# Patient Record
Sex: FEMALE | Race: BLACK OR AFRICAN AMERICAN | NOT HISPANIC OR LATINO | Employment: FULL TIME | URBAN - METROPOLITAN AREA
[De-identification: names, ages, dates, MRNs, and addresses within clinical notes are randomized per-mention and may not be internally consistent; named-entity substitution may affect disease eponyms.]

---

## 2018-07-26 ENCOUNTER — OFFICE VISIT (OUTPATIENT)
Dept: INTERNAL MEDICINE | Facility: CLINIC | Age: 53
End: 2018-07-26
Payer: COMMERCIAL

## 2018-07-26 ENCOUNTER — HOSPITAL ENCOUNTER (OUTPATIENT)
Dept: RADIOLOGY | Facility: HOSPITAL | Age: 53
Discharge: HOME | End: 2018-07-26
Attending: FAMILY MEDICINE
Payer: COMMERCIAL

## 2018-07-26 VITALS
HEART RATE: 74 BPM | WEIGHT: 153.44 LBS | BODY MASS INDEX: 24.66 KG/M2 | SYSTOLIC BLOOD PRESSURE: 122 MMHG | DIASTOLIC BLOOD PRESSURE: 82 MMHG | HEIGHT: 66 IN | TEMPERATURE: 99 F

## 2018-07-26 DIAGNOSIS — M79.642 PAIN IN BOTH HANDS: ICD-10-CM

## 2018-07-26 DIAGNOSIS — R63.4 WEIGHT LOSS: ICD-10-CM

## 2018-07-26 DIAGNOSIS — M79.89 SWELLING OF BOTH HANDS: ICD-10-CM

## 2018-07-26 DIAGNOSIS — D50.0 IRON DEFICIENCY ANEMIA DUE TO CHRONIC BLOOD LOSS: ICD-10-CM

## 2018-07-26 DIAGNOSIS — M79.89 SWELLING OF BOTH HANDS: Primary | ICD-10-CM

## 2018-07-26 DIAGNOSIS — M79.641 PAIN IN BOTH HANDS: ICD-10-CM

## 2018-07-26 PROCEDURE — 99204 OFFICE O/P NEW MOD 45 MIN: CPT | Mod: S$GLB,,, | Performed by: FAMILY MEDICINE

## 2018-07-26 PROCEDURE — 3008F BODY MASS INDEX DOCD: CPT | Mod: CPTII,S$GLB,, | Performed by: FAMILY MEDICINE

## 2018-07-26 PROCEDURE — 73120 X-RAY EXAM OF HAND: CPT | Mod: 50,TC,FY,PO

## 2018-07-26 PROCEDURE — 99999 PR PBB SHADOW E&M-NEW PATIENT-LVL III: CPT | Mod: PBBFAC,,, | Performed by: FAMILY MEDICINE

## 2018-07-26 PROCEDURE — 73120 X-RAY EXAM OF HAND: CPT | Mod: 26,50,, | Performed by: RADIOLOGY

## 2018-07-26 RX ORDER — IRON,CARBONYL/ASCORBIC ACID 100-250 MG
1 TABLET ORAL DAILY
COMMUNITY
Start: 2018-02-28 | End: 2018-12-04 | Stop reason: SDUPTHER

## 2018-07-26 RX ORDER — NAPROXEN AND ESOMEPRAZOLE MAGNESIUM 20; 500 MG/1; MG/1
1 TABLET, DELAYED RELEASE ORAL 2 TIMES DAILY
COMMUNITY
Start: 2018-01-09 | End: 2019-04-01

## 2018-07-26 NOTE — PROGRESS NOTES
"Subjective:      Patient ID: Ayesha Arcos is a 53 y.o. female.    Chief Complaint: Establish Care (physical)    HPI  52 yo female here to establish care.  She saw her PCP in Feb, outside/Dr. Wang and had annual/preop done.    She had R carpal tunnel earlier this year with Dr. Flores.  She is having a lot of swelling/pain in her hands.  Daily she feels the tightness and pain.  Using NSAIDs PRN.    No redness.  Swelling in feet as well.  Had injection in wrist/L hand for tendonitis and that was by hand specialist.  That was a few mos ago.  Has noticed her weight coming down.  Normal BMs, no diarrhea.  No palpitations.  Skin change mainly in hands.  Shiny/darker in color.  Was on HCTZ in past for BP, but that has been over a year since she has taken it.  Gyn done 8/2017  Colon 2/2017    Past Medical History:   Diagnosis Date    Carpal tunnel syndrome, bilateral     Hypertension      Family History   Problem Relation Age of Onset    Diabetes Mother     Heart disease Father     Hypertension Father     Early death Father         Early 50's    Stroke Paternal Uncle     Heart disease Paternal Uncle      Past Surgical History:   Procedure Laterality Date    CARPAL TUNNEL RELEASE      right    skin cancer removal      nose    TUBAL LIGATION       Social History   Substance Use Topics    Smoking status: Never Smoker    Smokeless tobacco: Never Used    Alcohol use Yes      Comment: social        /82 (BP Location: Right arm, Patient Position: Sitting, BP Method: Large (Manual))   Pulse 74   Temp 98.5 °F (36.9 °C) (Tympanic)   Ht 5' 5.5" (1.664 m)   Wt 69.6 kg (153 lb 7 oz)   LMP 06/25/2018   BMI 25.15 kg/m²     Review of Systems   Constitutional: Positive for unexpected weight change. Negative for activity change, appetite change, chills, diaphoresis, fatigue and fever.   HENT: Negative for ear pain, hearing loss, postnasal drip, rhinorrhea and tinnitus.    Eyes: Negative for visual disturbance. "   Respiratory: Negative for cough, shortness of breath and wheezing.    Cardiovascular: Negative for chest pain, palpitations and leg swelling.   Gastrointestinal: Negative for abdominal distention, abdominal pain, constipation and diarrhea.   Genitourinary: Negative for dysuria, frequency, hematuria and urgency.        Cycle had been gone 4 mos, had one in June.  Nothing since.     Musculoskeletal: Positive for arthralgias and joint swelling. Negative for back pain.   Neurological: Negative for weakness and headaches.   Hematological: Negative for adenopathy.   Psychiatric/Behavioral: Negative for confusion and decreased concentration.       Objective:     Physical Exam   Constitutional: She is oriented to person, place, and time. She appears well-developed and well-nourished. No distress.   HENT:   Right Ear: External ear normal.   Left Ear: External ear normal.   Nose: Nose normal.   Mouth/Throat: Oropharynx is clear and moist.   Eyes: Conjunctivae are normal. Pupils are equal, round, and reactive to light.   Neck: Normal range of motion. Neck supple. Carotid bruit is not present. No thyromegaly present.   Cardiovascular: Normal rate, regular rhythm and normal heart sounds.    Pulmonary/Chest: Effort normal and breath sounds normal. No respiratory distress. She has no wheezes. She has no rales.   Abdominal: Soft. Bowel sounds are normal. She exhibits no distension. There is no tenderness. There is no guarding.   Musculoskeletal: She exhibits edema.   BL hands are puffy/swollen  DIP in most fingers with some enlargement    BL feet are puffy  No pitting leg edema   Lymphadenopathy:     She has no cervical adenopathy.   Neurological: She is alert and oriented to person, place, and time. No cranial nerve deficit.   Skin: Skin is warm and dry. No rash noted.   Psychiatric: She has a normal mood and affect. Her behavior is normal. Judgment and thought content normal.   Nursing note and vitals reviewed.      No results  found for: WBC, HGB, HCT, PLT, CHOL, TRIG, HDL, LDLDIRECT, ALT, AST, NA, K, CL, CREATININE, BUN, CO2, TSH, PSA, INR, GLUF, HGBA1C, MICROALBUR    Assessment:     1. Swelling of both hands    2. Pain in both hands    3. Weight loss    4. Iron deficiency anemia due to chronic blood loss         Plan:     Swelling of both hands  -     CBC auto differential; Future; Expected date: 07/26/2018  -     Ferritin; Future; Expected date: 07/26/2018  -     Iron and TIBC; Future; Expected date: 07/26/2018  -     Basic metabolic panel; Future; Expected date: 07/26/2018  -     TSH; Future; Expected date: 07/26/2018  -     MOLLY; Future; Expected date: 07/26/2018  -     Sedimentation rate; Future; Expected date: 07/26/2018  -     X-Ray Hand 2 View Bilat; Future; Expected date: 07/26/2018  -     Uric acid; Future; Expected date: 07/26/2018    Pain in both hands  -     CBC auto differential; Future; Expected date: 07/26/2018  -     Ferritin; Future; Expected date: 07/26/2018  -     Iron and TIBC; Future; Expected date: 07/26/2018  -     Basic metabolic panel; Future; Expected date: 07/26/2018  -     TSH; Future; Expected date: 07/26/2018  -     MOLLY; Future; Expected date: 07/26/2018  -     Sedimentation rate; Future; Expected date: 07/26/2018  -     Uric acid; Future; Expected date: 07/26/2018    Weight loss  -     CBC auto differential; Future; Expected date: 07/26/2018  -     Ferritin; Future; Expected date: 07/26/2018  -     Iron and TIBC; Future; Expected date: 07/26/2018  -     Basic metabolic panel; Future; Expected date: 07/26/2018  -     TSH; Future; Expected date: 07/26/2018  -     MOLLY; Future; Expected date: 07/26/2018  -     Sedimentation rate; Future; Expected date: 07/26/2018  -     Hemoglobin A1c; Future; Expected date: 01/25/2019    Iron deficiency anemia due to chronic blood loss    Reviewed outside records  Will update labs and screen for autoimmune process as well  Check TSH and iron levels  Decrease NSAIDs and try  tylenol  Drink more water  Xrays of BL hands show thinning of the bones and degenerative changes  F/u to be determined once above is reviewed

## 2018-07-30 ENCOUNTER — DOCUMENTATION ONLY (OUTPATIENT)
Dept: RHEUMATOLOGY | Facility: CLINIC | Age: 53
End: 2018-07-30

## 2018-07-30 ENCOUNTER — TELEPHONE (OUTPATIENT)
Dept: INTERNAL MEDICINE | Facility: CLINIC | Age: 53
End: 2018-07-30

## 2018-07-30 DIAGNOSIS — R76.8 POSITIVE ANA (ANTINUCLEAR ANTIBODY): ICD-10-CM

## 2018-07-30 DIAGNOSIS — M25.541 ARTHRALGIA OF BOTH HANDS: Primary | ICD-10-CM

## 2018-07-30 DIAGNOSIS — M25.542 ARTHRALGIA OF BOTH HANDS: Primary | ICD-10-CM

## 2018-07-30 NOTE — TELEPHONE ENCOUNTER
Pls let pt know that her hand xrays show arthritic changes.    Her labs show a +MOLLY, but the remaining lupus panel is neg.  Her inflammatory marker is mildly elevated.  I would recommend seeing Rheumatology, see if you can get her in with new one, Dr. Thomas.  Her iron levels are mildly low, continue daily iron.  Normal sugar/thyroid levels.

## 2018-07-30 NOTE — TELEPHONE ENCOUNTER
I returned call and advised megha that we will call her once results are back and she expressed understanding.ernie

## 2018-08-02 ENCOUNTER — TELEPHONE (OUTPATIENT)
Dept: RHEUMATOLOGY | Facility: CLINIC | Age: 53
End: 2018-08-02

## 2018-08-03 ENCOUNTER — OFFICE VISIT (OUTPATIENT)
Dept: RHEUMATOLOGY | Facility: CLINIC | Age: 53
End: 2018-08-03
Payer: COMMERCIAL

## 2018-08-03 VITALS
DIASTOLIC BLOOD PRESSURE: 77 MMHG | SYSTOLIC BLOOD PRESSURE: 130 MMHG | BODY MASS INDEX: 24.94 KG/M2 | HEIGHT: 66 IN | HEART RATE: 83 BPM | WEIGHT: 155.19 LBS

## 2018-08-03 DIAGNOSIS — M25.50 ARTHRALGIA, UNSPECIFIED JOINT: Primary | ICD-10-CM

## 2018-08-03 DIAGNOSIS — M05.9 SEROPOSITIVE RHEUMATOID ARTHRITIS: ICD-10-CM

## 2018-08-03 DIAGNOSIS — R76.8 ANA POSITIVE: ICD-10-CM

## 2018-08-03 PROCEDURE — 99205 OFFICE O/P NEW HI 60 MIN: CPT | Mod: S$GLB,,, | Performed by: INTERNAL MEDICINE

## 2018-08-03 PROCEDURE — 3008F BODY MASS INDEX DOCD: CPT | Mod: CPTII,S$GLB,, | Performed by: INTERNAL MEDICINE

## 2018-08-03 PROCEDURE — 99999 PR PBB SHADOW E&M-EST. PATIENT-LVL III: CPT | Mod: PBBFAC,,, | Performed by: INTERNAL MEDICINE

## 2018-08-03 RX ORDER — PREDNISONE 5 MG/1
5 TABLET ORAL DAILY
Qty: 60 TABLET | Refills: 2 | Status: SHIPPED | OUTPATIENT
Start: 2018-08-03 | End: 2018-08-10 | Stop reason: SDUPTHER

## 2018-08-03 NOTE — PATIENT INSTRUCTIONS
Prednisone tablets  What is this medicine?  PREDNISONE (PRED ni sone) is a corticosteroid. It is commonly used to treat inflammation of the skin, joints, lungs, and other organs. Common conditions treated include asthma, allergies, and arthritis. It is also used for other conditions, such as blood disorders and diseases of the adrenal glands.  How should I use this medicine?  Take this medicine by mouth with a glass of water. Follow the directions on the prescription label. Take this medicine with food. If you are taking this medicine once a day, take it in the morning. Do not take more medicine than you are told to take. Do not suddenly stop taking your medicine because you may develop a severe reaction. Your doctor will tell you how much medicine to take. If your doctor wants you to stop the medicine, the dose may be slowly lowered over time to avoid any side effects.  Talk to your pediatrician regarding the use of this medicine in children. Special care may be needed.  What side effects may I notice from receiving this medicine?  Side effects that you should report to your doctor or health care professional as soon as possible:  · allergic reactions like skin rash, itching or hives, swelling of the face, lips, or tongue  · changes in emotions or moods  · changes in vision  · depressed mood  · eye pain  · fever or chills, cough, sore throat, pain or difficulty passing urine  · increased thirst  · swelling of ankles, feet  Side effects that usually do not require medical attention (report to your doctor or health care professional if they continue or are bothersome):  · confusion, excitement, restlessness  · headache  · nausea, vomiting  · skin problems, acne, thin and shiny skin  · trouble sleeping  · weight gain  What may interact with this medicine?  Do not take this medicine with any of the following medications:  · metyrapone  · mifepristone  This medicine may also interact with the following  medications:  · aminoglutethimide  · amphotericin B  · aspirin and aspirin-like medicines  · barbiturates  · certain medicines for diabetes, like glipizide or glyburide  · cholestyramine  · cholinesterase inhibitors  · cyclosporine  · digoxin  · diuretics  · ephedrine  · female hormones, like estrogens and birth control pills  · isoniazid  · ketoconazole  · NSAIDS, medicines for pain and inflammation, like ibuprofen or naproxen  · phenytoin  · rifampin  · toxoids  · vaccines  · warfarin  What if I miss a dose?  If you miss a dose, take it as soon as you can. If it is almost time for your next dose, talk to your doctor or health care professional. You may need to miss a dose or take an extra dose. Do not take double or extra doses without advice.  Where should I keep my medicine?  Keep out of the reach of children.  Store at room temperature between 15 and 30 degrees C (59 and 86 degrees F). Protect from light. Keep container tightly closed. Throw away any unused medicine after the expiration date.  What should I tell my health care provider before I take this medicine?  They need to know if you have any of these conditions:  · Cushing's syndrome  · diabetes  · glaucoma  · heart disease  · high blood pressure  · infection (especially a virus infection such as chickenpox, cold sores, or herpes)  · kidney disease  · liver disease  · mental illness  · myasthenia gravis  · osteoporosis  · seizures  · stomach or intestine problems  · thyroid disease  · an unusual or allergic reaction to lactose, prednisone, other medicines, foods, dyes, or preservatives  · pregnant or trying to get pregnant  · breast-feeding  What should I watch for while using this medicine?  Visit your doctor or health care professional for regular checks on your progress. If you are taking this medicine over a prolonged period, carry an identification card with your name and address, the type and dose of your medicine, and your doctor's name and  address.  This medicine may increase your risk of getting an infection. Tell your doctor or health care professional if you are around anyone with measles or chickenpox, or if you develop sores or blisters that do not heal properly.  If you are going to have surgery, tell your doctor or health care professional that you have taken this medicine within the last twelve months.  Ask your doctor or health care professional about your diet. You may need to lower the amount of salt you eat.  This medicine may affect blood sugar levels. If you have diabetes, check with your doctor or health care professional before you change your diet or the dose of your diabetic medicine.  NOTE:This sheet is a summary. It may not cover all possible information. If you have questions about this medicine, talk to your doctor, pharmacist, or health care provider. Copyright© 2017 [employer]

## 2018-08-03 NOTE — TELEPHONE ENCOUNTER
Notified pt of results and recommendations. She verbalized understanding. Pt has rheumatology appt today

## 2018-08-03 NOTE — PROGRESS NOTES
RHEUMATOLOGY OUTPATIENT CLINIC NOTE    8/3/2018    Attending Rheumatologist: Perez Thomas  Primary Care Provider: Primary Doctor No   Physician Requesting Consultation: Aaareferral Self  No address on file  Chief Complaint/Reason For Consultation:  Consult for joint pain and + MOLLY      Subjective:       HPI  Ayesha Arcos is a 53 y.o. Black or  female with joint pain and swelling to assess for rheumatic disease.  Patient's main complaint is hand arthralgias and swelling.  Started approximately on 2/2018, mostly on PIPs, MCPs, and wrists b/l.  Reports association with stiffness of approximately 30 minutes.  Notes inactivity aggravates sx.  Difficulty closing her fists, opening doors/jars due to pain.  No significant response to OTC medication or topical therapy.  Denies any Raynaud's phenomenon, rash, /GI complaints.      Review of Systems   Constitutional: Negative for fever and weight loss.   HENT:        Denies eye or mouth sicca symptoms, denies oral ulcers, denies parotid swelling, denies swollen glands.   Eyes: Negative for blurred vision, pain and redness.   Respiratory: Negative for cough, hemoptysis, sputum production and shortness of breath.    Cardiovascular: Negative for chest pain, orthopnea, leg swelling and PND.   Gastrointestinal: Negative for abdominal pain, blood in stool, constipation, diarrhea and heartburn.        Denies dysphagia.   Genitourinary: Negative for dysuria and hematuria.        Denies genital ulcers or sicca symptoms, denies foaming of the urine, denies nephrolithiasis.   Musculoskeletal: Positive for joint pain. Negative for back pain, myalgias and neck pain.        +Stiffness  +swelling   Skin: Negative for rash.        Denies photosensitivity, denies alopecia, denies sclerodactyly, denies Raynaud's phenomenon,denies digital ulcers, no psoriasis, ulcerations, no purpura, denies nodules.   Neurological: Negative for tingling, sensory change, focal weakness,  seizures, weakness and headaches.        Denies transient ischemic attack, denies strokes, denies seizures   Endo/Heme/Allergies: Does not bruise/bleed easily.        Denies fetal loss,frequent infections, denies deep vein thrombosis or pulmonary embolism.   Psychiatric/Behavioral: Negative for depression and suicidal ideas.   All other systems reviewed and are negative.    Past Medical History:   Diagnosis Date    Carpal tunnel syndrome, bilateral     Hypertension      Past Surgical History:   Procedure Laterality Date    CARPAL TUNNEL RELEASE      right    skin cancer removal      nose    TUBAL LIGATION       Family History   Problem Relation Age of Onset    Diabetes Mother     Heart disease Father     Hypertension Father     Early death Father         Early 50's    Stroke Paternal Uncle     Heart disease Paternal Uncle      History   Alcohol Use    Yes     Comment: social      History   Smoking Status    Never Smoker   Smokeless Tobacco    Never Used     History   Drug Use No       Current Outpatient Prescriptions:     diclofenac sodium (PENNSAID) 2 % SoPk, Apply 40 mg topically 2 (two) times daily., Disp: , Rfl:     iron-vitamin C 100-250 mg, ICAR-C, 100-250 mg Tab, Take 1 tablet by mouth once daily., Disp: , Rfl:     multivit-min/FA/lycopen/lutein (CENTRUM SILVER ULTRA MEN'S ORAL), Take 1 tablet by mouth once daily., Disp: , Rfl:     naproxen-esomeprazole (VIMOVO) 500-20 mg TbID, Take 1 tablet by mouth 2 (two) times daily., Disp: , Rfl:     predniSONE (DELTASONE) 5 MG tablet, Take 1 tablet (5 mg total) by mouth once daily., Disp: 60 tablet, Rfl: 2       Objective:         Vitals:    08/03/18 1630   BP: 130/77   Pulse: 83     Physical Exam   Nursing note and vitals reviewed.  Constitutional: She is oriented to person, place, and time and well-developed, well-nourished, and in no distress.   HENT:   Head: Normocephalic.   no oral ulcers, normal pooling of saliva.  no parotid enlargement.    Eyes: Conjunctivae are normal. Pupils are equal, round, and reactive to light.   Absent Episcleritis/scleritis.   Neck: Normal range of motion.   Cardiovascular: Normal rate and regular rhythm.    No murmur heard.  Pulmonary/Chest: Effort normal and breath sounds normal. No respiratory distress. She has no rales.   Abdominal: Soft. She exhibits no mass. There is no tenderness.   Lymphadenopathy:     She has no cervical adenopathy.   Neurological: She is alert and oriented to person, place, and time. No cranial nerve deficit. Gait normal.   absent sensory deficits  muscle strength 5/5 through.   SLR -, Lhermitte's sign - Spurling's test -   Skin: No rash noted.     No Skin fibrosis, teleangiectasias, rashes, discoid lesions, purpura, skin ulcers, nodules, or livedo reticularis, nail pitting.    Capillaroscopy: normal     Musculoskeletal: Normal range of motion. She exhibits no edema, tenderness or deformity.   Synovitis + / Tenderness +  / Warmth +  Present on PIPs, mostly MCPs and Wrists    : diminished    AROM: intact, painful wrist extension.  PROM: intact    Devices used by patient: none     Reviewed old and all outside pertinent medical records available.    All lab results personally reviewed and interpreted by me.  Lab Results   Component Value Date    WBC 9.79 07/26/2018    HGB 12.0 07/26/2018    HCT 41.1 07/26/2018    MCV 84 07/26/2018    MCH 24.4 (L) 07/26/2018    MCHC 29.2 (L) 07/26/2018    RDW 17.2 (H) 07/26/2018     (H) 07/26/2018    MPV 10.1 07/26/2018       Lab Results   Component Value Date     07/26/2018    K 4.1 07/26/2018     07/26/2018    CO2 23 07/26/2018    GLU 84 07/26/2018    BUN 8 07/26/2018    CALCIUM 9.6 07/26/2018       No results found for: COLORU, APPEARANCEUA, SPECGRAV, PHUR, PROTEINUA, GLUCOSEU, KETONESU, BLOODU, LEUKOCYTESUR, NITRITE, UROBILINOGEN    No results found for: CRP    Lab Results   Component Value Date    SEDRATE 27 (H) 07/26/2018       Lab Results    Component Value Date    SEDRATE 27 (H) 07/26/2018       No components found for: 25OHVITDTOT, 46QYEPTX8, 79WVXVPA5, METHODNOTE    Lab Results   Component Value Date    URICACID 4.5 07/26/2018       No components found for: TSPOTTB    Rheum Labs:  MOLLY HEP-2 Titer Positive >=1:2560 Homogeneous      Anti Sm Antibody 0.00 - 19.99 EU 1.56    Anti-Sm Interpretation Negative Negative    Comment: <20 EU...............Negative   20 - 25 EU...........Borderline   >25 EU...............Positive   Borderline results are repeated before reporting. If   repeat results are still borderline, the sample has no   significant antibody.    Anti-SSA Antibody 0.00 - 19.99 EU 1.71    Anti-SSA Interpretation Negative Negative    Comment: <20 EU...............Negative   20 - 25 EU...........Borderline   >25 EU...............Positive   Borderline results are repeated before reporting. If   repeat results are still borderline, the sample has no   significant antibody.    Anti-SSB Antibody 0.00 - 19.99 EU 0.82    Anti-SSB Interpretation Negative Negative    Comment: <20 EU...............Negative   20 - 25 EU...........Borderline   >25 EU...............Positive   Borderline results are repeated before reporting. If   repeat results are still borderline, the sample has no   significant antibody.    ds DNA Ab Negative 1:10 Negative 1:10    Comment: Performed by fluorescent crithidia assay.   Anti Sm/RNP Antibody 0.00 - 19.99 EU 4.58    Anti-Sm/RNP Interpretation Negative Negative    Comment: <20 EU...............Negative   20 - 25 EU...........Borderline   >25 EU...............Positive   Borderline results are repeated before reporting. If   repeat results are still borderline, the sample has no   significant antibody.        Imaging:  All imaging reviewed and independently  interpreted by me.  XR hands 7/2018  The bones appear demineralized.  Multi articular degenerative changes are seen including at the 1st carpometacarpal joint and at the  distal interphalangeal joints with osteophyte formation.  Findings are most pronounced at the right 5th distal interphalangeal joint with possible mild erosive changes.     ASSESSMENT / PLAN:     Ayesha Arcos is a 53 y.o. Black or  female with:    1. Arthralgia, unspecified joint  -     high likelyhood of inflammatory arthritis, currently on flare.  -     will complete w/u and start therapy with PDN for now  -     C3 complement;   -     C4 complement;  -     C-reactive protein;   -     Cyclic citrul peptide antibody, IgG;  -     Rheumatoid factor;   -     Hepatitis panel,   -     Quantiferon Gold TB;    2. MOLLY +  -     active arthritis but no other features of CTD at this time  -     C3 complement;   -     C4 complement;  -     Anti-DNA antibody, double-stranded;  -     DRVVT;   -     Cardiolipin antibody;   -     Beta-2 glycoprotein antibodies;   -     Protein / creatinine ratio, urine;   -     Urinalysis;     3. PDN use  -     discussed clinical significant side effects of therapy     Return to care in 1 month    Method of contact with patient concerns: MyChart attn Rheumatology    ** Addendum:  Labs reviewed, no doubt regarding diagnosis of seropositive rheumatoid arthritis.  Discussed with patient.  Continue prednisone 10 mg daily.  Will start methotrexate goal of 15 mg per week with folic acid supplementation.      Perez Thomas M.D.  Rheumatology Department   [facility] [place] [address], [place] [address]  Phone: [phone]  Fax: [phone]

## 2018-08-08 ENCOUNTER — LAB VISIT (OUTPATIENT)
Dept: LAB | Facility: HOSPITAL | Age: 53
End: 2018-08-08
Attending: INTERNAL MEDICINE
Payer: COMMERCIAL

## 2018-08-08 DIAGNOSIS — M25.50 ARTHRALGIA, UNSPECIFIED JOINT: ICD-10-CM

## 2018-08-08 LAB
C3 SERPL-MCNC: 118 MG/DL
C4 SERPL-MCNC: 20 MG/DL
CCP AB SER IA-ACNC: 149.4 U/ML
CRP SERPL-MCNC: 1.5 MG/L
ERYTHROCYTE [SEDIMENTATION RATE] IN BLOOD BY WESTERGREN METHOD: 13 MM/HR
RHEUMATOID FACT SERPL-ACNC: 57 IU/ML

## 2018-08-08 PROCEDURE — 36415 COLL VENOUS BLD VENIPUNCTURE: CPT | Mod: PO

## 2018-08-08 PROCEDURE — 86200 CCP ANTIBODY: CPT

## 2018-08-08 PROCEDURE — 86225 DNA ANTIBODY NATIVE: CPT

## 2018-08-08 PROCEDURE — 86431 RHEUMATOID FACTOR QUANT: CPT

## 2018-08-08 PROCEDURE — 86160 COMPLEMENT ANTIGEN: CPT | Mod: 59

## 2018-08-08 PROCEDURE — 86146 BETA-2 GLYCOPROTEIN ANTIBODY: CPT | Mod: 59

## 2018-08-08 PROCEDURE — 86160 COMPLEMENT ANTIGEN: CPT

## 2018-08-08 PROCEDURE — 86147 CARDIOLIPIN ANTIBODY EA IG: CPT | Mod: 59

## 2018-08-08 PROCEDURE — 86140 C-REACTIVE PROTEIN: CPT

## 2018-08-08 PROCEDURE — 85651 RBC SED RATE NONAUTOMATED: CPT | Mod: PO

## 2018-08-08 PROCEDURE — 80074 ACUTE HEPATITIS PANEL: CPT

## 2018-08-08 PROCEDURE — 85613 RUSSELL VIPER VENOM DILUTED: CPT

## 2018-08-09 LAB
DSDNA AB SER-ACNC: NORMAL [IU]/ML
HAV IGM SERPL QL IA: NEGATIVE
HBV CORE IGM SERPL QL IA: NEGATIVE
HBV SURFACE AG SERPL QL IA: NEGATIVE
HCV AB SERPL QL IA: NEGATIVE

## 2018-08-10 ENCOUNTER — TELEPHONE (OUTPATIENT)
Dept: RHEUMATOLOGY | Facility: CLINIC | Age: 53
End: 2018-08-10

## 2018-08-10 LAB
CARDIOLIPIN IGG SER IA-ACNC: <9.4 GPL
CARDIOLIPIN IGM SER IA-ACNC: <9.4 MPL
LA PPP-IMP: NEGATIVE

## 2018-08-10 RX ORDER — METHOTREXATE 2.5 MG/1
15 TABLET ORAL
Qty: 24 TABLET | Refills: 3 | Status: CANCELLED | OUTPATIENT
Start: 2018-08-10

## 2018-08-10 RX ORDER — FOLIC ACID 1 MG/1
1 TABLET ORAL DAILY
Qty: 30 TABLET | Refills: 4 | Status: SHIPPED | OUTPATIENT
Start: 2018-08-10 | End: 2018-09-04 | Stop reason: SDUPTHER

## 2018-08-10 RX ORDER — METHOTREXATE 2.5 MG/1
15 TABLET ORAL
Qty: 24 TABLET | Refills: 3 | OUTPATIENT
Start: 2018-08-10 | End: 2018-08-13 | Stop reason: SDUPTHER

## 2018-08-10 RX ORDER — PREDNISONE 5 MG/1
10 TABLET ORAL DAILY
Qty: 30 TABLET | Refills: 2 | Status: SHIPPED | OUTPATIENT
Start: 2018-08-10 | End: 2018-09-04 | Stop reason: SDUPTHER

## 2018-08-11 LAB
B2 GLYCOPROT1 IGA SER QL: <9 SAU
B2 GLYCOPROT1 IGG SER QL: <9 SGU
B2 GLYCOPROT1 IGM SER QL: <9 SMU

## 2018-08-13 ENCOUNTER — TELEPHONE (OUTPATIENT)
Dept: RHEUMATOLOGY | Facility: CLINIC | Age: 53
End: 2018-08-13

## 2018-08-13 NOTE — TELEPHONE ENCOUNTER
----- Message from Radha Lyon sent at 8/13/2018  1:13 PM CDT -----  Contact: pt  Calling to speak with officer regarding her test results.

## 2018-08-14 RX ORDER — METHOTREXATE 2.5 MG/1
15 TABLET ORAL
Qty: 24 TABLET | Refills: 3 | OUTPATIENT
Start: 2018-08-14 | End: 2018-09-04 | Stop reason: SDUPTHER

## 2018-08-14 NOTE — TELEPHONE ENCOUNTER
----- Message from Jose Martin Guevara LPN sent at 8/13/2018  4:52 PM CDT -----  Jose Martin Guevara LPN at 8/10/2018 12:04 PM     Status: Signed       Spoke with pt and advised her the Folic acid and prednisone was sent over and the MTX was printed. Reviewed over MTX directions with patient. Pt verbalized understanding.       MTX RX??

## 2018-08-15 ENCOUNTER — TELEPHONE (OUTPATIENT)
Dept: RHEUMATOLOGY | Facility: CLINIC | Age: 53
End: 2018-08-15

## 2018-08-15 NOTE — TELEPHONE ENCOUNTER
----- Message from Scarlett Burkett sent at 8/15/2018  2:50 PM CDT -----  Pt needs call back rg medication that she is suppose to be taking..[phone]

## 2018-08-15 NOTE — TELEPHONE ENCOUNTER
----- Message from Marcy Tavares sent at 8/15/2018  4:38 PM CDT -----  Contact: pt  The pt states she is returning a missed call, no additional info given, the pt can be reached at [phone]///[id number]

## 2018-08-27 ENCOUNTER — TELEPHONE (OUTPATIENT)
Dept: RHEUMATOLOGY | Facility: CLINIC | Age: 53
End: 2018-08-27

## 2018-08-27 NOTE — TELEPHONE ENCOUNTER
----- Message from Mary Chamberlain sent at 8/27/2018 11:50 AM CDT -----  Contact: Patient  Patient called to speak with the nurse; she is having some side effects from the medication she just started. She started it about 3 weeks ago. She is having stomach cramps and constipation. She wants to know what she should do. She can be contacted at [phone].    Thanks,  Mary

## 2018-08-29 ENCOUNTER — TELEPHONE (OUTPATIENT)
Dept: RHEUMATOLOGY | Facility: CLINIC | Age: 53
End: 2018-08-29

## 2018-08-29 NOTE — TELEPHONE ENCOUNTER
Spoke with patient,. States that last week after she took Methotrexate , Prednisone and Iron she was constipated . She stopped her Iron 2 days ago , and is due for Methotrexate today. Patient informed that Iron will constipate

## 2018-08-29 NOTE — TELEPHONE ENCOUNTER
----- Message from Heriberto Nagel sent at 8/29/2018 10:39 AM CDT -----  Contact: Pt  Please give pt a call at ..[phone] (home) she is returning the nurse call.

## 2018-08-31 DIAGNOSIS — Z12.39 BREAST CANCER SCREENING: ICD-10-CM

## 2018-09-04 ENCOUNTER — OFFICE VISIT (OUTPATIENT)
Dept: RHEUMATOLOGY | Facility: CLINIC | Age: 53
End: 2018-09-04
Payer: COMMERCIAL

## 2018-09-04 VITALS
SYSTOLIC BLOOD PRESSURE: 125 MMHG | HEIGHT: 65 IN | WEIGHT: 157.44 LBS | DIASTOLIC BLOOD PRESSURE: 84 MMHG | HEART RATE: 82 BPM | BODY MASS INDEX: 26.23 KG/M2

## 2018-09-04 DIAGNOSIS — M06.9 RHEUMATOID ARTHRITIS, INVOLVING UNSPECIFIED SITE, UNSPECIFIED RHEUMATOID FACTOR PRESENCE: Primary | ICD-10-CM

## 2018-09-04 PROCEDURE — 3008F BODY MASS INDEX DOCD: CPT | Mod: CPTII,S$GLB,, | Performed by: INTERNAL MEDICINE

## 2018-09-04 PROCEDURE — 99999 PR PBB SHADOW E&M-EST. PATIENT-LVL III: CPT | Mod: PBBFAC,,, | Performed by: INTERNAL MEDICINE

## 2018-09-04 PROCEDURE — 99214 OFFICE O/P EST MOD 30 MIN: CPT | Mod: S$GLB,,, | Performed by: INTERNAL MEDICINE

## 2018-09-04 RX ORDER — HYDROXYCHLOROQUINE SULFATE 200 MG/1
400 TABLET, FILM COATED ORAL DAILY
Qty: 60 TABLET | Refills: 3 | Status: SHIPPED | OUTPATIENT
Start: 2018-09-04 | End: 2018-12-04 | Stop reason: SDUPTHER

## 2018-09-04 RX ORDER — METHOTREXATE 2.5 MG/1
15 TABLET ORAL
Qty: 24 TABLET | Refills: 3 | Status: SHIPPED | OUTPATIENT
Start: 2018-09-04 | End: 2018-12-04

## 2018-09-04 RX ORDER — PREDNISONE 5 MG/1
10 TABLET ORAL DAILY
Qty: 30 TABLET | Refills: 0 | Status: SHIPPED | OUTPATIENT
Start: 2018-09-04 | End: 2018-10-04

## 2018-09-04 RX ORDER — FOLIC ACID 1 MG/1
1 TABLET ORAL DAILY
Qty: 30 TABLET | Refills: 4 | Status: SHIPPED | OUTPATIENT
Start: 2018-09-04 | End: 2018-12-04 | Stop reason: SDUPTHER

## 2018-09-04 NOTE — PROGRESS NOTES
RHEUMATOLOGY OUTPATIENT CLINIC NOTE    9/4/2018    Attending Rheumatologist: Perez Thomas  Primary Care Provider: Neel Matos MD   Physician Requesting Consultation: No referring provider defined for this encounter.  Chief Complaint/Reason For Consultation:  Consult for joint pain and + MOLLY      Subjective:       JOSE Arcos is a 53 y.o. Black or  female  comes follow-up for positive antibodies and arthritis.      Patient was last seen in early August.  Clinical picture suggestive of inflammatory arthritis.   Presentation and workup consistent with seropositive rheumatoid arthritis, with early erosions.   Was started on prednisone and methotrexate, with very good results per patient.  No acute complaints today.  Refers morning stiffness resolved and swelling going down.  Slight tenderness remains on the left wrist.  Tolerating methotrexate well.  Denies any rash,  Raynaud's,  or GI complaints.     Review of Systems   Constitutional: Negative for fever and weight loss.   HENT:        Denies eye or mouth sicca symptoms, denies oral ulcers, denies parotid swelling, denies swollen glands.   Eyes: Negative for blurred vision.   Respiratory: Negative for cough and shortness of breath.    Cardiovascular: Negative for chest pain.   Gastrointestinal: Negative for abdominal pain, blood in stool, constipation and diarrhea.   Genitourinary: Negative for dysuria and hematuria.        Denies genital ulcers or sicca symptoms, denies foaming of the urine, denies nephrolithiasis.   Musculoskeletal: Negative for joint pain.        +swelling   Skin: Negative for rash.        denies Raynaud's phenomenon   Neurological: Negative for tingling, sensory change, focal weakness, seizures, weakness and headaches.        Denies transient ischemic attack, denies strokes, denies seizures   Endo/Heme/Allergies: Does not bruise/bleed easily.        Denies fetal loss, frequent infections, denies deep vein  thrombosis or pulmonary embolism.   All other systems reviewed and are negative.    Past Medical History:   Diagnosis Date    Carpal tunnel syndrome, bilateral     Hypertension      Past Surgical History:   Procedure Laterality Date    CARPAL TUNNEL RELEASE      right    skin cancer removal      nose    TUBAL LIGATION       Family History   Problem Relation Age of Onset    Diabetes Mother     Heart disease Father     Hypertension Father     Early death Father         Early 50's    Stroke Paternal Uncle     Heart disease Paternal Uncle      Social History     Substance and Sexual Activity   Alcohol Use Yes    Comment: social      Social History     Tobacco Use   Smoking Status Never Smoker   Smokeless Tobacco Never Used     Social History     Substance and Sexual Activity   Drug Use No       Current Outpatient Medications:     diclofenac sodium (PENNSAID) 2 % SoPk, Apply 40 mg topically 2 (two) times daily., Disp: , Rfl:     folic acid (FOLVITE) 1 MG tablet, Take 1 tablet (1 mg total) by mouth once daily., Disp: 30 tablet, Rfl: 4    methotrexate 2.5 MG Tab, Take 6 tablets (15 mg total) by mouth every 7 days., Disp: 24 tablet, Rfl: 3    multivit-min/FA/lycopen/lutein (CENTRUM SILVER ULTRA MEN'S ORAL), Take 1 tablet by mouth once daily., Disp: , Rfl:     naproxen-esomeprazole (VIMOVO) 500-20 mg TbID, Take 1 tablet by mouth 2 (two) times daily., Disp: , Rfl:     predniSONE (DELTASONE) 5 MG tablet, Take 2 tablets (10 mg total) by mouth once daily., Disp: 30 tablet, Rfl: 0    hydroxychloroquine (PLAQUENIL) 200 mg tablet, Take 2 tablets (400 mg total) by mouth once daily., Disp: 60 tablet, Rfl: 3    iron-vitamin C 100-250 mg, ICAR-C, 100-250 mg Tab, Take 1 tablet by mouth once daily., Disp: , Rfl:        Objective:         Vitals:    09/04/18 0814   BP: 125/84   Pulse: 82     Physical Exam   Nursing note and vitals reviewed.  Constitutional: She is oriented to person, place, and time and well-developed,  well-nourished, and in no distress.   HENT:   Head: Normocephalic.   Eyes: Conjunctivae are normal.   Absent Episcleritis/scleritis.   Neck: Normal range of motion.   Cardiovascular: Normal rate.    Pulmonary/Chest: Effort normal. No respiratory distress.   Abdominal: She exhibits no distension.   Neurological: She is alert and oriented to person, place, and time. No cranial nerve deficit. Gait normal.   absent sensory deficits  muscle strength 5/5 through.   Skin: No rash noted.     No Skin fibrosis, teleangiectasias, rashes, discoid lesions, purpura, skin ulcers, nodules, or livedo reticularis, nail pitting.    Capillaroscopy: + dilated loops,  No capillary dropping or micro hemorrhages.     Musculoskeletal: Normal range of motion. She exhibits edema and tenderness. She exhibits no deformity.   Synovitis + / Tenderness +  / Warmth +  Chronic swelling PIP is and wrists.    :   Good    AROM: intact.  PROM: intact    Devices used by patient: none     Reviewed old and all outside pertinent medical records available.    All lab results personally reviewed and interpreted by me.  Lab Results   Component Value Date    WBC 9.79 07/26/2018    HGB 12.0 07/26/2018    HCT 41.1 07/26/2018    MCV 84 07/26/2018    MCH 24.4 (L) 07/26/2018    MCHC 29.2 (L) 07/26/2018    RDW 17.2 (H) 07/26/2018     (H) 07/26/2018    MPV 10.1 07/26/2018       Lab Results   Component Value Date     07/26/2018    K 4.1 07/26/2018     07/26/2018    CO2 23 07/26/2018    GLU 84 07/26/2018    BUN 8 07/26/2018    CALCIUM 9.6 07/26/2018       Lab Results   Component Value Date    COLORU Yellow 08/08/2018    APPEARANCEUA Clear 08/08/2018    SPECGRAV 1.025 08/08/2018    PHUR 6.0 08/08/2018    PROTEINUA Negative 08/08/2018    KETONESU Negative 08/08/2018    LEUKOCYTESUR Negative 08/08/2018    NITRITE Negative 08/08/2018       Lab Results   Component Value Date    CRP 1.5 08/08/2018       Lab Results   Component Value Date    SEDRATE 13  08/08/2018       Lab Results   Component Value Date    RF 57.0 (H) 08/08/2018    SEDRATE 13 08/08/2018       No components found for: 25OHVITDTOT, 77YJSFXQ8, 48EBHWFS7, METHODNOTE    Lab Results   Component Value Date    URICACID 4.5 07/26/2018       No components found for: TSPOTTB    Rheum Labs:  · MOLLY 1 in 2560 homogeneous pattern  · Rheumatoid factor 57  ·   ·  ALMA panel negative  ·  beta 2 anti cardiolipin negative  ·  C3-4 normal    ·   Hepatitis /TB negative 8/2018    Imaging:  All imaging reviewed and independently  interpreted by me.  XR hands 7/2018  The bones appear demineralized.  Multi articular degenerative changes are seen including at the 1st carpometacarpal joint and at the distal interphalangeal joints with osteophyte formation.  Findings are most pronounced at the right 5th distal interphalangeal joint with possible mild erosive changes.     ASSESSMENT / PLAN:     Ayesha Arcos is a 53 y.o. Black or  female with:    1.  Seropositive RA  -      Based on synovitis PIP ease, MCPs, wrists.  Probable erosions on imaging.    Increase a APR and autoantibodies.  -      CDAI:   Moderate disease activity  -      Currently on methotrexate 15 mg and prednisone therapy very good results noted per patient (stared on 8/2018).  -      Will add Plaquenil therapy.  Clinical significance side effects of medication discussed.  -      Taper prednisone to discontinue.  -      ESR, CRP before next visit.          2.  DMARD use  -     Monitor for toxicity  -     CBC, CMP before next visit.   -     Yearly eye clinic evaluation while on Plaquenil.    3. MOLLY +  -     active arthritis but no other features of CTD at this time  -     Dilated loops on capillaroscopy.  No history of vascular or obstetric events.  -     Will continue to monitor.    4. PDN use  -     discussed clinical significant side effects of therapy  -     Calcium vitamin-D supplementation.    5.   Health maintenance  -      Medication  counseling done.  -  Discussed importance of maintaining vaccinations up-to-date.    Recommended for shingles vaccine.       Return to care in 3 months    Method of contact with patient concerns: [facility]      Perez Thomas M.D.  Rheumatology Department   [facility] [place] [address], [place] [address]  Phone: [phone]  Fax: [phone]

## 2018-11-20 ENCOUNTER — TELEPHONE (OUTPATIENT)
Dept: INTERNAL MEDICINE | Facility: CLINIC | Age: 53
End: 2018-11-20

## 2018-11-20 NOTE — TELEPHONE ENCOUNTER
----- Message from Nathalie Corcoran sent at 11/20/2018 11:37 AM CST -----  Contact: pt  States she's returning a call. Please call pt at [phone]. Thank you

## 2018-11-20 NOTE — TELEPHONE ENCOUNTER
----- Message from Gonzales Marks sent at 11/20/2018 10:33 AM CST -----  Contact: Pt.   Pt is calling regarding requesting  to have nurse call pt. Pt states that the new medication is turning pt lips dark and Pt would like to know if their is anything that she can do for this. Pt would like nurse to call Pt. regarding this matter. .[phone] (home)

## 2018-11-27 ENCOUNTER — LAB VISIT (OUTPATIENT)
Dept: LAB | Facility: HOSPITAL | Age: 53
End: 2018-11-27
Attending: INTERNAL MEDICINE
Payer: COMMERCIAL

## 2018-11-27 DIAGNOSIS — M06.9 RHEUMATOID ARTHRITIS, INVOLVING UNSPECIFIED SITE, UNSPECIFIED RHEUMATOID FACTOR PRESENCE: ICD-10-CM

## 2018-11-27 LAB
ALBUMIN SERPL BCP-MCNC: 3.7 G/DL
ALP SERPL-CCNC: 74 U/L
ALT SERPL W/O P-5'-P-CCNC: 18 U/L
ANION GAP SERPL CALC-SCNC: 8 MMOL/L
AST SERPL-CCNC: 21 U/L
BASOPHILS # BLD AUTO: 0.02 K/UL
BASOPHILS NFR BLD: 0.3 %
BILIRUB SERPL-MCNC: 0.6 MG/DL
BUN SERPL-MCNC: 8 MG/DL
CALCIUM SERPL-MCNC: 9.4 MG/DL
CHLORIDE SERPL-SCNC: 107 MMOL/L
CO2 SERPL-SCNC: 22 MMOL/L
CREAT SERPL-MCNC: 0.7 MG/DL
CRP SERPL-MCNC: 5.2 MG/L
DIFFERENTIAL METHOD: ABNORMAL
EOSINOPHIL # BLD AUTO: 0.6 K/UL
EOSINOPHIL NFR BLD: 7.8 %
ERYTHROCYTE [DISTWIDTH] IN BLOOD BY AUTOMATED COUNT: 16.6 %
ERYTHROCYTE [SEDIMENTATION RATE] IN BLOOD BY WESTERGREN METHOD: 30 MM/HR
EST. GFR  (AFRICAN AMERICAN): >60 ML/MIN/1.73 M^2
EST. GFR  (NON AFRICAN AMERICAN): >60 ML/MIN/1.73 M^2
GLUCOSE SERPL-MCNC: 85 MG/DL
HCT VFR BLD AUTO: 39.4 %
HGB BLD-MCNC: 12.7 G/DL
LYMPHOCYTES # BLD AUTO: 1.8 K/UL
LYMPHOCYTES NFR BLD: 25.1 %
MCH RBC QN AUTO: 26.7 PG
MCHC RBC AUTO-ENTMCNC: 32.2 G/DL
MCV RBC AUTO: 83 FL
MONOCYTES # BLD AUTO: 0.6 K/UL
MONOCYTES NFR BLD: 7.8 %
NEUTROPHILS # BLD AUTO: 4.3 K/UL
NEUTROPHILS NFR BLD: 59 %
PLATELET # BLD AUTO: 303 K/UL
PMV BLD AUTO: 10.1 FL
POTASSIUM SERPL-SCNC: 3.9 MMOL/L
PROT SERPL-MCNC: 7.8 G/DL
RBC # BLD AUTO: 4.75 M/UL
SODIUM SERPL-SCNC: 137 MMOL/L
WBC # BLD AUTO: 7.29 K/UL

## 2018-11-27 PROCEDURE — 86140 C-REACTIVE PROTEIN: CPT

## 2018-11-27 PROCEDURE — 85025 COMPLETE CBC W/AUTO DIFF WBC: CPT | Mod: PO

## 2018-11-27 PROCEDURE — 85651 RBC SED RATE NONAUTOMATED: CPT | Mod: PO

## 2018-11-27 PROCEDURE — 36415 COLL VENOUS BLD VENIPUNCTURE: CPT | Mod: PO

## 2018-11-27 PROCEDURE — 80053 COMPREHEN METABOLIC PANEL: CPT | Mod: PO

## 2018-12-04 ENCOUNTER — OFFICE VISIT (OUTPATIENT)
Dept: RHEUMATOLOGY | Facility: CLINIC | Age: 53
End: 2018-12-04
Payer: COMMERCIAL

## 2018-12-04 VITALS
BODY MASS INDEX: 25.56 KG/M2 | WEIGHT: 153.44 LBS | DIASTOLIC BLOOD PRESSURE: 84 MMHG | SYSTOLIC BLOOD PRESSURE: 124 MMHG | HEIGHT: 65 IN | HEART RATE: 88 BPM

## 2018-12-04 DIAGNOSIS — M06.9 RHEUMATOID ARTHRITIS, INVOLVING UNSPECIFIED SITE, UNSPECIFIED RHEUMATOID FACTOR PRESENCE: Primary | ICD-10-CM

## 2018-12-04 PROCEDURE — 99999 PR PBB SHADOW E&M-EST. PATIENT-LVL III: CPT | Mod: PBBFAC,,, | Performed by: INTERNAL MEDICINE

## 2018-12-04 PROCEDURE — 99214 OFFICE O/P EST MOD 30 MIN: CPT | Mod: S$GLB,,, | Performed by: INTERNAL MEDICINE

## 2018-12-04 PROCEDURE — 3008F BODY MASS INDEX DOCD: CPT | Mod: CPTII,S$GLB,, | Performed by: INTERNAL MEDICINE

## 2018-12-04 RX ORDER — METHOTREXATE 2.5 MG/1
20 TABLET ORAL
Qty: 40 TABLET | Refills: 3 | Status: SHIPPED | OUTPATIENT
Start: 2018-12-04 | End: 2019-02-01 | Stop reason: SDUPTHER

## 2018-12-04 RX ORDER — FOLIC ACID 1 MG/1
1 TABLET ORAL DAILY
Qty: 30 TABLET | Refills: 4 | Status: SHIPPED | OUTPATIENT
Start: 2018-12-04 | End: 2019-03-08 | Stop reason: SDUPTHER

## 2018-12-04 RX ORDER — IRON,CARBONYL/ASCORBIC ACID 100-250 MG
1 TABLET ORAL DAILY
COMMUNITY
Start: 2018-12-04 | End: 2019-01-03

## 2018-12-04 RX ORDER — HYDROXYCHLOROQUINE SULFATE 200 MG/1
400 TABLET, FILM COATED ORAL DAILY
Qty: 60 TABLET | Refills: 3 | Status: SHIPPED | OUTPATIENT
Start: 2018-12-04 | End: 2019-05-01 | Stop reason: SDUPTHER

## 2018-12-04 NOTE — PROGRESS NOTES
RHEUMATOLOGY OUTPATIENT CLINIC NOTE    12/4/2018    Attending Rheumatologist: Perez Thomas  Primary Care Provider: Neel Matos MD   Physician Requesting Consultation: No referring provider defined for this encounter.  Chief Complaint/Reason For Consultation:  Consult for joint pain and + MOLLY      Subjective:       HPI  Ayesha Arcos is a 53 y.o. Black or  female  comes follow-up for positive antibodies and arthritis.    Today  Last seen September Plaquenil added and short prednisone taper given for active rheumatoid arthritis.  Voices no acute complaints today.  Reports intermittent joint pain and slight swelling on her wrists.  No present today.  Able to perform activities of daily living without difficulty.  Morning stiffness less than 1 hr.  Taking DMARDs as prescribed without side effects.  Denies any rash,  Raynaud's,  or GI complaints.     Review of Systems   Constitutional: Negative for chills and fever.   HENT:        Denies eye or mouth sicca symptoms, denies oral ulcers, denies parotid swelling, denies swollen glands.   Eyes: Negative for pain and redness.   Respiratory: Negative for cough and shortness of breath.    Cardiovascular: Negative for chest pain and leg swelling.   Gastrointestinal: Negative for abdominal pain and diarrhea.   Genitourinary: Negative for dysuria and hematuria.   Musculoskeletal: Positive for joint pain.        +swelling (improving)   Skin: Negative for rash.   Neurological: Negative for tingling and focal weakness.   Endo/Heme/Allergies: Does not bruise/bleed easily.   Psychiatric/Behavioral: Negative for substance abuse. The patient does not have insomnia.    All other systems reviewed and are negative.    Past Medical History:   Diagnosis Date    Carpal tunnel syndrome, bilateral     Hypertension      Past Surgical History:   Procedure Laterality Date    CARPAL TUNNEL RELEASE      right    skin cancer removal      nose    TUBAL LIGATION        Family History   Problem Relation Age of Onset    Diabetes Mother     Heart disease Father     Hypertension Father     Early death Father         Early 50's    Stroke Paternal Uncle     Heart disease Paternal Uncle      Social History     Substance and Sexual Activity   Alcohol Use Yes    Comment: social      Social History     Tobacco Use   Smoking Status Never Smoker   Smokeless Tobacco Never Used     Social History     Substance and Sexual Activity   Drug Use No       Current Outpatient Medications:     diclofenac sodium (PENNSAID) 2 % SoPk, Apply 40 mg topically 2 (two) times daily., Disp: , Rfl:     folic acid (FOLVITE) 1 MG tablet, Take 1 tablet (1 mg total) by mouth once daily., Disp: 30 tablet, Rfl: 4    hydroxychloroquine (PLAQUENIL) 200 mg tablet, Take 2 tablets (400 mg total) by mouth once daily., Disp: 60 tablet, Rfl: 3    methotrexate 2.5 MG Tab, Take 8 tablets (20 mg total) by mouth every 7 days. Split dose same day: 4 tablets in AM, 4 tablets in PM, Disp: 40 tablet, Rfl: 3    multivit-min/FA/lycopen/lutein (CENTRUM SILVER ULTRA MEN'S ORAL), Take 1 tablet by mouth once daily., Disp: , Rfl:     naproxen-esomeprazole (VIMOVO) 500-20 mg TbID, Take 1 tablet by mouth 2 (two) times daily., Disp: , Rfl:     iron-vitamin C 100-250 mg, ICAR-C, 100-250 mg Tab, Take 1 tablet by mouth once daily., Disp: , Rfl:        Objective:         Vitals:    12/04/18 0903   BP: 124/84   Pulse: 88     Physical Exam   Nursing note and vitals reviewed.  Constitutional: She is oriented to person, place, and time and well-developed, well-nourished, and in no distress.   HENT:   Head: Normocephalic.   Eyes: Conjunctivae are normal.   Absent Episcleritis/scleritis.   Neck: Normal range of motion.   Cardiovascular: Normal rate.    Pulmonary/Chest: Effort normal. No respiratory distress.   Abdominal: She exhibits no distension.   Neurological: She is alert and oriented to person, place, and time. No cranial nerve  deficit. Gait normal.   absent sensory deficits  muscle strength 5/5 through.   Skin: No rash noted.     Musculoskeletal: Normal range of motion. She exhibits edema and tenderness. She exhibits no deformity.   Synovitis + wrists / Tenderness +  / Warmth +    : strong    AROM: intact.  PROM: intact    Devices used by patient: none     Reviewed old and all outside pertinent medical records available.    All lab results personally reviewed and interpreted by me.  Lab Results   Component Value Date    WBC 7.29 11/27/2018    HGB 12.7 11/27/2018    HCT 39.4 11/27/2018    MCV 83 11/27/2018    MCH 26.7 (L) 11/27/2018    MCHC 32.2 11/27/2018    RDW 16.6 (H) 11/27/2018     11/27/2018    MPV 10.1 11/27/2018       Lab Results   Component Value Date     11/27/2018    K 3.9 11/27/2018     11/27/2018    CO2 22 (L) 11/27/2018    GLU 85 11/27/2018    BUN 8 11/27/2018    CALCIUM 9.4 11/27/2018    PROT 7.8 11/27/2018    ALBUMIN 3.7 11/27/2018    BILITOT 0.6 11/27/2018    AST 21 11/27/2018    ALKPHOS 74 11/27/2018    ALT 18 11/27/2018       Lab Results   Component Value Date    COLORU Yellow 08/08/2018    APPEARANCEUA Clear 08/08/2018    SPECGRAV 1.025 08/08/2018    PHUR 6.0 08/08/2018    PROTEINUA Negative 08/08/2018    KETONESU Negative 08/08/2018    LEUKOCYTESUR Negative 08/08/2018    NITRITE Negative 08/08/2018       Lab Results   Component Value Date    CRP 5.2 11/27/2018       Lab Results   Component Value Date    SEDRATE 30 (H) 11/27/2018       Lab Results   Component Value Date    RF 57.0 (H) 08/08/2018    SEDRATE 30 (H) 11/27/2018       No components found for: 25OHVITDTOT, 48LYNYDR5, 87ARYBMJ8, METHODNOTE    Lab Results   Component Value Date    URICACID 4.5 07/26/2018       No components found for: TSPOTTB    Rheum Labs:  · MOLLY 1 in 2560 homogeneous pattern  · Rheumatoid factor 57  ·   ·  ALMA panel negative  ·  beta 2 anti cardiolipin negative  ·  C3-4 normal    ·   Hepatitis /TB negative  8/2018    Imaging:  All imaging reviewed and independently  interpreted by me.  XR hands 7/2018  The bones appear demineralized.  Multi articular degenerative changes are seen including at the 1st carpometacarpal joint and at the distal interphalangeal joints with osteophyte formation.  Findings are most pronounced at the right 5th distal interphalangeal joint with possible mild erosive changes.     ASSESSMENT / PLAN:     Ayesha Arcos is a 53 y.o. Black or  female with:    1.  Seropositive, erosive RA  -      CDAI:   Moderate disease activity.  Superimposed OA as well.  -      Currently on methotrexate 15 mg po once per week and HCQ 400mg, FA daily.  -      Will increase MTX to 20mg po split dose same day.    -      Will consider for biologic therapy if active RA.  -      ESR, CRP before next visit.          2.  DMARD use  -     Monitor for toxicity  -     CBC, CMP before next visit.   -     Yearly eye clinic evaluation while on Plaquenil.    3. MOLLY +  -     no other features of CTD at this time  -     Will continue to monitor.    4.   Health maintenance  -      Medication counseling done.  -  Discussed importance of maintaining vaccinations up-to-date.    Recommended for shingles vaccine.       Return to care in 3 months    Method of contact with patient concerns: [facility]      Disclaimer:  This note is prepared using voice recognition software and as such is likely to have errors and has not been proof read. Please contact me for questions.       Perez Thomas M.D.  Rheumatology Department   [facility] [place] [address], [place] [address]  Phone: [phone]  Fax: [phone]

## 2018-12-04 NOTE — PATIENT INSTRUCTIONS
Tofacitinib tablets  What is this medicine?  TOFACITINIB (TOE fa SYE ti nib) is a medicine that works on the immune system. This medicine is used to treat rheumatoid arthritis.  How should I use this medicine?  Take this medicine by mouth with a glass of water. Follow the directions on the prescription label. You can take it with or without food. If it upsets your stomach, take it with food.  A special MedGuide will be given to you by the pharmacist with each prescription and refill. Be sure to read this information carefully each time.  Talk to your pediatrician regarding the use of this medicine in children. Special care may be needed.  What side effects may I notice from receiving this medicine?  Side effects that you should report to your doctor or health care professional as soon as possible:  · allergic reactions like skin rash, itching or hives, swelling of the face, lips, or tongue  · breathing problems  · dizziness  · signs of infection - fever or chills, cough, sore throat, pain or trouble passing urine  · signs and symptoms of liver injury like dark yellow or brown urine; general ill feeling or flu-like symptoms; light-colored stools; loss of appetite; nausea; right upper belly pain; unusually weak or tired; yellowing of the eyes or skin  · stomach pain or a sudden change in bowel habits  · unusually weak or tired  Side effects that usually do not require medical attention (Report these to your doctor or health care professional if they continue or are bothersome.):  · diarrhea  · headache  · muscle aches  · runny nose  · sinus trouble  What may interact with this medicine?  Do not take this medicine with any of the following medications:  · abatacept  · adalimumab  · anakinra  · azathioprine  · certolizumab  · cyclosporine  · etanercept  · golimumab  · infliximab  · live vaccines  · rituximab  · tacrolimus  · tocilizumabThis medicine may also interact with the following  medications:  · fluconazole  · ketoconazole  · rifampin  · vaccines  What if I miss a dose?  If you miss a dose, take it as soon as you can. If it is almost time for your next dose, take only that dose. Do not take double or extra doses.  Where should I keep my medicine?  Keep out of the reach of children.  Store between 20 and 25 degrees C (68 and 77 degrees F). Throw away any unused medicine after the expiration date.  What should I tell my health care provider before I take this medicine?  They need to know if you have any of these conditions:  · cancer  · diabetes  · high cholesterol  · immune system problems  · infection (especially a virus infection such as chickenpox, cold sores, or herpes)  · kidney disease  · liver disease  · low blood counts, like low white cell, platelet, or red cell counts  · stomach or intestine problems  · an unusual or allergic reaction to tofacitinib, other medicines, foods, dyes, or preservatives  · pregnant or trying to get pregnant  · breast-feeding  What should I watch for while using this medicine?  Tell your doctor or healthcare professional if your symptoms do not start to get better or if they get worse.  Avoid taking products that contain aspirin, acetaminophen, ibuprofen, naproxen, or ketoprofen unless instructed by your doctor. These medicines may hide a fever.  Call your doctor or health care professional for advice if you get a fever, chills or sore throat, or other symptoms of a cold or flu. Do not treat yourself. This drug decreases your body's ability to fight infections. Try to avoid being around people who are sick.  NOTE:This sheet is a summary. It may not cover all possible information. If you have questions about this medicine, talk to your doctor, pharmacist, or health care provider. Copyright© 2017 [employer]        Infliximab injection  What is this medicine?  INFLIXIMAB (in FLIX i mab) is used to treat Crohn's disease and ulcerative colitis. It is also  used to treat ankylosing spondylitis, psoriasis, and some forms of arthritis.  How should I use this medicine?  This medicine is for injection into a vein. It is usually given by a health care professional in a hospital or clinic setting.  A special MedGuide will be given to you by the pharmacist with each prescription and refill. Be sure to read this information carefully each time.  Talk to your pediatrician regarding the use of this medicine in children. Special care may be needed.  What side effects may I notice from receiving this medicine?  Side effects that you should report to your doctor or health care professional as soon as possible:  · allergic reactions like skin rash, itching or hives, swelling of the face, lips, or tongue  · chest pain  · fever or chills, usually related to the infusion  · muscle or joint pain  · red, scaly patches or raised bumps on the skin  · signs of infection - fever or chills, cough, sore throat, pain or difficulty passing urine  · swollen lymph nodes in the neck, underarm, or groin areas  · unexplained weight loss  · unusual bleeding or bruising  · unusually weak or tired  · yellowing of the eyes or skin  Side effects that usually do not require medical attention (report to your doctor or health care professional if they continue or are bothersome):  · headache  · heartburn or stomach pain  · nausea, vomiting  What may interact with this medicine?  Do not take this medicine with any of the following medications:  · anakinra  · rilonacept  This medicine may also interact with the following medications:  · vaccines  What if I miss a dose?  It is important not to miss your dose. Call your doctor or health care professional if you are unable to keep an appointment.  Where should I keep my medicine?  This drug is given in a hospital or clinic and will not be stored at home.  What should I tell my health care provider before I take this medicine?  They need to know if you have any of  these conditions:  · diabetes  · exposure to tuberculosis  · heart failure  · hepatitis or liver disease  · immune system problems  · infection  · lung or breathing disease, like COPD  · multiple sclerosis  · current or past resident of [place]  · seizure disorder  · an unusual or allergic reaction to infliximab, mouse proteins, other medicines, foods, dyes, or preservatives  · pregnant or trying to get pregnant  · breast-feeding  What should I watch for while using this medicine?  Visit your doctor or health care professional for regular checks on your progress.  If you get a cold or other infection while receiving this medicine, call your doctor or health care professional. Do not treat yourself. This medicine may decrease your body's ability to fight infections. Before beginning therapy, your doctor may do a test to see if you have been exposed to tuberculosis.  This medicine may make the symptoms of heart failure worse in some patients. If you notice symptoms such as increased shortness of breath or swelling of the ankles or legs, contact your health care provider right away.  If you are going to have surgery or dental work, tell your health care professional or dentist that you have received this medicine.  If you take this medicine for plaque psoriasis, stay out of the sun. If you cannot avoid being in the sun, wear protective clothing and use sunscreen. Do not use sun lamps or tanning beds/booths.  NOTE:This sheet is a summary. It may not cover all possible information. If you have questions about this medicine, talk to your doctor, pharmacist, or health care provider. Copyright© 2017 [employer]

## 2019-01-02 ENCOUNTER — TELEPHONE (OUTPATIENT)
Dept: RHEUMATOLOGY | Facility: CLINIC | Age: 54
End: 2019-01-02

## 2019-01-02 NOTE — TELEPHONE ENCOUNTER
----- Message from Mary Chamberlain sent at 1/2/2019 10:22 AM CST -----  Contact: Patient  Patient called to speak with the nurse; she stated the pharmacy didn't have her new prescription orders on file.     They didn't have the prescriptions for:   1. Methotrexate.  2. Iron    [facility] [address] [place] [address] [facility]  [address] [place] [id number]  Phone: [phone] Fax: [phone]    She can be contacted at [phone].    Thanks,  Mary

## 2019-01-02 NOTE — TELEPHONE ENCOUNTER
Spoke with Hiral the Pharmacist at [facility] she stated that she needed to clarify order/instruction for Methotrexate. Order/instruction clarified. Mrs. Arcos is aware.

## 2019-02-01 RX ORDER — METHOTREXATE 2.5 MG/1
20 TABLET ORAL
Qty: 40 TABLET | Refills: 3 | Status: SHIPPED | OUTPATIENT
Start: 2019-02-01 | End: 2019-07-12 | Stop reason: SDUPTHER

## 2019-02-01 NOTE — TELEPHONE ENCOUNTER
----- Message from Marcy Tavares sent at 2/1/2019 10:44 AM CST -----  Contact: pt  The pt state she needs a new prescription on her arthritis medication, the pt gave no additional info can be reached at [phone]///[email or web address]

## 2019-03-01 ENCOUNTER — LAB VISIT (OUTPATIENT)
Dept: LAB | Facility: HOSPITAL | Age: 54
End: 2019-03-01
Attending: INTERNAL MEDICINE
Payer: COMMERCIAL

## 2019-03-01 DIAGNOSIS — M06.9 RHEUMATOID ARTHRITIS, INVOLVING UNSPECIFIED SITE, UNSPECIFIED RHEUMATOID FACTOR PRESENCE: ICD-10-CM

## 2019-03-01 LAB
ALBUMIN SERPL BCP-MCNC: 3.6 G/DL
ALP SERPL-CCNC: 81 U/L
ALT SERPL W/O P-5'-P-CCNC: 19 U/L
ANION GAP SERPL CALC-SCNC: 9 MMOL/L
AST SERPL-CCNC: 19 U/L
BASOPHILS # BLD AUTO: 0.06 K/UL
BASOPHILS NFR BLD: 0.7 %
BILIRUB SERPL-MCNC: 0.4 MG/DL
BUN SERPL-MCNC: 10 MG/DL
CALCIUM SERPL-MCNC: 9.4 MG/DL
CHLORIDE SERPL-SCNC: 106 MMOL/L
CO2 SERPL-SCNC: 25 MMOL/L
CREAT SERPL-MCNC: 0.8 MG/DL
CRP SERPL-MCNC: 2.2 MG/L
DIFFERENTIAL METHOD: ABNORMAL
EOSINOPHIL # BLD AUTO: 0.7 K/UL
EOSINOPHIL NFR BLD: 8.4 %
ERYTHROCYTE [DISTWIDTH] IN BLOOD BY AUTOMATED COUNT: 15.6 %
ERYTHROCYTE [SEDIMENTATION RATE] IN BLOOD BY WESTERGREN METHOD: 49 MM/HR
EST. GFR  (AFRICAN AMERICAN): >60 ML/MIN/1.73 M^2
EST. GFR  (NON AFRICAN AMERICAN): >60 ML/MIN/1.73 M^2
GLUCOSE SERPL-MCNC: 92 MG/DL
HCT VFR BLD AUTO: 40.7 %
HGB BLD-MCNC: 12.5 G/DL
IMM GRANULOCYTES # BLD AUTO: 0.02 K/UL
IMM GRANULOCYTES NFR BLD AUTO: 0.2 %
LYMPHOCYTES # BLD AUTO: 2.3 K/UL
LYMPHOCYTES NFR BLD: 28.6 %
MCH RBC QN AUTO: 26.3 PG
MCHC RBC AUTO-ENTMCNC: 30.7 G/DL
MCV RBC AUTO: 86 FL
MONOCYTES # BLD AUTO: 0.6 K/UL
MONOCYTES NFR BLD: 7 %
NEUTROPHILS # BLD AUTO: 4.5 K/UL
NEUTROPHILS NFR BLD: 55.3 %
NRBC BLD-RTO: 0 /100 WBC
PLATELET # BLD AUTO: 326 K/UL
PMV BLD AUTO: 10.4 FL
POTASSIUM SERPL-SCNC: 3.8 MMOL/L
PROT SERPL-MCNC: 7.9 G/DL
RBC # BLD AUTO: 4.76 M/UL
SODIUM SERPL-SCNC: 140 MMOL/L
WBC # BLD AUTO: 8.09 K/UL

## 2019-03-01 PROCEDURE — 85025 COMPLETE CBC W/AUTO DIFF WBC: CPT

## 2019-03-01 PROCEDURE — 86140 C-REACTIVE PROTEIN: CPT

## 2019-03-01 PROCEDURE — 80053 COMPREHEN METABOLIC PANEL: CPT

## 2019-03-01 PROCEDURE — 36415 COLL VENOUS BLD VENIPUNCTURE: CPT

## 2019-03-01 PROCEDURE — 85652 RBC SED RATE AUTOMATED: CPT

## 2019-03-08 ENCOUNTER — OFFICE VISIT (OUTPATIENT)
Dept: RHEUMATOLOGY | Facility: CLINIC | Age: 54
End: 2019-03-08
Payer: COMMERCIAL

## 2019-03-08 VITALS
HEART RATE: 84 BPM | DIASTOLIC BLOOD PRESSURE: 94 MMHG | WEIGHT: 153.25 LBS | HEIGHT: 65 IN | SYSTOLIC BLOOD PRESSURE: 137 MMHG | BODY MASS INDEX: 25.53 KG/M2

## 2019-03-08 DIAGNOSIS — M05.9 SEROPOSITIVE RHEUMATOID ARTHRITIS: Primary | ICD-10-CM

## 2019-03-08 DIAGNOSIS — M19.90 OSTEOARTHRITIS, UNSPECIFIED OSTEOARTHRITIS TYPE, UNSPECIFIED SITE: ICD-10-CM

## 2019-03-08 DIAGNOSIS — Z79.60 LONG-TERM USE OF IMMUNOSUPPRESSANT MEDICATION: ICD-10-CM

## 2019-03-08 DIAGNOSIS — Z71.89 COUNSELING ON HEALTH PROMOTION AND DISEASE PREVENTION: ICD-10-CM

## 2019-03-08 DIAGNOSIS — R76.8 POSITIVE ANA (ANTINUCLEAR ANTIBODY): ICD-10-CM

## 2019-03-08 PROCEDURE — 99999 PR PBB SHADOW E&M-EST. PATIENT-LVL III: ICD-10-PCS | Mod: PBBFAC,,, | Performed by: INTERNAL MEDICINE

## 2019-03-08 PROCEDURE — 99214 OFFICE O/P EST MOD 30 MIN: CPT | Mod: S$GLB,,, | Performed by: INTERNAL MEDICINE

## 2019-03-08 PROCEDURE — 99999 PR PBB SHADOW E&M-EST. PATIENT-LVL III: CPT | Mod: PBBFAC,,, | Performed by: INTERNAL MEDICINE

## 2019-03-08 PROCEDURE — 3008F PR BODY MASS INDEX (BMI) DOCUMENTED: ICD-10-PCS | Mod: CPTII,S$GLB,, | Performed by: INTERNAL MEDICINE

## 2019-03-08 PROCEDURE — 99214 PR OFFICE/OUTPT VISIT, EST, LEVL IV, 30-39 MIN: ICD-10-PCS | Mod: S$GLB,,, | Performed by: INTERNAL MEDICINE

## 2019-03-08 PROCEDURE — 3008F BODY MASS INDEX DOCD: CPT | Mod: CPTII,S$GLB,, | Performed by: INTERNAL MEDICINE

## 2019-03-08 RX ORDER — FOLIC ACID 1 MG/1
1 TABLET ORAL DAILY
Qty: 30 TABLET | Refills: 4 | Status: SHIPPED | OUTPATIENT
Start: 2019-03-08 | End: 2020-02-12 | Stop reason: SDUPTHER

## 2019-03-08 NOTE — PATIENT INSTRUCTIONS
Osteoarthritis: Coping with Pain    There are many ways to control your pain. Youre making a good start by learning about osteoarthritis and its treatments. Knowing more about this condition helps you work with your healthcare provider to find answers to problems. Keeping a positive outlook can help you manage pain from day to day. And making time each day to relax and enjoy yourself may help you control osteoarthritis pain, instead of letting it control you. Try these methods to help you cope with, and even reduce, your pain.  Take control  Relaxing may help relieve muscle aches that result from joint pain. To relax, try these techniques:  · Breathe slowly and calmly and think of a peaceful scene.  · Meditate by focusing your mind on one word, object, or idea.  Getting plenty of sleep can help reduce pain and let you function better. If pain is making it hard for you to sleep, ask your doctor about ways to control pain and ensure a good nights sleep. Cutting back on caffeine and alcohol can help you sleep better. So can going to bed and getting up at about the same time every day.  Use distraction  Getting your mind off the pain may seem hard to do. But it can actually help reduce pain. When you are in pain, try one of these ways of distracting yourself:  · Watch a funny movie with a friend.  · Listen to music you enjoy.  · Read a novel.  · Talk with friends or family.  · Go to a museum, park, or other favorite attraction.  · Arrange to do a regular activity, such as volunteer work.  Heat and cold  Using heat and cold treatments are simple ways to lessen arthritis symptoms:  · Heat soothes stiff joints and tired muscles. Heat works well before exercise, for example. Heat treatments include:  ¨ A warm shower or baths, or soak (for example, fill the sink with warm water and move your fingers, hands, and wrists around in the water)  ¨ A moist heating pad  ¨ A warm, moist wash cloth  ¨ An electric blanket or  throw  · Cold treatments help to numb painful areas and decrease swelling. Cold treatments include the following wrapped in a thin towel:  ¨ An ice pack or bag of ice  ¨ A gel-filled cold pack  ¨ A bag of frozen vegetables, like peas or corn  Be careful when using heat or cold. You can injure your skin. Each treatment should only last for 10 to 20 minutes. Your healthcare provider or therapist can give you specific instructions.  Acupuncture  Acupuncture is a 2000-year-old practice. Practitioners insert thin needles in specific parts of the body. Research shows that it can help to relieve the pain of arthritis.  For more information or to find a practitioner in your area, contact the American Academy of Medical Acupuncture. Its website is: http://www.medicalacupuncture.org/.  Massage  Therapeutic massage has many benefits. It may:  · Help you and your muscles relax  · Improve blood flow to muscles and joints  · Help joints stay more flexible  Look for a certified massage therapist. Many are trained to treat sore muscles and joint pain and stiffness.  Vitamins, supplements, and herbs  People with arthritis, or other long-term conditions that cause pain, often look for alternative ways to lessen pain. Vitamins, supplements, and herbs may or may not help you to feel better. Before you try any vitamin, supplement, or herb, make sure you ask your healthcare provider or pharmacist.  Physical therapy/occupational therapy  Evaluation by a physical therapist and or occupational therapist for assessment for limitations in activities of daily living  Assistance with developing an appropriate exercise routine for both muscle strengthening and cardiovascular health  Weight management  Studies have demonstrated that weight loss in overweight individuals can improve osteoarthritis symptoms.  Talk with your healthcare provider regarding your optimal weight and techniques for weight management if necessary.  Psychological  treatments  Research shows that many psychological therapies or those that deal with thinking and emotions, help people cope with arthritis pain. Therapies include cognitive-behavioral therapy (CBT), pain coping skills training, biofeedback, stress management, and hypnosis. Ask your healthcare provider for more information about these therapies.  For more information about many of these methods, contact the National Center for Complementary and Alternative Medicine (NCCAM) at http://www.[facility].gov.   Date Last Reviewed: 2/14/2016 © 2000-2017 [employer]. [address] [place] [address]. All rights reserved. This information is not intended as a substitute for professional medical care. Always follow your healthcare professional's instructions.        Living with Osteoarthritis    Osteoarthritis is a chronic disease and the most common type of arthritis. But it doesnt have to keep you from leading an active life. You can help control symptoms by exercising and losing weight if you are overweight. Using special tools also helps make life easier. Be sure to see your healthcare provider for scheduled checkups and lab work. If you have questions or concerns between office visits, call your healthcare provider's office.  Make exercise part of your life  Gentle exercise can help lessen your pain. Keep the following in mind:  · Choose exercises that improve joint motion and make your muscles stronger. Your healthcare provider or a physical therapist may suggest a few.  · Stretching and flexibility activities such as yoga and yesi chi may improve pain and joint motion.  · Try low-impact sports, such as walking, biking, or doing exercises in a warm pool.  · Most people should exercise for at least 30 minutes a day on most days of the week. This can be broken up into shorter periods throughout the day.  · Dont push yourself too hard at first. Slowly build up over time.  · Make sure you warm up for  5 to 10 minutes before you exercise.  · If pain and stiffness increase, don't exercise as hard or as long.  Watch your weight  If you weigh more than you should, your weight-bearing joints are under extra pressure. This makes your symptoms worse. To reduce pain and stiffness, try shedding a few of those extra pounds. The tips below may help:  · Start a weight-loss program with the help of your healthcare provider.  · Ask your friends and family for support.  · Join a weight-loss group.  Use special tools  Even simple tasks can be hard to do when your joints hurt. Special tools called assistive devices can make things easier by reducing strain and protecting your joints. Ask your healthcare provider where to find these and other helpful tools:  · Long-handled reachers or grabbers  · Jar openers and button threaders  · Large  for pencils, garden tools, and other hand-held objects  Use mobility and other aids  People with arthritis and other joint problems often use mobility aids to help with walking. For example, they may use canes or walkers. They may also use splints or braces to support joints. Talk with your healthcare provider or physical therapist about these aids:  · A cane to reduce knee or hip pain and help prevent falls  · Splints for your wrists or other joints  · A brace to support a weak knee joint  · Orthotics for toe and foot involvement  Medical and surgical treatments  Discuss medical treatments with your healthcare provider to help reduce your pain and improve joint mobility.  · Topical medicines such as lidocaine, capsaicin, and diclofenac gel  · Oral medicines such as acetaminophen, NSAIDs (nonsteroidal anti-inflammatory medicines) such as ibuprofen and naproxen, or opioid narcotics  · Injections in affected joints such as corticosteroids in various joints, or hyaluronic acid in the knee joints  · Surgical repair or surgical joint replacement with artificial joints  · Complementary therapies  such as heat and cold treatment, massage, acupuncture, supplements, cognitive training, meditation, and others. Discuss these options with your healthcare provider.  Date Last Reviewed: 2/14/2016 © 2000-2017 [employer]. [address] [place] [address], [place] [address]. All rights reserved. This information is not intended as a substitute for professional medical care. Always follow your healthcare professional's instructions.        Duloxetine delayed-release capsules  What is this medicine?  DULOXETINE (doo LOX e teen) is used to treat depression, anxiety, and different types of chronic pain.  How should I use this medicine?  Take this medicine by mouth with a glass of water. Follow the directions on the prescription label. Do not cut, crush or chew this medicine. You can take this medicine with or without food. Take your medicine at regular intervals. Do not take your medicine more often than directed. Do not stop taking this medicine suddenly except upon the advice of your doctor. Stopping this medicine too quickly may cause serious side effects or your condition may worsen.  A special MedGuide will be given to you by the pharmacist with each prescription and refill. Be sure to read this information carefully each time.  Talk to your pediatrician regarding the use of this medicine in children. While this drug may be prescribed for children as young as 7 years of age for selected conditions, precautions do apply.  What side effects may I notice from receiving this medicine?  Side effects that you should report to your doctor or health care professional as soon as possible:  · allergic reactions like skin rash, itching or hives, swelling of the face, lips, or tongue  · changes in blood pressure  · confusion  · dark urine  · dizziness  · fast talking and excited feelings or actions that are out of control  · fast, irregular heartbeat  · fever  · general ill feeling or flu-like symptoms  · hallucination,  loss of contact with reality  · light-colored stools  · loss of balance or coordination  · redness, blistering, peeling or loosening of the skin, including inside the mouth  · right upper belly pain  · seizures  · suicidal thoughts or other mood changes  · trouble concentrating  · trouble passing urine or change in the amount of urine  · unusual bleeding or bruising  · unusually weak or tired  · yellowing of the eyes or skin  Side effects that usually do not require medical attention (report to your doctor or health care professional if they continue or are bothersome):  · blurred vision  · change in appetite  · change in sex drive or performance  · headache  · increased sweating  · nausea  What may interact with this medicine?  Do not take this medicine with any of the following medications:  · certain diet drugs like dexfenfluramine, fenfluramine  · desvenlafaxine  · linezolid  · MAOIs like Azilect, Carbex, Eldepryl, Marplan, Nardil, and Parnate  · methylene blue (intravenous)  · milnacipran  · thioridazine  · venlafaxine  This medicine may also interact with the following medications:  · alcohol  · aspirin and aspirin-like medicines  · certain antibiotics like ciprofloxacin and enoxacin  · certain medicines for blood pressure, heart disease, irregular heart beat  · certain medicines for depression, anxiety, or psychotic disturbances  · certain medicines for migraine headache like almotriptan, eletriptan, frovatriptan, naratriptan, rizatriptan, sumatriptan, zolmitriptan  · certain medicines that treat or prevent blood clots like warfarin, enoxaparin, and dalteparin  · cimetidine  · fentanyl  · lithium  · NSAIDS, medicines for pain and inflammation, like ibuprofen or naproxen  · phentermine  · procarbazine  · sibutramine  · [place]'s wort  · theophylline  · tramadol  · tryptophan  What if I miss a dose?  If you miss a dose, take it as soon as you can. If it is almost time for your next dose, take only that dose.  Do not take double or extra doses.  Where should I keep my medicine?  Keep out of the reach of children.  Store at room temperature between 20 and 25 degrees C (68 to 77 degrees F). Throw away any unused medicine after the expiration date.  What should I tell my health care provider before I take this medicine?  They need to know if you have any of these conditions:  · bipolar disorder or a family history of bipolar disorder  · glaucoma  · kidney disease  · liver disease  · suicidal thoughts or a previous suicide attempt  · taken medicines called MAOIs like Carbex, Eldepryl, Marplan, Nardil, and Parnate within 14 days  · an unusual reaction to duloxetine, other medicines, foods, dyes, or preservatives  · pregnant or trying to get pregnant  · breast-feeding  What should I watch for while using this medicine?  Tell your doctor if your symptoms do not get better or if they get worse. Visit your doctor or health care professional for regular checks on your progress. Because it may take several weeks to see the full effects of this medicine, it is important to continue your treatment as prescribed by your doctor.  Patients and their families should watch out for new or worsening thoughts of suicide or depression. Also watch out for sudden changes in feelings such as feeling anxious, agitated, panicky, irritable, hostile, aggressive, impulsive, severely restless, overly excited and hyperactive, or not being able to sleep. If this happens, especially at the beginning of treatment or after a change in dose, call your health care professional.  You may get drowsy or dizzy. Do not drive, use machinery, or do anything that needs mental alertness until you know how this medicine affects you. Do not stand or sit up quickly, especially if you are an older patient. This reduces the risk of dizzy or fainting spells. Alcohol may interfere with the effect of this medicine. Avoid alcoholic drinks.  This medicine can cause an increase  in blood pressure. This medicine can also cause a sudden drop in your blood pressure, which may make you feel faint and increase the chance of a fall. These effects are most common when you first start the medicine or when the dose is increased, or during use of other medicines that can cause a sudden drop in blood pressure. Check with your doctor for instructions on monitoring your blood pressure while taking this medicine.  Your mouth may get dry. Chewing sugarless gum or sucking hard candy, and drinking plenty of water may help. Contact your doctor if the problem does not go away or is severe.  NOTE:This sheet is a summary. It may not cover all possible information. If you have questions about this medicine, talk to your doctor, pharmacist, or health care provider. Copyright© 2017 [employer]

## 2019-03-08 NOTE — PROGRESS NOTES
RHEUMATOLOGY OUTPATIENT CLINIC NOTE    3/8/2019    Attending Rheumatologist: Perez Thomas  Primary Care Provider: Neel Matos MD   Physician Requesting Consultation: No referring provider defined for this encounter.  Chief Complaint/Reason For Consultation:  Consult for joint pain and + MOLLY      Subjective:       JOSE Arcos is a 53 y.o. Black or  female  comes follow-up for positive antibodies and arthritis.    Today  Last seen on December.  Moderate activity of RA, methotrexate increased.  Voices no acute complaints today.  Refers intermittent episodes of arthralgias mostly on DIPs.  Do not interfere with activities of daily living.  Denies any joint pain or swelling.  Does not have prolonged morning stiffness.  Denies any rash,  Raynaud's,  or GI complaints.     Review of Systems   Constitutional: Negative for chills and fever.   HENT:        Denies eye or mouth sicca symptoms, denies oral ulcers, denies parotid swelling, denies swollen glands.   Eyes: Negative for pain and redness.   Respiratory: Negative for cough and shortness of breath.    Cardiovascular: Negative for chest pain and leg swelling.   Gastrointestinal: Negative for abdominal pain and diarrhea.   Genitourinary: Negative for dysuria and hematuria.   Musculoskeletal: Negative for back pain and joint pain.   Skin: Negative for rash.   Neurological: Negative for tingling and focal weakness.   Endo/Heme/Allergies: Does not bruise/bleed easily.   Psychiatric/Behavioral: Negative for substance abuse. The patient does not have insomnia.    All other systems reviewed and are negative.    Past Medical History:   Diagnosis Date    Carpal tunnel syndrome, bilateral     Hypertension      Past Surgical History:   Procedure Laterality Date    CARPAL TUNNEL RELEASE      right    skin cancer removal      nose    TUBAL LIGATION       Family History   Problem Relation Age of Onset    Diabetes Mother     Heart disease  Father     Hypertension Father     Early death Father         Early 50's    Stroke Paternal Uncle     Heart disease Paternal Uncle      Social History     Substance and Sexual Activity   Alcohol Use Yes    Comment: social      Social History     Tobacco Use   Smoking Status Never Smoker   Smokeless Tobacco Never Used     Social History     Substance and Sexual Activity   Drug Use No       Current Outpatient Medications:     diclofenac sodium (PENNSAID) 2 % SoPk, Apply 40 mg topically 2 (two) times daily., Disp: 1 g, Rfl: 2    folic acid (FOLVITE) 1 MG tablet, Take 1 tablet (1 mg total) by mouth once daily., Disp: 30 tablet, Rfl: 4    hydroxychloroquine (PLAQUENIL) 200 mg tablet, Take 2 tablets (400 mg total) by mouth once daily., Disp: 60 tablet, Rfl: 3    methotrexate 2.5 MG Tab, Take 8 tablets (20 mg total) by mouth every 7 days. Split dose same day: 4 tablets in AM, 4 tablets in PM, Disp: 40 tablet, Rfl: 3    multivit-min/FA/lycopen/lutein (CENTRUM SILVER ULTRA MEN'S ORAL), Take 1 tablet by mouth once daily., Disp: , Rfl:     naproxen-esomeprazole (VIMOVO) 500-20 mg TbID, Take 1 tablet by mouth 2 (two) times daily., Disp: , Rfl:        Objective:         Vitals:    03/08/19 0814   BP: (!) 137/94   Pulse: 84     Physical Exam   Nursing note and vitals reviewed.  Constitutional: She is oriented to person, place, and time and well-developed, well-nourished, and in no distress.   HENT:   Head: Normocephalic.   Eyes: Conjunctivae are normal.   Absent Episcleritis/scleritis.   Neck: Normal range of motion.   Cardiovascular: Normal rate.    Pulmonary/Chest: Effort normal. No respiratory distress.   Abdominal: She exhibits no distension.   Neurological: She is alert and oriented to person, place, and time. No cranial nerve deficit. Gait normal.   absent sensory deficits  muscle strength 5/5 through.   Skin: No rash noted.     Musculoskeletal: Normal range of motion. She exhibits deformity (early Heberden's).  She exhibits no edema or tenderness.   No synovitis  : strong    AROM: intact.  PROM: intact    Devices used by patient: none     Reviewed old and all outside pertinent medical records available.    All lab results personally reviewed and interpreted by me.  Lab Results   Component Value Date    WBC 8.09 03/01/2019    HGB 12.5 03/01/2019    HCT 40.7 03/01/2019    MCV 86 03/01/2019    MCH 26.3 (L) 03/01/2019    MCHC 30.7 (L) 03/01/2019    RDW 15.6 (H) 03/01/2019     03/01/2019    MPV 10.4 03/01/2019       Lab Results   Component Value Date     03/01/2019    K 3.8 03/01/2019     03/01/2019    CO2 25 03/01/2019    GLU 92 03/01/2019    BUN 10 03/01/2019    CALCIUM 9.4 03/01/2019    PROT 7.9 03/01/2019    ALBUMIN 3.6 03/01/2019    BILITOT 0.4 03/01/2019    AST 19 03/01/2019    ALKPHOS 81 03/01/2019    ALT 19 03/01/2019       Lab Results   Component Value Date    COLORU Yellow 08/08/2018    APPEARANCEUA Clear 08/08/2018    SPECGRAV 1.025 08/08/2018    PHUR 6.0 08/08/2018    PROTEINUA Negative 08/08/2018    KETONESU Negative 08/08/2018    LEUKOCYTESUR Negative 08/08/2018    NITRITE Negative 08/08/2018       Lab Results   Component Value Date    CRP 2.2 03/01/2019       Lab Results   Component Value Date    SEDRATE 49 (H) 03/01/2019       Lab Results   Component Value Date    RF 57.0 (H) 08/08/2018    SEDRATE 49 (H) 03/01/2019       No components found for: 25OHVITDTOT, 54YNIXIK3, 08BFAYIU6, METHODNOTE    Lab Results   Component Value Date    URICACID 4.5 07/26/2018       No components found for: TSPOTTB    Rheum Labs:  · MOLLY 1 in 2560 homogeneous pattern  · Rheumatoid factor 57  ·   ·  ALMA panel negative  ·  beta 2 anti cardiolipin negative  ·  C3-4 normal    ·   Hepatitis /TB negative 8/2018    Imaging:  All imaging reviewed and independently  interpreted by me.  XR hands 7/2018  The bones appear demineralized.  Multi articular degenerative changes are seen including at the 1st  carpometacarpal joint and at the distal interphalangeal joints with osteophyte formation.  Findings are most pronounced at the right 5th distal interphalangeal joint with possible mild erosive changes.     ASSESSMENT / PLAN:     Ayesha Arcos is a 53 y.o. Black or  female with:    1.Seropositive, erosive RA  - CDAI: 4-> low disease activity.  Chronic complaints most consistent with DJD.  - Currently on HCQ 400mg daily, methotrexate 20 mg po split dose once per week and daily FA.  - will continue DMARDs unchanged.  - ESR, CRP before next visit.    2. DMARD use  - Monitor for toxicity  - CBC, CMP before next visit.   - Yearly eye clinic evaluation while on Plaquenil.    3. MOLLY +  - remains with no other features of active CTD  - Will continue to monitor.    4. Osteoarthritis  - active complaints consistent with DJD.  - Discussed and recommended exercise (lisa non wt-bearing/swimming)  - resting affected joint for brief periods (<12 h)  - joint braces/splints PRN  - stretching, massage, heat, paraffin wax  - Acetaminophen prn -> standing -> NSAIDs short course (if persistent pain)  - Topicals therapy: Capsaicin / NSAIDs     5. Other specified counseling  - Immunization counseling done.  - Nutrition and exercise counseling.  - Limitation of alcohol consumption.  - Regular exercise:  Aerobic and resistance.  - Medication counseling provided.    Return to care in 3 months    Method of contact with patient concerns: [facility]    Disclaimer:  This note is prepared using voice recognition software and as such is likely to have errors and has not been proof read. Please contact me for questions.     Time spent: 25 minutes in face to face discussion concerning diagnosis, prognosis, review of lab and test results, benefits of treatment as well as management of disease, counseling of patient and coordination of care between various health care providers.  Greater than half the time spent was used  for coordination of care and counseling of patient.    Perez Thomas M.D.  Rheumatology Department   [facility]

## 2019-04-01 ENCOUNTER — HOSPITAL ENCOUNTER (OUTPATIENT)
Dept: RADIOLOGY | Facility: HOSPITAL | Age: 54
Discharge: HOME | End: 2019-04-01
Attending: FAMILY MEDICINE
Payer: COMMERCIAL

## 2019-04-01 ENCOUNTER — OFFICE VISIT (OUTPATIENT)
Dept: INTERNAL MEDICINE | Facility: CLINIC | Age: 54
End: 2019-04-01
Payer: COMMERCIAL

## 2019-04-01 VITALS
HEIGHT: 65 IN | BODY MASS INDEX: 25.53 KG/M2 | SYSTOLIC BLOOD PRESSURE: 128 MMHG | HEART RATE: 74 BPM | DIASTOLIC BLOOD PRESSURE: 86 MMHG | TEMPERATURE: 98 F | WEIGHT: 153.25 LBS

## 2019-04-01 DIAGNOSIS — M25.512 ACUTE PAIN OF LEFT SHOULDER: Primary | ICD-10-CM

## 2019-04-01 DIAGNOSIS — R76.8 POSITIVE ANA (ANTINUCLEAR ANTIBODY): ICD-10-CM

## 2019-04-01 DIAGNOSIS — M25.512 ACUTE PAIN OF LEFT SHOULDER: ICD-10-CM
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
## 2019-04-01 PROCEDURE — 99999 PR PBB SHADOW E&M-EST. PATIENT-LVL III: ICD-10-PCS | Mod: PBBFAC,,, | Performed by: FAMILY MEDICINE

## 2019-04-01 PROCEDURE — 73030 XR SHOULDER COMPLETE 2 OR MORE VIEWS LEFT: ICD-10-PCS | Mod: 26,LT,, | Performed by: RADIOLOGY

## 2019-04-01 PROCEDURE — 3008F PR BODY MASS INDEX (BMI) DOCUMENTED: ICD-10-PCS | Mod: CPTII,S$GLB,, | Performed by: FAMILY MEDICINE

## 2019-04-01 PROCEDURE — 99214 OFFICE O/P EST MOD 30 MIN: CPT | Mod: S$GLB,,, | Performed by: FAMILY MEDICINE

## 2019-04-01 PROCEDURE — 73030 X-RAY EXAM OF SHOULDER: CPT | Mod: 26,LT,, | Performed by: RADIOLOGY

## 2019-04-01 PROCEDURE — 99214 PR OFFICE/OUTPT VISIT, EST, LEVL IV, 30-39 MIN: ICD-10-PCS | Mod: S$GLB,,, | Performed by: FAMILY MEDICINE

## 2019-04-01 PROCEDURE — 99999 PR PBB SHADOW E&M-EST. PATIENT-LVL III: CPT | Mod: PBBFAC,,, | Performed by: FAMILY MEDICINE

## 2019-04-01 PROCEDURE — 73030 X-RAY EXAM OF SHOULDER: CPT | Mod: TC,FY,PO,LT

## 2019-04-01 PROCEDURE — 3008F BODY MASS INDEX DOCD: CPT | Mod: CPTII,S$GLB,, | Performed by: FAMILY MEDICINE

## 2019-04-01 RX ORDER — IBUPROFEN 600 MG/1
600 TABLET ORAL EVERY 8 HOURS PRN
Qty: 30 TABLET | Refills: 0 | Status: SHIPPED | OUTPATIENT
Start: 2019-04-01 | End: 2019-06-14

## 2019-04-01 NOTE — PROGRESS NOTES
"Subjective:      Patient ID: Ayesha Arcos is a 53 y.o. female.    Chief Complaint: Fall (left shoulder pain )    HPI  54 yo with RA on MTX and plaquenil here for f/u after a fall about 2.5-3 wks ago.  [place], hitting her L shoulder.  Can move it in all directions, just stiff/painful.  Using topical cream/pennsaid and needs refill on NSAID.  No swelling, no numbness/tingling in the hand/fingers.    Past Medical History:   Diagnosis Date    Carpal tunnel syndrome, bilateral     Hypertension     Rheumatoid arthritis      Family History   Problem Relation Age of Onset    Diabetes Mother     Heart disease Father     Hypertension Father     Early death Father         Early 50's    Stroke Paternal Uncle     Heart disease Paternal Uncle      Past Surgical History:   Procedure Laterality Date    CARPAL TUNNEL RELEASE      right    skin cancer removal      nose    TUBAL LIGATION       Social History     Tobacco Use    Smoking status: Never Smoker    Smokeless tobacco: Never Used   Substance Use Topics    Alcohol use: Yes     Comment: social     Drug use: No       /86 (BP Location: Left arm, Patient Position: Sitting, BP Method: Large (Manual))   Pulse 74   Temp 98.4 °F (36.9 °C) (Oral)   Ht 5' 5" (1.651 m)   Wt 69.5 kg (153 lb 3.5 oz)   BMI 25.50 kg/m²     Review of Systems   Constitutional: Negative for activity change, appetite change, chills, diaphoresis, fatigue, fever and unexpected weight change.   HENT: Negative for ear pain, hearing loss, postnasal drip, rhinorrhea and tinnitus.    Eyes: Negative for visual disturbance.   Respiratory: Negative for cough, shortness of breath and wheezing.    Cardiovascular: Negative for chest pain, palpitations and leg swelling.   Gastrointestinal: Negative for abdominal distention.   Genitourinary: Negative for dysuria, frequency, hematuria and urgency.   Musculoskeletal: Positive for arthralgias. "   Neurological: Negative for weakness and headaches.   Hematological: Negative for adenopathy.       Objective:     Physical Exam   Constitutional: She appears well-developed and well-nourished.   Cardiovascular: Normal rate, regular rhythm and normal heart sounds.   Pulmonary/Chest: Effort normal and breath sounds normal. No respiratory distress.   Musculoskeletal:        Left shoulder: She exhibits decreased range of motion and tenderness. She exhibits no bony tenderness.   ROM intact, painful with raising above the head and reaching across chest and to the back.   Nursing note and vitals reviewed.      Lab Results   Component Value Date    WBC 8.09 03/01/2019    HGB 12.5 03/01/2019    HCT 40.7 03/01/2019     03/01/2019    ALT 19 03/01/2019    AST 19 03/01/2019     03/01/2019    K 3.8 03/01/2019     03/01/2019    CREATININE 0.8 03/01/2019    BUN 10 03/01/2019    CO2 25 03/01/2019    TSH 1.363 07/26/2018    HGBA1C 5.1 07/26/2018       Assessment:     1. Acute pain of left shoulder    2. Fall, initial encounter    3. Positive MOLLY (antinuclear antibody)         Plan:     Acute pain of left shoulder  -     Cancel: X-Ray Shoulder Trauma 3 view Left; Future; Expected date: 04/01/2019    Fall, initial encounter  -     Cancel: X-Ray Shoulder Trauma 3 view Left; Future; Expected date: 04/01/2019    Positive MOLLY (antinuclear antibody)    Other orders  -     ibuprofen (ADVIL,MOTRIN) 600 MG tablet; Take 1 tablet (600 mg total) by mouth every 8 (eight) hours as needed for Pain. Take with food  Dispense: 30 tablet; Refill: 0    Xray with advanced arthritis  Start ibuprofen 600mg tid with food/PRN  Can use topical/heat/gentle stretching  If no improvement over next 10-14 days, start PT

## 2019-04-18 ENCOUNTER — TELEPHONE (OUTPATIENT)
Dept: INTERNAL MEDICINE | Facility: CLINIC | Age: 54
End: 2019-04-18

## 2019-04-18 DIAGNOSIS — M25.512 ACUTE PAIN OF LEFT SHOULDER: Primary | ICD-10-CM

## 2019-04-18 NOTE — TELEPHONE ENCOUNTER
Called pt and let her know that Dr. Matos placed referral for PT at [facility], they will call to set up appt after they get approved through the insurance company

## 2019-04-18 NOTE — TELEPHONE ENCOUNTER
----- Message from Meenakshi Diaz sent at 4/18/2019  8:42 AM CDT -----  Contact: self  Type:  Patient Returning Call    Who Called:abhay  Who Left Message for Patient:  Does the patient know what this is regarding?:  Would the patient rather a call back or a response via [facility]? call  Best Call Back Number:[phone]  Additional Information:

## 2019-04-18 NOTE — TELEPHONE ENCOUNTER
Pt called back, she said she saw you for left arm/shoulder pain and you mentioned doing PT if she wasn't any better.  She would like to do PT.  She lives in [facility].    Let her know that Dr. Matos out the rest of week and Monday, so will contact her once Dr. Matos responds.

## 2019-04-18 NOTE — TELEPHONE ENCOUNTER
----- Message from Gonzales Marks sent at 4/17/2019  4:36 PM CDT -----  Contact: Pt   Pt is .Type:  Patient Returning Call    Who Called:Pt   Who Left Message for Patient:Nurse   Does the patient know what this is regarding?: concerning setting up therapy   Would the patient rather a call back or a response via [facility]? Call back   Best Call Back Number:[phone]           .Thank You  Heaven Marks

## 2019-05-01 RX ORDER — HYDROXYCHLOROQUINE SULFATE 200 MG/1
400 TABLET, FILM COATED ORAL DAILY
Qty: 60 TABLET | Refills: 0 | Status: SHIPPED | OUTPATIENT
Start: 2019-05-01 | End: 2019-05-30 | Stop reason: SDUPTHER

## 2019-05-31 RX ORDER — HYDROXYCHLOROQUINE SULFATE 200 MG/1
400 TABLET, FILM COATED ORAL DAILY
Qty: 60 TABLET | Refills: 0 | Status: SHIPPED | OUTPATIENT
Start: 2019-05-31 | End: 2019-09-20 | Stop reason: SDUPTHER

## 2019-06-14 ENCOUNTER — LAB VISIT (OUTPATIENT)
Dept: LAB | Facility: HOSPITAL | Age: 54
End: 2019-06-14
Attending: INTERNAL MEDICINE
Payer: COMMERCIAL

## 2019-06-14 ENCOUNTER — OFFICE VISIT (OUTPATIENT)
Dept: RHEUMATOLOGY | Facility: CLINIC | Age: 54
End: 2019-06-14
Payer: COMMERCIAL

## 2019-06-14 VITALS — BODY MASS INDEX: 25.9 KG/M2 | WEIGHT: 155.44 LBS | HEIGHT: 65 IN

## 2019-06-14 DIAGNOSIS — Z79.60 LONG-TERM USE OF IMMUNOSUPPRESSANT MEDICATION: ICD-10-CM

## 2019-06-14 DIAGNOSIS — M05.9 SEROPOSITIVE RHEUMATOID ARTHRITIS: Primary | ICD-10-CM

## 2019-06-14 DIAGNOSIS — Z71.89 COUNSELING ON HEALTH PROMOTION AND DISEASE PREVENTION: ICD-10-CM

## 2019-06-14 DIAGNOSIS — M15.9 PRIMARY OSTEOARTHRITIS INVOLVING MULTIPLE JOINTS: ICD-10-CM

## 2019-06-14 DIAGNOSIS — M05.9 SEROPOSITIVE RHEUMATOID ARTHRITIS: ICD-10-CM

## 2019-06-14 DIAGNOSIS — R76.8 POSITIVE ANA (ANTINUCLEAR ANTIBODY): ICD-10-CM

## 2019-06-14 LAB
ALBUMIN SERPL BCP-MCNC: 3.8 G/DL (ref 3.5–5.2)
ALP SERPL-CCNC: 73 U/L (ref 55–135)
ALT SERPL W/O P-5'-P-CCNC: 18 U/L (ref 10–44)
ANION GAP SERPL CALC-SCNC: 9 MMOL/L (ref 8–16)
AST SERPL-CCNC: 16 U/L (ref 10–40)
BASOPHILS # BLD AUTO: 0.05 K/UL (ref 0–0.2)
BASOPHILS NFR BLD: 0.6 % (ref 0–1.9)
BILIRUB SERPL-MCNC: 0.4 MG/DL (ref 0.1–1)
BUN SERPL-MCNC: 9 MG/DL (ref 6–20)
CALCIUM SERPL-MCNC: 9.6 MG/DL (ref 8.7–10.5)
CHLORIDE SERPL-SCNC: 105 MMOL/L (ref 95–110)
CO2 SERPL-SCNC: 24 MMOL/L (ref 23–29)
CREAT SERPL-MCNC: 0.8 MG/DL (ref 0.5–1.4)
CRP SERPL-MCNC: 0.9 MG/L (ref 0–8.2)
DIFFERENTIAL METHOD: ABNORMAL
EOSINOPHIL # BLD AUTO: 0.6 K/UL (ref 0–0.5)
EOSINOPHIL NFR BLD: 7.7 % (ref 0–8)
ERYTHROCYTE [DISTWIDTH] IN BLOOD BY AUTOMATED COUNT: 16.1 % (ref 11.5–14.5)
ERYTHROCYTE [SEDIMENTATION RATE] IN BLOOD BY WESTERGREN METHOD: 30 MM/HR (ref 0–36)
EST. GFR  (AFRICAN AMERICAN): >60 ML/MIN/1.73 M^2
EST. GFR  (NON AFRICAN AMERICAN): >60 ML/MIN/1.73 M^2
GLUCOSE SERPL-MCNC: 98 MG/DL (ref 70–110)
HCT VFR BLD AUTO: 41.6 % (ref 37–48.5)
HGB BLD-MCNC: 13.2 G/DL (ref 12–16)
IMM GRANULOCYTES # BLD AUTO: 0.01 K/UL (ref 0–0.04)
IMM GRANULOCYTES NFR BLD AUTO: 0.1 % (ref 0–0.5)
LYMPHOCYTES # BLD AUTO: 2 K/UL (ref 1–4.8)
LYMPHOCYTES NFR BLD: 24.8 % (ref 18–48)
MCH RBC QN AUTO: 27.1 PG (ref 27–31)
MCHC RBC AUTO-ENTMCNC: 31.7 G/DL (ref 32–36)
MCV RBC AUTO: 85 FL (ref 82–98)
MONOCYTES # BLD AUTO: 0.7 K/UL (ref 0.3–1)
MONOCYTES NFR BLD: 8.2 % (ref 4–15)
NEUTROPHILS # BLD AUTO: 4.8 K/UL (ref 1.8–7.7)
NEUTROPHILS NFR BLD: 58.7 % (ref 38–73)
NRBC BLD-RTO: 0 /100 WBC
PLATELET # BLD AUTO: 321 K/UL (ref 150–350)
PMV BLD AUTO: 9.8 FL (ref 9.2–12.9)
POTASSIUM SERPL-SCNC: 4.1 MMOL/L (ref 3.5–5.1)
PROT SERPL-MCNC: 7.9 G/DL (ref 6–8.4)
RBC # BLD AUTO: 4.87 M/UL (ref 4–5.4)
SODIUM SERPL-SCNC: 138 MMOL/L (ref 136–145)
WBC # BLD AUTO: 8.14 K/UL (ref 3.9–12.7)

## 2019-06-14 PROCEDURE — 99214 PR OFFICE/OUTPT VISIT, EST, LEVL IV, 30-39 MIN: ICD-10-PCS | Mod: 25,S$GLB,, | Performed by: INTERNAL MEDICINE

## 2019-06-14 PROCEDURE — 36415 COLL VENOUS BLD VENIPUNCTURE: CPT

## 2019-06-14 PROCEDURE — 86140 C-REACTIVE PROTEIN: CPT

## 2019-06-14 PROCEDURE — 20610 LARGE JOINT ASPIRATION/INJECTION: L GLENOHUMERAL: ICD-10-PCS | Mod: LT,S$GLB,, | Performed by: INTERNAL MEDICINE

## 2019-06-14 PROCEDURE — 99214 OFFICE O/P EST MOD 30 MIN: CPT | Mod: 25,S$GLB,, | Performed by: INTERNAL MEDICINE

## 2019-06-14 PROCEDURE — 3008F BODY MASS INDEX DOCD: CPT | Mod: CPTII,S$GLB,, | Performed by: INTERNAL MEDICINE

## 2019-06-14 PROCEDURE — 20610 DRAIN/INJ JOINT/BURSA W/O US: CPT | Mod: LT,S$GLB,, | Performed by: INTERNAL MEDICINE

## 2019-06-14 PROCEDURE — 80053 COMPREHEN METABOLIC PANEL: CPT

## 2019-06-14 PROCEDURE — 85025 COMPLETE CBC W/AUTO DIFF WBC: CPT

## 2019-06-14 PROCEDURE — 99999 PR PBB SHADOW E&M-EST. PATIENT-LVL III: CPT | Mod: PBBFAC,,, | Performed by: INTERNAL MEDICINE

## 2019-06-14 PROCEDURE — 85652 RBC SED RATE AUTOMATED: CPT

## 2019-06-14 PROCEDURE — 3008F PR BODY MASS INDEX (BMI) DOCUMENTED: ICD-10-PCS | Mod: CPTII,S$GLB,, | Performed by: INTERNAL MEDICINE

## 2019-06-14 PROCEDURE — 99999 PR PBB SHADOW E&M-EST. PATIENT-LVL III: ICD-10-PCS | Mod: PBBFAC,,, | Performed by: INTERNAL MEDICINE

## 2019-06-14 RX ORDER — TRIAMCINOLONE ACETONIDE 40 MG/ML
40 INJECTION, SUSPENSION INTRA-ARTICULAR; INTRAMUSCULAR
Status: DISCONTINUED | OUTPATIENT
Start: 2019-06-14 | End: 2019-06-14 | Stop reason: HOSPADM

## 2019-06-14 RX ORDER — NAPROXEN 500 MG/1
500 TABLET ORAL 2 TIMES DAILY
Qty: 60 TABLET | Refills: 2 | Status: SHIPPED | OUTPATIENT
Start: 2019-06-14 | End: 2019-07-14

## 2019-06-14 RX ADMIN — TRIAMCINOLONE ACETONIDE 40 MG: 40 INJECTION, SUSPENSION INTRA-ARTICULAR; INTRAMUSCULAR at 10:06

## 2019-06-14 NOTE — PATIENT INSTRUCTIONS
Infliximab injection  What is this medicine?  INFLIXIMAB (in FLIX i mab) is used to treat Crohn's disease and ulcerative colitis. It is also used to treat ankylosing spondylitis, psoriasis, and some forms of arthritis.  How should I use this medicine?  This medicine is for injection into a vein. It is usually given by a health care professional in a hospital or clinic setting.  A special MedGuide will be given to you by the pharmacist with each prescription and refill. Be sure to read this information carefully each time.  Talk to your pediatrician regarding the use of this medicine in children. Special care may be needed.  What side effects may I notice from receiving this medicine?  Side effects that you should report to your doctor or health care professional as soon as possible:  · allergic reactions like skin rash, itching or hives, swelling of the face, lips, or tongue  · chest pain  · fever or chills, usually related to the infusion  · muscle or joint pain  · red, scaly patches or raised bumps on the skin  · signs of infection - fever or chills, cough, sore throat, pain or difficulty passing urine  · swollen lymph nodes in the neck, underarm, or groin areas  · unexplained weight loss  · unusual bleeding or bruising  · unusually weak or tired  · yellowing of the eyes or skin  Side effects that usually do not require medical attention (report to your doctor or health care professional if they continue or are bothersome):  · headache  · heartburn or stomach pain  · nausea, vomiting  What may interact with this medicine?  Do not take this medicine with any of the following medications:  · anakinra  · rilonacept  This medicine may also interact with the following medications:  · vaccines  What if I miss a dose?  It is important not to miss your dose. Call your doctor or health care professional if you are unable to keep an appointment.  Where should I keep my medicine?  This drug is given in a hospital or clinic  and will not be stored at home.  What should I tell my health care provider before I take this medicine?  They need to know if you have any of these conditions:  · diabetes  · exposure to tuberculosis  · heart failure  · hepatitis or liver disease  · immune system problems  · infection  · lung or breathing disease, like COPD  · multiple sclerosis  · current or past resident of [place]  · seizure disorder  · an unusual or allergic reaction to infliximab, mouse proteins, other medicines, foods, dyes, or preservatives  · pregnant or trying to get pregnant  · breast-feeding  What should I watch for while using this medicine?  Visit your doctor or health care professional for regular checks on your progress.  If you get a cold or other infection while receiving this medicine, call your doctor or health care professional. Do not treat yourself. This medicine may decrease your body's ability to fight infections. Before beginning therapy, your doctor may do a test to see if you have been exposed to tuberculosis.  This medicine may make the symptoms of heart failure worse in some patients. If you notice symptoms such as increased shortness of breath or swelling of the ankles or legs, contact your health care provider right away.  If you are going to have surgery or dental work, tell your health care professional or dentist that you have received this medicine.  If you take this medicine for plaque psoriasis, stay out of the sun. If you cannot avoid being in the sun, wear protective clothing and use sunscreen. Do not use sun lamps or tanning beds/booths.  NOTE:This sheet is a summary. It may not cover all possible information. If you have questions about this medicine, talk to your doctor, pharmacist, or health care provider. Copyright© 2017 [employer]        Adalimumab Injection  What is this medicine?  ADALIMUMAB (a shayna greenberg mab) is used to treat rheumatoid and psoriatic arthritis. It is also  used to treat ankylosing spondylitis, Crohn's disease, ulcerative colitis, plaque psoriasis, hidradenitis suppurativa, and uveitis.  How should I use this medicine?  This medicine is for injection under the skin. You will be taught how to prepare and give this medicine. Use exactly as directed. Take your medicine at regular intervals. Do not take your medicine more often than directed.  A special MedGuide will be given to you by the pharmacist with each prescription and refill. Be sure to read this information carefully each time.  It is important that you put your used needles and syringes in a special sharps container. Do not put them in a trash can. If you do not have a sharps container, call your pharmacist or healthcare provider to get one.  Talk to your pediatrician regarding the use of this medicine in children. While this drug may be prescribed for children as young as 2 years for selected conditions, precautions do apply.  The  of the medicine offers free information to patients and their health care partners. Call 1-962.965.1116 for more information.  What side effects may I notice from receiving this medicine?  Side effects that you should report to your doctor or health care professional as soon as possible:  · allergic reactions like skin rash, itching or hives, swelling of the face, lips, or tongue  · breathing problems  · changes in vision  · chest pain  · fever, chills, or any other sign of infection  · numbness or tingling  · red, scaly patches or raised bumps on the skin  · swelling of the ankles  · swollen lymph nodes in the neck, underarm, or groin areas  · unexplained weight loss  · unusual bleeding or bruising  · unusually weak or tired  Side effects that usually do not require medical attention (report to your doctor or health care professional if they continue or are bothersome):  · headache  · nausea  · redness, itching, swelling, or bruising at site where injected  What may  interact with this medicine?  Do not take this medicine with any of the following medications:  · abatacept  · anakinra  · etanercept  · infliximab  · live virus vaccines  · rilonacept  This medicine may also interact with the following medications:  · vaccines  What if I miss a dose?  If you miss a dose, take it as soon as you can. If it is almost time for your next dose, take only that dose. Do not take double or extra doses. Give the next dose when your next scheduled dose is due. Call your doctor or health care professional if you are not sure how to handle a missed dose.  Where should I keep my medicine?  Keep out of the reach of children.  Store in the original container and in the refrigerator between 2 and 8 degrees C (36 and 46 degrees F). Do not freeze. The product may be stored in a cool carrier with an ice pack, if needed. Protect from light. Throw away any unused medicine after the expiration date.  What should I tell my health care provider before I take this medicine?  They need to know if you have any of these conditions:  · diabetes  · heart disease  · hepatitis B or history of hepatitis B infection  · immune system problems  · infection or history of infections  · multiple sclerosis  · recently received or scheduled to receive a vaccine  · scheduled to have surgery  · tuberculosis, a positive skin test for tuberculosis or have recently been in close contact with someone who has tuberculosis  · an unusual reaction to adalimumab, other medicines, mannitol, latex, rubber, foods, dyes, or preservatives  · pregnant or trying to get pregnant  · breast-feeding  What should I watch for while using this medicine?  Visit your doctor or health care professional for regular checks on your progress. Tell your doctor or healthcare professional if your symptoms do not start to get better or if they get worse.  You will be tested for tuberculosis (TB) before you start this medicine. If your doctor prescribes any  medicine for TB, you should start taking the TB medicine before starting this medicine. Make sure to finish the full course of TB medicine.  Call your doctor or health care professional if you get a cold or other infection while receiving this medicine. Do not treat yourself. This medicine may decrease your body's ability to fight infection.  Talk to your doctor about your risk of cancer. You may be more at risk for certain types of cancers if you take this medicine.  NOTE:This sheet is a summary. It may not cover all possible information. If you have questions about this medicine, talk to your doctor, pharmacist, or health care provider. Copyright© 2017 [employer]

## 2019-06-14 NOTE — PROCEDURES
Large Joint Aspiration/Injection: L glenohumeral  Date/Time: 6/14/2019 10:09 AM  Performed by: Perez Thomas MD  Authorized by: Perez Thomas MD     Consent Done?:  Yes (Verbal)  Indications:  Pain  Procedure site marked: Yes    Timeout: Prior to procedure the correct patient, procedure, and site was verified      Location:  Shoulder  Site:  L glenohumeral  Prep: Patient was prepped and draped in usual sterile fashion    Ultrasonic Guidance for needle placement: No  Needle size:  25 G  Medications:  40 mg triamcinolone acetonide 40 mg/mL  Patient tolerance:  Patient tolerated the procedure well with no immediate complications

## 2019-06-14 NOTE — PROGRESS NOTES
RHEUMATOLOGY OUTPATIENT CLINIC NOTE    6/14/2019    Attending Rheumatologist: Perez Thomas  Primary Care Provider: Neel Matos MD   Physician Requesting Consultation: No referring provider defined for this encounter.  Chief Complaint/Reason For Consultation:  Consult for joint pain and + MOLLY    Subjective:       HPI  Ayesha Arcos is a 53 y.o. Black or  female positive RA comes for follow-up.    Today  Last seen on March.  Mild disease activity, recommended split methotrexate dose.  Patient had episode of mechanical fall, has been having worsening left shoulder pain.  Currently doing PT per primary care provider with suboptimal results.  Denies any other significant joint pain.  Does not have prolonged morning stiffness or joint swelling.  Currently not splinting methotrexate dose.  Denies any fever, rash,  or GI complaints.    Review of Systems   Constitutional: Negative for chills and fever.   HENT:        Denies eye or mouth sicca symptoms, denies oral ulcers, denies parotid swelling, denies swollen glands.   Eyes: Negative for pain and redness.   Respiratory: Negative for cough and shortness of breath.    Cardiovascular: Negative for chest pain and leg swelling.   Gastrointestinal: Negative for abdominal pain and diarrhea.   Genitourinary: Negative for dysuria and hematuria.   Musculoskeletal: Positive for joint pain (Left shoulder, mechanical pattern.). Negative for back pain.   Skin: Negative for rash.   Neurological: Negative for tingling and focal weakness.   Endo/Heme/Allergies: Does not bruise/bleed easily.   Psychiatric/Behavioral: Negative for substance abuse. The patient does not have insomnia.    All other systems reviewed and are negative.    Past Medical History:   Diagnosis Date    Carpal tunnel syndrome, bilateral     Hypertension     Rheumatoid arthritis      Past Surgical History:   Procedure Laterality Date    CARPAL TUNNEL RELEASE      right    skin  cancer removal      nose    TUBAL LIGATION       Family History   Problem Relation Age of Onset    Diabetes Mother     Heart disease Father     Hypertension Father     Early death Father         Early 50's    Stroke Paternal Uncle     Heart disease Paternal Uncle      Social History     Substance and Sexual Activity   Alcohol Use Yes    Comment: social      Social History     Tobacco Use   Smoking Status Never Smoker   Smokeless Tobacco Never Used     Social History     Substance and Sexual Activity   Drug Use No       Current Outpatient Medications:     diclofenac sodium (PENNSAID) 2 % SoPk, Apply 40 mg topically 2 (two) times daily., Disp: 1 g, Rfl: 2    folic acid (FOLVITE) 1 MG tablet, Take 1 tablet (1 mg total) by mouth once daily., Disp: 30 tablet, Rfl: 4    hydroxychloroquine (PLAQUENIL) 200 mg tablet, Take 2 tablets (400 mg total) by mouth once daily., Disp: 60 tablet, Rfl: 0    methotrexate 2.5 MG Tab, Take 8 tablets (20 mg total) by mouth every 7 days. Split dose same day: 4 tablets in AM, 4 tablets in PM, Disp: 40 tablet, Rfl: 3    multivit-min/FA/lycopen/lutein (CENTRUM SILVER ULTRA MEN'S ORAL), Take 1 tablet by mouth once daily., Disp: , Rfl:     naproxen (NAPROSYN) 500 MG tablet, Take 1 tablet (500 mg total) by mouth 2 (two) times daily., Disp: 60 tablet, Rfl: 2    varicella-zoster gE-AS01B, PF, (SHINGRIX, PF,) 50 mcg/0.5 mL injection, Inject 0.5 mLs into the muscle every 3 (three) months. for 2 doses, Disp: 0.5 mL, Rfl: 1       Objective:         There were no vitals filed for this visit.  Physical Exam   Nursing note and vitals reviewed.  Constitutional: She is oriented to person, place, and time and well-developed, well-nourished, and in no distress.   HENT:   Head: Normocephalic.   Eyes: Conjunctivae are normal.   Absent Episcleritis/scleritis.   Neck: Normal range of motion.   Cardiovascular: Normal rate and intact distal pulses.    Pulmonary/Chest: Effort normal. No respiratory  distress.   Abdominal: Soft. She exhibits no distension.   Neurological: She is alert and oriented to person, place, and time. Gait normal.   absent sensory deficits  muscle strength 5/5 through.   Skin: No rash noted.     Musculoskeletal: Normal range of motion. She exhibits deformity (early Heberden's). She exhibits no edema or tenderness.   : strong  No synovitis.  No warmth or erythema.    AROM:  Pain reported approach left shoulder abduction past 50°, otherwise intact.  PROM:  As above    Rotator cuff maneuvers positive left side.  Arm drop test negative.    Devices used by patient: none     Reviewed old and all outside pertinent medical records available.    All lab results personally reviewed and interpreted by me.  Lab Results   Component Value Date    WBC 8.14 06/14/2019    HGB 13.2 06/14/2019    HCT 41.6 06/14/2019    MCV 85 06/14/2019    MCH 27.1 06/14/2019    MCHC 31.7 (L) 06/14/2019    RDW 16.1 (H) 06/14/2019     06/14/2019    MPV 9.8 06/14/2019       Lab Results   Component Value Date     06/14/2019    K 4.1 06/14/2019     06/14/2019    CO2 24 06/14/2019    GLU 98 06/14/2019    BUN 9 06/14/2019    CALCIUM 9.6 06/14/2019    PROT 7.9 06/14/2019    ALBUMIN 3.8 06/14/2019    BILITOT 0.4 06/14/2019    AST 16 06/14/2019    ALKPHOS 73 06/14/2019    ALT 18 06/14/2019       Lab Results   Component Value Date    COLORU Yellow 08/08/2018    APPEARANCEUA Clear 08/08/2018    SPECGRAV 1.025 08/08/2018    PHUR 6.0 08/08/2018    PROTEINUA Negative 08/08/2018    KETONESU Negative 08/08/2018    LEUKOCYTESUR Negative 08/08/2018    NITRITE Negative 08/08/2018       Lab Results   Component Value Date    CRP 0.9 06/14/2019       Lab Results   Component Value Date    SEDRATE 49 (H) 03/01/2019       Lab Results   Component Value Date    RF 57.0 (H) 08/08/2018    SEDRATE 49 (H) 03/01/2019       No components found for: 25OHVITDTOT, 20MKYITY8, 97MWYZPZ5, METHODNOTE    Lab Results   Component Value Date     URICACID 4.5 07/26/2018       No components found for: TSPOTTB    Rheum Labs:  · MOLLY 1 in 2560 homogeneous pattern  · Rheumatoid factor 57  ·   ·  ALMA panel negative  ·  beta 2 anti cardiolipin negative  ·  C3-4 normal    ·   Hepatitis /TB negative 8/2018    Imaging:  All imaging reviewed and independently  interpreted by me.    XR hands 7/2018  The bones appear demineralized.  Multi articular degenerative changes are seen including at the 1st carpometacarpal joint and at the distal interphalangeal joints with osteophyte formation.  Findings are most pronounced at the right 5th distal interphalangeal joint with possible mild erosive changes.    X-ray shoulder April 2019  no radiographic evidence of acute osseous, articular, or soft tissue abnormality.  There are moderate to severe degenerative changes present at the glenohumeral joint.     ASSESSMENT / PLAN:     Ayesha Arcos is a 53 y.o. Black or  female with:    1.Seropositive, erosive RA  - CDAI: -> low disease activity.  Chronic complaints most consistent with DJD.  - Currently on HCQ 400mg daily, methotrexate 20 mg po once per week  - recommend to split methotrexate dose the same day for better absorption.  - daily FA supplementation.  - ESR, CRP before next visit.    2. DMARD use  - Monitor for toxicity  - CBC, CMP before next visit.   - Yearly eye clinic evaluation while on Plaquenil.    3. MOLLY +  - remains with no other features of active CTD  - Will continue to monitor.    4. Osteoarthritis  - main complaint consistent with shoulder arthropathy  - resting affected joint for brief periods (<12 h)  - range of motion, flexibility, and strengthening exercises.  Currently undergoing PT.  - Acetaminophen prn -> standing -> NSAIDs short course (if persistent pain)  - Naprosyn provided to use p.r.n.  - Topicals therapy: Capsaicin / NSAIDs   - corticosteroid injection provided on left shoulder today    5. Other specified counseling  -  Immunization counseling done.  Shingrix vaccine recommended today.  - Nutrition and exercise counseling.  - Limitation of alcohol consumption.  - Regular exercise:  Aerobic and resistance.  - Medication counseling provided.    Return to care in 3 months    Method of contact with patient concerns: [facility]    Disclaimer:  This note is prepared using voice recognition software and as such is likely to have errors and has not been proof read. Please contact me for questions.     Time spent: 25 minutes in face to face discussion concerning diagnosis, prognosis, review of lab and test results, benefits of treatment as well as management of disease, counseling of patient and coordination of care between various health care providers.  Greater than half the time spent was used for coordination of care and counseling of patient.    Large Joint Aspiration/Injection: L glenohumeral  Date/Time: 6/14/2019 10:09 AM  Performed by: Perez Thomas MD  Authorized by: Perez Thomas MD     Consent Done?:  Yes (Verbal)  Indications:  Pain  Procedure site marked: Yes    Timeout: Prior to procedure the correct patient, procedure, and site was verified      Location:  Shoulder  Site:  L glenohumeral  Prep: Patient was prepped and draped in usual sterile fashion    Ultrasonic Guidance for needle placement: No  Needle size:  25 G  Medications:  40 mg triamcinolone acetonide 40 mg/mL  Patient tolerance:  Patient tolerated the procedure well with no immediate complications      Perez Thomas M.D.  Rheumatology Department   [facility]

## 2019-07-12 RX ORDER — METHOTREXATE 2.5 MG/1
20 TABLET ORAL
Qty: 40 TABLET | Refills: 3 | Status: SHIPPED | OUTPATIENT
Start: 2019-07-12 | End: 2019-08-07 | Stop reason: SDUPTHER

## 2019-08-08 RX ORDER — METHOTREXATE 2.5 MG/1
20 TABLET ORAL
Qty: 40 TABLET | Refills: 3 | Status: SHIPPED | OUTPATIENT
Start: 2019-08-08 | End: 2019-08-16 | Stop reason: SDUPTHER

## 2019-08-16 RX ORDER — METHOTREXATE 2.5 MG/1
20 TABLET ORAL
Qty: 40 TABLET | Refills: 3 | Status: SHIPPED | OUTPATIENT
Start: 2019-08-16 | End: 2019-08-22 | Stop reason: SDUPTHER

## 2019-08-16 NOTE — TELEPHONE ENCOUNTER
----- Message from Bernadette Watson sent at 8/16/2019  4:28 PM CDT -----  Contact: Pt  Type:  RX Refill Request    Who Called: Ayesha Arcos  Refill or New Rx:Refill  RX Name and Strength:methotrexate 2.5 MG Tab   How is the patient currently taking it? (ex. 1XDay):  Take 8 tablets (20 mg total) by mouth every 7 days. Split dose same day: 4 tablets in AM, 4 tablets in PM   Is this a 30 day or 90 day RX: 30 Day  Preferred Pharmacy with phone number:  [id number] [place], [place] - [address] [facility] AT [facility]  [address] [facility] [address]  Phone: [phone] Fax: [phone]  Local or Mail Order:Local  Ordering Provider:Dr. MELE Thomas   Would the patient rather a call back or a response via [facility]? Call Back  Best Call Back Number:[phone] (home)   Additional Information:

## 2019-08-19 ENCOUNTER — TELEPHONE (OUTPATIENT)
Dept: RHEUMATOLOGY | Facility: CLINIC | Age: 54
End: 2019-08-19

## 2019-08-19 RX ORDER — METHOTREXATE 2.5 MG/1
20 TABLET ORAL
Qty: 40 TABLET | Refills: 3 | Status: CANCELLED | OUTPATIENT
Start: 2019-08-19 | End: 2020-08-18

## 2019-08-19 NOTE — TELEPHONE ENCOUNTER
Spoke with patient who states that the [facility] did not receive her Methotrexate RX that Dr. Thomas sent on 08/16/2019.     This nurse has attempted to call [facility] several times but has failed to reach a pharmacist or a Pharm Tech to check on RX or to call in RX. Ms. Arcos dusty be informed of success of refill of her RX. Mx. Arcos states understanding.  Will call again upon my return to clinic.

## 2019-08-23 RX ORDER — METHOTREXATE 2.5 MG/1
20 TABLET ORAL
Qty: 40 TABLET | Refills: 3 | Status: SHIPPED | OUTPATIENT
Start: 2019-08-23 | End: 2019-08-26 | Stop reason: SDUPTHER

## 2019-08-26 NOTE — TELEPHONE ENCOUNTER
----- Message from Kyara Martinez sent at 8/26/2019 12:08 PM CDT -----  Type:  RX Refill Request     Who Called:  Pt  Ayesha  Refill or New Rx:           Refill      RX Name and Strength:    Arthritis Med (does not know the name of med)  How is the patient currently taking it? (ex. 1XDay):  Takes 8 tablets every Monday  Is this a 30 day or 90 day RX:   Preferred Pharmacy with phone number:    [facility]  Local or Mail Order: Local  Ordering Provider:  Dr Thomas  Would the patient rather a call back or a response via [facility]?   Call back  Best Call Back Number:    [phone]  Additional Information:   States she has not gotten her med yet//the pharmacy is waiting on a prior authorization from your office//has been out of the med for 3  Weeks now//the pharmacy gave her a 1 week supply//pt has [employer] Insurance//please call asasp//ayaka/lh

## 2019-08-27 RX ORDER — METHOTREXATE 2.5 MG/1
20 TABLET ORAL
Qty: 40 TABLET | Refills: 3 | Status: SHIPPED | OUTPATIENT
Start: 2019-08-27 | End: 2019-09-20 | Stop reason: SDUPTHER

## 2019-08-28 ENCOUNTER — TELEPHONE (OUTPATIENT)
Dept: RHEUMATOLOGY | Facility: CLINIC | Age: 54
End: 2019-08-28

## 2019-08-28 NOTE — TELEPHONE ENCOUNTER
"Called [facility] to inquire about the patient's prescription for methotrexate.  The pharm tech stated that they have been having trouble with their messaging system and that they had not received a prescription refill request from Dr. Thomas but allowed me to do a "verbal authorization" over the phone for the methotrexate to be filled.    Spoke to the patient and informed her that [facility] had been having technical difficulties but that a verbal auth was given to fill her prescription.  "

## 2019-08-28 NOTE — TELEPHONE ENCOUNTER
----- Message from Miriam Domingo sent at 8/28/2019  9:28 AM CDT -----  Contact: Pt  Pt is requesting call back in regards to prescription not being at pharmacy.      Pls call back at [phone]

## 2019-09-11 ENCOUNTER — TELEPHONE (OUTPATIENT)
Dept: INTERNAL MEDICINE | Facility: CLINIC | Age: 54
End: 2019-09-11

## 2019-09-11 NOTE — TELEPHONE ENCOUNTER
----- Message from Kelsey Aviles sent at 9/11/2019  8:41 AM CDT -----  Contact: self [phone]  .Type:  Sooner Apoointment Request    Caller is requesting a sooner appointment.  Caller declined first available appointment listed below.  Caller will not accept being placed on the waitlist and is requesting a message be sent to doctor.  Name of Caller:Ayesha Arcos  When is the first available appointment?10/01/19  Symptoms:therapy follow-up/back pain  Would the patient rather a call back or a response via [facility]? Call back  Best Call Back Number:[phone]  Additional Information:

## 2019-09-11 NOTE — TELEPHONE ENCOUNTER
----- Message from Bernadette Watson sent at 9/11/2019  9:41 AM CDT -----  Contact: Pt  Type:  Patient Returning Call    Who Called:Ayesha Arcos  Who Left Message for Patient: Harley  Does the patient know what this is regarding?: Appointment  Would the patient rather a call back or a response via [facility]? Call Back  Best Call Back Number: [phone] (home)   Additional Information:

## 2019-09-11 NOTE — TELEPHONE ENCOUNTER
Called and left message that Dr. Matos doesn't have anything available today and will be leaving shortly.  Can schedule with someone else or she can see .  Please call back to get scheduled.

## 2019-09-11 NOTE — TELEPHONE ENCOUNTER
Notified pt that our system just came back up. Dr. Matos is gone for the day. Advised pt to come to urgent care. She said, she has an appt scheduled for 10/3. She will keep that appt. She prefers to see Dr. Matos. Explained to pt that I will add her to the wait list. If a sooner appt becomes available, she will be notified. She verbalized understanding.

## 2019-09-12 RX ORDER — HYDROXYCHLOROQUINE SULFATE 200 MG/1
400 TABLET, FILM COATED ORAL DAILY
Qty: 60 TABLET | Refills: 0 | Status: CANCELLED | OUTPATIENT
Start: 2019-09-12

## 2019-09-13 ENCOUNTER — LAB VISIT (OUTPATIENT)
Dept: LAB | Facility: HOSPITAL | Age: 54
End: 2019-09-13
Attending: INTERNAL MEDICINE
Payer: COMMERCIAL

## 2019-09-13 DIAGNOSIS — M05.9 SEROPOSITIVE RHEUMATOID ARTHRITIS: ICD-10-CM

## 2019-09-13 LAB
ALBUMIN SERPL BCP-MCNC: 3.8 G/DL (ref 3.5–5.2)
ALP SERPL-CCNC: 71 U/L (ref 55–135)
ALT SERPL W/O P-5'-P-CCNC: 13 U/L (ref 10–44)
ANION GAP SERPL CALC-SCNC: 10 MMOL/L (ref 8–16)
AST SERPL-CCNC: 14 U/L (ref 10–40)
BASOPHILS # BLD AUTO: 0.04 K/UL (ref 0–0.2)
BASOPHILS NFR BLD: 0.5 % (ref 0–1.9)
BILIRUB SERPL-MCNC: 0.4 MG/DL (ref 0.1–1)
BUN SERPL-MCNC: 10 MG/DL (ref 6–20)
CALCIUM SERPL-MCNC: 9.2 MG/DL (ref 8.7–10.5)
CHLORIDE SERPL-SCNC: 106 MMOL/L (ref 95–110)
CO2 SERPL-SCNC: 23 MMOL/L (ref 23–29)
CREAT SERPL-MCNC: 0.8 MG/DL (ref 0.5–1.4)
CRP SERPL-MCNC: 0.6 MG/L (ref 0–8.2)
DIFFERENTIAL METHOD: ABNORMAL
EOSINOPHIL # BLD AUTO: 0.7 K/UL (ref 0–0.5)
EOSINOPHIL NFR BLD: 7.9 % (ref 0–8)
ERYTHROCYTE [DISTWIDTH] IN BLOOD BY AUTOMATED COUNT: 15.6 % (ref 11.5–14.5)
ERYTHROCYTE [SEDIMENTATION RATE] IN BLOOD BY WESTERGREN METHOD: 12 MM/HR (ref 0–20)
EST. GFR  (AFRICAN AMERICAN): >60 ML/MIN/1.73 M^2
EST. GFR  (NON AFRICAN AMERICAN): >60 ML/MIN/1.73 M^2
GLUCOSE SERPL-MCNC: 98 MG/DL (ref 70–110)
HCT VFR BLD AUTO: 41.7 % (ref 37–48.5)
HGB BLD-MCNC: 13.3 G/DL (ref 12–16)
IMM GRANULOCYTES # BLD AUTO: 0.02 K/UL (ref 0–0.04)
IMM GRANULOCYTES NFR BLD AUTO: 0.2 % (ref 0–0.5)
LYMPHOCYTES # BLD AUTO: 2 K/UL (ref 1–4.8)
LYMPHOCYTES NFR BLD: 23.3 % (ref 18–48)
MCH RBC QN AUTO: 27.2 PG (ref 27–31)
MCHC RBC AUTO-ENTMCNC: 31.9 G/DL (ref 32–36)
MCV RBC AUTO: 85 FL (ref 82–98)
MONOCYTES # BLD AUTO: 0.6 K/UL (ref 0.3–1)
MONOCYTES NFR BLD: 6.7 % (ref 4–15)
NEUTROPHILS # BLD AUTO: 5.2 K/UL (ref 1.8–7.7)
NEUTROPHILS NFR BLD: 61.6 % (ref 38–73)
NRBC BLD-RTO: 0 /100 WBC
PLATELET # BLD AUTO: 303 K/UL (ref 150–350)
PMV BLD AUTO: 10.3 FL (ref 9.2–12.9)
POTASSIUM SERPL-SCNC: 3.9 MMOL/L (ref 3.5–5.1)
PROT SERPL-MCNC: 7.7 G/DL (ref 6–8.4)
RBC # BLD AUTO: 4.89 M/UL (ref 4–5.4)
SODIUM SERPL-SCNC: 139 MMOL/L (ref 136–145)
WBC # BLD AUTO: 8.46 K/UL (ref 3.9–12.7)

## 2019-09-13 PROCEDURE — 36415 COLL VENOUS BLD VENIPUNCTURE: CPT

## 2019-09-13 PROCEDURE — 85025 COMPLETE CBC W/AUTO DIFF WBC: CPT

## 2019-09-13 PROCEDURE — 86140 C-REACTIVE PROTEIN: CPT

## 2019-09-13 PROCEDURE — 85651 RBC SED RATE NONAUTOMATED: CPT

## 2019-09-13 PROCEDURE — 80053 COMPREHEN METABOLIC PANEL: CPT

## 2019-09-17 RX ORDER — HYDROXYCHLOROQUINE SULFATE 200 MG/1
400 TABLET, FILM COATED ORAL DAILY
Qty: 60 TABLET | Refills: 0 | Status: CANCELLED | OUTPATIENT
Start: 2019-09-17

## 2019-09-17 NOTE — TELEPHONE ENCOUNTER
----- Message from Marcy Chaitanya sent at 9/17/2019  8:02 AM CDT -----  Contact: pt  1. What is the name of the medication you are requesting? Arthritis medication (the pt diesn't know the name of this med)  2. What is the dose? n/a  3. How do you take the medication? Orally, topically, etc? n/a  4. How often do you take this medication? n/a  5. Do you need a 30 day or 90 day supply? n/a  6. How many refills are you requesting? n/a  7. What is your preferred pharmacy and location of the pharmacy? [facility] [address]  8. Who can we contact with further questions? The pt at [phone]

## 2019-09-18 ENCOUNTER — PATIENT MESSAGE (OUTPATIENT)
Dept: RHEUMATOLOGY | Facility: CLINIC | Age: 54
End: 2019-09-18

## 2019-09-18 NOTE — TELEPHONE ENCOUNTER
Called and left message for patient requesting information on the prescription she would like refilled.

## 2019-09-20 ENCOUNTER — OFFICE VISIT (OUTPATIENT)
Dept: RHEUMATOLOGY | Facility: CLINIC | Age: 54
End: 2019-09-20
Payer: COMMERCIAL

## 2019-09-20 ENCOUNTER — TELEPHONE (OUTPATIENT)
Dept: PHARMACY | Facility: CLINIC | Age: 54
End: 2019-09-20

## 2019-09-20 ENCOUNTER — TELEPHONE (OUTPATIENT)
Dept: INTERNAL MEDICINE | Facility: CLINIC | Age: 54
End: 2019-09-20

## 2019-09-20 VITALS
WEIGHT: 157.19 LBS | BODY MASS INDEX: 26.19 KG/M2 | DIASTOLIC BLOOD PRESSURE: 90 MMHG | HEART RATE: 81 BPM | HEIGHT: 65 IN | SYSTOLIC BLOOD PRESSURE: 130 MMHG

## 2019-09-20 DIAGNOSIS — M19.90 OSTEOARTHRITIS, UNSPECIFIED OSTEOARTHRITIS TYPE, UNSPECIFIED SITE: ICD-10-CM

## 2019-09-20 DIAGNOSIS — M05.9 SEROPOSITIVE RHEUMATOID ARTHRITIS: Primary | ICD-10-CM

## 2019-09-20 DIAGNOSIS — D84.9 IMMUNOSUPPRESSED STATUS: ICD-10-CM

## 2019-09-20 DIAGNOSIS — M15.9 PRIMARY OSTEOARTHRITIS INVOLVING MULTIPLE JOINTS: ICD-10-CM

## 2019-09-20 PROCEDURE — 99999 PR PBB SHADOW E&M-EST. PATIENT-LVL III: CPT | Mod: PBBFAC,,, | Performed by: INTERNAL MEDICINE

## 2019-09-20 PROCEDURE — 3008F BODY MASS INDEX DOCD: CPT | Mod: CPTII,S$GLB,, | Performed by: INTERNAL MEDICINE

## 2019-09-20 PROCEDURE — 99215 PR OFFICE/OUTPT VISIT, EST, LEVL V, 40-54 MIN: ICD-10-PCS | Mod: S$GLB,,, | Performed by: INTERNAL MEDICINE

## 2019-09-20 PROCEDURE — 3008F PR BODY MASS INDEX (BMI) DOCUMENTED: ICD-10-PCS | Mod: CPTII,S$GLB,, | Performed by: INTERNAL MEDICINE

## 2019-09-20 PROCEDURE — 99215 OFFICE O/P EST HI 40 MIN: CPT | Mod: S$GLB,,, | Performed by: INTERNAL MEDICINE

## 2019-09-20 PROCEDURE — 99999 PR PBB SHADOW E&M-EST. PATIENT-LVL III: ICD-10-PCS | Mod: PBBFAC,,, | Performed by: INTERNAL MEDICINE

## 2019-09-20 RX ORDER — HYDROXYCHLOROQUINE SULFATE 200 MG/1
400 TABLET, FILM COATED ORAL DAILY
Qty: 60 TABLET | Refills: 2 | Status: SHIPPED | OUTPATIENT
Start: 2019-09-20 | End: 2019-09-20 | Stop reason: SDUPTHER

## 2019-09-20 RX ORDER — HYDROXYCHLOROQUINE SULFATE 200 MG/1
400 TABLET, FILM COATED ORAL DAILY
Qty: 60 TABLET | Refills: 2 | Status: SHIPPED | OUTPATIENT
Start: 2019-09-20 | End: 2019-12-13 | Stop reason: SDUPTHER

## 2019-09-20 RX ORDER — METHOTREXATE 2.5 MG/1
20 TABLET ORAL
Qty: 40 TABLET | Refills: 3 | Status: SHIPPED | OUTPATIENT
Start: 2019-09-20 | End: 2019-09-20 | Stop reason: SDUPTHER

## 2019-09-20 RX ORDER — METHOTREXATE 2.5 MG/1
20 TABLET ORAL
Qty: 40 TABLET | Refills: 3 | Status: SHIPPED | OUTPATIENT
Start: 2019-09-20 | End: 2020-01-13

## 2019-09-20 ASSESSMENT — ROUTINE ASSESSMENT OF PATIENT INDEX DATA (RAPID3): MDHAQ FUNCTION SCORE: 2

## 2019-09-20 NOTE — TELEPHONE ENCOUNTER
Called and left message that we are out of shingles vaccines as of now.  She can call back if she needs to speak with me.

## 2019-09-20 NOTE — PROGRESS NOTES
RHEUMATOLOGY OUTPATIENT CLINIC NOTE    9/20/2019    Attending Rheumatologist: Perez Thomas  Primary Care Provider: Neel Matos MD   Physician Requesting Consultation: No referring provider defined for this encounter.  Chief Complaint/Reason For Consultation:  Consult for joint pain and + MOLLY    Subjective:       HPI  Ayesha Arcos is a 54 y.o. Black or  female positive RA comes for follow-up.    Today  Last seen on June.  Deemed stable with methotrexate 20 mg p.o. dose and Plaquenil.  Provided with corticosteroid injection of left shoulder and recommended for physical therapy.  Excellent results referred, ran out of medication and has not had Plaquenil for approximately a week.  No acute complaints today.  Reports intermittent episodes of joint pain and swelling on hands.  Episodic prolonged stiffness.  Denies any fever, swollen glands,  or GI complaints.    Review of Systems   Constitutional: Negative for chills and fever.   HENT:        Denies eye or mouth sicca symptoms, denies oral ulcers, denies parotid swelling, denies swollen glands.   Eyes: Negative for pain and redness.   Respiratory: Negative for cough and shortness of breath.    Cardiovascular: Negative for chest pain and leg swelling.   Gastrointestinal: Negative for abdominal pain and diarrhea.   Genitourinary: Negative for dysuria and hematuria.   Musculoskeletal: Negative for back pain and joint pain (Left shoulder, mechanical pattern.  Intermittently hands with inflammatory pattern.).   Skin: Negative for rash.   Neurological: Negative for tingling and focal weakness.   Endo/Heme/Allergies: Does not bruise/bleed easily.   Psychiatric/Behavioral: Negative for substance abuse. The patient does not have insomnia.    All other systems reviewed and are negative.    Past Medical History:   Diagnosis Date    Carpal tunnel syndrome, bilateral     Hypertension     Rheumatoid arthritis      Past Surgical History:    Procedure Laterality Date    CARPAL TUNNEL RELEASE      right    skin cancer removal      nose    TUBAL LIGATION       Family History   Problem Relation Age of Onset    Diabetes Mother     Heart disease Father     Hypertension Father     Early death Father         Early 50's    Stroke Paternal Uncle     Heart disease Paternal Uncle      Social History     Substance and Sexual Activity   Alcohol Use Yes    Comment: social      Social History     Tobacco Use   Smoking Status Never Smoker   Smokeless Tobacco Never Used     Social History     Substance and Sexual Activity   Drug Use No       Current Outpatient Medications:     folic acid (FOLVITE) 1 MG tablet, Take 1 tablet (1 mg total) by mouth once daily., Disp: 30 tablet, Rfl: 4    hydroxychloroquine (PLAQUENIL) 200 mg tablet, Take 2 tablets (400 mg total) by mouth once daily., Disp: 60 tablet, Rfl: 2    methotrexate 2.5 MG Tab, Take 8 tablets (20 mg total) by mouth every 7 days. Split dose same day: 4 tablets in AM, 4 tablets in PM, Disp: 40 tablet, Rfl: 3    multivitamin capsule, Take 1 capsule by mouth once daily., Disp: , Rfl:     tofacitinib (XELJANZ XR) 11 mg Tb24, Take 11 mg by mouth once daily., Disp: 30 tablet, Rfl: 2    varicella-zoster gE-AS01B, PF, (SHINGRIX, PF,) 50 mcg/0.5 mL injection, Inject 0.5 mLs into the muscle every 3 (three) months. for 2 doses, Disp: 0.5 mL, Rfl: 1       Objective:         Vitals:    09/20/19 0818   BP: (!) 130/90   Pulse: 81     Physical Exam   Nursing note and vitals reviewed.  Constitutional: She is oriented to person, place, and time and well-developed, well-nourished, and in no distress.   HENT:   Head: Normocephalic.   Eyes: Conjunctivae are normal.   Absent Episcleritis/scleritis.   Neck: Normal range of motion.   Cardiovascular: Normal rate and intact distal pulses.    Pulmonary/Chest: Effort normal. No respiratory distress.   Abdominal: Soft. She exhibits no distension.   Neurological: She is alert and  oriented to person, place, and time. Gait normal.   absent sensory deficits  muscle strength 5/5 through.   Skin: No rash noted. No erythema.     Musculoskeletal: Normal range of motion. She exhibits tenderness (Slight tenderness to palpation some PIPs.) and deformity (early Heberden's).   : strong  Puffiness some PIP ease and MCPs.  synovitis.  No warmth or erythema.    AROM: intact.  PROM: Intact  Devices used by patient: none     Reviewed old and all outside pertinent medical records available.    All lab results personally reviewed and interpreted by me.  Lab Results   Component Value Date    WBC 8.46 09/13/2019    HGB 13.3 09/13/2019    HCT 41.7 09/13/2019    MCV 85 09/13/2019    MCH 27.2 09/13/2019    MCHC 31.9 (L) 09/13/2019    RDW 15.6 (H) 09/13/2019     09/13/2019    MPV 10.3 09/13/2019       Lab Results   Component Value Date     09/13/2019    K 3.9 09/13/2019     09/13/2019    CO2 23 09/13/2019    GLU 98 09/13/2019    BUN 10 09/13/2019    CALCIUM 9.2 09/13/2019    PROT 7.7 09/13/2019    ALBUMIN 3.8 09/13/2019    BILITOT 0.4 09/13/2019    AST 14 09/13/2019    ALKPHOS 71 09/13/2019    ALT 13 09/13/2019       Lab Results   Component Value Date    COLORU Yellow 08/08/2018    APPEARANCEUA Clear 08/08/2018    SPECGRAV 1.025 08/08/2018    PHUR 6.0 08/08/2018    PROTEINUA Negative 08/08/2018    KETONESU Negative 08/08/2018    LEUKOCYTESUR Negative 08/08/2018    NITRITE Negative 08/08/2018       Lab Results   Component Value Date    CRP 0.6 09/13/2019       Lab Results   Component Value Date    SEDRATE 12 09/13/2019       Lab Results   Component Value Date    RF 57.0 (H) 08/08/2018    SEDRATE 12 09/13/2019       No components found for: 25OHVITDTOT, 48NSWBGE1, 17MSRTXV4, METHODNOTE    Lab Results   Component Value Date    URICACID 4.5 07/26/2018       No components found for: TSPOTTB    Rheum Labs:  · MOLLY 1 in 2560 homogeneous pattern  · Rheumatoid factor 57  ·   ·  ALMA panel  negative  ·  beta 2 anti cardiolipin negative  ·  C3-4 normal    ·   Hepatitis /TB negative 8/2018    Imaging:  All imaging reviewed and independently  interpreted by me.    XR hands 7/2018  The bones appear demineralized.  Multi articular degenerative changes are seen including at the 1st carpometacarpal joint and at the distal interphalangeal joints with osteophyte formation.  Findings are most pronounced at the right 5th distal interphalangeal joint with possible mild erosive changes.    X-ray shoulder April 2019  no radiographic evidence of acute osseous, articular, or soft tissue abnormality.  There are moderate to severe degenerative changes present at the glenohumeral joint.     ASSESSMENT / PLAN:     Ayesha Arcos is a 54 y.o. Black or  female with:    1.Seropositive, erosive RA  - CDAI: 12-> moderate disease activity.  - Currently on HCQ 400mg daily, methotrexate 20 mg po split dose once per week  - will step up therapy, recommend for Xeljanz (fear of needles reported by patient).  - clinical significance side effects of therapy discussed in detail.  Script sent through [facility].  - continue daily FA supplementation.  - ESR, CRP before next visit.    2. DMARD use  - Monitor for toxicity  - CBC, CMP before next visit.   - Yearly eye clinic evaluation while on Plaquenil.    3. MOLLY +  - remains with no other features of active CTD  - Will continue to monitor.    4. Osteoarthritis  - good results with physical therapy.   - Recommend to continue with range of motion, flexibility, and strengthening exercises through PT.  - resting affected joint for brief periods (<12 h)  - Acetaminophen prn -> standing -> NSAIDs short course (if persistent pain)  - Naprosyn provided to use p.r.n.  - Topicals therapy: Capsaicin / NSAIDs   - corticosteroid injection provided on 6/2019 with good results    5. Other specified counseling  - Immunization counseling done.  Shingrix vaccine recommended today.  -  Nutrition and exercise counseling.  - Limitation of alcohol consumption.  - Regular exercise:  Aerobic and resistance.  - Medication counseling provided.    Return to care in 2 months    Method of contact with patient concerns: [facility]    Disclaimer:  This note is prepared using voice recognition software and as such is likely to have errors and has not been proof read. Please contact me for questions.     Time spent: 25 minutes in face to face discussion concerning diagnosis, prognosis, review of lab and test results, benefits of treatment as well as management of disease, counseling of patient and coordination of care between various health care providers.  Greater than half the time spent was used for coordination of care and counseling of patient.    Perez Thomas M.D.  Rheumatology Department   [facility]

## 2019-09-20 NOTE — TELEPHONE ENCOUNTER
----- Message from Laura Rivera sent at 9/20/2019  3:48 PM CDT -----  Contact: Patient  Type: Needs Medical Advice    Who Called:  Patient  Symptoms (please be specific):  na  How long has patient had these symptoms:  na2  Pharmacy name and phone #:  [facility]  Best Call Back Number: [phone] (home)    Additional Information: would like to discuss adding the shingles vaccine to her appointment next week, please call to advise, thank you!

## 2019-09-30 NOTE — TELEPHONE ENCOUNTER
DOCUMENTATION ONLY:  Prior authorization for Xeljanz XR 11mg approved from 9/26/2019 to 12/31/2039.  Case ID# [id number]    Co-pay: $0 (e-voucher)    Patient Assistance IS NOT required.     Forward to clinical pharmacist for consult & shipment.

## 2019-10-02 ENCOUNTER — TELEPHONE (OUTPATIENT)
Dept: RHEUMATOLOGY | Facility: CLINIC | Age: 54
End: 2019-10-02

## 2019-10-02 NOTE — TELEPHONE ENCOUNTER
Returned pt's call. Pt was checking to see if her Xeljanz was approved. Advised pt as of 09/30/2019 medication was approved. Pt verbalized understanding.

## 2019-10-02 NOTE — TELEPHONE ENCOUNTER
----- Message from Leena Malone LPN sent at 10/2/2019  2:31 PM CDT -----  Please assist. This patient does not see Dr. Babatunde Thomas. I think they sent it to the wrong provider. Thank you.   ----- Message -----  From: Remington Goodwin  Sent: 10/2/2019   2:13 PM CDT  To: William Fine Staff    .Type:  Patient Returning Call    Who Called:pt   Who Left Message for Patient:  Does the patient know what this is regarding?:treatment // medications  Would the patient rather a call back or a response via [facility]? Call back   Best Call Back Number: [phone]  Additional Information: Pt is requesting a call from nurse to discuss medications and treatment.

## 2019-10-03 ENCOUNTER — TELEPHONE (OUTPATIENT)
Dept: PHARMACY | Facility: CLINIC | Age: 54
End: 2019-10-03

## 2019-10-03 NOTE — TELEPHONE ENCOUNTER
Called patient on 10/3 for Xeljanz XR initial consult/fill. Call was silent initially. Called back twice and calls dropped both times after patient answered. Initially thought it was [facility] headset but switched to handheld with same result. Appears patient's line was dropping. Will attempt again to reach out to patient. $0 copay in 004.

## 2019-10-03 NOTE — TELEPHONE ENCOUNTER
Xeljanz XR initial consult / shipment complete on 10/3.  Pt complains of 3/10 pain mostly at night, involving the arms and shoulders.  Morning stiffness occurs in the hands for about 15 minutes. Running hands under warm water helps.  Since starting treatment with Plaquenil and MTX, the patient's ability to button shirts has improved.  She has no problems turning faucets on and off.  Dosing reviewed with the pt, advising her to set an alarm or use a pill box as a reminder.  Pt agreed to this adherence advise.  Goals of therapy were reviewed.  Warnings and possible side effects explained to the pt.  Pt voiced understanding.  [facility] services, refill procedures, hours of operation, on call pharmacist, and welcome packet reviewed.  Pt advised that I will touch base one week after she starts.  Pt confirmed shipping of Xeljanz on 10/3 for delivery on 10/4.  Pt plans to start on 10/5.  Pt  and address verified.  $0.00 copay in 004.  Pt had no further questions or concerns.

## 2019-10-04 ENCOUNTER — PATIENT OUTREACH (OUTPATIENT)
Dept: ADMINISTRATIVE | Facility: HOSPITAL | Age: 54
End: 2019-10-04

## 2019-10-17 ENCOUNTER — TELEPHONE (OUTPATIENT)
Dept: INTERNAL MEDICINE | Facility: CLINIC | Age: 54
End: 2019-10-17

## 2019-10-17 NOTE — TELEPHONE ENCOUNTER
Called pt and let her know that we don't have the shingles vaccine.  It has been on back order and in very limited supply.  I told her that we have been sending to the pharmacy to get .  She said she tried that and they say she is not eligible, that she would have to get through her [facility].  She called [employer] and they told her she was eligible.  She has been to [facility] and [facility] and they wont' give it to her.  Pt said she will call her insurance and check on this.

## 2019-10-17 NOTE — TELEPHONE ENCOUNTER
----- Message from Camron Leija sent at 10/17/2019 11:16 AM CDT -----  Contact: pt   Pt checking on shingles vaccine avail.       [phone]

## 2019-10-25 DIAGNOSIS — Z12.11 COLON CANCER SCREENING: ICD-10-CM

## 2019-10-28 ENCOUNTER — TELEPHONE (OUTPATIENT)
Dept: PHARMACY | Facility: CLINIC | Age: 54
End: 2019-10-28

## 2019-11-14 ENCOUNTER — TELEPHONE (OUTPATIENT)
Dept: INTERNAL MEDICINE | Facility: CLINIC | Age: 54
End: 2019-11-14

## 2019-11-14 NOTE — TELEPHONE ENCOUNTER
----- Message from Alberta Suarez sent at 11/14/2019 10:46 AM CST -----     Patient FOBT specimen too old to process

## 2019-11-15 ENCOUNTER — TELEPHONE (OUTPATIENT)
Dept: RHEUMATOLOGY | Facility: CLINIC | Age: 54
End: 2019-11-15

## 2019-11-15 ENCOUNTER — LAB VISIT (OUTPATIENT)
Dept: LAB | Facility: HOSPITAL | Age: 54
End: 2019-11-15
Attending: INTERNAL MEDICINE
Payer: COMMERCIAL

## 2019-11-15 ENCOUNTER — OFFICE VISIT (OUTPATIENT)
Dept: INTERNAL MEDICINE | Facility: CLINIC | Age: 54
End: 2019-11-15
Payer: COMMERCIAL

## 2019-11-15 ENCOUNTER — OFFICE VISIT (OUTPATIENT)
Dept: RHEUMATOLOGY | Facility: CLINIC | Age: 54
End: 2019-11-15
Payer: COMMERCIAL

## 2019-11-15 VITALS
WEIGHT: 164.69 LBS | SYSTOLIC BLOOD PRESSURE: 120 MMHG | DIASTOLIC BLOOD PRESSURE: 80 MMHG | HEART RATE: 76 BPM | BODY MASS INDEX: 27.44 KG/M2 | HEIGHT: 65 IN | TEMPERATURE: 98 F

## 2019-11-15 VITALS
HEART RATE: 85 BPM | DIASTOLIC BLOOD PRESSURE: 94 MMHG | HEIGHT: 65 IN | BODY MASS INDEX: 27.1 KG/M2 | WEIGHT: 162.69 LBS | SYSTOLIC BLOOD PRESSURE: 139 MMHG

## 2019-11-15 DIAGNOSIS — M05.9 SEROPOSITIVE RHEUMATOID ARTHRITIS: ICD-10-CM

## 2019-11-15 DIAGNOSIS — M05.9 SEROPOSITIVE RHEUMATOID ARTHRITIS: Primary | ICD-10-CM

## 2019-11-15 DIAGNOSIS — Z00.00 ANNUAL PHYSICAL EXAM: Primary | ICD-10-CM

## 2019-11-15 DIAGNOSIS — D84.9 IMMUNOSUPPRESSED STATUS: ICD-10-CM

## 2019-11-15 DIAGNOSIS — Z71.89 COUNSELING ON HEALTH PROMOTION AND DISEASE PREVENTION: ICD-10-CM

## 2019-11-15 DIAGNOSIS — Z79.60 LONG-TERM USE OF IMMUNOSUPPRESSANT MEDICATION: ICD-10-CM

## 2019-11-15 DIAGNOSIS — M19.90 OSTEOARTHRITIS, UNSPECIFIED OSTEOARTHRITIS TYPE, UNSPECIFIED SITE: ICD-10-CM

## 2019-11-15 LAB
ALBUMIN SERPL BCP-MCNC: 4.4 G/DL (ref 3.5–5.2)
ALP SERPL-CCNC: 68 U/L (ref 55–135)
ALT SERPL W/O P-5'-P-CCNC: 29 U/L (ref 10–44)
ANION GAP SERPL CALC-SCNC: 10 MMOL/L (ref 8–16)
AST SERPL-CCNC: 22 U/L (ref 10–40)
BASOPHILS # BLD AUTO: 0.04 K/UL (ref 0–0.2)
BASOPHILS NFR BLD: 0.6 % (ref 0–1.9)
BILIRUB SERPL-MCNC: 0.4 MG/DL (ref 0.1–1)
BUN SERPL-MCNC: 9 MG/DL (ref 6–20)
CALCIUM SERPL-MCNC: 9.5 MG/DL (ref 8.7–10.5)
CHLORIDE SERPL-SCNC: 104 MMOL/L (ref 95–110)
CO2 SERPL-SCNC: 27 MMOL/L (ref 23–29)
CREAT SERPL-MCNC: 0.8 MG/DL (ref 0.5–1.4)
CRP SERPL-MCNC: 0.3 MG/L (ref 0–8.2)
DIFFERENTIAL METHOD: ABNORMAL
EOSINOPHIL # BLD AUTO: 0.2 K/UL (ref 0–0.5)
EOSINOPHIL NFR BLD: 3.5 % (ref 0–8)
ERYTHROCYTE [DISTWIDTH] IN BLOOD BY AUTOMATED COUNT: 16.2 % (ref 11.5–14.5)
ERYTHROCYTE [SEDIMENTATION RATE] IN BLOOD BY WESTERGREN METHOD: 8 MM/HR (ref 0–36)
EST. GFR  (AFRICAN AMERICAN): >60 ML/MIN/1.73 M^2
EST. GFR  (NON AFRICAN AMERICAN): >60 ML/MIN/1.73 M^2
GLUCOSE SERPL-MCNC: 95 MG/DL (ref 70–110)
HCT VFR BLD AUTO: 42.9 % (ref 37–48.5)
HGB BLD-MCNC: 13.4 G/DL (ref 12–16)
IMM GRANULOCYTES # BLD AUTO: 0.01 K/UL (ref 0–0.04)
IMM GRANULOCYTES NFR BLD AUTO: 0.1 % (ref 0–0.5)
LYMPHOCYTES # BLD AUTO: 2 K/UL (ref 1–4.8)
LYMPHOCYTES NFR BLD: 29.3 % (ref 18–48)
MCH RBC QN AUTO: 27.9 PG (ref 27–31)
MCHC RBC AUTO-ENTMCNC: 31.2 G/DL (ref 32–36)
MCV RBC AUTO: 89 FL (ref 82–98)
MONOCYTES # BLD AUTO: 0.6 K/UL (ref 0.3–1)
MONOCYTES NFR BLD: 8.6 % (ref 4–15)
NEUTROPHILS # BLD AUTO: 4 K/UL (ref 1.8–7.7)
NEUTROPHILS NFR BLD: 58 % (ref 38–73)
NRBC BLD-RTO: 0 /100 WBC
PLATELET # BLD AUTO: 353 K/UL (ref 150–350)
PMV BLD AUTO: 10.7 FL (ref 9.2–12.9)
POTASSIUM SERPL-SCNC: 3.6 MMOL/L (ref 3.5–5.1)
PROT SERPL-MCNC: 7.8 G/DL (ref 6–8.4)
RBC # BLD AUTO: 4.8 M/UL (ref 4–5.4)
SODIUM SERPL-SCNC: 141 MMOL/L (ref 136–145)
WBC # BLD AUTO: 6.9 K/UL (ref 3.9–12.7)

## 2019-11-15 PROCEDURE — 36415 COLL VENOUS BLD VENIPUNCTURE: CPT

## 2019-11-15 PROCEDURE — 85652 RBC SED RATE AUTOMATED: CPT

## 2019-11-15 PROCEDURE — 99214 OFFICE O/P EST MOD 30 MIN: CPT | Mod: S$GLB,,, | Performed by: INTERNAL MEDICINE

## 2019-11-15 PROCEDURE — 90750 ZOSTER RECOMBINANT VACCINE: ICD-10-PCS | Mod: S$GLB,,, | Performed by: FAMILY MEDICINE

## 2019-11-15 PROCEDURE — 90662 FLU VACCINE - HIGH DOSE (65+) PRESERVATIVE FREE IM: ICD-10-PCS | Mod: S$GLB,,, | Performed by: FAMILY MEDICINE

## 2019-11-15 PROCEDURE — 90471 IMMUNIZATION ADMIN: CPT | Mod: S$GLB,,, | Performed by: FAMILY MEDICINE

## 2019-11-15 PROCEDURE — 80053 COMPREHEN METABOLIC PANEL: CPT

## 2019-11-15 PROCEDURE — 90472 ZOSTER RECOMBINANT VACCINE: ICD-10-PCS | Mod: S$GLB,,, | Performed by: FAMILY MEDICINE

## 2019-11-15 PROCEDURE — 99999 PR PBB SHADOW E&M-EST. PATIENT-LVL III: ICD-10-PCS | Mod: PBBFAC,,, | Performed by: INTERNAL MEDICINE

## 2019-11-15 PROCEDURE — 99396 PR PREVENTIVE VISIT,EST,40-64: ICD-10-PCS | Mod: 25,S$GLB,, | Performed by: FAMILY MEDICINE

## 2019-11-15 PROCEDURE — 90472 IMMUNIZATION ADMIN EACH ADD: CPT | Mod: S$GLB,,, | Performed by: FAMILY MEDICINE

## 2019-11-15 PROCEDURE — 90662 IIV NO PRSV INCREASED AG IM: CPT | Mod: S$GLB,,, | Performed by: FAMILY MEDICINE

## 2019-11-15 PROCEDURE — 86140 C-REACTIVE PROTEIN: CPT

## 2019-11-15 PROCEDURE — 90471 FLU VACCINE - HIGH DOSE (65+) PRESERVATIVE FREE IM: ICD-10-PCS | Mod: S$GLB,,, | Performed by: FAMILY MEDICINE

## 2019-11-15 PROCEDURE — 99999 PR PBB SHADOW E&M-EST. PATIENT-LVL III: CPT | Mod: PBBFAC,,, | Performed by: FAMILY MEDICINE

## 2019-11-15 PROCEDURE — 99999 PR PBB SHADOW E&M-EST. PATIENT-LVL III: ICD-10-PCS | Mod: PBBFAC,,, | Performed by: FAMILY MEDICINE

## 2019-11-15 PROCEDURE — 99999 PR PBB SHADOW E&M-EST. PATIENT-LVL III: CPT | Mod: PBBFAC,,, | Performed by: INTERNAL MEDICINE

## 2019-11-15 PROCEDURE — 3008F PR BODY MASS INDEX (BMI) DOCUMENTED: ICD-10-PCS | Mod: CPTII,S$GLB,, | Performed by: INTERNAL MEDICINE

## 2019-11-15 PROCEDURE — 90750 HZV VACC RECOMBINANT IM: CPT | Mod: S$GLB,,, | Performed by: FAMILY MEDICINE

## 2019-11-15 PROCEDURE — 99396 PREV VISIT EST AGE 40-64: CPT | Mod: 25,S$GLB,, | Performed by: FAMILY MEDICINE

## 2019-11-15 PROCEDURE — 99214 PR OFFICE/OUTPT VISIT, EST, LEVL IV, 30-39 MIN: ICD-10-PCS | Mod: S$GLB,,, | Performed by: INTERNAL MEDICINE

## 2019-11-15 PROCEDURE — 3008F BODY MASS INDEX DOCD: CPT | Mod: CPTII,S$GLB,, | Performed by: INTERNAL MEDICINE

## 2019-11-15 PROCEDURE — 85025 COMPLETE CBC W/AUTO DIFF WBC: CPT

## 2019-11-15 NOTE — TELEPHONE ENCOUNTER
----- Message from Perez Thomas MD sent at 11/15/2019 11:33 AM CST -----  Please contact the patient and inform I personally reviewed the lab results.  I am happy to report that the patient's results are within acceptable range.  We can continue with the plan discussed on our previous encounter.  For any questions or concerns, do not hesitate to contact me; and have the patient call the office or send a message through the patient portal for any concerns.    Thank you very much!    Sincerely,    Perez Thomas  Rheumatology Department   [facility]

## 2019-11-15 NOTE — PATIENT INSTRUCTIONS
Pendulum (Flexibility)    1. Lean over next to a table, with your left arm supporting your weight on the table.  2. Relax your right arm and let it hang straight down.  3. Slowly begin to swing your right arm in a small [place]. Gradually make the [place] bigger if you can. Change direction after 1 minute of motion.  4. Next, swing your right arm backward and forward. Then move it side to side. Change direction after 1 minute of motion.  5. Repeat these movements for about 5 minutes.  6. Do this exercise 3 times a day, or as often as instructed.  Date Last Reviewed: 3/10/2016  © 2589-2732 [employer]. [address] [place] [address]. All rights reserved. This information is not intended as a substitute for professional medical care. Always follow your healthcare professional's instructions.        Exercises for Shoulder Flexibility: Wall Walk    Improving your flexibility can reduce pain. Stretching exercises also can help increase your range of pain-free motion. Breathe normally when you exercise. Use smooth, fluid movements.  Note: Follow any special instructions you are given. If you feel pain, stop the exercise. If the pain continues after stopping, call your healthcare provider:  · Stand with your shoulder about 2 feet from the wall.  · Raise your arm to shoulder level and gently walk your fingers up the wall as high as you can.  · Hold for a few seconds. Then walk your fingers back down.  · Repeat 3 times. Move closer to the wall as you repeat.  · Build up to holding each stretch for 30 seconds.  Caution: Do this stretch only if your healthcare provider recommends it. Dont do it when you are first injured.       Date Last Reviewed: 8/16/2015  © 3543-9105 [employer]. [address] [place] [address]. All rights reserved. This information is not intended as a substitute for professional medical care. Always follow your healthcare professional's  instructions.

## 2019-11-15 NOTE — PROGRESS NOTES
RHEUMATOLOGY OUTPATIENT CLINIC NOTE    11/15/2019    Attending Rheumatologist: Perez Thomas  Primary Care Provider: Neel Matos MD   Physician Requesting Consultation: No referring provider defined for this encounter.  Chief Complaint/Reason For Consultation:  Seropositive rheumatoid arthritis    Subjective:       HPI  Ayesha Arcos is a 54 y.o. Black or  female seropositive RA comes for follow-up.    Today  Last seen on September.  Recommended to step up therapy with Xeljanz.  Started taking same month without any particular side effects.  Notable improvement referred since taking medication.  Denies new joint pain or swelling.  Able to perform activities of daily living without particular difficulty.  Denies fever, rash,  or GI complaints.    Review of Systems   Constitutional: Negative for chills and fever.   HENT:        Denies eye or mouth sicca symptoms, denies oral ulcers, denies parotid swelling, denies swollen glands.   Eyes: Negative for pain and redness.   Respiratory: Negative for cough and shortness of breath.    Cardiovascular: Negative for chest pain and leg swelling.   Gastrointestinal: Negative for abdominal pain and diarrhea.   Genitourinary: Negative for dysuria and hematuria.   Musculoskeletal: Negative for back pain and joint pain (Left shoulder, mechanical pattern.).   Skin: Negative for rash.   Neurological: Negative for tingling and focal weakness.   Endo/Heme/Allergies: Does not bruise/bleed easily.   Psychiatric/Behavioral: Negative for substance abuse. The patient does not have insomnia.    All other systems reviewed and are negative.    Past Medical History:   Diagnosis Date    Carpal tunnel syndrome, bilateral     Hypertension     Rheumatoid arthritis      Past Surgical History:   Procedure Laterality Date    CARPAL TUNNEL RELEASE      right    skin cancer removal      nose    TUBAL LIGATION       Family History   Problem Relation Age of  Onset    Diabetes Mother     Heart disease Father     Hypertension Father     Early death Father         Early 50's    Stroke Paternal Uncle     Heart disease Paternal Uncle      Social History     Substance and Sexual Activity   Alcohol Use Yes    Comment: social      Social History     Tobacco Use   Smoking Status Never Smoker   Smokeless Tobacco Never Used     Social History     Substance and Sexual Activity   Drug Use No       Current Outpatient Medications:     folic acid (FOLVITE) 1 MG tablet, Take 1 tablet (1 mg total) by mouth once daily., Disp: 30 tablet, Rfl: 4    hydroxychloroquine (PLAQUENIL) 200 mg tablet, Take 2 tablets (400 mg total) by mouth once daily., Disp: 60 tablet, Rfl: 2    methotrexate 2.5 MG Tab, Take 8 tablets (20 mg total) by mouth every 7 days. Split dose same day: 4 tablets in AM, 4 tablets in PM, Disp: 40 tablet, Rfl: 3    multivitamin capsule, Take 1 capsule by mouth once daily., Disp: , Rfl:     tofacitinib (XELJANZ XR) 11 mg Tb24, Take 11 mg by mouth once daily., Disp: 30 tablet, Rfl: 2    varicella-zoster [id number], PF, (SHINGRIX, PF,) 50 mcg/0.5 mL injection, admin by [facility] (Patient not taking: Reported on 11/15/2019), Disp: 0.5 mL, Rfl: 1       Objective:         Vitals:    11/15/19 0818   BP: (!) 139/94   Pulse: 85     Physical Exam   Nursing note and vitals reviewed.  Constitutional: She is oriented to person, place, and time and well-developed, well-nourished, and in no distress.   HENT:   Head: Normocephalic.   Eyes: Conjunctivae are normal.   Absent Episcleritis/scleritis.   Neck: Normal range of motion.   Cardiovascular: Normal rate and intact distal pulses.    Pulmonary/Chest: Effort normal. No respiratory distress.   Abdominal: Soft. She exhibits no distension.   Neurological: She is alert and oriented to person, place, and time. Gait normal.   absent sensory deficits  muscle strength 5/5 through.   Skin: No rash noted. No erythema.     Musculoskeletal: Normal  range of motion. She exhibits deformity (early Heberden's). She exhibits no tenderness.   : strong  Puffiness some PIP ease and MCPs (improved compared to last visit)    AROM: intact  PROM: Intact    Devices used by patient: none     Reviewed old and all outside pertinent medical records available.    All lab results personally reviewed and interpreted by me.  Lab Results   Component Value Date    WBC 8.46 09/13/2019    HGB 13.3 09/13/2019    HCT 41.7 09/13/2019    MCV 85 09/13/2019    MCH 27.2 09/13/2019    MCHC 31.9 (L) 09/13/2019    RDW 15.6 (H) 09/13/2019     09/13/2019    MPV 10.3 09/13/2019       Lab Results   Component Value Date     11/15/2019    K 3.6 11/15/2019     11/15/2019    CO2 27 11/15/2019    GLU 95 11/15/2019    BUN 9 11/15/2019    CALCIUM 9.5 11/15/2019    PROT 7.8 11/15/2019    ALBUMIN 4.4 11/15/2019    BILITOT 0.4 11/15/2019    AST 22 11/15/2019    ALKPHOS 68 11/15/2019    ALT 29 11/15/2019       Lab Results   Component Value Date    COLORU Yellow 08/08/2018    APPEARANCEUA Clear 08/08/2018    SPECGRAV 1.025 08/08/2018    PHUR 6.0 08/08/2018    PROTEINUA Negative 08/08/2018    KETONESU Negative 08/08/2018    LEUKOCYTESUR Negative 08/08/2018    NITRITE Negative 08/08/2018       Lab Results   Component Value Date    CRP 0.3 11/15/2019       Lab Results   Component Value Date    SEDRATE 12 09/13/2019       Lab Results   Component Value Date    RF 57.0 (H) 08/08/2018    SEDRATE 12 09/13/2019       No components found for: 25OHVITDTOT, 09JRKLTV1, 22RKRVWB3, METHODNOTE    Lab Results   Component Value Date    URICACID 4.5 07/26/2018       No components found for: TSPOTTB    Rheum Labs:  · MOLLY 1 in 2560 homogeneous pattern  · Rheumatoid factor 57  ·   ·  ALMA panel negative  ·  beta 2 anti cardiolipin negative  ·  C3-4 normal    ·   Hepatitis /TB negative 8/2018    Imaging:  All imaging reviewed and independently  interpreted by me.    XR hands 7/2018  The bones appear  demineralized.  Multi articular degenerative changes are seen including at the 1st carpometacarpal joint and at the distal interphalangeal joints with osteophyte formation.  Findings are most pronounced at the right 5th distal interphalangeal joint with possible mild erosive changes.    X-ray shoulder April 2019  no radiographic evidence of acute osseous, articular, or soft tissue abnormality.  There are moderate to severe degenerative changes present at the glenohumeral joint.     ASSESSMENT / PLAN:     Ayesha Arcos is a 54 y.o. Black or  female with:    1.Seropositive, erosive RA  - CDAI: 4-> low disease activity.  Episodic arthritis on DJD.  - Currently on HCQ 400mg daily, methotrexate 20 mg po split dose, and Xeljanz initiated on 9/2019   - will continue therapy unchanged this time.  Excellent response and still early to assess for response to biologic.  - ESR, CRP    2. Immunosuppressed status   - Compromised immune system secondary to autoimmune disease and use of immunosuppressive drugs.   - Monitor carefully for infections and toxicities.   - Advised to get immediate medical care if any infection.   - Also advised strict adherence to age appropriate vaccinations and cancer screenings with PCP.  - CBC, CMP today and before next visit    3. MOLLY +  - remains with no other features of active CTD  - Will continue to monitor.    4. Osteoarthritis  - Recommend to continue with range of motion, flexibility, and strengthening exercises  - Acetaminophen prn -> standing -> NSAIDs short course (if persistent pain)  - Naprosyn p.r.n.    5. Other specified counseling  - Immunization counseling done.  Shingrix vaccine recommended today.  - Nutrition and exercise counseling.  - Limitation of alcohol consumption.  - Regular exercise:  Aerobic and resistance.  - Medication counseling provided.    Return to care in 4 months    Method of contact with patient concerns: Li roland  Rheumatology    Disclaimer:  This note is prepared using voice recognition software and as such is likely to have errors and has not been proof read. Please contact me for questions.     Time spent: 25 minutes in face to face discussion concerning diagnosis, prognosis, review of lab and test results, benefits of treatment as well as management of disease, counseling of patient and coordination of care between various health care providers.  Greater than half the time spent was used for coordination of care and counseling of patient.    Perez Thomas M.D.  Rheumatology Department   [facility]

## 2019-11-15 NOTE — TELEPHONE ENCOUNTER
Pt said, she was sent the Fit Kit in the mail. She's not sure who sent it. She didn't receive a phone call or anything. She mailed the specimen back. She didn't bring it to the clinic. She said, she was not anything and they had the envelope to mail it back.

## 2019-11-16 NOTE — PROGRESS NOTES
"Subjective:      Patient ID: Ayesha Arcos is a 54 y.o. female.    Chief Complaint:  Annual    HPI  53 yo with HTN, RA followed by Rheum on meds here for annual.  Doing well on meds.  Pain in joints has improved, still with some swelling.  Had colonoscopy done 2017, due again in 10 yrs.  Cycle becoming more irregular/having hot flashes as well.  Normal BMs  BP has been stable//no meds  Trying to eat well/stay active and drink a good bit of water    Past Medical History:   Diagnosis Date    Carpal tunnel syndrome, bilateral     Hypertension     Rheumatoid arthritis      Family History   Problem Relation Age of Onset    Diabetes Mother     Heart disease Father     Hypertension Father     Early death Father         Early 50's    Stroke Paternal Uncle     Heart disease Paternal Uncle      Past Surgical History:   Procedure Laterality Date    CARPAL TUNNEL RELEASE      right    skin cancer removal      nose    TUBAL LIGATION       Social History     Tobacco Use    Smoking status: Never Smoker    Smokeless tobacco: Never Used   Substance Use Topics    Alcohol use: Yes     Comment: social     Drug use: No       /80 (BP Location: Left arm, Patient Position: Sitting, BP Method: Large (Manual))   Pulse 76   Temp 97.9 °F (36.6 °C) (Oral)   Ht 5' 5" (1.651 m)   Wt 74.7 kg (164 lb 10.9 oz)   BMI 27.40 kg/m²     Review of Systems   Constitutional: Negative for activity change, appetite change, chills, diaphoresis, fatigue, fever and unexpected weight change.   HENT: Negative for ear pain, hearing loss, postnasal drip, rhinorrhea and tinnitus.    Eyes: Negative for visual disturbance.   Respiratory: Negative for cough, shortness of breath and wheezing.    Cardiovascular: Negative for chest pain, palpitations and leg swelling.   Gastrointestinal: Negative for abdominal distention, abdominal pain, constipation and diarrhea.   Genitourinary: Negative for dysuria, frequency, hematuria and urgency. "   Musculoskeletal: Positive for joint swelling. Negative for back pain.   Neurological: Negative for weakness and headaches.   Hematological: Negative for adenopathy.   Psychiatric/Behavioral: Negative for confusion and decreased concentration.       Objective:     Physical Exam   Constitutional: She is oriented to person, place, and time. She appears well-developed and well-nourished. No distress.   HENT:   Right Ear: External ear normal.   Left Ear: External ear normal.   Nose: Nose normal.   Mouth/Throat: Oropharynx is clear and moist.   Eyes: Pupils are equal, round, and reactive to light. Conjunctivae are normal.   Neck: Normal range of motion. Neck supple. Carotid bruit is not present. No thyromegaly present.   Cardiovascular: Normal rate, regular rhythm and normal heart sounds.   Pulmonary/Chest: Effort normal and breath sounds normal. No respiratory distress. She has no wheezes. She has no rales.   Abdominal: Soft. Bowel sounds are normal. She exhibits no distension. There is no tenderness. There is no guarding.   Lymphadenopathy:     She has no cervical adenopathy.   Neurological: She is alert and oriented to person, place, and time. No cranial nerve deficit.   Skin: Skin is warm and dry. No rash noted.   Psychiatric: She has a normal mood and affect. Her behavior is normal. Judgment and thought content normal.   Nursing note and vitals reviewed.      Lab Results   Component Value Date    WBC 6.90 11/15/2019    HGB 13.4 11/15/2019    HCT 42.9 11/15/2019     (H) 11/15/2019    ALT 29 11/15/2019    AST 22 11/15/2019     11/15/2019    K 3.6 11/15/2019     11/15/2019    CREATININE 0.8 11/15/2019    BUN 9 11/15/2019    CO2 27 11/15/2019    TSH 1.363 07/26/2018    HGBA1C 5.1 07/26/2018       Assessment:     1. Annual physical exam         Plan:     Annual physical exam  -     TSH; Future; Expected date: 11/15/2019  -     Hemoglobin A1c; Future; Expected date: 11/15/2019  -     Lipid panel;  Future; Expected date: 11/15/2019    Other orders  -     (In Office Administered) Zoster Recombinant Vaccine  -     (In Office Administered) Zoster Recombinant Vaccine; Future; Expected date: 01/15/2020  -     (In Office Administered) Pneumococcal Conjugate Vaccine (13 Valent) (IM); Future; Expected date: 01/15/2020  -     Influenza - High Dose (65+) (PF) (IM)    Will add screening labs to next lab draw for Rheum, had some drawn this AM  Healthy diet/exercise  Cont with Rheum visits  Shingrix #1 today, #2 in 2 mos  Flu shot today  Will need Prevnar 13 as well as Adacel in future.  Will schedule as nurse visit  F/u annually and PRN  Get copy of mammo

## 2019-11-22 ENCOUNTER — TELEPHONE (OUTPATIENT)
Dept: PHARMACY | Facility: CLINIC | Age: 54
End: 2019-11-22

## 2019-12-13 DIAGNOSIS — M05.9 SEROPOSITIVE RHEUMATOID ARTHRITIS: ICD-10-CM

## 2019-12-17 DIAGNOSIS — M05.9 SEROPOSITIVE RHEUMATOID ARTHRITIS: ICD-10-CM

## 2019-12-17 RX ORDER — HYDROXYCHLOROQUINE SULFATE 200 MG/1
400 TABLET, FILM COATED ORAL DAILY
Qty: 60 TABLET | Refills: 2 | Status: SHIPPED | OUTPATIENT
Start: 2019-12-17 | End: 2020-02-14

## 2019-12-17 RX ORDER — HYDROXYCHLOROQUINE SULFATE 200 MG/1
TABLET, FILM COATED ORAL
Qty: 60 TABLET | Refills: 0 | Status: SHIPPED | OUTPATIENT
Start: 2019-12-17 | End: 2020-02-14

## 2019-12-24 DIAGNOSIS — M05.9 SEROPOSITIVE RHEUMATOID ARTHRITIS: ICD-10-CM

## 2019-12-27 ENCOUNTER — TELEPHONE (OUTPATIENT)
Dept: PHARMACY | Facility: CLINIC | Age: 54
End: 2019-12-27

## 2020-01-13 DIAGNOSIS — M05.9 SEROPOSITIVE RHEUMATOID ARTHRITIS: ICD-10-CM

## 2020-01-13 RX ORDER — METHOTREXATE 2.5 MG/1
TABLET ORAL
Qty: 40 TABLET | Refills: 3 | Status: SHIPPED | OUTPATIENT
Start: 2020-01-13 | End: 2020-05-07 | Stop reason: SDUPTHER

## 2020-01-16 ENCOUNTER — TELEPHONE (OUTPATIENT)
Dept: INTERNAL MEDICINE | Facility: CLINIC | Age: 55
End: 2020-01-16

## 2020-01-16 NOTE — TELEPHONE ENCOUNTER
----- Message from Nathalie Corcoran sent at 1/16/2020  8:11 AM CST -----  Contact: pt  She would like to see if she can come in today for her nurse visit, instead of tomorrow. Please call pt [phone]. Thank you

## 2020-01-17 ENCOUNTER — CLINICAL SUPPORT (OUTPATIENT)
Dept: INTERNAL MEDICINE | Facility: CLINIC | Age: 55
End: 2020-01-17
Payer: COMMERCIAL

## 2020-01-17 DIAGNOSIS — Z23 NEED FOR SHINGLES VACCINE: Primary | ICD-10-CM

## 2020-01-17 PROCEDURE — 99999 PR PBB SHADOW E&M-EST. PATIENT-LVL I: ICD-10-PCS | Mod: PBBFAC,,,

## 2020-01-17 PROCEDURE — 90750 HZV VACC RECOMBINANT IM: CPT | Mod: S$GLB,,, | Performed by: FAMILY MEDICINE

## 2020-01-17 PROCEDURE — 90471 IMMUNIZATION ADMIN: CPT | Mod: S$GLB,,, | Performed by: FAMILY MEDICINE

## 2020-01-17 PROCEDURE — 90750 ZOSTER RECOMBINANT VACCINE: ICD-10-PCS | Mod: S$GLB,,, | Performed by: FAMILY MEDICINE

## 2020-01-17 PROCEDURE — 90471 ZOSTER RECOMBINANT VACCINE: ICD-10-PCS | Mod: S$GLB,,, | Performed by: FAMILY MEDICINE

## 2020-01-17 PROCEDURE — 99999 PR PBB SHADOW E&M-EST. PATIENT-LVL I: CPT | Mod: PBBFAC,,,

## 2020-01-17 NOTE — PROGRESS NOTES
Shingrix administered. Pt was due for pneumonia vaccine also, but she didn't want to do both today. She will get pneumonia vaccine another day. Pt tolerated well. Advised pt to remain in lobby 15 minutes after injection. If no adverse/allergic reaction, may leave

## 2020-01-20 ENCOUNTER — TELEPHONE (OUTPATIENT)
Dept: PHARMACY | Facility: CLINIC | Age: 55
End: 2020-01-20

## 2020-01-30 ENCOUNTER — PATIENT MESSAGE (OUTPATIENT)
Dept: PHARMACY | Facility: CLINIC | Age: 55
End: 2020-01-30

## 2020-01-30 NOTE — TELEPHONE ENCOUNTER
Pt confirmed shipping of Xelkiraz on  to arrive on . Address and  verified. $0 copay in 004. Pt reported 3-4 doses on hand. Pt did not start any new medications. Pt had no further questions or concerns.

## 2020-02-12 DIAGNOSIS — M19.90 OSTEOARTHRITIS, UNSPECIFIED OSTEOARTHRITIS TYPE, UNSPECIFIED SITE: ICD-10-CM

## 2020-02-12 RX ORDER — FOLIC ACID 1 MG/1
1 TABLET ORAL DAILY
Qty: 30 TABLET | Refills: 4 | Status: SHIPPED | OUTPATIENT
Start: 2020-02-12 | End: 2020-07-07

## 2020-02-13 ENCOUNTER — LAB VISIT (OUTPATIENT)
Dept: LAB | Facility: HOSPITAL | Age: 55
End: 2020-02-13
Attending: INTERNAL MEDICINE
Payer: COMMERCIAL

## 2020-02-13 DIAGNOSIS — Z79.60 LONG-TERM USE OF IMMUNOSUPPRESSANT MEDICATION: ICD-10-CM

## 2020-02-13 DIAGNOSIS — M05.9 SEROPOSITIVE RHEUMATOID ARTHRITIS: ICD-10-CM

## 2020-02-13 DIAGNOSIS — Z00.00 ANNUAL PHYSICAL EXAM: ICD-10-CM

## 2020-02-13 LAB
ALBUMIN SERPL BCP-MCNC: 3.8 G/DL (ref 3.5–5.2)
ALP SERPL-CCNC: 68 U/L (ref 55–135)
ALT SERPL W/O P-5'-P-CCNC: 20 U/L (ref 10–44)
ANION GAP SERPL CALC-SCNC: 9 MMOL/L (ref 8–16)
AST SERPL-CCNC: 17 U/L (ref 10–40)
BASOPHILS # BLD AUTO: 0.02 K/UL (ref 0–0.2)
BASOPHILS NFR BLD: 0.3 % (ref 0–1.9)
BILIRUB SERPL-MCNC: 0.4 MG/DL (ref 0.1–1)
BUN SERPL-MCNC: 11 MG/DL (ref 6–20)
CALCIUM SERPL-MCNC: 9.1 MG/DL (ref 8.7–10.5)
CHLORIDE SERPL-SCNC: 106 MMOL/L (ref 95–110)
CHOLEST SERPL-MCNC: 251 MG/DL (ref 120–199)
CHOLEST/HDLC SERPL: 4.3 {RATIO} (ref 2–5)
CO2 SERPL-SCNC: 24 MMOL/L (ref 23–29)
CREAT SERPL-MCNC: 0.8 MG/DL (ref 0.5–1.4)
CRP SERPL-MCNC: 0.6 MG/L (ref 0–8.2)
DIFFERENTIAL METHOD: ABNORMAL
EOSINOPHIL # BLD AUTO: 0.2 K/UL (ref 0–0.5)
EOSINOPHIL NFR BLD: 2.6 % (ref 0–8)
ERYTHROCYTE [DISTWIDTH] IN BLOOD BY AUTOMATED COUNT: 15.5 % (ref 11.5–14.5)
ERYTHROCYTE [SEDIMENTATION RATE] IN BLOOD BY WESTERGREN METHOD: 11 MM/HR (ref 0–36)
EST. GFR  (AFRICAN AMERICAN): >60 ML/MIN/1.73 M^2
EST. GFR  (NON AFRICAN AMERICAN): >60 ML/MIN/1.73 M^2
GLUCOSE SERPL-MCNC: 100 MG/DL (ref 70–110)
HCT VFR BLD AUTO: 38.1 % (ref 37–48.5)
HDLC SERPL-MCNC: 58 MG/DL (ref 40–75)
HDLC SERPL: 23.1 % (ref 20–50)
HGB BLD-MCNC: 12.5 G/DL (ref 12–16)
IMM GRANULOCYTES # BLD AUTO: 0.02 K/UL (ref 0–0.04)
IMM GRANULOCYTES NFR BLD AUTO: 0.3 % (ref 0–0.5)
LDLC SERPL CALC-MCNC: 174.4 MG/DL (ref 63–159)
LYMPHOCYTES # BLD AUTO: 1.4 K/UL (ref 1–4.8)
LYMPHOCYTES NFR BLD: 18.8 % (ref 18–48)
MCH RBC QN AUTO: 28.7 PG (ref 27–31)
MCHC RBC AUTO-ENTMCNC: 32.8 G/DL (ref 32–36)
MCV RBC AUTO: 88 FL (ref 82–98)
MONOCYTES # BLD AUTO: 0.7 K/UL (ref 0.3–1)
MONOCYTES NFR BLD: 9.7 % (ref 4–15)
NEUTROPHILS # BLD AUTO: 5.1 K/UL (ref 1.8–7.7)
NEUTROPHILS NFR BLD: 68.6 % (ref 38–73)
NONHDLC SERPL-MCNC: 193 MG/DL
NRBC BLD-RTO: 0 /100 WBC
PLATELET # BLD AUTO: 273 K/UL (ref 150–350)
PMV BLD AUTO: 10.2 FL (ref 9.2–12.9)
POTASSIUM SERPL-SCNC: 3.9 MMOL/L (ref 3.5–5.1)
PROT SERPL-MCNC: 7.2 G/DL (ref 6–8.4)
RBC # BLD AUTO: 4.35 M/UL (ref 4–5.4)
SODIUM SERPL-SCNC: 139 MMOL/L (ref 136–145)
TRIGL SERPL-MCNC: 93 MG/DL (ref 30–150)
TSH SERPL DL<=0.005 MIU/L-ACNC: 1.35 UIU/ML (ref 0.4–4)
WBC # BLD AUTO: 7.44 K/UL (ref 3.9–12.7)

## 2020-02-13 PROCEDURE — 85652 RBC SED RATE AUTOMATED: CPT

## 2020-02-13 PROCEDURE — 83036 HEMOGLOBIN GLYCOSYLATED A1C: CPT

## 2020-02-13 PROCEDURE — 80053 COMPREHEN METABOLIC PANEL: CPT

## 2020-02-13 PROCEDURE — 36415 COLL VENOUS BLD VENIPUNCTURE: CPT

## 2020-02-13 PROCEDURE — 84443 ASSAY THYROID STIM HORMONE: CPT

## 2020-02-13 PROCEDURE — 86140 C-REACTIVE PROTEIN: CPT

## 2020-02-13 PROCEDURE — 80061 LIPID PANEL: CPT

## 2020-02-13 PROCEDURE — 85025 COMPLETE CBC W/AUTO DIFF WBC: CPT

## 2020-02-14 ENCOUNTER — OFFICE VISIT (OUTPATIENT)
Dept: RHEUMATOLOGY | Facility: CLINIC | Age: 55
End: 2020-02-14
Payer: COMMERCIAL

## 2020-02-14 ENCOUNTER — PATIENT MESSAGE (OUTPATIENT)
Dept: INTERNAL MEDICINE | Facility: CLINIC | Age: 55
End: 2020-02-14

## 2020-02-14 VITALS
SYSTOLIC BLOOD PRESSURE: 137 MMHG | HEART RATE: 78 BPM | DIASTOLIC BLOOD PRESSURE: 89 MMHG | BODY MASS INDEX: 27.99 KG/M2 | WEIGHT: 168 LBS | HEIGHT: 65 IN

## 2020-02-14 DIAGNOSIS — R76.8 POSITIVE ANA (ANTINUCLEAR ANTIBODY): ICD-10-CM

## 2020-02-14 DIAGNOSIS — E78.5 HYPERLIPIDEMIA, UNSPECIFIED HYPERLIPIDEMIA TYPE: Primary | ICD-10-CM

## 2020-02-14 DIAGNOSIS — M05.9 SEROPOSITIVE RHEUMATOID ARTHRITIS: Primary | ICD-10-CM

## 2020-02-14 DIAGNOSIS — Z71.89 COUNSELING ON HEALTH PROMOTION AND DISEASE PREVENTION: ICD-10-CM

## 2020-02-14 DIAGNOSIS — D84.9 IMMUNOSUPPRESSED STATUS: ICD-10-CM

## 2020-02-14 LAB
ESTIMATED AVG GLUCOSE: 111 MG/DL (ref 68–131)
HBA1C MFR BLD HPLC: 5.5 % (ref 4–5.6)

## 2020-02-14 PROCEDURE — 99214 OFFICE O/P EST MOD 30 MIN: CPT | Mod: S$GLB,,, | Performed by: INTERNAL MEDICINE

## 2020-02-14 PROCEDURE — 99999 PR PBB SHADOW E&M-EST. PATIENT-LVL III: ICD-10-PCS | Mod: PBBFAC,,, | Performed by: INTERNAL MEDICINE

## 2020-02-14 PROCEDURE — 3008F BODY MASS INDEX DOCD: CPT | Mod: CPTII,S$GLB,, | Performed by: INTERNAL MEDICINE

## 2020-02-14 PROCEDURE — 99999 PR PBB SHADOW E&M-EST. PATIENT-LVL III: CPT | Mod: PBBFAC,,, | Performed by: INTERNAL MEDICINE

## 2020-02-14 PROCEDURE — 99214 PR OFFICE/OUTPT VISIT, EST, LEVL IV, 30-39 MIN: ICD-10-PCS | Mod: S$GLB,,, | Performed by: INTERNAL MEDICINE

## 2020-02-14 PROCEDURE — 3008F PR BODY MASS INDEX (BMI) DOCUMENTED: ICD-10-PCS | Mod: CPTII,S$GLB,, | Performed by: INTERNAL MEDICINE

## 2020-02-14 NOTE — PROGRESS NOTES
RHEUMATOLOGY OUTPATIENT CLINIC NOTE    2/14/2020    Attending Rheumatologist: Perez Thomas  Primary Care Provider: Neel Matos MD   Physician Requesting Consultation: No referring provider defined for this encounter.  Chief Complaint/Reason For Consultation:  Seropositive rheumatoid arthritis    Subjective:       HPI  Ayesha Arcos is a 54 y.o. Black or  female seropositive RA comes for follow-up.    Today  Last seen on November.  Deemed stable with management of Xeljanz, MTX 20mg, and HCQ.  No acute complaints.  Denies new joint pain or swelling.  Refers morning stiffness less than 0.5 hr.  Able to perform activities of daily living without difficulties due to pain.  Does not have rash,  or GI complaints.    Review of Systems   Constitutional: Negative for chills, fever and malaise/fatigue.   Eyes: Negative for pain and redness.   Respiratory: Negative for cough, hemoptysis and shortness of breath.    Cardiovascular: Negative for chest pain and leg swelling.   Gastrointestinal: Negative for abdominal pain, blood in stool and melena.   Genitourinary: Negative for dysuria and hematuria.   Musculoskeletal: Negative for falls and joint pain.   Skin: Negative for rash.   Neurological: Negative for tingling and focal weakness.   Psychiatric/Behavioral: Negative for memory loss. The patient does not have insomnia.      Past Medical History:   Diagnosis Date    Carpal tunnel syndrome, bilateral     Hypertension     Rheumatoid arthritis      Past Surgical History:   Procedure Laterality Date    CARPAL TUNNEL RELEASE      right    skin cancer removal      nose    TUBAL LIGATION       Family History   Problem Relation Age of Onset    Diabetes Mother     Heart disease Father     Hypertension Father     Early death Father         Early 50's    Stroke Paternal Uncle     Heart disease Paternal Uncle      Social History     Substance and Sexual Activity   Alcohol Use Yes     "Comment: social      Social History     Tobacco Use   Smoking Status Never Smoker   Smokeless Tobacco Never Used     Social History     Substance and Sexual Activity   Drug Use No       Current Outpatient Medications:     folic acid (FOLVITE) 1 MG tablet, Take 1 tablet (1 mg total) by mouth once daily., Disp: 30 tablet, Rfl: 4    methotrexate 2.5 MG Tab, TAKE 8 TABLETS BY MOUTH EVERY 7 DAYS. SPLIT DOSE SAME DAY: 4 TSIN AM, 4 TABLETS IN PM, Disp: 40 tablet, Rfl: 3    multivitamin capsule, Take 1 capsule by mouth once daily., Disp: , Rfl:     multivitamin with minerals (HAIR,SKIN AND NAILS ORAL), Take by mouth once daily., Disp: , Rfl:     tofacitinib (XELJANZ XR) 11 mg Tb24, Take 11 mg by mouth once daily., Disp: 30 tablet, Rfl: 2    UNABLE TO FIND, 2 (two) times daily. Concentrated Serum for thinning hair, Disp: , Rfl:        Objective:         Vitals:    02/14/20 1255   BP: 137/89   Pulse: 78     Physical Exam   Constitutional: No distress.   Estimated body mass index is 27.96 kg/m² as calculated from the following:    Height as of this encounter: 5' 5" (1.651 m).    Weight as of this encounter: 76.2 kg (167 lb 15.9 oz).    Wt Readings from Last 1 Encounters:  02/14/20 1255 : 76.2 kg (167 lb 15.9 oz)     HENT:   Head: Normocephalic and atraumatic.   Eyes: Conjunctivae are normal. Pupils are equal, round, and reactive to light.   Neck: Normal range of motion.   Cardiovascular: Normal rate and intact distal pulses.    Pulmonary/Chest: Effort normal. No respiratory distress.   Abdominal: Soft. She exhibits no distension.   Neurological: She is alert. Gait normal.   Skin: No rash noted. No erythema.     Musculoskeletal: Normal range of motion.   : strong  No synovitis    AROM: intact  PROM: intact    Devices used by patient: none     Reviewed old and all outside pertinent medical records available.    All lab results personally reviewed and interpreted by me.  Lab Results   Component Value Date    WBC 7.44 " 02/13/2020    HGB 12.5 02/13/2020    HCT 38.1 02/13/2020    MCV 88 02/13/2020    MCH 28.7 02/13/2020    MCHC 32.8 02/13/2020    RDW 15.5 (H) 02/13/2020     02/13/2020    MPV 10.2 02/13/2020       Lab Results   Component Value Date     02/13/2020    K 3.9 02/13/2020     02/13/2020    CO2 24 02/13/2020     02/13/2020    BUN 11 02/13/2020    CALCIUM 9.1 02/13/2020    PROT 7.2 02/13/2020    ALBUMIN 3.8 02/13/2020    BILITOT 0.4 02/13/2020    AST 17 02/13/2020    ALKPHOS 68 02/13/2020    ALT 20 02/13/2020       Lab Results   Component Value Date    COLORU Yellow 08/08/2018    APPEARANCEUA Clear 08/08/2018    SPECGRAV 1.025 08/08/2018    PHUR 6.0 08/08/2018    PROTEINUA Negative 08/08/2018    KETONESU Negative 08/08/2018    LEUKOCYTESUR Negative 08/08/2018    NITRITE Negative 08/08/2018       Lab Results   Component Value Date    CRP 0.6 02/13/2020       Lab Results   Component Value Date    SEDRATE 11 02/13/2020       Lab Results   Component Value Date    RF 57.0 (H) 08/08/2018    SEDRATE 11 02/13/2020       No components found for: 25OHVITDTOT, 95LSEIHN3, 80BQOXCP7, METHODNOTE    Lab Results   Component Value Date    URICACID 4.5 07/26/2018       No components found for: TSPOTTB    Rheum Labs:  · MOLLY 1 in 2560 homogeneous pattern  · Rheumatoid factor 57  ·   ·  ALMA panel negative  ·  beta 2 anti cardiolipin negative  ·  C3-4 normal    ·   Hepatitis /TB negative 8/2018    Imaging:  All imaging reviewed and independently  interpreted by me.    XR hands 7/2018  The bones appear demineralized.  Multi articular degenerative changes are seen including at the 1st carpometacarpal joint and at the distal interphalangeal joints with osteophyte formation.  Findings are most pronounced at the right 5th distal interphalangeal joint with possible mild erosive changes.    X-ray shoulder April 2019  no radiographic evidence of acute osseous, articular, or soft tissue abnormality.  There are moderate to  severe degenerative changes present at the glenohumeral joint.     ASSESSMENT / PLAN:     Ayesha Arcos is a 54 y.o. Black or  female with:    1.Seropositive, erosive RA  - CDAI:-> remission.    - Currently on HCQ 400mg daily, methotrexate 20 mg po split dose, and Xeljanz initiated  - will recommend to discontinue Plaquenil and continue with methotrexate 20 mg split dose and Xeljanz.  - daily folic acid.  - ESR, CRP prior next visit.    2. Immunosuppressed status   - Compromised immune system secondary to autoimmune disease and use of immunosuppressive drugs.   - Monitor carefully for infections and toxicities.   - Advised to get immediate medical care if any infection.   - Also advised strict adherence to age appropriate vaccinations and cancer screenings with PCP.  - CBC, CMP before next visit    3. MOLLY +  - remains with no other features of active CTD    4. Other specified counseling  - Immunization counseling done.  Shingrix vaccine recommended today.  - Nutrition and exercise counseling.  - Limitation of alcohol consumption.  - Regular exercise:  Aerobic and resistance.  - Medication counseling provided.    Return to care in 4 months    Method of contact with patient concerns: Li ro[facility] Rheumatology    Disclaimer:  This note is prepared using voice recognition software and as such is likely to have errors and has not been proof read. Please contact me for questions.     Time spent: 25 minutes in face to face discussion concerning diagnosis, prognosis, review of lab and test results, benefits of treatment as well as management of disease, counseling of patient and coordination of care between various health care providers.  Greater than half the time spent was used for coordination of care and counseling of patient.    Perez Thomas M.D.  Rheumatology Department   [facility] [place] [facility]

## 2020-02-24 ENCOUNTER — TELEPHONE (OUTPATIENT)
Dept: PHARMACY | Facility: CLINIC | Age: 55
End: 2020-02-24

## 2020-03-19 ENCOUNTER — TELEPHONE (OUTPATIENT)
Dept: RHEUMATOLOGY | Facility: CLINIC | Age: 55
End: 2020-03-19

## 2020-03-19 DIAGNOSIS — M05.9 SEROPOSITIVE RHEUMATOID ARTHRITIS: ICD-10-CM

## 2020-03-19 NOTE — TELEPHONE ENCOUNTER
----- Message from Emily Lobato sent at 3/19/2020  4:10 PM CDT -----  Contact: self  Requesting call back regarding pt states pharmacy needs prior auth for pt medications. Please call back at [phone].    Thanks,  Emily Lobato

## 2020-03-20 ENCOUNTER — TELEPHONE (OUTPATIENT)
Dept: PHARMACY | Facility: CLINIC | Age: 55
End: 2020-03-20

## 2020-04-17 ENCOUNTER — TELEPHONE (OUTPATIENT)
Dept: PHARMACY | Facility: CLINIC | Age: 55
End: 2020-04-17

## 2020-05-07 DIAGNOSIS — M05.9 SEROPOSITIVE RHEUMATOID ARTHRITIS: ICD-10-CM

## 2020-05-08 RX ORDER — METHOTREXATE 2.5 MG/1
TABLET ORAL
Qty: 40 TABLET | Refills: 3 | Status: SHIPPED | OUTPATIENT
Start: 2020-05-08 | End: 2020-09-15 | Stop reason: SDUPTHER

## 2020-05-15 ENCOUNTER — TELEPHONE (OUTPATIENT)
Dept: PHARMACY | Facility: CLINIC | Age: 55
End: 2020-05-15

## 2020-06-12 ENCOUNTER — TELEPHONE (OUTPATIENT)
Dept: PHARMACY | Facility: CLINIC | Age: 55
End: 2020-06-12

## 2020-06-15 ENCOUNTER — PATIENT OUTREACH (OUTPATIENT)
Dept: ADMINISTRATIVE | Facility: OTHER | Age: 55
End: 2020-06-15

## 2020-06-15 DIAGNOSIS — M05.9 SEROPOSITIVE RHEUMATOID ARTHRITIS: ICD-10-CM

## 2020-06-15 DIAGNOSIS — Z12.31 ENCOUNTER FOR SCREENING MAMMOGRAM FOR MALIGNANT NEOPLASM OF BREAST: Primary | ICD-10-CM

## 2020-06-16 ENCOUNTER — TELEPHONE (OUTPATIENT)
Dept: PHARMACY | Facility: CLINIC | Age: 55
End: 2020-06-16

## 2020-06-16 ENCOUNTER — LAB VISIT (OUTPATIENT)
Dept: LAB | Facility: HOSPITAL | Age: 55
End: 2020-06-16
Attending: INTERNAL MEDICINE
Payer: COMMERCIAL

## 2020-06-16 ENCOUNTER — OFFICE VISIT (OUTPATIENT)
Dept: RHEUMATOLOGY | Facility: CLINIC | Age: 55
End: 2020-06-16
Payer: COMMERCIAL

## 2020-06-16 VITALS
SYSTOLIC BLOOD PRESSURE: 141 MMHG | HEIGHT: 65 IN | BODY MASS INDEX: 27.99 KG/M2 | HEART RATE: 78 BPM | DIASTOLIC BLOOD PRESSURE: 90 MMHG | WEIGHT: 168 LBS

## 2020-06-16 DIAGNOSIS — M05.9 SEROPOSITIVE RHEUMATOID ARTHRITIS: Primary | ICD-10-CM

## 2020-06-16 DIAGNOSIS — D84.9 IMMUNOSUPPRESSED STATUS: ICD-10-CM

## 2020-06-16 DIAGNOSIS — M05.9 SEROPOSITIVE RHEUMATOID ARTHRITIS: ICD-10-CM

## 2020-06-16 LAB
ALBUMIN SERPL BCP-MCNC: 4.1 G/DL (ref 3.5–5.2)
ALP SERPL-CCNC: 89 U/L (ref 55–135)
ALT SERPL W/O P-5'-P-CCNC: 18 U/L (ref 10–44)
ANION GAP SERPL CALC-SCNC: 8 MMOL/L (ref 8–16)
AST SERPL-CCNC: 18 U/L (ref 10–40)
BASOPHILS # BLD AUTO: 0.03 K/UL (ref 0–0.2)
BASOPHILS NFR BLD: 0.5 % (ref 0–1.9)
BILIRUB SERPL-MCNC: 0.4 MG/DL (ref 0.1–1)
BUN SERPL-MCNC: 7 MG/DL (ref 6–20)
CALCIUM SERPL-MCNC: 9.3 MG/DL (ref 8.7–10.5)
CHLORIDE SERPL-SCNC: 107 MMOL/L (ref 95–110)
CO2 SERPL-SCNC: 26 MMOL/L (ref 23–29)
CREAT SERPL-MCNC: 0.8 MG/DL (ref 0.5–1.4)
CRP SERPL-MCNC: 0.7 MG/L (ref 0–8.2)
DIFFERENTIAL METHOD: ABNORMAL
EOSINOPHIL # BLD AUTO: 0.1 K/UL (ref 0–0.5)
EOSINOPHIL NFR BLD: 2.1 % (ref 0–8)
ERYTHROCYTE [DISTWIDTH] IN BLOOD BY AUTOMATED COUNT: 16 % (ref 11.5–14.5)
ERYTHROCYTE [SEDIMENTATION RATE] IN BLOOD BY WESTERGREN METHOD: 36 MM/HR (ref 0–36)
EST. GFR  (AFRICAN AMERICAN): >60 ML/MIN/1.73 M^2
EST. GFR  (NON AFRICAN AMERICAN): >60 ML/MIN/1.73 M^2
GLUCOSE SERPL-MCNC: 103 MG/DL (ref 70–110)
HCT VFR BLD AUTO: 39 % (ref 37–48.5)
HGB BLD-MCNC: 12.4 G/DL (ref 12–16)
IMM GRANULOCYTES # BLD AUTO: 0.01 K/UL (ref 0–0.04)
IMM GRANULOCYTES NFR BLD AUTO: 0.2 % (ref 0–0.5)
LYMPHOCYTES # BLD AUTO: 1.3 K/UL (ref 1–4.8)
LYMPHOCYTES NFR BLD: 22.8 % (ref 18–48)
MCH RBC QN AUTO: 28.3 PG (ref 27–31)
MCHC RBC AUTO-ENTMCNC: 31.8 G/DL (ref 32–36)
MCV RBC AUTO: 89 FL (ref 82–98)
MONOCYTES # BLD AUTO: 0.4 K/UL (ref 0.3–1)
MONOCYTES NFR BLD: 7.2 % (ref 4–15)
NEUTROPHILS # BLD AUTO: 3.9 K/UL (ref 1.8–7.7)
NEUTROPHILS NFR BLD: 67.4 % (ref 38–73)
NRBC BLD-RTO: 0 /100 WBC
PLATELET # BLD AUTO: 383 K/UL (ref 150–350)
PMV BLD AUTO: 10.1 FL (ref 9.2–12.9)
POTASSIUM SERPL-SCNC: 3.7 MMOL/L (ref 3.5–5.1)
PROT SERPL-MCNC: 7.7 G/DL (ref 6–8.4)
RBC # BLD AUTO: 4.38 M/UL (ref 4–5.4)
SODIUM SERPL-SCNC: 141 MMOL/L (ref 136–145)
WBC # BLD AUTO: 5.8 K/UL (ref 3.9–12.7)

## 2020-06-16 PROCEDURE — 99214 PR OFFICE/OUTPT VISIT, EST, LEVL IV, 30-39 MIN: ICD-10-PCS | Mod: S$GLB,,, | Performed by: INTERNAL MEDICINE

## 2020-06-16 PROCEDURE — 86140 C-REACTIVE PROTEIN: CPT

## 2020-06-16 PROCEDURE — 99999 PR PBB SHADOW E&M-EST. PATIENT-LVL III: CPT | Mod: PBBFAC,,, | Performed by: INTERNAL MEDICINE

## 2020-06-16 PROCEDURE — 3008F BODY MASS INDEX DOCD: CPT | Mod: CPTII,S$GLB,, | Performed by: INTERNAL MEDICINE

## 2020-06-16 PROCEDURE — 86480 TB TEST CELL IMMUN MEASURE: CPT

## 2020-06-16 PROCEDURE — 85652 RBC SED RATE AUTOMATED: CPT

## 2020-06-16 PROCEDURE — 99214 OFFICE O/P EST MOD 30 MIN: CPT | Mod: S$GLB,,, | Performed by: INTERNAL MEDICINE

## 2020-06-16 PROCEDURE — 36415 COLL VENOUS BLD VENIPUNCTURE: CPT

## 2020-06-16 PROCEDURE — 85025 COMPLETE CBC W/AUTO DIFF WBC: CPT

## 2020-06-16 PROCEDURE — 99999 PR PBB SHADOW E&M-EST. PATIENT-LVL III: ICD-10-PCS | Mod: PBBFAC,,, | Performed by: INTERNAL MEDICINE

## 2020-06-16 PROCEDURE — 3008F PR BODY MASS INDEX (BMI) DOCUMENTED: ICD-10-PCS | Mod: CPTII,S$GLB,, | Performed by: INTERNAL MEDICINE

## 2020-06-16 PROCEDURE — 80053 COMPREHEN METABOLIC PANEL: CPT

## 2020-06-16 RX ORDER — DIPHENHYDRAMINE HYDROCHLORIDE 50 MG/ML
50 INJECTION INTRAMUSCULAR; INTRAVENOUS ONCE AS NEEDED
Status: CANCELLED | OUTPATIENT
Start: 2020-10-12

## 2020-06-16 RX ORDER — SODIUM CHLORIDE 0.9 % (FLUSH) 0.9 %
10 SYRINGE (ML) INJECTION
Status: CANCELLED | OUTPATIENT
Start: 2020-10-12

## 2020-06-16 RX ORDER — DIPHENHYDRAMINE HYDROCHLORIDE 50 MG/ML
50 INJECTION INTRAMUSCULAR; INTRAVENOUS
Status: CANCELLED | OUTPATIENT
Start: 2020-10-12

## 2020-06-16 RX ORDER — EPINEPHRINE 0.3 MG/.3ML
0.3 INJECTION SUBCUTANEOUS ONCE AS NEEDED
Status: CANCELLED | OUTPATIENT
Start: 2020-10-12

## 2020-06-16 RX ORDER — TOFACITINIB 11 MG/1
11 TABLET, FILM COATED, EXTENDED RELEASE ORAL DAILY
Qty: 30 TABLET | Refills: 2 | Status: SHIPPED | OUTPATIENT
Start: 2020-06-16 | End: 2020-09-16 | Stop reason: SDUPTHER

## 2020-06-16 RX ORDER — ACETAMINOPHEN 325 MG/1
650 TABLET ORAL
Status: CANCELLED | OUTPATIENT
Start: 2020-10-12

## 2020-06-16 RX ORDER — HEPARIN 100 UNIT/ML
500 SYRINGE INTRAVENOUS
Status: CANCELLED | OUTPATIENT
Start: 2020-10-12

## 2020-06-16 NOTE — PATIENT INSTRUCTIONS
Tocilizumab injection  What is this medicine?  TOCILIZUMAB (TOE si ELVIA ue mab) is used to treat rheumatoid arthritis (RA) and certain types of arthritis in children. This medicine helps reduce joint pain and swelling. This medicine is often used with other medicines.  How should I use this medicine?  This medicine is for infusion into a vein or for injection under the skin. It is usually given by a health care professional in a hospital or clinic setting. If you get this medicine at home, you will be taught how to prepare and give this medicine. Use exactly as directed. Take your medicine at regular intervals. Do not take your medicine more often than directed.  It is important that you put your used needles and syringes in a special sharps container. Do not put them in a trash can. If you do not have a sharps container, call your pharmacist or healthcare provider to get one.  A special MedGuide will be given to you by the pharmacist with each prescription and refill. Be sure to read this information carefully each time.  Talk to your pediatrician regarding the use of this medicine in children. While the drug may be prescribed for children as young as 2 years for selected conditions, precautions do apply.  What side effects may I notice from receiving this medicine?  Side effects that you should report to your doctor or health care professional as soon as possible:  · allergic reactions like skin rash, itching or hives, swelling of the face, lips, or tongue  · breathing problems  · feeling faint or lightheaded, falls  · fever or chills, sore throat  · stomach pain  · unusual bleeding or bruising  · yellowing of the eyes or skin  Side effects that usually do not require medical attention (Report these to your doctor or health care professional if they continue or are bothersome.):  · dizziness  · headache  · pain, redness, or irritation at site where injected  What may interact with this medicine?  Do not take this  medicine with any of the following medications:  · live virus vaccines  This medicine may also interact with the following medications:  · abatacept  · adalimumab  · anakinra  · atorvastatin  · certolizumab  · cyclosporine  · dextromethorphan  · etanercept  · golimumab  · infliximab  · lovastatin  · medicines that lower the immune system  · ofatumumab  · omeprazole  · oral contraceptives  · rituximab  · simvastatin  · steroid medicines like prednisone or cortisone  · theophylline  · tositumomab  · vaccines  · warfarin  What if I miss a dose?  It is important not to miss your dose. Call your doctor or health care professional if you are unable to keep an appointment. If you give yourself the medicine and you miss a dose, take it as soon as you can. If it is almost time for your next dose, take only that dose. Do not take double or extra doses.  Where should I keep my medicine?  Keep out of the reach of children. If you are using this medicine at home, you will be instructed on how to store this medicine. Throw away any unused medicine after the expiration date on the label.  What should I tell my health care provider before I take this medicine?  They need to know if you have any of these conditions:  · cancer  · diabetes  · diverticulitis  · heart disease  · hepatitis B or history of hepatitis B infection  · high blood pressure  · high cholesterol  · history of pancreatitis  · immune system problems  · infection (especially a virus infection such as chickenpox, cold sores, or herpes)  · liver disease  · low blood counts, like low white cell, platelet, or red cell counts  · multiple sclerosis  · recently received or scheduled to receive a vaccine  · scheduled to have surgery  · stomach ulcer  · stroke  · tuberculosis, a positive skin test for tuberculosis or have recently been in close contact with someone who has tuberculosis  · an unusual or allergic reaction to tocilizumab, other medicines, foods, dyes, or  preservatives  · pregnant or trying to get pregnant  · breast-feeding  What should I watch for while using this medicine?  Tell your doctor or healthcare professional if your symptoms do not start to get better or if they get worse.  Your condition will be monitored carefully while you are receiving this medicine.  You will be tested for tuberculosis (TB) before you start this medicine. If your doctor prescribes any medicine for TB, you should start taking the TB medicine before starting this medicine. Make sure to finish the full course of TB medicine.  Talk to your doctor about your risk of cancer. You may be more at risk for certain types of cancers if you take this medicine.  Call your doctor or health care professional for advice if you get a fever, chills or sore throat, or other symptoms of a cold or flu. Do not treat yourself. This drug decreases your body's ability to fight infections. Try to avoid being around people who are sick.  You may need blood work done while you are taking this medicine.  NOTE:This sheet is a summary. It may not cover all possible information. If you have questions about this medicine, talk to your doctor, pharmacist, or health care provider. Copyright© 2017 [employer]

## 2020-06-16 NOTE — Clinical Note
Considering replacing Xeljanz to Actemra infusion per protocol on next visit in 3-4 months, after MD follow-up.  For prior auth/approval

## 2020-06-16 NOTE — PROGRESS NOTES
RHEUMATOLOGY OUTPATIENT CLINIC NOTE    6/16/2020    Attending Rheumatologist: Perez Thomas  Primary Care Provider: Neel Matos MD   Physician Requesting Consultation: No referring provider defined for this encounter.  Chief Complaint/Reason For Consultation:  Seropositive rheumatoid arthritis    Subjective:       HPI  Ayesha Arcos is a 54 y.o. Black or  female seropositive RA comes for follow-up.    Today  Last seen on February.  Deemed stable with management of Xeljanz, MTX 20mg.  HCQ was discontinued.  Patient notices worsening swelling, most notably on right 5th PIP.  Denies association with pain or prolonged morning stiffness.  Able to perform activities of daily living without difficulty.  Denies fever, rash,  or GI complaints.    Addendum 10/7:  High co-pay with infusions, patient reported prior fear of needles.  Will recommend Rinvoq to replace Xeljanz for seropositive rheumatoid arthritis with suboptimal response to current combined regimen of methotrexate and Xeljanz.  Medication sent to [facility]    Review of Systems   Constitutional: Negative for chills, fever and malaise/fatigue.   Eyes: Negative for pain and redness.   Respiratory: Negative for cough, hemoptysis and shortness of breath.    Cardiovascular: Negative for chest pain and leg swelling.   Gastrointestinal: Negative for abdominal pain, blood in stool and melena.   Genitourinary: Negative for dysuria and hematuria.   Musculoskeletal: Negative for falls and joint pain (Worsening joint swelling).   Skin: Negative for rash.   Neurological: Negative for tingling and focal weakness.   Psychiatric/Behavioral: Negative for memory loss. The patient does not have insomnia.      Past Medical History:   Diagnosis Date    Carpal tunnel syndrome, bilateral     Hypertension     Rheumatoid arthritis      Past Surgical History:   Procedure Laterality Date    CARPAL TUNNEL RELEASE      right    skin cancer removal       "nose    TUBAL LIGATION       Family History   Problem Relation Age of Onset    Diabetes Mother     Heart disease Father     Hypertension Father     Early death Father         Early 50's    Stroke Paternal Uncle     Heart disease Paternal Uncle      Social History     Substance and Sexual Activity   Alcohol Use Yes    Comment: social      Social History     Tobacco Use   Smoking Status Never Smoker   Smokeless Tobacco Never Used     Social History     Substance and Sexual Activity   Drug Use No       Current Outpatient Medications:     folic acid (FOLVITE) 1 MG tablet, Take 1 tablet (1 mg total) by mouth once daily., Disp: 30 tablet, Rfl: 4    methotrexate 2.5 MG Tab, TAKE 8 TABLETS BY MOUTH EVERY 7 DAYS. SPLIT DOSE SAME DAY: 4 TSIN AM, 4 TABLETS IN PM, Disp: 40 tablet, Rfl: 3    multivitamin capsule, Take 1 capsule by mouth once daily., Disp: , Rfl:     multivitamin with minerals (HAIR,SKIN AND NAILS ORAL), Take by mouth once daily., Disp: , Rfl:     tofacitinib (XELJANZ XR) 11 mg Tb24, Take 1 tablet (11 mg) by mouth once daily., Disp: 30 tablet, Rfl: 2    UNABLE TO FIND, 2 (two) times daily. Concentrated Serum for thinning hair, Disp: , Rfl:        Objective:         Vitals:    06/16/20 1018   BP: (!) 141/90   Pulse: 78     Physical Exam   Constitutional: No distress.   Estimated body mass index is 27.96 kg/m² as calculated from the following:    Height as of this encounter: 5' 5" (1.651 m).    Weight as of this encounter: 76.2 kg (167 lb 15.9 oz).    Wt Readings from Last 1 Encounters:  06/16/20 1018 : 76.2 kg (167 lb 15.9 oz)     HENT:   Head: Normocephalic and atraumatic.   Eyes: Conjunctivae are normal. Pupils are equal, round, and reactive to light.   Neck: Normal range of motion.   Cardiovascular: Normal rate and intact distal pulses.    Pulmonary/Chest: Effort normal. No respiratory distress.   Abdominal: Soft. She exhibits no distension.   Neurological: She is alert. Gait normal.   Skin: No " rash noted. No erythema.     Musculoskeletal: Normal range of motion. Tenderness present.      Comments: : strong  Puffy hand appearance.  Acute on chronic synovitis Rt. 5th PIP, slight tenderness.  Small joints feel warmth.  No erythema.    AROM: intact  PROM: intact    Devices used by patient: none     Reviewed old and all outside pertinent medical records available.    All lab results personally reviewed and interpreted by me.  Lab Results   Component Value Date    WBC 5.80 06/16/2020    HGB 12.4 06/16/2020    HCT 39.0 06/16/2020    MCV 89 06/16/2020    MCH 28.3 06/16/2020    MCHC 31.8 (L) 06/16/2020    RDW 16.0 (H) 06/16/2020     (H) 06/16/2020    MPV 10.1 06/16/2020       Lab Results   Component Value Date     06/16/2020    K 3.7 06/16/2020     06/16/2020    CO2 26 06/16/2020     06/16/2020    BUN 7 06/16/2020    CALCIUM 9.3 06/16/2020    PROT 7.7 06/16/2020    ALBUMIN 4.1 06/16/2020    BILITOT 0.4 06/16/2020    AST 18 06/16/2020    ALKPHOS 89 06/16/2020    ALT 18 06/16/2020       Lab Results   Component Value Date    COLORU Yellow 08/08/2018    APPEARANCEUA Clear 08/08/2018    SPECGRAV 1.025 08/08/2018    PHUR 6.0 08/08/2018    PROTEINUA Negative 08/08/2018    KETONESU Negative 08/08/2018    LEUKOCYTESUR Negative 08/08/2018    NITRITE Negative 08/08/2018       Lab Results   Component Value Date    CRP 0.7 06/16/2020       Lab Results   Component Value Date    SEDRATE 11 02/13/2020       Lab Results   Component Value Date    RF 57.0 (H) 08/08/2018    SEDRATE 11 02/13/2020       No components found for: 25OHVITDTOT, 45QMPWZT2, 69YFQFPO6, METHODNOTE    Lab Results   Component Value Date    URICACID 4.5 07/26/2018       No components found for: TSPOTTB    Rheum Labs:  · MOLLY 1 in 2560 homogeneous pattern  · Rheumatoid factor 57  ·   · ALMA panel negative  · beta 2 anti cardiolipin negative  · C3-4 normal    ·   Hepatitis /TB negative 8/2018    Imaging:  All imaging reviewed and  independently  interpreted by me.    XR hands 7/2018  The bones appear demineralized.  Multi articular degenerative changes are seen including at the 1st carpometacarpal joint and at the distal interphalangeal joints with osteophyte formation.  Findings are most pronounced at the right 5th distal interphalangeal joint with possible mild erosive changes.    X-ray shoulder April 2019  no radiographic evidence of acute osseous, articular, or soft tissue abnormality.  There are moderate to severe degenerative changes present at the glenohumeral joint.     ASSESSMENT / PLAN:     Ayesha Arcos is a 54 y.o. Black or  female with:    1.Seropositive, erosive RA  - CDAI:-> mild disease activity.  Patient denies pain or stiffness, worsening swelling since discontinuation of Plaquenil.  - resume Plaquenil and continue with methotrexate 20 mg and Xeljanz 11 mg daily.  Continue daily folic acid  - repeat x-ray to assess radiographic progression.  - consider switch replacing Xeljanz by Tocilizumab (Hx of MOLLY, would avoid TNFi if possible)    2. Immunosuppressed status   - Compromised immune system secondary to autoimmune disease and use of immunosuppressive drugs.   - Monitor carefully for infections and toxicities.   - Advised to get immediate medical care if any infection.   - Also advised strict adherence to age appropriate vaccinations and cancer screenings with PCP.  - CBC, CMP before next visit    3. MOLLY +  - remains with no other features of active CTD    4. Other specified counseling  - Immunization counseling done.    - Limitation of alcohol consumption.  - Regular exercise:  Aerobic and resistance.  - Medication counseling provided.    Return to care in 4 months    Method of contact with patient concerns: [facility]    Disclaimer:  This note is prepared using voice recognition software and as such is likely to have errors and has not been proof read. Please contact me for questions.      Perez Thomas M.D.  Rheumatology Department   [facility] [place] [facility]

## 2020-06-18 LAB
GAMMA INTERFERON BACKGROUND BLD IA-ACNC: 0.02 IU/ML
M TB IFN-G CD4+ BCKGRND COR BLD-ACNC: 0 IU/ML
MITOGEN IGNF BCKGRD COR BLD-ACNC: 3.47 IU/ML
TB GOLD PLUS: NEGATIVE
TB2 - NIL: 0 IU/ML

## 2020-07-01 ENCOUNTER — TELEPHONE (OUTPATIENT)
Dept: RHEUMATOLOGY | Facility: CLINIC | Age: 55
End: 2020-07-01

## 2020-07-01 NOTE — TELEPHONE ENCOUNTER
----- Message from Maliha Mcgill sent at 7/1/2020 11:42 AM CDT -----  Type:  Needs Medical Advice    Who Called: Ayesha Arcos      Would the patient rather a call back or a response via [facility]? Call back   Best Call Back Number: [phone] (cell)   Additional Information: Patient is calling in regards to a Prior Authorization on NEW RX. Patient wanted to know if the Prior Authorization went through Injections.???  Patient stated that it will be a Nurse visit.

## 2020-07-14 ENCOUNTER — TELEPHONE (OUTPATIENT)
Dept: PHARMACY | Facility: CLINIC | Age: 55
End: 2020-07-14

## 2020-07-22 ENCOUNTER — PATIENT OUTREACH (OUTPATIENT)
Dept: ADMINISTRATIVE | Facility: HOSPITAL | Age: 55
End: 2020-07-22

## 2020-07-22 NOTE — PROGRESS NOTES
Patient returned call. Patient will schedule with Dr. Ramsey and will call back after screenings.

## 2020-08-11 ENCOUNTER — TELEPHONE (OUTPATIENT)
Dept: PHARMACY | Facility: CLINIC | Age: 55
End: 2020-08-11

## 2020-08-25 ENCOUNTER — PATIENT OUTREACH (OUTPATIENT)
Dept: ADMINISTRATIVE | Facility: HOSPITAL | Age: 55
End: 2020-08-25

## 2020-08-25 NOTE — LETTER
AUTHORIZATION FOR RELEASE OF   CONFIDENTIAL INFORMATION    Dear Dr. Ramsey,    We are seeing Ayesha Arcos, date of birth 1965, in the clinic at [facility]. Neel Matos MD is the patient's PCP. Ayesha Arcos has an outstanding lab/procedure at the time we reviewed her chart. In order to help keep her health information updated, she has authorized us to request the following medical record(s):                                              ( x )  MAMMOGRAM-Most recent                                        (  )  PAP SMEAR                                          (  )  OUTSIDE LAB RESULTS     (  )  DEXA SCAN                                          (  )  EYE EXAM            (  )  FOOT EXAM                                          (  )  ENTIRE RECORD     (  )  OUTSIDE IMMUNIZATIONS                 (  )  _______________         Please fax records to [facility], Kodi Crisp Coleman, MD, Attn: Delphine High LPN                                                   Fax: [phone]                                           If you have any questions, please contact Delphine High LPN                                                                              Phone: [phone]          Patient Name: Ayesha Arcos  : 1965  Patient Phone #: [phone]

## 2020-08-25 NOTE — PROGRESS NOTES
Working Mammo report. Pt due. Called to offer scheduling, no answer left message and sent mychart message.

## 2020-09-15 DIAGNOSIS — M05.9 SEROPOSITIVE RHEUMATOID ARTHRITIS: ICD-10-CM

## 2020-09-16 DIAGNOSIS — M05.9 SEROPOSITIVE RHEUMATOID ARTHRITIS: ICD-10-CM

## 2020-09-18 ENCOUNTER — TELEPHONE (OUTPATIENT)
Dept: PHARMACY | Facility: CLINIC | Age: 55
End: 2020-09-18

## 2020-09-18 RX ORDER — METHOTREXATE 2.5 MG/1
TABLET ORAL
Qty: 40 TABLET | Refills: 3 | Status: SHIPPED | OUTPATIENT
Start: 2020-09-18 | End: 2020-09-22 | Stop reason: SDUPTHER

## 2020-09-18 RX ORDER — TOFACITINIB 11 MG/1
11 TABLET, FILM COATED, EXTENDED RELEASE ORAL DAILY
Qty: 30 TABLET | Refills: 2 | Status: SHIPPED | OUTPATIENT
Start: 2020-09-18 | End: 2020-11-16

## 2020-09-22 DIAGNOSIS — M05.9 SEROPOSITIVE RHEUMATOID ARTHRITIS: ICD-10-CM

## 2020-09-23 RX ORDER — METHOTREXATE 2.5 MG/1
TABLET ORAL
Qty: 40 TABLET | Refills: 3 | Status: SHIPPED | OUTPATIENT
Start: 2020-09-23 | End: 2020-10-16 | Stop reason: SDUPTHER

## 2020-10-05 ENCOUNTER — TELEPHONE (OUTPATIENT)
Dept: RHEUMATOLOGY | Facility: CLINIC | Age: 55
End: 2020-10-05

## 2020-10-05 NOTE — TELEPHONE ENCOUNTER
Rae Jaquez RN  P William Todd Staff             MATT PARKER  Received call from Natalia @ [phone] to inform that she needs documentation on why this patient is swithing from Xeljanz,  To  Actemra. She see's no recent x-ray results . If approve woulsd only approved one dose and other will have to be administer in MD's office/ or home infusion center, to keep cost down for member.

## 2020-10-07 ENCOUNTER — TELEPHONE (OUTPATIENT)
Dept: RHEUMATOLOGY | Facility: CLINIC | Age: 55
End: 2020-10-07

## 2020-10-07 DIAGNOSIS — M05.9 SEROPOSITIVE RHEUMATOID ARTHRITIS: Primary | ICD-10-CM

## 2020-10-07 RX ORDER — UPADACITINIB 15 MG/1
15 TABLET, EXTENDED RELEASE ORAL DAILY
Qty: 30 TABLET | Refills: 2 | Status: SHIPPED | OUTPATIENT
Start: 2020-10-07 | End: 2021-02-22 | Stop reason: SDUPTHER

## 2020-10-07 NOTE — TELEPHONE ENCOUNTER
Rae Jaquez RN  P William Todd Staff             MATT PARKER  Received call from Natalia @ [phone] to inform that she needs documentation on why this patient is swithing from Xeljanz,  To  Actemra. She see's no recent x-ray results . If approve woulsd only approved one dose and other will have to be administer in MD's office/ or home infusion center, to keep cost down for member.

## 2020-10-13 ENCOUNTER — TELEPHONE (OUTPATIENT)
Dept: PHARMACY | Facility: CLINIC | Age: 55
End: 2020-10-13

## 2020-10-16 ENCOUNTER — TELEPHONE (OUTPATIENT)
Dept: RHEUMATOLOGY | Facility: CLINIC | Age: 55
End: 2020-10-16

## 2020-10-16 ENCOUNTER — OFFICE VISIT (OUTPATIENT)
Dept: RHEUMATOLOGY | Facility: CLINIC | Age: 55
End: 2020-10-16
Payer: COMMERCIAL

## 2020-10-16 ENCOUNTER — LAB VISIT (OUTPATIENT)
Dept: LAB | Facility: HOSPITAL | Age: 55
End: 2020-10-16
Attending: INTERNAL MEDICINE
Payer: COMMERCIAL

## 2020-10-16 VITALS
SYSTOLIC BLOOD PRESSURE: 131 MMHG | HEIGHT: 65 IN | BODY MASS INDEX: 27.07 KG/M2 | DIASTOLIC BLOOD PRESSURE: 87 MMHG | WEIGHT: 162.5 LBS | HEART RATE: 79 BPM

## 2020-10-16 DIAGNOSIS — Z79.899 HIGH RISK MEDICATION USE: ICD-10-CM

## 2020-10-16 DIAGNOSIS — B00.89 HERPETIC DERMATITIS: ICD-10-CM

## 2020-10-16 DIAGNOSIS — D84.9 IMMUNOSUPPRESSED STATUS: ICD-10-CM

## 2020-10-16 DIAGNOSIS — M05.9 SEROPOSITIVE RHEUMATOID ARTHRITIS: Primary | ICD-10-CM

## 2020-10-16 DIAGNOSIS — M05.9 SEROPOSITIVE RHEUMATOID ARTHRITIS: ICD-10-CM

## 2020-10-16 DIAGNOSIS — Z71.89 COUNSELING ON HEALTH PROMOTION AND DISEASE PREVENTION: ICD-10-CM

## 2020-10-16 DIAGNOSIS — Z79.60 LONG-TERM USE OF IMMUNOSUPPRESSANT MEDICATION: ICD-10-CM

## 2020-10-16 LAB
ALBUMIN SERPL BCP-MCNC: 4.3 G/DL (ref 3.5–5.2)
ALP SERPL-CCNC: 87 U/L (ref 55–135)
ALT SERPL W/O P-5'-P-CCNC: 27 U/L (ref 10–44)
ANION GAP SERPL CALC-SCNC: 10 MMOL/L (ref 8–16)
AST SERPL-CCNC: 19 U/L (ref 10–40)
BASOPHILS # BLD AUTO: 0.03 K/UL (ref 0–0.2)
BASOPHILS NFR BLD: 0.5 % (ref 0–1.9)
BILIRUB SERPL-MCNC: 0.4 MG/DL (ref 0.1–1)
BUN SERPL-MCNC: 8 MG/DL (ref 6–20)
CALCIUM SERPL-MCNC: 9.7 MG/DL (ref 8.7–10.5)
CHLORIDE SERPL-SCNC: 106 MMOL/L (ref 95–110)
CO2 SERPL-SCNC: 26 MMOL/L (ref 23–29)
CREAT SERPL-MCNC: 0.8 MG/DL (ref 0.5–1.4)
CRP SERPL-MCNC: 2.8 MG/L (ref 0–8.2)
DIFFERENTIAL METHOD: ABNORMAL
EOSINOPHIL # BLD AUTO: 0.1 K/UL (ref 0–0.5)
EOSINOPHIL NFR BLD: 0.9 % (ref 0–8)
ERYTHROCYTE [DISTWIDTH] IN BLOOD BY AUTOMATED COUNT: 16.5 % (ref 11.5–14.5)
ERYTHROCYTE [SEDIMENTATION RATE] IN BLOOD BY WESTERGREN METHOD: 7 MM/HR (ref 0–36)
EST. GFR  (AFRICAN AMERICAN): >60 ML/MIN/1.73 M^2
EST. GFR  (NON AFRICAN AMERICAN): >60 ML/MIN/1.73 M^2
GLUCOSE SERPL-MCNC: 111 MG/DL (ref 70–110)
HCT VFR BLD AUTO: 39.2 % (ref 37–48.5)
HGB BLD-MCNC: 12.5 G/DL (ref 12–16)
IMM GRANULOCYTES # BLD AUTO: 0.01 K/UL (ref 0–0.04)
IMM GRANULOCYTES NFR BLD AUTO: 0.2 % (ref 0–0.5)
LYMPHOCYTES # BLD AUTO: 1.4 K/UL (ref 1–4.8)
LYMPHOCYTES NFR BLD: 21.7 % (ref 18–48)
MCH RBC QN AUTO: 28.5 PG (ref 27–31)
MCHC RBC AUTO-ENTMCNC: 31.9 G/DL (ref 32–36)
MCV RBC AUTO: 90 FL (ref 82–98)
MONOCYTES # BLD AUTO: 0.6 K/UL (ref 0.3–1)
MONOCYTES NFR BLD: 9 % (ref 4–15)
NEUTROPHILS # BLD AUTO: 4.5 K/UL (ref 1.8–7.7)
NEUTROPHILS NFR BLD: 67.7 % (ref 38–73)
NRBC BLD-RTO: 0 /100 WBC
PLATELET # BLD AUTO: 334 K/UL (ref 150–350)
PMV BLD AUTO: 10 FL (ref 9.2–12.9)
POTASSIUM SERPL-SCNC: 3.9 MMOL/L (ref 3.5–5.1)
PROT SERPL-MCNC: 7.9 G/DL (ref 6–8.4)
RBC # BLD AUTO: 4.38 M/UL (ref 4–5.4)
SODIUM SERPL-SCNC: 142 MMOL/L (ref 136–145)
WBC # BLD AUTO: 6.59 K/UL (ref 3.9–12.7)

## 2020-10-16 PROCEDURE — 36415 COLL VENOUS BLD VENIPUNCTURE: CPT

## 2020-10-16 PROCEDURE — 99214 PR OFFICE/OUTPT VISIT, EST, LEVL IV, 30-39 MIN: ICD-10-PCS | Mod: 25,S$GLB,, | Performed by: INTERNAL MEDICINE

## 2020-10-16 PROCEDURE — 99999 PR PBB SHADOW E&M-EST. PATIENT-LVL III: ICD-10-PCS | Mod: PBBFAC,,, | Performed by: INTERNAL MEDICINE

## 2020-10-16 PROCEDURE — 3008F BODY MASS INDEX DOCD: CPT | Mod: CPTII,S$GLB,, | Performed by: INTERNAL MEDICINE

## 2020-10-16 PROCEDURE — 90471 FLU VACCINE - QUADRIVALENT - ADJUVANTED: ICD-10-PCS | Mod: S$GLB,,, | Performed by: INTERNAL MEDICINE

## 2020-10-16 PROCEDURE — 86140 C-REACTIVE PROTEIN: CPT

## 2020-10-16 PROCEDURE — 90694 FLU VACCINE - QUADRIVALENT - ADJUVANTED: ICD-10-PCS | Mod: S$GLB,,, | Performed by: INTERNAL MEDICINE

## 2020-10-16 PROCEDURE — 90471 IMMUNIZATION ADMIN: CPT | Mod: S$GLB,,, | Performed by: INTERNAL MEDICINE

## 2020-10-16 PROCEDURE — 3008F PR BODY MASS INDEX (BMI) DOCUMENTED: ICD-10-PCS | Mod: CPTII,S$GLB,, | Performed by: INTERNAL MEDICINE

## 2020-10-16 PROCEDURE — 80053 COMPREHEN METABOLIC PANEL: CPT

## 2020-10-16 PROCEDURE — 99999 PR PBB SHADOW E&M-EST. PATIENT-LVL III: CPT | Mod: PBBFAC,,, | Performed by: INTERNAL MEDICINE

## 2020-10-16 PROCEDURE — 90694 VACC AIIV4 NO PRSRV 0.5ML IM: CPT | Mod: S$GLB,,, | Performed by: INTERNAL MEDICINE

## 2020-10-16 PROCEDURE — 99214 OFFICE O/P EST MOD 30 MIN: CPT | Mod: 25,S$GLB,, | Performed by: INTERNAL MEDICINE

## 2020-10-16 PROCEDURE — 85025 COMPLETE CBC W/AUTO DIFF WBC: CPT

## 2020-10-16 PROCEDURE — 85652 RBC SED RATE AUTOMATED: CPT

## 2020-10-16 RX ORDER — METHOTREXATE 2.5 MG/1
25 TABLET ORAL
Qty: 40 TABLET | Refills: 3 | Status: SHIPPED | OUTPATIENT
Start: 2020-10-16 | End: 2021-02-24 | Stop reason: SDUPTHER

## 2020-10-16 NOTE — TELEPHONE ENCOUNTER
----- Message from Odessa Matthew sent at 10/16/2020 12:22 PM CDT -----  Regarding: appt on today at clinic waiting o lab  Type:  Needs Medical Advice    Who Called: pt  Symptoms (please be specific): n/a   How long has patient had these symptoms:  n/a  Pharmacy name and phone #:  n/a  Would the patient rather a call back or a response via [facility]? Call back  Best Call Back Number: [phone]  Additional Information: Pt is still waiting to have labs done and wants to speak with staff about her appt with . Please call back.Thanks

## 2020-10-16 NOTE — PROGRESS NOTES
Administered 0.5mL of Influenza (Fluad) to right deltoid. Patient instructed on s/s to report. Tolerated well. Advised to wait 15 minutes post administration in clinic. Patient Verbalized understanding.    Lot- [id number]  Exp- June 30, 2021

## 2020-10-16 NOTE — TELEPHONE ENCOUNTER
----- Message from Rose Norton sent at 10/16/2020  7:08 AM CDT -----  Contact: self/[phone]  Patient would like a call back regarding her cancel appt for today, please call back at [phone]. Thanks/ar

## 2020-10-16 NOTE — PROGRESS NOTES
RHEUMATOLOGY OUTPATIENT CLINIC NOTE    10/16/2020    Attending Rheumatologist: Perez Thomas  Primary Care Provider: Neel Matos MD   Physician Requesting Consultation: No referring provider defined for this encounter.  Chief Complaint/Reason For Consultation:  Seropositive rheumatoid arthritis    Subjective:       HPI  Ayesha Arcos is a 55 y.o. Black or  female seropositive RA comes for follow-up.    Today  Last seen on mid June.  Mild disease activity despite current therapy with Xeljanz and methotrexate 20 mg, recommended switch to Actemra.  Due to high co-pay for infusion, recommended to Rinvoq instead which is in the process of being approved.  Patient refers doing better since on physical therapy.  Denies significant joint pain or stiffness that interfere with activities of daily living.  Denies fever, rash,  or GI complaints.  Progressing finger deformities noted however, most notable on right 5th PIP.    Addendum 11/27:  Vaginal blisters referred 7 days after Rinvoq therapy.  Patient instructed to stop medication (along with other DMARDs) and provided with course of valacyclovir with gabapentin.     Addendum 12/4:  Communicated with patient.  Still has 3 days left of antiviral therapy.  Refers blisters are gone and has no symptoms.  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  Recommend to complete course and will rechallenge with invoke again.  If refractory symptoms, will do another course and switch biologic therapy.    Review of Systems   Constitutional: Negative for chills, fever and malaise/fatigue.   Eyes: Negative for pain and redness.   Respiratory: Negative for cough, hemoptysis and shortness of breath.    Cardiovascular: Negative for chest pain and leg swelling.   Gastrointestinal: Negative for abdominal pain, blood in stool and melena.   Genitourinary: Negative for dysuria and hematuria.   Musculoskeletal: Negative for falls and joint pain (Worsening  5th PIP swelling and deformity).   Skin: Negative for rash.   Neurological: Negative for tingling and focal weakness.   Psychiatric/Behavioral: Negative for memory loss. The patient does not have insomnia.      Past Medical History:   Diagnosis Date    Carpal tunnel syndrome, bilateral     Hypertension     Rheumatoid arthritis      Past Surgical History:   Procedure Laterality Date    CARPAL TUNNEL RELEASE      right    skin cancer removal      nose    TUBAL LIGATION       Family History   Problem Relation Age of Onset    Diabetes Mother     Heart disease Father     Hypertension Father     Early death Father         Early 50's    Stroke Paternal Uncle     Heart disease Paternal Uncle      Social History     Substance and Sexual Activity   Alcohol Use Yes    Comment: social      Social History     Tobacco Use   Smoking Status Never Smoker   Smokeless Tobacco Never Used     Social History     Substance and Sexual Activity   Drug Use No       Current Outpatient Medications:     folic acid (FOLVITE) 1 MG tablet, TAKE 1 TABLET(1 MG) BY MOUTH EVERY DAY, Disp: 30 tablet, Rfl: 4    methotrexate 2.5 MG Tab, Take 10 tablets (25 mg total) by mouth every 7 days. TAKE 10 TABLETS BY MOUTH EVERY 7 DAYS. SPLIT DOSE SAME DAY: 5 Tablets IN AM, 5 TABLETS IN PM, Disp: 40 tablet, Rfl: 3    multivitamin capsule, Take 1 capsule by mouth once daily., Disp: , Rfl:     multivitamin with minerals (HAIR,SKIN AND NAILS ORAL), Take by mouth once daily., Disp: , Rfl:     tofacitinib (XELJANZ XR) 11 mg Tb24, Take 1 tablet (11 mg) by mouth once daily., Disp: 30 tablet, Rfl: 2    UNABLE TO FIND, 2 (two) times daily. Concentrated Serum for thinning hair, Disp: , Rfl:     upadacitinib (RINVOQ) 15 mg 24 hr tablet, Take 1 tablet (15 mg total) by mouth once daily. (Patient not taking: Reported on 10/16/2020), Disp: 30 tablet, Rfl: 2       Objective:         Vitals:    10/16/20 1245   BP: 131/87   Pulse: 79     Physical Exam  "  Constitutional: No distress.   Estimated body mass index is 27.96 kg/m² as calculated from the following:    Height as of this encounter: 5' 5" (1.651 m).    Weight as of this encounter: 76.2 kg (167 lb 15.9 oz).    Wt Readings from Last 1 Encounters:  06/16/20 1018 : 76.2 kg (167 lb 15.9 oz)     HENT:   Head: Normocephalic and atraumatic.   Eyes: Conjunctivae are normal. Pupils are equal, round, and reactive to light.   Neck: Normal range of motion.   Cardiovascular: Normal rate and intact distal pulses.    Pulmonary/Chest: Effort normal. No respiratory distress.   Abdominal: Soft. She exhibits no distension.   Neurological: She is alert. Gait normal.   Skin: No rash noted. No erythema.     Musculoskeletal: Normal range of motion. Tenderness (Mild tenderness 5th right PIP.) present.      Comments: : strong  Synovitis+++ 5th PIP right hand with ulnar deviation.  Puffy hand appearance mildly improved since last visit  No warmth or erythema.    AROM: intact  PROM: intact    Devices used by patient: none     Reviewed old and all outside pertinent medical records available.    All lab results personally reviewed and interpreted by me.  Lab Results   Component Value Date    WBC 6.59 10/16/2020    HGB 12.5 10/16/2020    HCT 39.2 10/16/2020    MCV 90 10/16/2020    MCH 28.5 10/16/2020    MCHC 31.9 (L) 10/16/2020    RDW 16.5 (H) 10/16/2020     10/16/2020    MPV 10.0 10/16/2020       Lab Results   Component Value Date     10/16/2020    K 3.9 10/16/2020     10/16/2020    CO2 26 10/16/2020     (H) 10/16/2020    BUN 8 10/16/2020    CALCIUM 9.7 10/16/2020    PROT 7.9 10/16/2020    ALBUMIN 4.3 10/16/2020    BILITOT 0.4 10/16/2020    AST 19 10/16/2020    ALKPHOS 87 10/16/2020    ALT 27 10/16/2020       Lab Results   Component Value Date    COLORU Yellow 08/08/2018    APPEARANCEUA Clear 08/08/2018    SPECGRAV 1.025 08/08/2018    PHUR 6.0 08/08/2018    PROTEINUA Negative 08/08/2018    KETONESU Negative " 08/08/2018    LEUKOCYTESUR Negative 08/08/2018    NITRITE Negative 08/08/2018       Lab Results   Component Value Date    CRP 2.8 10/16/2020       Lab Results   Component Value Date    SEDRATE 36 06/16/2020       Lab Results   Component Value Date    RF 57.0 (H) 08/08/2018    SEDRATE 36 06/16/2020       No components found for: 25OHVITDTOT, 43UNLMQN5, 01HCEQPB4, METHODNOTE    Lab Results   Component Value Date    URICACID 4.5 07/26/2018       No components found for: TSPOTTB    Rheum Labs:  · MOLLY 1 in 2560 homogeneous pattern  · Rheumatoid factor 57  ·   · ALMA panel negative  · beta 2 anti cardiolipin negative  · C3-4 normal    ·   Hepatitis /TB negative 8/2018    Imaging:  All imaging reviewed and independently  interpreted by me.    XR hands 7/2018  The bones appear demineralized.  Multi articular degenerative changes are seen including at the 1st carpometacarpal joint and at the distal interphalangeal joints with osteophyte formation.  Findings are most pronounced at the right 5th distal interphalangeal joint with possible mild erosive changes.    X-ray shoulder April 2019  no radiographic evidence of acute osseous, articular, or soft tissue abnormality.  There are moderate to severe degenerative changes present at the glenohumeral joint.     ASSESSMENT / PLAN:     Ayesha Arcos is a 55 y.o. Black or  female with:    1.Seropositive, erosive RA  - CDAI:-> moderate disease activity.   - Patient denies significant pain or stiffness, however persistent swelling and now worsening ulnar deviation on exam  - recommend updating x-rays to assess for progressive erosive changes  - consider switch to Rinvoq by next visit.  - maximize methotrexate to 25 mg split dose once per week.  Will continue Xeljanz 11 mg daily for now    2. Immunosuppressed status   - Compromised immune system secondary to autoimmune disease and use of immunosuppressive drugs.   - Monitor carefully for infections and  toxicities.   - Advised to get immediate medical care if any infection.   - Also advised strict adherence to age appropriate vaccinations and cancer screenings with PCP.  - CBC, CMP before next visit    3. MOLLY +  - remains with no other features of active CTD    4. Other specified counseling  - Immunization counseling done.    - Limitation of alcohol consumption.  - Regular exercise:  Aerobic and resistance.  - Medication counseling provided.    Return to care in 3 months    Method of contact with patient concerns: [facility] attn Rheumatology    Disclaimer:  This note is prepared using voice recognition software and as such is likely to have errors and has not been proof read. Please contact me for questions.     Perez Thomas M.D.  Rheumatology Department   [facility]

## 2020-10-26 ENCOUNTER — SPECIALTY PHARMACY (OUTPATIENT)
Dept: PHARMACY | Facility: CLINIC | Age: 55
End: 2020-10-26

## 2020-10-26 NOTE — TELEPHONE ENCOUNTER
Specialty Pharmacy - Refill Coordination    Specialty Medication Orders Linked to Encounter      Most Recent Value   Medication #1  tofacitinib (XELJANZ XR) 11 mg Tb24 (Order#[id number], Rx#[id number])          Refill Questions - Documented Responses      Most Recent Value   Relationship to patient of person spoken to?  Self   HIPAA/medical authority confirmed?  Yes   Any changes in contact preferences or allowed representatives?  No   Has the patient had any insurance changes?  No   Has the patient had any changes to specialty medication, dose, or instructions?  No   Has the patient started taking any new medications, herbals, or supplements?  No   Has the patient been diagnosed with any new medical conditions?  No   Does the patient have any new allergies to medications or foods?  No   Does the patient have any concerns about side effects?  No   Can the patient store medication/sharps container properly (at the correct temperature, away from children/pets, etc.)?  Yes   Can the patient call emergency services (911) in the event of an emergency?  Yes   Does the patient have any concerns or questions about taking or administering this medication as prescribed?  No   How many doses did the patient miss in the past 4 weeks or since the last fill?  0   How many doses does the patient have on hand?  4   How many days does the patient report on hand quantity will last?  4   Does the number of doses/days supply remaining match pharmacy expected amounts?  Yes   How will the patient receive the medication?  Mail   When does the patient need to receive the medication?  10/27/20   Shipping Address  Home   Address in [facility] confirmed and updated if neccessary?  Yes   Expected Copay ($)  0   Is the patient able to afford the medication copay?  Yes   Payment Method  zero copay   Days supply of Refill  30   Would patient like to speak to a pharmacist?  No   Do you want to trigger an intervention?  No   Do you want to  trigger an additional referral task?  No   Refill activity completed?  Yes   Refill activity plan  Refill scheduled   Shipment/Pickup Date:  10/26/20          Current Outpatient Medications   Medication Sig    folic acid (FOLVITE) 1 MG tablet TAKE 1 TABLET(1 MG) BY MOUTH EVERY DAY    methotrexate 2.5 MG Tab Take 10 tablets (25 mg total) by mouth every 7 days. TAKE 10 TABLETS BY MOUTH EVERY 7 DAYS. SPLIT DOSE SAME DAY: 5 Tablets IN AM, 5 TABLETS IN PM    multivitamin capsule Take 1 capsule by mouth once daily.    multivitamin with minerals (HAIR,SKIN AND NAILS ORAL) Take by mouth once daily.    tofacitinib (XELJANZ XR) 11 mg Tb24 Take 1 tablet (11 mg) by mouth once daily.    UNABLE TO FIND 2 (two) times daily. Concentrated Serum for thinning hair    upadacitinib (RINVOQ) 15 mg 24 hr tablet Take 1 tablet (15 mg total) by mouth once daily. (Patient not taking: Reported on 10/16/2020)   Last reviewed on 10/16/2020  1:15 PM by Perez Thomas MD    Review of patient's allergies indicates:  No Known Allergies Last reviewed on  10/26/2020 9:49 AM by Nat Gant      Tasks added this encounter   No tasks added.   Tasks due within next 3 months   No tasks due.     Nat Gant, PharmD  [facility]  [address] [place] [address]  Phone: [phone]  Fax: [phone]

## 2020-10-27 ENCOUNTER — SPECIALTY PHARMACY (OUTPATIENT)
Dept: PHARMACY | Facility: CLINIC | Age: 55
End: 2020-10-27

## 2020-11-09 ENCOUNTER — TELEPHONE (OUTPATIENT)
Dept: RHEUMATOLOGY | Facility: CLINIC | Age: 55
End: 2020-11-09

## 2020-11-09 NOTE — TELEPHONE ENCOUNTER
Patient is checking the status of the prior authorization for Rinvoq.  This is the last documentation that I see when someone updated patient    Please call patient with an update.     Nat Gant, PharmD  to Ayesha Arcos          10/27/20 8:44 AM  Pt aware that [facility] is working on Rinvoq. Due to storm potential delay in shipment Xeljanz shipped out yesterday so that she does not have gap in therapy.         Please call patient with an update.

## 2020-11-11 ENCOUNTER — SPECIALTY PHARMACY (OUTPATIENT)
Dept: PHARMACY | Facility: CLINIC | Age: 55
End: 2020-11-11

## 2020-11-11 ENCOUNTER — PATIENT MESSAGE (OUTPATIENT)
Dept: PHARMACY | Facility: CLINIC | Age: 55
End: 2020-11-11

## 2020-11-11 DIAGNOSIS — M05.9 SEROPOSITIVE RHEUMATOID ARTHRITIS: Primary | ICD-10-CM

## 2020-11-16 NOTE — TELEPHONE ENCOUNTER
Specialty Pharmacy - Initial Clinical Assessment    Specialty Medication Orders Linked to Encounter      Most Recent Value   Medication #1  upadacitinib (RINVOQ) 15 mg 24 hr tablet (Order#[id number], Rx#[id number])      Pt confirmed shipping of Rinvoq on  to arrive on 20. Address and  verified. $5 copay in 004. Pt plans to start Rinvoq on 20. Pt declined full initial consult, medication list was reviewed. Pt had no further questions or concerns.    Subjective    Ayesha Arcos is a 55 y.o. female, who is followed by the specialty pharmacy service for management and education.    Recent Encounters     Date Type Provider Description    2020 Specialty Pharmacy Nat Gant PharmD Initial Clinical Assessment    10/27/2020 Specialty Pharmacy Nat Gant PharmD Referral Authorization    10/26/2020 Specialty Pharmacy Rachel Paul Refill Coordination        Clinical call attempts since last clinical assessment   No call attempts found.     Today she received education before her first fill with [facility].    Current Outpatient Medications   Medication Sig    folic acid (FOLVITE) 1 MG tablet TAKE 1 TABLET(1 MG) BY MOUTH EVERY DAY    methotrexate 2.5 MG Tab Take 10 tablets (25 mg total) by mouth every 7 days. TAKE 10 TABLETS BY MOUTH EVERY 7 DAYS. SPLIT DOSE SAME DAY: 5 Tablets IN AM, 5 TABLETS IN PM    multivitamin capsule Take 1 capsule by mouth once daily.    multivitamin with minerals (HAIR,SKIN AND NAILS ORAL) Take by mouth once daily.    UNABLE TO FIND 2 (two) times daily. Concentrated Serum for thinning hair    upadacitinib (RINVOQ) 15 mg 24 hr tablet Take 1 tablet (15 mg total) by mouth once daily.   Last reviewed on 10/16/2020  1:15 PM by Perez Thomas MD    Review of patient's allergies indicates:  No Known AllergiesLast reviewed on  10/26/2020 9:49 AM by Nat Gant    Drug Interactions    Drug interactions evaluated: yes  Clinically relevant drug interactions  identified: no  Provided the patient with educational material regarding drug interactions: not applicable         Adverse Effects    *All other systems reviewed and are negative       Assessment Questions - Documented Responses      Most Recent Value   Assessment   Medication Reconciliation completed for patient  Yes   During the past 4 weeks, has patient missed any activities due to condition or medication?  No   During the past 4 weeks, did patient have any of the following urgent care visits?  None   Goals of Therapy Status  Discussed (new start)   Welcome packet contents reviewed and discussed with patient?  No   Assesment completed?  Yes   Plan  Therapy being initiated   Do you need to open a clinical intervention (i-vent)?  No   Do you want to schedule first shipment?  Yes        Refill Questions - Documented Responses      Most Recent Value   Relationship to patient of person spoken to?  Self   HIPAA/medical authority confirmed?  Yes   Can the patient store medication/sharps container properly (at the correct temperature, away from children/pets, etc.)?  Yes   Can the patient call emergency services (911) in the event of an emergency?  Yes   Does the patient have any concerns or questions about taking or administering this medication as prescribed?  No   How many doses does the patient have on hand?  4   During the past 4 weeks, has patient missed any activities due to condition or medication?  No   During the past 4 weeks, did patient have any of the following urgent care visits?  None   How will the patient receive the medication?  Mail   When does the patient need to receive the medication?  11/16/20   Shipping Address  Home   Is the patient able to afford the medication copay?  Yes   Payment Method  CC on file   Days supply of Refill  30   Refill activity completed?  Yes   Refill activity plan  Refill scheduled   Shipment/Pickup Date:  11/16/20          Objective    She has a past medical history of  "Carpal tunnel syndrome, bilateral, Hypertension, and Rheumatoid arthritis.    Tried/failed medications: Xeljanz    BP Readings from Last 4 Encounters:   10/16/20 131/87   06/16/20 (!) 141/90   02/14/20 137/89   11/15/19 120/80     Ht Readings from Last 4 Encounters:   10/16/20 5' 5" (1.651 m)   06/16/20 5' 5" (1.651 m)   02/14/20 5' 5" (1.651 m)   11/15/19 5' 5" (1.651 m)     Wt Readings from Last 4 Encounters:   10/16/20 73.7 kg (162 lb 7.7 oz)   06/16/20 76.2 kg (167 lb 15.9 oz)   02/14/20 76.2 kg (167 lb 15.9 oz)   11/15/19 74.7 kg (164 lb 10.9 oz)     Recent Labs   Lab Result Units 10/16/20  1228   Creatinine mg/dL 0.8   ALT U/L 27   AST U/L 19     The goals of rheumatoid arthritis treatment include:  · Relieving pain and suppressing inflammation  · Achieving remission and preventing joint and organ damage  · Increasing joint mobility and strength  · Preventing infection and other complications of treatment  · Reducing long term complications of rheumatoid arthritis  · Improving or maintaining physical function and optimal well-being  · Improving or maintaining quality of life  · Maintaining optimal therapy adherence  · Minimizing and managing side effects    Goals of Therapy Status: Discussed (new start)    Assessment/Plan  Patient plans to stop xeljanz and start Rinvoq 11/17/20  Rinvoq indication, dosage, appropriateness, effectiveness, safety and convenience of her specialty medication(s) were reviewed today.     Patient Counseling    Counseled the patient on the following: doses and administration discussed, safe handling, storage, and disposal discussed, possible adverse effects and management discussed, possible drug and prescription drug interactions discussed, possible drug and OTC drug and food interactions discussed, lab monitoring and follow-up discussed, therapeutic rationale discussed, cost of medications and cost implications discussed, adherence and missed doses discussed, pharmacy contact " information discussed        Pt switching from xeljanz to rinvoq. Dose appropriate for sero + RA (15 mg QD). TB test negative (6/16/20). Hep B negative (8/18/18). Labs reviewed (6/16/20) - CBC essentially WNL, LFTs WNL. NKDA. Comorbidities (fear of needles, HTN, mark +) reviewed. Pt currently taking MTX + FA. Therapy appropriate to initiate    Tasks added this encounter   12/9/2020 - Refill Call (Auto Added)  2/7/2021 - Clinical - Follow Up Assesement (90 day)   Tasks due within next 3 months   No tasks due.     Nat Gant, PharmD  [facility]  [address] [facility] [address]  Phone: [phone]  Fax: [phone]

## 2020-11-20 ENCOUNTER — TELEPHONE (OUTPATIENT)
Dept: RHEUMATOLOGY | Facility: CLINIC | Age: 55
End: 2020-11-20

## 2020-11-20 NOTE — TELEPHONE ENCOUNTER
Pt states she got Rinvoq in mail today, Started today, Stopped Xeljanz, Would like to know if she should continue to take MTX. Will send msg over to Dr. Thomas to see further recommendations.     Last dose of Xeljanz- Yesterday       Please advise.

## 2020-11-20 NOTE — TELEPHONE ENCOUNTER
----- Message from Rose Norton sent at 11/20/2020  2:51 PM CST -----  Contact: self/[phone]  Type:  Patient Returning Call    Who Called:Ayesha Arcos  Who Left Message for Patient:nurse  Does the patient know what this is regarding?:yes  Would the patient rather a call back or a response via [facility]? Call back   Best Call Back Number:[phone]  Additional Information:

## 2020-11-24 NOTE — TELEPHONE ENCOUNTER
Per Dr Thomas:  Recommend to continue with methotrexate with daily folic acid until next evaluation in clinic.  Thank you!       Left message on patients VM. Dr Thomas instructions

## 2020-11-26 ENCOUNTER — PATIENT MESSAGE (OUTPATIENT)
Dept: RHEUMATOLOGY | Facility: CLINIC | Age: 55
End: 2020-11-26

## 2020-11-27 RX ORDER — GABAPENTIN 100 MG/1
100 CAPSULE ORAL 3 TIMES DAILY
Qty: 30 CAPSULE | Refills: 0 | Status: SHIPPED | OUTPATIENT
Start: 2020-11-27 | End: 2021-03-11 | Stop reason: ALTCHOICE

## 2020-11-27 RX ORDER — VALACYCLOVIR HYDROCHLORIDE 1 G/1
1000 TABLET, FILM COATED ORAL 2 TIMES DAILY
Qty: 20 TABLET | Refills: 0 | Status: SHIPPED | OUTPATIENT
Start: 2020-11-27 | End: 2020-12-04 | Stop reason: SDUPTHER

## 2020-11-27 NOTE — TELEPHONE ENCOUNTER
Patient states that started Rinvoq on 11-20 . She now has blisters in vagina area. Wants to know if she should stop Rinvoq and go back to Xeljanz. Patient informed to stop Rinvoq and do not restart Xeljanz  .  Informed patient that Dr. Thomas is out of clinic but is answering messages and will send message. Patient verbalized understanding

## 2020-12-04 ENCOUNTER — TELEPHONE (OUTPATIENT)
Dept: RHEUMATOLOGY | Facility: CLINIC | Age: 55
End: 2020-12-04

## 2020-12-04 RX ORDER — VALACYCLOVIR HYDROCHLORIDE 1 G/1
1000 TABLET, FILM COATED ORAL 2 TIMES DAILY
Qty: 20 TABLET | Refills: 1 | Status: SHIPPED | OUTPATIENT
Start: 2020-12-04 | End: 2024-02-20

## 2020-12-04 NOTE — TELEPHONE ENCOUNTER
----- Message from Inés Schmitt sent at 12/4/2020  4:20 PM CST -----  Contact: Pt  .Type:  Needs Medical Advice    Who Called:  Ayesha  Symptoms (please be specific): not on arthritis RX due to being on antibiotic  How long has patient had these symptoms:    Pharmacy name and phone #:       [id number]  [place] [address] [facility]  [address]  [facility] [address]  Phone: [phone] Fax: [phone]      Would the patient rather a call back or a response via [facility]? callback  Best Call Back Number:  [phone] (home)     Additional Information: pt wants to know if she should restart Rx's or does she need an appt first

## 2020-12-04 NOTE — TELEPHONE ENCOUNTER
Pt stated she is finishing up antibiotics and no longer has hives. Pt wants to know if she is clear to start her Arthritis meds back or should she make an appointment to come in and see Dr William armstrong?

## 2020-12-08 ENCOUNTER — TELEPHONE (OUTPATIENT)
Dept: RHEUMATOLOGY | Facility: CLINIC | Age: 55
End: 2020-12-08

## 2020-12-08 NOTE — TELEPHONE ENCOUNTER
Received response from [facility], Valacyclovir is on the members plan list of covered drugs. PA not required

## 2020-12-15 DIAGNOSIS — M19.90 OSTEOARTHRITIS, UNSPECIFIED OSTEOARTHRITIS TYPE, UNSPECIFIED SITE: ICD-10-CM

## 2020-12-15 RX ORDER — FOLIC ACID 1 MG/1
TABLET ORAL
Qty: 30 TABLET | Refills: 4 | Status: SHIPPED | OUTPATIENT
Start: 2020-12-15 | End: 2021-03-23

## 2020-12-21 ENCOUNTER — SPECIALTY PHARMACY (OUTPATIENT)
Dept: PHARMACY | Facility: CLINIC | Age: 55
End: 2020-12-21

## 2020-12-21 NOTE — TELEPHONE ENCOUNTER
Specialty Pharmacy - Refill Coordination    Specialty Medication Orders Linked to Encounter      Most Recent Value   Medication #1  upadacitinib (RINVOQ) 15 mg 24 hr tablet (Order#[id number], Rx#[id number])          Refill Questions - Documented Responses      Most Recent Value   Relationship to patient of person spoken to?  Self   HIPAA/medical authority confirmed?  Yes   Any changes in contact preferences or allowed representatives?  No   Has the patient had any insurance changes?  No   Has the patient had any changes to specialty medication, dose, or instructions?  No   Has the patient started taking any new medications, herbals, or supplements?  No   Has the patient been diagnosed with any new medical conditions?  No   Does the patient have any new allergies to medications or foods?  No   Does the patient have any concerns about side effects?  No   Can the patient store medication/sharps container properly (at the correct temperature, away from children/pets, etc.)?  Yes   Can the patient call emergency services (911) in the event of an emergency?  Yes   Does the patient have any concerns or questions about taking or administering this medication as prescribed?  No   How many doses did the patient miss in the past 4 weeks or since the last fill?  0   How many doses does the patient have on hand?  5   How many days does the patient report on hand quantity will last?  5   Does the number of doses/days supply remaining match pharmacy expected amounts?  Yes   Does the patient feel that this medication is effective?  Yes   During the past 4 weeks, has patient missed any activities due to condition or medication?  No   During the past 4 weeks, did patient have any of the following urgent care visits?  None   How will the patient receive the medication?  Mail   When does the patient need to receive the medication?  12/25/20   Shipping Address  Home   Address in [facility] confirmed and updated if neccessary?   Yes   Expected Copay ($)  5   Is the patient able to afford the medication copay?  Yes   Payment Method  CC on file   Days supply of Refill  30   Would patient like to speak to a pharmacist?  No   Do you want to trigger an intervention?  No   Do you want to trigger an additional referral task?  No   Refill activity completed?  Yes   Refill activity plan  Refill scheduled   Shipment/Pickup Date:  12/22/20          Current Outpatient Medications   Medication Sig    folic acid (FOLVITE) 1 MG tablet TAKE 1 TABLET(1 MG) BY MOUTH EVERY DAY    gabapentin (NEURONTIN) 100 MG capsule Take 1 capsule (100 mg total) by mouth 3 (three) times daily. for 10 days    methotrexate 2.5 MG Tab Take 10 tablets (25 mg total) by mouth every 7 days. TAKE 10 TABLETS BY MOUTH EVERY 7 DAYS. SPLIT DOSE SAME DAY: 5 Tablets IN AM, 5 TABLETS IN PM    multivitamin capsule Take 1 capsule by mouth once daily.    multivitamin with minerals (HAIR,SKIN AND NAILS ORAL) Take by mouth once daily.    UNABLE TO FIND 2 (two) times daily. Concentrated Serum for thinning hair    upadacitinib (RINVOQ) 15 mg 24 hr tablet Take 1 tablet (15 mg total) by mouth once daily.    valACYclovir (VALTREX) 1000 MG tablet Take 1 tablet (1,000 mg total) by mouth 2 (two) times daily. for 10 days   Last reviewed on 12/21/2020  5:16 PM by Azucena Wallace    Review of patient's allergies indicates:  No Known Allergies Last reviewed on  12/21/2020 5:16 PM by Azucena Wallace      Tasks added this encounter   No tasks added.   Tasks due within next 3 months   12/9/2020 - Refill Call (Auto Added)  2/7/2021 - Clinical - Follow Up Assesement (90 day)       Patient requested a print out from 2020, verified address and will mail out    AZUCENA WALLACE  [facility]  [address] [place] [id number]  Phone: [phone]  Fax: [phone]

## 2020-12-22 LAB — HEMOCCULT STL QL IA: NEGATIVE

## 2021-01-12 ENCOUNTER — PATIENT OUTREACH (OUTPATIENT)
Dept: ADMINISTRATIVE | Facility: HOSPITAL | Age: 56
End: 2021-01-12

## 2021-01-16 ENCOUNTER — SPECIALTY PHARMACY (OUTPATIENT)
Dept: PHARMACY | Facility: CLINIC | Age: 56
End: 2021-01-16

## 2021-01-21 ENCOUNTER — TELEPHONE (OUTPATIENT)
Dept: RHEUMATOLOGY | Facility: CLINIC | Age: 56
End: 2021-01-21

## 2021-01-22 ENCOUNTER — HOSPITAL ENCOUNTER (OUTPATIENT)
Dept: RADIOLOGY | Facility: HOSPITAL | Age: 56
Discharge: HOME | End: 2021-01-22
Attending: INTERNAL MEDICINE
Payer: COMMERCIAL

## 2021-01-22 ENCOUNTER — OFFICE VISIT (OUTPATIENT)
Dept: RHEUMATOLOGY | Facility: CLINIC | Age: 56
End: 2021-01-22
Payer: COMMERCIAL

## 2021-01-22 VITALS
DIASTOLIC BLOOD PRESSURE: 93 MMHG | BODY MASS INDEX: 26.93 KG/M2 | SYSTOLIC BLOOD PRESSURE: 133 MMHG | WEIGHT: 161.63 LBS | HEART RATE: 81 BPM | HEIGHT: 65 IN

## 2021-01-22 DIAGNOSIS — Z71.89 COUNSELING ON HEALTH PROMOTION AND DISEASE PREVENTION: ICD-10-CM

## 2021-01-22 DIAGNOSIS — M05.9 SEROPOSITIVE RHEUMATOID ARTHRITIS: ICD-10-CM

## 2021-01-22 DIAGNOSIS — M05.9 SEROPOSITIVE RHEUMATOID ARTHRITIS: Primary | ICD-10-CM

## 2021-01-22 DIAGNOSIS — Z79.899 HIGH RISK MEDICATION USE: ICD-10-CM

## 2021-01-22 DIAGNOSIS — R76.8 POSITIVE ANA (ANTINUCLEAR ANTIBODY): ICD-10-CM

## 2021-01-22 PROCEDURE — 73130 X-RAY EXAM OF HAND: CPT | Mod: 26,,, | Performed by: RADIOLOGY

## 2021-01-22 PROCEDURE — 99999 PR PBB SHADOW E&M-EST. PATIENT-LVL III: CPT | Mod: PBBFAC,,, | Performed by: INTERNAL MEDICINE

## 2021-01-22 PROCEDURE — 3008F BODY MASS INDEX DOCD: CPT | Mod: CPTII,S$GLB,, | Performed by: INTERNAL MEDICINE

## 2021-01-22 PROCEDURE — 73130 X-RAY EXAM OF HAND: CPT | Mod: TC,50

## 2021-01-22 PROCEDURE — 3008F PR BODY MASS INDEX (BMI) DOCUMENTED: ICD-10-PCS | Mod: CPTII,S$GLB,, | Performed by: INTERNAL MEDICINE

## 2021-01-22 PROCEDURE — 1126F AMNT PAIN NOTED NONE PRSNT: CPT | Mod: S$GLB,,, | Performed by: INTERNAL MEDICINE

## 2021-01-22 PROCEDURE — 73130 XR HAND COMPLETE 3 VIEWS BILATERAL: ICD-10-PCS | Mod: 26,,, | Performed by: RADIOLOGY

## 2021-01-22 PROCEDURE — 99214 OFFICE O/P EST MOD 30 MIN: CPT | Mod: S$GLB,,, | Performed by: INTERNAL MEDICINE

## 2021-01-22 PROCEDURE — 99214 PR OFFICE/OUTPT VISIT, EST, LEVL IV, 30-39 MIN: ICD-10-PCS | Mod: S$GLB,,, | Performed by: INTERNAL MEDICINE

## 2021-01-22 PROCEDURE — 99999 PR PBB SHADOW E&M-EST. PATIENT-LVL III: ICD-10-PCS | Mod: PBBFAC,,, | Performed by: INTERNAL MEDICINE

## 2021-01-22 PROCEDURE — 1126F PR PAIN SEVERITY QUANTIFIED, NO PAIN PRESENT: ICD-10-PCS | Mod: S$GLB,,, | Performed by: INTERNAL MEDICINE

## 2021-01-27 ENCOUNTER — PATIENT OUTREACH (OUTPATIENT)
Dept: ADMINISTRATIVE | Facility: HOSPITAL | Age: 56
End: 2021-01-27

## 2021-02-20 ENCOUNTER — IMMUNIZATION (OUTPATIENT)
Dept: INTERNAL MEDICINE | Facility: CLINIC | Age: 56
End: 2021-02-20
Payer: COMMERCIAL

## 2021-02-20 DIAGNOSIS — Z23 NEED FOR VACCINATION: Primary | ICD-10-CM

## 2021-02-20 PROCEDURE — 91300 COVID-19, MRNA, LNP-S, PF, 30 MCG/0.3 ML DOSE VACCINE: CPT | Mod: PBBFAC | Performed by: FAMILY MEDICINE

## 2021-02-22 DIAGNOSIS — M05.9 SEROPOSITIVE RHEUMATOID ARTHRITIS: ICD-10-CM

## 2021-02-23 RX ORDER — UPADACITINIB 15 MG/1
15 TABLET, EXTENDED RELEASE ORAL DAILY
Qty: 30 TABLET | Refills: 2 | Status: SHIPPED | OUTPATIENT
Start: 2021-02-23 | End: 2021-05-20 | Stop reason: SDUPTHER

## 2021-02-24 ENCOUNTER — SPECIALTY PHARMACY (OUTPATIENT)
Dept: PHARMACY | Facility: CLINIC | Age: 56
End: 2021-02-24

## 2021-02-24 DIAGNOSIS — M05.9 SEROPOSITIVE RHEUMATOID ARTHRITIS: ICD-10-CM

## 2021-02-24 RX ORDER — METHOTREXATE 2.5 MG/1
25 TABLET ORAL
Qty: 40 TABLET | Refills: 3 | Status: SHIPPED | OUTPATIENT
Start: 2021-02-24 | End: 2021-02-25 | Stop reason: SDUPTHER

## 2021-02-25 DIAGNOSIS — M05.9 SEROPOSITIVE RHEUMATOID ARTHRITIS: ICD-10-CM

## 2021-02-25 RX ORDER — METHOTREXATE 2.5 MG/1
25 TABLET ORAL
Qty: 40 TABLET | Refills: 3 | Status: SHIPPED | OUTPATIENT
Start: 2021-02-25 | End: 2021-09-28

## 2021-03-11 ENCOUNTER — LAB VISIT (OUTPATIENT)
Dept: LAB | Facility: HOSPITAL | Age: 56
End: 2021-03-11
Attending: FAMILY MEDICINE
Payer: COMMERCIAL

## 2021-03-11 ENCOUNTER — OFFICE VISIT (OUTPATIENT)
Dept: INTERNAL MEDICINE | Facility: CLINIC | Age: 56
End: 2021-03-11
Payer: COMMERCIAL

## 2021-03-11 VITALS
SYSTOLIC BLOOD PRESSURE: 132 MMHG | DIASTOLIC BLOOD PRESSURE: 84 MMHG | TEMPERATURE: 98 F | HEART RATE: 72 BPM | WEIGHT: 159.19 LBS | BODY MASS INDEX: 26.52 KG/M2 | HEIGHT: 65 IN

## 2021-03-11 DIAGNOSIS — Z00.00 ANNUAL PHYSICAL EXAM: Primary | ICD-10-CM

## 2021-03-11 DIAGNOSIS — Z00.00 ANNUAL PHYSICAL EXAM: ICD-10-CM

## 2021-03-11 PROCEDURE — 86703 HIV-1/HIV-2 1 RESULT ANTBDY: CPT | Performed by: FAMILY MEDICINE

## 2021-03-11 PROCEDURE — 1126F PR PAIN SEVERITY QUANTIFIED, NO PAIN PRESENT: ICD-10-PCS | Mod: S$GLB,,, | Performed by: FAMILY MEDICINE

## 2021-03-11 PROCEDURE — 99999 PR PBB SHADOW E&M-EST. PATIENT-LVL III: CPT | Mod: PBBFAC,,, | Performed by: FAMILY MEDICINE

## 2021-03-11 PROCEDURE — 99999 PR PBB SHADOW E&M-EST. PATIENT-LVL III: ICD-10-PCS | Mod: PBBFAC,,, | Performed by: FAMILY MEDICINE

## 2021-03-11 PROCEDURE — 3008F PR BODY MASS INDEX (BMI) DOCUMENTED: ICD-10-PCS | Mod: CPTII,S$GLB,, | Performed by: FAMILY MEDICINE

## 2021-03-11 PROCEDURE — 1126F AMNT PAIN NOTED NONE PRSNT: CPT | Mod: S$GLB,,, | Performed by: FAMILY MEDICINE

## 2021-03-11 PROCEDURE — 99396 PR PREVENTIVE VISIT,EST,40-64: ICD-10-PCS | Mod: S$GLB,,, | Performed by: FAMILY MEDICINE

## 2021-03-11 PROCEDURE — 82306 VITAMIN D 25 HYDROXY: CPT | Performed by: FAMILY MEDICINE

## 2021-03-11 PROCEDURE — 99396 PREV VISIT EST AGE 40-64: CPT | Mod: S$GLB,,, | Performed by: FAMILY MEDICINE

## 2021-03-11 PROCEDURE — 80061 LIPID PANEL: CPT | Performed by: FAMILY MEDICINE

## 2021-03-11 PROCEDURE — 3008F BODY MASS INDEX DOCD: CPT | Mod: CPTII,S$GLB,, | Performed by: FAMILY MEDICINE

## 2021-03-11 PROCEDURE — 36415 COLL VENOUS BLD VENIPUNCTURE: CPT | Mod: PO | Performed by: FAMILY MEDICINE

## 2021-03-11 PROCEDURE — 84443 ASSAY THYROID STIM HORMONE: CPT | Performed by: FAMILY MEDICINE

## 2021-03-11 PROCEDURE — 83036 HEMOGLOBIN GLYCOSYLATED A1C: CPT | Performed by: FAMILY MEDICINE

## 2021-03-12 LAB
25(OH)D3+25(OH)D2 SERPL-MCNC: 52 NG/ML (ref 30–96)
CHOLEST SERPL-MCNC: 280 MG/DL (ref 120–199)
CHOLEST/HDLC SERPL: 5.2 {RATIO} (ref 2–5)
ESTIMATED AVG GLUCOSE: 108 MG/DL (ref 68–131)
HBA1C MFR BLD: 5.4 % (ref 4–5.6)
HDLC SERPL-MCNC: 54 MG/DL (ref 40–75)
HDLC SERPL: 19.3 % (ref 20–50)
HIV 1+2 AB+HIV1 P24 AG SERPL QL IA: NEGATIVE
LDLC SERPL CALC-MCNC: 199 MG/DL (ref 63–159)
NONHDLC SERPL-MCNC: 226 MG/DL
TRIGL SERPL-MCNC: 135 MG/DL (ref 30–150)
TSH SERPL DL<=0.005 MIU/L-ACNC: 1.04 UIU/ML (ref 0.4–4)

## 2021-03-13 ENCOUNTER — IMMUNIZATION (OUTPATIENT)
Dept: INTERNAL MEDICINE | Facility: CLINIC | Age: 56
End: 2021-03-13
Payer: COMMERCIAL

## 2021-03-13 DIAGNOSIS — Z23 NEED FOR VACCINATION: Primary | ICD-10-CM

## 2021-03-13 PROCEDURE — 91300 COVID-19, MRNA, LNP-S, PF, 30 MCG/0.3 ML DOSE VACCINE: CPT | Mod: PBBFAC | Performed by: FAMILY MEDICINE

## 2021-03-13 PROCEDURE — 0002A COVID-19, MRNA, LNP-S, PF, 30 MCG/0.3 ML DOSE VACCINE: CPT | Mod: PBBFAC | Performed by: FAMILY MEDICINE

## 2021-03-15 ENCOUNTER — PATIENT MESSAGE (OUTPATIENT)
Dept: INTERNAL MEDICINE | Facility: CLINIC | Age: 56
End: 2021-03-15

## 2021-03-15 DIAGNOSIS — E78.5 HYPERLIPIDEMIA, UNSPECIFIED HYPERLIPIDEMIA TYPE: Primary | ICD-10-CM

## 2021-03-15 RX ORDER — ROSUVASTATIN CALCIUM 10 MG/1
10 TABLET, COATED ORAL DAILY
Qty: 90 TABLET | Refills: 3 | Status: SHIPPED | OUTPATIENT
Start: 2021-03-15 | End: 2021-12-21 | Stop reason: SDUPTHER

## 2021-03-17 ENCOUNTER — PATIENT OUTREACH (OUTPATIENT)
Dept: ADMINISTRATIVE | Facility: HOSPITAL | Age: 56
End: 2021-03-17

## 2021-03-24 ENCOUNTER — SPECIALTY PHARMACY (OUTPATIENT)
Dept: PHARMACY | Facility: CLINIC | Age: 56
End: 2021-03-24

## 2021-03-29 ENCOUNTER — SPECIALTY PHARMACY (OUTPATIENT)
Dept: PHARMACY | Facility: CLINIC | Age: 56
End: 2021-03-29

## 2021-04-13 DIAGNOSIS — M19.90 OSTEOARTHRITIS, UNSPECIFIED OSTEOARTHRITIS TYPE, UNSPECIFIED SITE: ICD-10-CM

## 2021-04-15 RX ORDER — FOLIC ACID 1 MG/1
TABLET ORAL
Qty: 30 TABLET | Refills: 4 | OUTPATIENT
Start: 2021-04-15

## 2021-04-21 ENCOUNTER — SPECIALTY PHARMACY (OUTPATIENT)
Dept: PHARMACY | Facility: CLINIC | Age: 56
End: 2021-04-21

## 2021-04-26 ENCOUNTER — PATIENT OUTREACH (OUTPATIENT)
Dept: ADMINISTRATIVE | Facility: HOSPITAL | Age: 56
End: 2021-04-26

## 2021-04-29 ENCOUNTER — PATIENT OUTREACH (OUTPATIENT)
Dept: ADMINISTRATIVE | Facility: OTHER | Age: 56
End: 2021-04-29

## 2021-04-30 ENCOUNTER — LAB VISIT (OUTPATIENT)
Dept: LAB | Facility: HOSPITAL | Age: 56
End: 2021-04-30
Attending: FAMILY MEDICINE
Payer: COMMERCIAL

## 2021-04-30 ENCOUNTER — OFFICE VISIT (OUTPATIENT)
Dept: RHEUMATOLOGY | Facility: CLINIC | Age: 56
End: 2021-04-30
Payer: COMMERCIAL

## 2021-04-30 ENCOUNTER — TELEPHONE (OUTPATIENT)
Dept: ORTHOPEDICS | Facility: CLINIC | Age: 56
End: 2021-04-30

## 2021-04-30 VITALS
HEART RATE: 72 BPM | SYSTOLIC BLOOD PRESSURE: 128 MMHG | WEIGHT: 158.5 LBS | BODY MASS INDEX: 26.38 KG/M2 | DIASTOLIC BLOOD PRESSURE: 84 MMHG

## 2021-04-30 DIAGNOSIS — M05.9 SEROPOSITIVE RHEUMATOID ARTHRITIS: Primary | ICD-10-CM

## 2021-04-30 DIAGNOSIS — Z79.899 HIGH RISK MEDICATION USE: ICD-10-CM

## 2021-04-30 DIAGNOSIS — M25.50 ARTHRALGIA, UNSPECIFIED JOINT: ICD-10-CM

## 2021-04-30 DIAGNOSIS — D84.9 IMMUNOCOMPROMISED PATIENT: Primary | ICD-10-CM

## 2021-04-30 DIAGNOSIS — Z71.89 COUNSELING ON HEALTH PROMOTION AND DISEASE PREVENTION: ICD-10-CM

## 2021-04-30 LAB
ALBUMIN SERPL BCP-MCNC: 4.3 G/DL (ref 3.5–5.2)
ALP SERPL-CCNC: 89 U/L (ref 55–135)
ALT SERPL W/O P-5'-P-CCNC: 33 U/L (ref 10–44)
ANION GAP SERPL CALC-SCNC: 10 MMOL/L (ref 8–16)
AST SERPL-CCNC: 26 U/L (ref 10–40)
BASOPHILS # BLD AUTO: 0.02 K/UL (ref 0–0.2)
BASOPHILS NFR BLD: 0.3 % (ref 0–1.9)
BILIRUB SERPL-MCNC: 0.5 MG/DL (ref 0.1–1)
BUN SERPL-MCNC: 12 MG/DL (ref 6–20)
CALCIUM SERPL-MCNC: 9.3 MG/DL (ref 8.7–10.5)
CHLORIDE SERPL-SCNC: 104 MMOL/L (ref 95–110)
CO2 SERPL-SCNC: 26 MMOL/L (ref 23–29)
CREAT SERPL-MCNC: 0.9 MG/DL (ref 0.5–1.4)
CRP SERPL-MCNC: 0.3 MG/L (ref 0–8.2)
DIFFERENTIAL METHOD: ABNORMAL
EOSINOPHIL # BLD AUTO: 0.1 K/UL (ref 0–0.5)
EOSINOPHIL NFR BLD: 0.9 % (ref 0–8)
ERYTHROCYTE [DISTWIDTH] IN BLOOD BY AUTOMATED COUNT: 15.9 % (ref 11.5–14.5)
ERYTHROCYTE [SEDIMENTATION RATE] IN BLOOD BY WESTERGREN METHOD: 9 MM/HR (ref 0–36)
EST. GFR  (AFRICAN AMERICAN): >60 ML/MIN/1.73 M^2
EST. GFR  (NON AFRICAN AMERICAN): >60 ML/MIN/1.73 M^2
GLUCOSE SERPL-MCNC: 96 MG/DL (ref 70–110)
HCT VFR BLD AUTO: 38.8 % (ref 37–48.5)
HGB BLD-MCNC: 12.6 G/DL (ref 12–16)
IMM GRANULOCYTES # BLD AUTO: 0.01 K/UL (ref 0–0.04)
IMM GRANULOCYTES NFR BLD AUTO: 0.1 % (ref 0–0.5)
LYMPHOCYTES # BLD AUTO: 1.6 K/UL (ref 1–4.8)
LYMPHOCYTES NFR BLD: 23.3 % (ref 18–48)
MCH RBC QN AUTO: 29.7 PG (ref 27–31)
MCHC RBC AUTO-ENTMCNC: 32.5 G/DL (ref 32–36)
MCV RBC AUTO: 92 FL (ref 82–98)
MONOCYTES # BLD AUTO: 0.6 K/UL (ref 0.3–1)
MONOCYTES NFR BLD: 8.4 % (ref 4–15)
NEUTROPHILS # BLD AUTO: 4.6 K/UL (ref 1.8–7.7)
NEUTROPHILS NFR BLD: 67 % (ref 38–73)
NRBC BLD-RTO: 0 /100 WBC
PLATELET # BLD AUTO: 351 K/UL (ref 150–450)
PMV BLD AUTO: 10 FL (ref 9.2–12.9)
POTASSIUM SERPL-SCNC: 3.9 MMOL/L (ref 3.5–5.1)
PROT SERPL-MCNC: 7.6 G/DL (ref 6–8.4)
RBC # BLD AUTO: 4.24 M/UL (ref 4–5.4)
SODIUM SERPL-SCNC: 140 MMOL/L (ref 136–145)
WBC # BLD AUTO: 6.87 K/UL (ref 3.9–12.7)

## 2021-04-30 PROCEDURE — 80053 COMPREHEN METABOLIC PANEL: CPT | Performed by: INTERNAL MEDICINE

## 2021-04-30 PROCEDURE — 3008F PR BODY MASS INDEX (BMI) DOCUMENTED: ICD-10-PCS | Mod: CPTII,S$GLB,, | Performed by: INTERNAL MEDICINE

## 2021-04-30 PROCEDURE — 3008F BODY MASS INDEX DOCD: CPT | Mod: CPTII,S$GLB,, | Performed by: INTERNAL MEDICINE

## 2021-04-30 PROCEDURE — 99999 PR PBB SHADOW E&M-EST. PATIENT-LVL III: CPT | Mod: PBBFAC,,, | Performed by: INTERNAL MEDICINE

## 2021-04-30 PROCEDURE — 85025 COMPLETE CBC W/AUTO DIFF WBC: CPT | Performed by: INTERNAL MEDICINE

## 2021-04-30 PROCEDURE — 86140 C-REACTIVE PROTEIN: CPT | Performed by: INTERNAL MEDICINE

## 2021-04-30 PROCEDURE — 36415 COLL VENOUS BLD VENIPUNCTURE: CPT | Performed by: INTERNAL MEDICINE

## 2021-04-30 PROCEDURE — 1126F AMNT PAIN NOTED NONE PRSNT: CPT | Mod: S$GLB,,, | Performed by: INTERNAL MEDICINE

## 2021-04-30 PROCEDURE — 85652 RBC SED RATE AUTOMATED: CPT | Performed by: INTERNAL MEDICINE

## 2021-04-30 PROCEDURE — 1126F PR PAIN SEVERITY QUANTIFIED, NO PAIN PRESENT: ICD-10-PCS | Mod: S$GLB,,, | Performed by: INTERNAL MEDICINE

## 2021-04-30 PROCEDURE — 99215 OFFICE O/P EST HI 40 MIN: CPT | Mod: S$GLB,,, | Performed by: INTERNAL MEDICINE

## 2021-04-30 PROCEDURE — 99215 PR OFFICE/OUTPT VISIT, EST, LEVL V, 40-54 MIN: ICD-10-PCS | Mod: S$GLB,,, | Performed by: INTERNAL MEDICINE

## 2021-04-30 PROCEDURE — 99999 PR PBB SHADOW E&M-EST. PATIENT-LVL III: ICD-10-PCS | Mod: PBBFAC,,, | Performed by: INTERNAL MEDICINE

## 2021-04-30 RX ORDER — CETIRIZINE HYDROCHLORIDE 10 MG/1
10 TABLET ORAL
Status: CANCELLED | OUTPATIENT
Start: 2021-04-30

## 2021-04-30 RX ORDER — ACETAMINOPHEN 325 MG/1
650 TABLET ORAL
Status: CANCELLED | OUTPATIENT
Start: 2021-04-30

## 2021-04-30 RX ORDER — EPINEPHRINE 1 MG/ML
0.3 INJECTION, SOLUTION, CONCENTRATE INTRAVENOUS
Status: CANCELLED | OUTPATIENT
Start: 2021-04-30

## 2021-04-30 RX ORDER — DIPHENHYDRAMINE HYDROCHLORIDE 50 MG/ML
25 INJECTION INTRAMUSCULAR; INTRAVENOUS
Status: CANCELLED | OUTPATIENT
Start: 2021-04-30

## 2021-04-30 RX ORDER — SODIUM CHLORIDE 0.9 % (FLUSH) 0.9 %
10 SYRINGE (ML) INJECTION
Status: CANCELLED | OUTPATIENT
Start: 2021-04-30

## 2021-04-30 RX ORDER — METHYLPREDNISOLONE SOD SUCC 125 MG
40 VIAL (EA) INJECTION
Status: CANCELLED | OUTPATIENT
Start: 2021-04-30

## 2021-04-30 RX ORDER — HEPARIN 100 UNIT/ML
500 SYRINGE INTRAVENOUS
Status: CANCELLED | OUTPATIENT
Start: 2021-04-30

## 2021-04-30 RX ORDER — IPRATROPIUM BROMIDE AND ALBUTEROL SULFATE 2.5; .5 MG/3ML; MG/3ML
3 SOLUTION RESPIRATORY (INHALATION)
Status: CANCELLED | OUTPATIENT
Start: 2021-04-30

## 2021-05-10 ENCOUNTER — PATIENT MESSAGE (OUTPATIENT)
Dept: RHEUMATOLOGY | Facility: CLINIC | Age: 56
End: 2021-05-10

## 2021-05-11 ENCOUNTER — OFFICE VISIT (OUTPATIENT)
Dept: ORTHOPEDICS | Facility: CLINIC | Age: 56
End: 2021-05-11
Payer: COMMERCIAL

## 2021-05-11 VITALS
DIASTOLIC BLOOD PRESSURE: 90 MMHG | BODY MASS INDEX: 26.33 KG/M2 | WEIGHT: 158 LBS | HEIGHT: 65 IN | SYSTOLIC BLOOD PRESSURE: 133 MMHG | HEART RATE: 72 BPM

## 2021-05-11 DIAGNOSIS — M05.9 SEROPOSITIVE RHEUMATOID ARTHRITIS: ICD-10-CM

## 2021-05-11 DIAGNOSIS — S63.259A FINGER DISLOCATION, INITIAL ENCOUNTER: Primary | ICD-10-CM

## 2021-05-11 PROCEDURE — 3008F PR BODY MASS INDEX (BMI) DOCUMENTED: ICD-10-PCS | Mod: CPTII,S$GLB,, | Performed by: ORTHOPAEDIC SURGERY

## 2021-05-11 PROCEDURE — 99999 PR PBB SHADOW E&M-EST. PATIENT-LVL III: ICD-10-PCS | Mod: PBBFAC,,, | Performed by: ORTHOPAEDIC SURGERY

## 2021-05-11 PROCEDURE — 99203 OFFICE O/P NEW LOW 30 MIN: CPT | Mod: S$GLB,,, | Performed by: ORTHOPAEDIC SURGERY

## 2021-05-11 PROCEDURE — 1125F AMNT PAIN NOTED PAIN PRSNT: CPT | Mod: S$GLB,,, | Performed by: ORTHOPAEDIC SURGERY

## 2021-05-11 PROCEDURE — 99203 PR OFFICE/OUTPT VISIT, NEW, LEVL III, 30-44 MIN: ICD-10-PCS | Mod: S$GLB,,, | Performed by: ORTHOPAEDIC SURGERY

## 2021-05-11 PROCEDURE — 99999 PR PBB SHADOW E&M-EST. PATIENT-LVL III: CPT | Mod: PBBFAC,,, | Performed by: ORTHOPAEDIC SURGERY

## 2021-05-11 PROCEDURE — 3008F BODY MASS INDEX DOCD: CPT | Mod: CPTII,S$GLB,, | Performed by: ORTHOPAEDIC SURGERY

## 2021-05-11 PROCEDURE — 1125F PR PAIN SEVERITY QUANTIFIED, PAIN PRESENT: ICD-10-PCS | Mod: S$GLB,,, | Performed by: ORTHOPAEDIC SURGERY

## 2021-05-14 ENCOUNTER — TELEPHONE (OUTPATIENT)
Dept: RHEUMATOLOGY | Facility: CLINIC | Age: 56
End: 2021-05-14

## 2021-05-14 ENCOUNTER — PATIENT MESSAGE (OUTPATIENT)
Dept: RHEUMATOLOGY | Facility: CLINIC | Age: 56
End: 2021-05-14

## 2021-05-15 ENCOUNTER — PATIENT MESSAGE (OUTPATIENT)
Dept: RHEUMATOLOGY | Facility: CLINIC | Age: 56
End: 2021-05-15

## 2021-05-19 ENCOUNTER — PATIENT MESSAGE (OUTPATIENT)
Dept: RHEUMATOLOGY | Facility: CLINIC | Age: 56
End: 2021-05-19

## 2021-05-19 ENCOUNTER — TELEPHONE (OUTPATIENT)
Dept: RHEUMATOLOGY | Facility: CLINIC | Age: 56
End: 2021-05-19

## 2021-05-19 ENCOUNTER — SPECIALTY PHARMACY (OUTPATIENT)
Dept: PHARMACY | Facility: CLINIC | Age: 56
End: 2021-05-19

## 2021-05-19 DIAGNOSIS — M05.9 SEROPOSITIVE RHEUMATOID ARTHRITIS: ICD-10-CM

## 2021-05-19 RX ORDER — UPADACITINIB 15 MG/1
15 TABLET, EXTENDED RELEASE ORAL DAILY
Qty: 30 TABLET | Refills: 2 | Status: CANCELLED | OUTPATIENT
Start: 2021-05-19 | End: 2021-06-18

## 2021-05-20 ENCOUNTER — TELEPHONE (OUTPATIENT)
Dept: RHEUMATOLOGY | Facility: CLINIC | Age: 56
End: 2021-05-20

## 2021-05-20 RX ORDER — UPADACITINIB 15 MG/1
15 TABLET, EXTENDED RELEASE ORAL DAILY
Qty: 30 TABLET | Refills: 1 | Status: SHIPPED | OUTPATIENT
Start: 2021-05-20 | End: 2021-07-29 | Stop reason: SDUPTHER

## 2021-05-22 NOTE — TELEPHONE ENCOUNTER
Spoke with pt and advised her the Folic acid and prednisone was sent over and the MTX was faxed to [facility]. Reviewed over MTX directions with patient. Pt verbalized understanding.   
No

## 2021-05-25 ENCOUNTER — SPECIALTY PHARMACY (OUTPATIENT)
Dept: PHARMACY | Facility: CLINIC | Age: 56
End: 2021-05-25

## 2021-05-27 ENCOUNTER — TELEPHONE (OUTPATIENT)
Dept: INFUSION THERAPY | Facility: HOSPITAL | Age: 56
End: 2021-05-27

## 2021-05-28 ENCOUNTER — OFFICE VISIT (OUTPATIENT)
Dept: RHEUMATOLOGY | Facility: CLINIC | Age: 56
End: 2021-05-28
Payer: COMMERCIAL

## 2021-05-28 VITALS
SYSTOLIC BLOOD PRESSURE: 125 MMHG | WEIGHT: 154.56 LBS | BODY MASS INDEX: 25.72 KG/M2 | HEART RATE: 81 BPM | DIASTOLIC BLOOD PRESSURE: 84 MMHG

## 2021-05-28 DIAGNOSIS — M05.9 SEROPOSITIVE RHEUMATOID ARTHRITIS: Primary | ICD-10-CM

## 2021-05-28 PROCEDURE — 99214 PR OFFICE/OUTPT VISIT, EST, LEVL IV, 30-39 MIN: ICD-10-PCS | Mod: S$GLB,,, | Performed by: INTERNAL MEDICINE

## 2021-05-28 PROCEDURE — 3008F BODY MASS INDEX DOCD: CPT | Mod: CPTII,S$GLB,, | Performed by: INTERNAL MEDICINE

## 2021-05-28 PROCEDURE — 99999 PR PBB SHADOW E&M-EST. PATIENT-LVL III: ICD-10-PCS | Mod: PBBFAC,,, | Performed by: INTERNAL MEDICINE

## 2021-05-28 PROCEDURE — 1126F PR PAIN SEVERITY QUANTIFIED, NO PAIN PRESENT: ICD-10-PCS | Mod: S$GLB,,, | Performed by: INTERNAL MEDICINE

## 2021-05-28 PROCEDURE — 1126F AMNT PAIN NOTED NONE PRSNT: CPT | Mod: S$GLB,,, | Performed by: INTERNAL MEDICINE

## 2021-05-28 PROCEDURE — 3008F PR BODY MASS INDEX (BMI) DOCUMENTED: ICD-10-PCS | Mod: CPTII,S$GLB,, | Performed by: INTERNAL MEDICINE

## 2021-05-28 PROCEDURE — 99999 PR PBB SHADOW E&M-EST. PATIENT-LVL III: CPT | Mod: PBBFAC,,, | Performed by: INTERNAL MEDICINE

## 2021-05-28 PROCEDURE — 99214 OFFICE O/P EST MOD 30 MIN: CPT | Mod: S$GLB,,, | Performed by: INTERNAL MEDICINE

## 2021-06-21 ENCOUNTER — TELEPHONE (OUTPATIENT)
Dept: RHEUMATOLOGY | Facility: CLINIC | Age: 56
End: 2021-06-21

## 2021-06-24 ENCOUNTER — LAB VISIT (OUTPATIENT)
Dept: LAB | Facility: HOSPITAL | Age: 56
End: 2021-06-24
Attending: FAMILY MEDICINE
Payer: COMMERCIAL

## 2021-06-24 DIAGNOSIS — E78.5 HYPERLIPIDEMIA, UNSPECIFIED HYPERLIPIDEMIA TYPE: ICD-10-CM

## 2021-06-24 LAB
ALBUMIN SERPL BCP-MCNC: 4.3 G/DL (ref 3.5–5.2)
ALP SERPL-CCNC: 98 U/L (ref 55–135)
ALT SERPL W/O P-5'-P-CCNC: 39 U/L (ref 10–44)
ANION GAP SERPL CALC-SCNC: 11 MMOL/L (ref 8–16)
AST SERPL-CCNC: 26 U/L (ref 10–40)
BILIRUB SERPL-MCNC: 0.4 MG/DL (ref 0.1–1)
BUN SERPL-MCNC: 11 MG/DL (ref 6–20)
CALCIUM SERPL-MCNC: 9.5 MG/DL (ref 8.7–10.5)
CHLORIDE SERPL-SCNC: 105 MMOL/L (ref 95–110)
CHOLEST SERPL-MCNC: 200 MG/DL (ref 120–199)
CHOLEST/HDLC SERPL: 3.2 {RATIO} (ref 2–5)
CO2 SERPL-SCNC: 23 MMOL/L (ref 23–29)
CREAT SERPL-MCNC: 0.9 MG/DL (ref 0.5–1.4)
EST. GFR  (AFRICAN AMERICAN): >60 ML/MIN/1.73 M^2
EST. GFR  (NON AFRICAN AMERICAN): >60 ML/MIN/1.73 M^2
GLUCOSE SERPL-MCNC: 98 MG/DL (ref 70–110)
HDLC SERPL-MCNC: 62 MG/DL (ref 40–75)
HDLC SERPL: 31 % (ref 20–50)
LDLC SERPL CALC-MCNC: 115.4 MG/DL (ref 63–159)
NONHDLC SERPL-MCNC: 138 MG/DL
POTASSIUM SERPL-SCNC: 3.7 MMOL/L (ref 3.5–5.1)
PROT SERPL-MCNC: 7.8 G/DL (ref 6–8.4)
SODIUM SERPL-SCNC: 139 MMOL/L (ref 136–145)
TRIGL SERPL-MCNC: 113 MG/DL (ref 30–150)

## 2021-06-24 PROCEDURE — 80053 COMPREHEN METABOLIC PANEL: CPT | Performed by: FAMILY MEDICINE

## 2021-06-24 PROCEDURE — 80061 LIPID PANEL: CPT | Performed by: FAMILY MEDICINE

## 2021-06-24 PROCEDURE — 36415 COLL VENOUS BLD VENIPUNCTURE: CPT | Performed by: FAMILY MEDICINE

## 2021-06-29 ENCOUNTER — SPECIALTY PHARMACY (OUTPATIENT)
Dept: PHARMACY | Facility: CLINIC | Age: 56
End: 2021-06-29

## 2021-07-02 ENCOUNTER — OFFICE VISIT (OUTPATIENT)
Dept: INTERNAL MEDICINE | Facility: CLINIC | Age: 56
End: 2021-07-02
Payer: COMMERCIAL

## 2021-07-02 VITALS
HEART RATE: 74 BPM | HEIGHT: 65 IN | TEMPERATURE: 98 F | WEIGHT: 156.75 LBS | BODY MASS INDEX: 26.12 KG/M2 | DIASTOLIC BLOOD PRESSURE: 82 MMHG | SYSTOLIC BLOOD PRESSURE: 112 MMHG

## 2021-07-02 DIAGNOSIS — D84.9 IMMUNOSUPPRESSED STATUS: ICD-10-CM

## 2021-07-02 DIAGNOSIS — E78.5 HYPERLIPIDEMIA, UNSPECIFIED HYPERLIPIDEMIA TYPE: Primary | ICD-10-CM

## 2021-07-02 PROCEDURE — 1126F PR PAIN SEVERITY QUANTIFIED, NO PAIN PRESENT: ICD-10-PCS | Mod: S$GLB,,, | Performed by: FAMILY MEDICINE

## 2021-07-02 PROCEDURE — 1126F AMNT PAIN NOTED NONE PRSNT: CPT | Mod: S$GLB,,, | Performed by: FAMILY MEDICINE

## 2021-07-02 PROCEDURE — 90471 IMMUNIZATION ADMIN: CPT | Mod: S$GLB,,, | Performed by: FAMILY MEDICINE

## 2021-07-02 PROCEDURE — 90471 PNEUMOCOCCAL POLYSACCHARIDE VACCINE 23-VALENT =>2YO SQ IM: ICD-10-PCS | Mod: S$GLB,,, | Performed by: FAMILY MEDICINE

## 2021-07-02 PROCEDURE — 99999 PR PBB SHADOW E&M-EST. PATIENT-LVL III: CPT | Mod: PBBFAC,,, | Performed by: FAMILY MEDICINE

## 2021-07-02 PROCEDURE — 90732 PPSV23 VACC 2 YRS+ SUBQ/IM: CPT | Mod: S$GLB,,, | Performed by: FAMILY MEDICINE

## 2021-07-02 PROCEDURE — 3008F PR BODY MASS INDEX (BMI) DOCUMENTED: ICD-10-PCS | Mod: CPTII,S$GLB,, | Performed by: FAMILY MEDICINE

## 2021-07-02 PROCEDURE — 3008F BODY MASS INDEX DOCD: CPT | Mod: CPTII,S$GLB,, | Performed by: FAMILY MEDICINE

## 2021-07-02 PROCEDURE — 99999 PR PBB SHADOW E&M-EST. PATIENT-LVL III: ICD-10-PCS | Mod: PBBFAC,,, | Performed by: FAMILY MEDICINE

## 2021-07-02 PROCEDURE — 90732 PNEUMOCOCCAL POLYSACCHARIDE VACCINE 23-VALENT =>2YO SQ IM: ICD-10-PCS | Mod: S$GLB,,, | Performed by: FAMILY MEDICINE

## 2021-07-02 PROCEDURE — 99213 OFFICE O/P EST LOW 20 MIN: CPT | Mod: 25,S$GLB,, | Performed by: FAMILY MEDICINE

## 2021-07-02 PROCEDURE — 99213 PR OFFICE/OUTPT VISIT, EST, LEVL III, 20-29 MIN: ICD-10-PCS | Mod: 25,S$GLB,, | Performed by: FAMILY MEDICINE

## 2021-07-27 ENCOUNTER — SPECIALTY PHARMACY (OUTPATIENT)
Dept: PHARMACY | Facility: CLINIC | Age: 56
End: 2021-07-27

## 2021-07-29 RX ORDER — UPADACITINIB 15 MG/1
15 TABLET, EXTENDED RELEASE ORAL DAILY
Qty: 90 TABLET | Refills: 3 | Status: SHIPPED | OUTPATIENT
Start: 2021-07-29 | End: 2021-10-27

## 2021-08-03 RX ORDER — UPADACITINIB 15 MG/1
15 TABLET, EXTENDED RELEASE ORAL DAILY
Qty: 90 TABLET | Refills: 1 | Status: SHIPPED | OUTPATIENT
Start: 2021-08-03 | End: 2022-03-04 | Stop reason: SDUPTHER

## 2021-08-05 ENCOUNTER — PATIENT OUTREACH (OUTPATIENT)
Dept: ADMINISTRATIVE | Facility: OTHER | Age: 56
End: 2021-08-05

## 2021-08-06 ENCOUNTER — OFFICE VISIT (OUTPATIENT)
Dept: RHEUMATOLOGY | Facility: CLINIC | Age: 56
End: 2021-08-06
Payer: COMMERCIAL

## 2021-08-06 DIAGNOSIS — R53.83 FATIGUE, UNSPECIFIED TYPE: ICD-10-CM

## 2021-08-06 DIAGNOSIS — D84.9 IMMUNOCOMPROMISED PATIENT: ICD-10-CM

## 2021-08-06 DIAGNOSIS — Z71.89 COUNSELING ON HEALTH PROMOTION AND DISEASE PREVENTION: ICD-10-CM

## 2021-08-06 DIAGNOSIS — R76.8 POSITIVE ANA (ANTINUCLEAR ANTIBODY): ICD-10-CM

## 2021-08-06 DIAGNOSIS — M05.9 SEROPOSITIVE RHEUMATOID ARTHRITIS: Primary | ICD-10-CM

## 2021-08-06 PROCEDURE — 99214 OFFICE O/P EST MOD 30 MIN: CPT | Mod: 95,,, | Performed by: INTERNAL MEDICINE

## 2021-08-06 PROCEDURE — 99214 PR OFFICE/OUTPT VISIT, EST, LEVL IV, 30-39 MIN: ICD-10-PCS | Mod: 95,,, | Performed by: INTERNAL MEDICINE

## 2021-08-06 PROCEDURE — 3044F PR MOST RECENT HEMOGLOBIN A1C LEVEL <7.0%: ICD-10-PCS | Mod: CPTII,,, | Performed by: INTERNAL MEDICINE

## 2021-08-06 PROCEDURE — 3044F HG A1C LEVEL LT 7.0%: CPT | Mod: CPTII,,, | Performed by: INTERNAL MEDICINE

## 2021-08-28 ENCOUNTER — PATIENT MESSAGE (OUTPATIENT)
Dept: PHARMACY | Facility: CLINIC | Age: 56
End: 2021-08-28

## 2021-09-17 ENCOUNTER — SPECIALTY PHARMACY (OUTPATIENT)
Dept: PHARMACY | Facility: CLINIC | Age: 56
End: 2021-09-17

## 2021-09-25 DIAGNOSIS — M05.9 SEROPOSITIVE RHEUMATOID ARTHRITIS: ICD-10-CM

## 2021-09-25 DIAGNOSIS — M19.90 OSTEOARTHRITIS, UNSPECIFIED OSTEOARTHRITIS TYPE, UNSPECIFIED SITE: ICD-10-CM

## 2021-09-28 DIAGNOSIS — M05.9 SEROPOSITIVE RHEUMATOID ARTHRITIS: ICD-10-CM

## 2021-09-28 DIAGNOSIS — M19.90 OSTEOARTHRITIS, UNSPECIFIED OSTEOARTHRITIS TYPE, UNSPECIFIED SITE: ICD-10-CM

## 2021-09-28 RX ORDER — METHOTREXATE 2.5 MG/1
25 TABLET ORAL
Qty: 120 TABLET | Refills: 2 | Status: SHIPPED | OUTPATIENT
Start: 2021-09-28 | End: 2021-12-14 | Stop reason: SDUPTHER

## 2021-09-28 RX ORDER — FOLIC ACID 1 MG/1
TABLET ORAL
Qty: 30 TABLET | Refills: 4 | OUTPATIENT
Start: 2021-09-28

## 2021-09-28 RX ORDER — FOLIC ACID 1 MG/1
TABLET ORAL
Qty: 30 TABLET | Refills: 4 | Status: SHIPPED | OUTPATIENT
Start: 2021-09-28 | End: 2021-12-14 | Stop reason: SDUPTHER

## 2021-09-28 RX ORDER — METHOTREXATE 2.5 MG/1
25 TABLET ORAL
Qty: 40 TABLET | Refills: 3 | OUTPATIENT
Start: 2021-09-28 | End: 2021-10-28

## 2021-10-18 ENCOUNTER — SPECIALTY PHARMACY (OUTPATIENT)
Dept: PHARMACY | Facility: CLINIC | Age: 56
End: 2021-10-18

## 2021-10-25 ENCOUNTER — IMMUNIZATION (OUTPATIENT)
Dept: PRIMARY CARE CLINIC | Facility: CLINIC | Age: 56
End: 2021-10-25
Payer: COMMERCIAL

## 2021-10-25 PROCEDURE — 90686 FLU VACCINE (QUAD) GREATER THAN OR EQUAL TO 3YO PRESERVATIVE FREE IM: ICD-10-PCS | Mod: S$GLB,,, | Performed by: FAMILY MEDICINE

## 2021-10-25 PROCEDURE — 90686 IIV4 VACC NO PRSV 0.5 ML IM: CPT | Mod: S$GLB,,, | Performed by: FAMILY MEDICINE

## 2021-10-25 PROCEDURE — 90471 IMMUNIZATION ADMIN: CPT | Mod: S$GLB,,, | Performed by: FAMILY MEDICINE

## 2021-10-25 PROCEDURE — 90471 FLU VACCINE (QUAD) GREATER THAN OR EQUAL TO 3YO PRESERVATIVE FREE IM: ICD-10-PCS | Mod: S$GLB,,, | Performed by: FAMILY MEDICINE

## 2021-11-18 ENCOUNTER — PATIENT MESSAGE (OUTPATIENT)
Dept: PHARMACY | Facility: CLINIC | Age: 56
End: 2021-11-18
Payer: COMMERCIAL

## 2021-11-22 ENCOUNTER — SPECIALTY PHARMACY (OUTPATIENT)
Dept: PHARMACY | Facility: CLINIC | Age: 56
End: 2021-11-22
Payer: COMMERCIAL

## 2021-12-13 ENCOUNTER — TELEPHONE (OUTPATIENT)
Dept: RHEUMATOLOGY | Facility: CLINIC | Age: 56
End: 2021-12-13
Payer: COMMERCIAL

## 2021-12-14 ENCOUNTER — OFFICE VISIT (OUTPATIENT)
Dept: RHEUMATOLOGY | Facility: CLINIC | Age: 56
End: 2021-12-14
Payer: COMMERCIAL

## 2021-12-14 ENCOUNTER — LAB VISIT (OUTPATIENT)
Dept: LAB | Facility: HOSPITAL | Age: 56
End: 2021-12-14
Attending: INTERNAL MEDICINE
Payer: COMMERCIAL

## 2021-12-14 VITALS — WEIGHT: 154.56 LBS | BODY MASS INDEX: 25.75 KG/M2 | HEIGHT: 65 IN

## 2021-12-14 DIAGNOSIS — R53.83 FATIGUE, UNSPECIFIED TYPE: ICD-10-CM

## 2021-12-14 DIAGNOSIS — D84.9 IMMUNOCOMPROMISED PATIENT: ICD-10-CM

## 2021-12-14 DIAGNOSIS — Z71.89 COUNSELING ON HEALTH PROMOTION AND DISEASE PREVENTION: ICD-10-CM

## 2021-12-14 DIAGNOSIS — Z79.60 LONG-TERM USE OF IMMUNOSUPPRESSANT MEDICATION: ICD-10-CM

## 2021-12-14 DIAGNOSIS — M19.90 OSTEOARTHRITIS, UNSPECIFIED OSTEOARTHRITIS TYPE, UNSPECIFIED SITE: ICD-10-CM

## 2021-12-14 DIAGNOSIS — M05.9 SEROPOSITIVE RHEUMATOID ARTHRITIS: Primary | ICD-10-CM

## 2021-12-14 DIAGNOSIS — M05.9 SEROPOSITIVE RHEUMATOID ARTHRITIS: ICD-10-CM

## 2021-12-14 LAB
ALBUMIN SERPL BCP-MCNC: 4.6 G/DL (ref 3.5–5.2)
ALP SERPL-CCNC: 97 U/L (ref 55–135)
ALT SERPL W/O P-5'-P-CCNC: 46 U/L (ref 10–44)
ANION GAP SERPL CALC-SCNC: 11 MMOL/L (ref 8–16)
AST SERPL-CCNC: 29 U/L (ref 10–40)
BASOPHILS # BLD AUTO: 0.04 K/UL (ref 0–0.2)
BASOPHILS NFR BLD: 0.5 % (ref 0–1.9)
BILIRUB SERPL-MCNC: 0.6 MG/DL (ref 0.1–1)
BUN SERPL-MCNC: 10 MG/DL (ref 6–20)
CALCIUM SERPL-MCNC: 9.4 MG/DL (ref 8.7–10.5)
CHLORIDE SERPL-SCNC: 105 MMOL/L (ref 95–110)
CO2 SERPL-SCNC: 24 MMOL/L (ref 23–29)
CREAT SERPL-MCNC: 0.8 MG/DL (ref 0.5–1.4)
CRP SERPL-MCNC: 0.1 MG/L (ref 0–8.2)
DIFFERENTIAL METHOD: ABNORMAL
EOSINOPHIL # BLD AUTO: 0 K/UL (ref 0–0.5)
EOSINOPHIL NFR BLD: 0.5 % (ref 0–8)
ERYTHROCYTE [DISTWIDTH] IN BLOOD BY AUTOMATED COUNT: 15 % (ref 11.5–14.5)
ERYTHROCYTE [SEDIMENTATION RATE] IN BLOOD BY WESTERGREN METHOD: 2 MM/HR (ref 0–20)
EST. GFR  (AFRICAN AMERICAN): >60 ML/MIN/1.73 M^2
EST. GFR  (NON AFRICAN AMERICAN): >60 ML/MIN/1.73 M^2
GLUCOSE SERPL-MCNC: 90 MG/DL (ref 70–110)
HCT VFR BLD AUTO: 44.5 % (ref 37–48.5)
HGB BLD-MCNC: 14 G/DL (ref 12–16)
IMM GRANULOCYTES # BLD AUTO: 0.03 K/UL (ref 0–0.04)
IMM GRANULOCYTES NFR BLD AUTO: 0.4 % (ref 0–0.5)
LYMPHOCYTES # BLD AUTO: 1.5 K/UL (ref 1–4.8)
LYMPHOCYTES NFR BLD: 18.1 % (ref 18–48)
MCH RBC QN AUTO: 29.2 PG (ref 27–31)
MCHC RBC AUTO-ENTMCNC: 31.5 G/DL (ref 32–36)
MCV RBC AUTO: 93 FL (ref 82–98)
MONOCYTES # BLD AUTO: 0.3 K/UL (ref 0.3–1)
MONOCYTES NFR BLD: 4 % (ref 4–15)
NEUTROPHILS # BLD AUTO: 6.2 K/UL (ref 1.8–7.7)
NEUTROPHILS NFR BLD: 76.5 % (ref 38–73)
NRBC BLD-RTO: 0 /100 WBC
PLATELET # BLD AUTO: 336 K/UL (ref 150–450)
PMV BLD AUTO: 10.3 FL (ref 9.2–12.9)
POTASSIUM SERPL-SCNC: 3.7 MMOL/L (ref 3.5–5.1)
PROT SERPL-MCNC: 8.3 G/DL (ref 6–8.4)
RBC # BLD AUTO: 4.8 M/UL (ref 4–5.4)
SODIUM SERPL-SCNC: 140 MMOL/L (ref 136–145)
WBC # BLD AUTO: 8.1 K/UL (ref 3.9–12.7)

## 2021-12-14 PROCEDURE — 99214 OFFICE O/P EST MOD 30 MIN: CPT | Mod: S$GLB,,, | Performed by: INTERNAL MEDICINE

## 2021-12-14 PROCEDURE — 85651 RBC SED RATE NONAUTOMATED: CPT | Performed by: INTERNAL MEDICINE

## 2021-12-14 PROCEDURE — 80053 COMPREHEN METABOLIC PANEL: CPT | Performed by: INTERNAL MEDICINE

## 2021-12-14 PROCEDURE — 85025 COMPLETE CBC W/AUTO DIFF WBC: CPT | Performed by: INTERNAL MEDICINE

## 2021-12-14 PROCEDURE — 86480 TB TEST CELL IMMUN MEASURE: CPT | Performed by: INTERNAL MEDICINE

## 2021-12-14 PROCEDURE — 99999 PR PBB SHADOW E&M-EST. PATIENT-LVL II: CPT | Mod: PBBFAC,,, | Performed by: INTERNAL MEDICINE

## 2021-12-14 PROCEDURE — 99999 PR PBB SHADOW E&M-EST. PATIENT-LVL II: ICD-10-PCS | Mod: PBBFAC,,, | Performed by: INTERNAL MEDICINE

## 2021-12-14 PROCEDURE — 86140 C-REACTIVE PROTEIN: CPT | Performed by: INTERNAL MEDICINE

## 2021-12-14 PROCEDURE — 99214 PR OFFICE/OUTPT VISIT, EST, LEVL IV, 30-39 MIN: ICD-10-PCS | Mod: S$GLB,,, | Performed by: INTERNAL MEDICINE

## 2021-12-14 PROCEDURE — 36415 COLL VENOUS BLD VENIPUNCTURE: CPT | Mod: PO | Performed by: INTERNAL MEDICINE

## 2021-12-14 PROCEDURE — 80074 ACUTE HEPATITIS PANEL: CPT | Performed by: INTERNAL MEDICINE

## 2021-12-14 RX ORDER — UPADACITINIB 15 MG/1
15 TABLET, EXTENDED RELEASE ORAL DAILY
Qty: 90 TABLET | Refills: 2 | Status: SHIPPED | OUTPATIENT
Start: 2021-12-14 | End: 2022-03-14

## 2021-12-14 RX ORDER — METHOTREXATE 2.5 MG/1
25 TABLET ORAL
Qty: 120 TABLET | Refills: 2 | Status: SHIPPED | OUTPATIENT
Start: 2021-12-14 | End: 2022-07-08 | Stop reason: SDUPTHER

## 2021-12-14 RX ORDER — FOLIC ACID 1 MG/1
1 TABLET ORAL DAILY
Qty: 30 TABLET | Refills: 4 | Status: SHIPPED | OUTPATIENT
Start: 2021-12-14 | End: 2022-03-15

## 2021-12-14 RX ORDER — UPADACITINIB 15 MG/1
15 TABLET, EXTENDED RELEASE ORAL DAILY
COMMUNITY
End: 2021-12-14 | Stop reason: SDUPTHER

## 2021-12-15 LAB
GAMMA INTERFERON BACKGROUND BLD IA-ACNC: 0.04 IU/ML
HAV IGM SERPL QL IA: NEGATIVE
HBV CORE IGM SERPL QL IA: NEGATIVE
HBV SURFACE AG SERPL QL IA: NEGATIVE
HCV AB SERPL QL IA: NEGATIVE
M TB IFN-G CD4+ BCKGRND COR BLD-ACNC: -0.01 IU/ML
MITOGEN IGNF BCKGRD COR BLD-ACNC: >10 IU/ML
TB GOLD PLUS: NEGATIVE
TB2 - NIL: -0.01 IU/ML

## 2021-12-20 ENCOUNTER — SPECIALTY PHARMACY (OUTPATIENT)
Dept: PHARMACY | Facility: CLINIC | Age: 56
End: 2021-12-20
Payer: COMMERCIAL

## 2022-01-18 ENCOUNTER — SPECIALTY PHARMACY (OUTPATIENT)
Dept: PHARMACY | Facility: CLINIC | Age: 57
End: 2022-01-18
Payer: COMMERCIAL

## 2022-01-18 NOTE — TELEPHONE ENCOUNTER
Specialty Pharmacy - Refill Coordination    Specialty Medication Orders Linked to Encounter    Flowsheet Row Most Recent Value   Medication #1 upadacitinib (RINVOQ) 15 mg 24 hr tablet (Order#[id number], Rx#)          Refill Questions - Documented Responses    Flowsheet Row Most Recent Value   Patient Availability and HIPAA Verification    Does patient want to proceed with activity? Yes   HIPAA/medical authority confirmed? Yes   Relationship to patient of person spoken to? Self   Refill Screening Questions    Changes to allergies? No   Changes to medications? No   New conditions since last clinic visit? No   Unplanned office visit, urgent care, ED, or hospital admission in the last 4 weeks? No   How does patient/caregiver feel medication is working? Good   Financial problems or insurance changes? No   How many doses of your specialty medications were missed in the last 4 weeks? 0   Would patient like to speak to a pharmacist? No   When does the patient need to receive the medication? 01/22/22   Refill Delivery Questions    How will the patient receive the medication? Delivery Nini   When does the patient need to receive the medication? 01/22/22   Shipping Address Home   Address in [facility] confirmed and updated if neccessary? Yes   Expected Copay ($) 5   Is the patient able to afford the medication copay? Yes   Payment Method CC on file   Days supply of Refill 30   Supplies needed? No supplies needed   Refill activity completed? Yes   Refill activity plan Refill scheduled   Shipment/Pickup Date: 01/21/22          Current Outpatient Medications   Medication Sig    FLAXSEED OIL ORAL Take by mouth.    folic acid (FOLVITE) 1 MG tablet Take 1 tablet (1 mg total) by mouth once daily.    methotrexate 2.5 MG Tab Take 10 tablets (25 mg total) by mouth every 7 days.    multivitamin capsule Take 1 capsule by mouth once daily.    multivitamin with minerals (HAIR,SKIN AND NAILS ORAL) Take by mouth once daily.     rosuvastatin (CRESTOR) 10 MG tablet Take 1 tablet (10 mg total) by mouth once daily. (Patient not taking: Reported on 1/11/2022)    TURMERIC ORAL Take by mouth.    upadacitinib (RINVOQ) 15 mg 24 hr tablet Take 1 tablet (15 mg total) by mouth once daily.    upadacitinib (RINVOQ) 15 mg 24 hr tablet Take 1 tablet (15 mg total) by mouth once daily.    valACYclovir (VALTREX) 1000 MG tablet Take 1 tablet (1,000 mg total) by mouth 2 (two) times daily. for 10 days   Last reviewed on 1/11/2022  3:36 PM by Lashell Hickman CMA    Review of patient's allergies indicates:  No Known Allergies Last reviewed on  1/11/2022 3:35 PM by Lashell Hickman      Tasks added this encounter   No tasks added.   Tasks due within next 3 months   1/16/2022 - Refill Call (Auto Added)     [facility] - Specialty Pharm[facility]  [address] [place], [place] [address]andra  [facility] [address]  Phone: [phone]  Fax: [phone]

## 2022-02-09 ENCOUNTER — TELEPHONE (OUTPATIENT)
Dept: RHEUMATOLOGY | Facility: CLINIC | Age: 57
End: 2022-02-09
Payer: COMMERCIAL

## 2022-02-09 ENCOUNTER — PATIENT OUTREACH (OUTPATIENT)
Dept: ADMINISTRATIVE | Facility: OTHER | Age: 57
End: 2022-02-09
Payer: COMMERCIAL

## 2022-02-09 ENCOUNTER — OFFICE VISIT (OUTPATIENT)
Dept: INTERNAL MEDICINE | Facility: CLINIC | Age: 57
End: 2022-02-09
Payer: COMMERCIAL

## 2022-02-09 VITALS
DIASTOLIC BLOOD PRESSURE: 86 MMHG | SYSTOLIC BLOOD PRESSURE: 136 MMHG | BODY MASS INDEX: 26.14 KG/M2 | WEIGHT: 156.88 LBS | OXYGEN SATURATION: 99 % | HEART RATE: 100 BPM | TEMPERATURE: 98 F | HEIGHT: 65 IN

## 2022-02-09 DIAGNOSIS — M70.71 BURSITIS OF RIGHT HIP, UNSPECIFIED BURSA: Primary | ICD-10-CM

## 2022-02-09 PROCEDURE — 99214 PR OFFICE/OUTPT VISIT, EST, LEVL IV, 30-39 MIN: ICD-10-PCS | Mod: S$GLB,,, | Performed by: FAMILY MEDICINE

## 2022-02-09 PROCEDURE — 99999 PR PBB SHADOW E&M-EST. PATIENT-LVL IV: CPT | Mod: PBBFAC,,, | Performed by: FAMILY MEDICINE

## 2022-02-09 PROCEDURE — 3008F PR BODY MASS INDEX (BMI) DOCUMENTED: ICD-10-PCS | Mod: CPTII,S$GLB,, | Performed by: FAMILY MEDICINE

## 2022-02-09 PROCEDURE — 3079F DIAST BP 80-89 MM HG: CPT | Mod: CPTII,S$GLB,, | Performed by: FAMILY MEDICINE

## 2022-02-09 PROCEDURE — 99214 OFFICE O/P EST MOD 30 MIN: CPT | Mod: S$GLB,,, | Performed by: FAMILY MEDICINE

## 2022-02-09 PROCEDURE — 3075F PR MOST RECENT SYSTOLIC BLOOD PRESS GE 130-139MM HG: ICD-10-PCS | Mod: CPTII,S$GLB,, | Performed by: FAMILY MEDICINE

## 2022-02-09 PROCEDURE — 1159F MED LIST DOCD IN RCRD: CPT | Mod: CPTII,S$GLB,, | Performed by: FAMILY MEDICINE

## 2022-02-09 PROCEDURE — 3075F SYST BP GE 130 - 139MM HG: CPT | Mod: CPTII,S$GLB,, | Performed by: FAMILY MEDICINE

## 2022-02-09 PROCEDURE — 1160F PR REVIEW ALL MEDS BY PRESCRIBER/CLIN PHARMACIST DOCUMENTED: ICD-10-PCS | Mod: CPTII,S$GLB,, | Performed by: FAMILY MEDICINE

## 2022-02-09 PROCEDURE — 3079F PR MOST RECENT DIASTOLIC BLOOD PRESSURE 80-89 MM HG: ICD-10-PCS | Mod: CPTII,S$GLB,, | Performed by: FAMILY MEDICINE

## 2022-02-09 PROCEDURE — 3008F BODY MASS INDEX DOCD: CPT | Mod: CPTII,S$GLB,, | Performed by: FAMILY MEDICINE

## 2022-02-09 PROCEDURE — 1159F PR MEDICATION LIST DOCUMENTED IN MEDICAL RECORD: ICD-10-PCS | Mod: CPTII,S$GLB,, | Performed by: FAMILY MEDICINE

## 2022-02-09 PROCEDURE — 99999 PR PBB SHADOW E&M-EST. PATIENT-LVL IV: ICD-10-PCS | Mod: PBBFAC,,, | Performed by: FAMILY MEDICINE

## 2022-02-09 PROCEDURE — 1160F RVW MEDS BY RX/DR IN RCRD: CPT | Mod: CPTII,S$GLB,, | Performed by: FAMILY MEDICINE

## 2022-02-09 RX ORDER — MELOXICAM 7.5 MG/1
7.5 TABLET ORAL DAILY
Qty: 14 TABLET | Refills: 0 | Status: SHIPPED | OUTPATIENT
Start: 2022-02-09 | End: 2022-02-23

## 2022-02-09 RX ORDER — DICLOFENAC SODIUM 10 MG/G
2 GEL TOPICAL 4 TIMES DAILY
Qty: 200 G | Refills: 0 | Status: SHIPPED | OUTPATIENT
Start: 2022-02-09 | End: 2022-12-15

## 2022-02-09 NOTE — ASSESSMENT & PLAN NOTE
Discussed with Dr. Thomas, Rheumatology; will treat with mobic and voltaren gel x 2 week; message sent to Dr. Thomas' staff to schedule her for injection as well; advised to seek medical attention for worsening hip pain; Patient expressed understanding.

## 2022-02-09 NOTE — TELEPHONE ENCOUNTER
Spoke with pt and scheduled her for an appt for 2/10/22 regarding her hip pain.  Pt asked if she should stop any medications and was informed that she did not need to stop any medications.

## 2022-02-09 NOTE — TELEPHONE ENCOUNTER
Spoke with pt and scheduled her for visit 2/10/22 regarding hip pain.  Pt asked if she should stop any of her medications and was told no she did not.

## 2022-02-09 NOTE — PROGRESS NOTES
Subjective:       Patient ID: Ayesha Arcos is a 56 y.o. female.    Chief Complaint: Hip Pain (right)    PCP: Dr. Ambreen Matos    Patient is new to me. Patient presents to clinic today for right hip pain. H/o rheumatoid arthritis, followed by Dr. Thomas, Rheumatology. Reports having muscle cramps in right leg Sunday morning. She then noted pain in her right lateral hip that persisted. She used biofreeze without relief. She took OTC ibuprofen with little relief.    Review of Systems   Constitutional: Negative for chills, fatigue, fever and unexpected weight change.   Eyes: Negative for visual disturbance.   Respiratory: Negative for shortness of breath.    Cardiovascular: Negative for chest pain.   Musculoskeletal: Positive for arthralgias. Negative for myalgias.   Neurological: Negative for headaches.         Objective:      Physical Exam  Vitals reviewed.   Constitutional:       General: She is not in acute distress.     Appearance: She is well-developed.   HENT:      Head: Normocephalic and atraumatic.   Eyes:      General: Lids are normal. No scleral icterus.     Extraocular Movements: Extraocular movements intact.      Conjunctiva/sclera: Conjunctivae normal.      Pupils: Pupils are equal, round, and reactive to light.   Pulmonary:      Effort: Pulmonary effort is normal.   Musculoskeletal:      Right lower leg: Tenderness present. No swelling, deformity or bony tenderness. No edema.        Legs:    Neurological:      Mental Status: She is alert and oriented to person, place, and time.      Gait: Gait abnormal (antalgic gait secondary to right hip pain).   Psychiatric:         Mood and Affect: Mood and affect normal.         Assessment:       1. Bursitis of right hip, unspecified bursa        Plan:     Problem List Items Addressed This Visit     Bursitis of right hip - Primary    Current Assessment & Plan     Discussed with Dr. Thomas, Rheumatology; will treat with mobic and voltaren gel x 2 week;  message sent to Dr. Thomas' staff to schedule her for injection as well; advised to seek medical attention for worsening hip pain; Patient expressed understanding.         Relevant Medications    meloxicam (MOBIC) 7.5 MG tablet    diclofenac sodium (VOLTAREN) 1 % Gel

## 2022-02-09 NOTE — TELEPHONE ENCOUNTER
----- Message from Adelina Cespedes MD sent at 2/9/2022 11:05 AM CST -----  Please assist patient with procedure appointment for right hip bursitis. Thank you!

## 2022-02-09 NOTE — TELEPHONE ENCOUNTER
----- Message from Rose Norton sent at 2/9/2022 12:57 PM CST -----  Contact: self/[phone]  Patient is calling regarding hip pain, please call her back at [phone]. Thanks/ar

## 2022-02-10 ENCOUNTER — OFFICE VISIT (OUTPATIENT)
Dept: RHEUMATOLOGY | Facility: CLINIC | Age: 57
End: 2022-02-10
Payer: COMMERCIAL

## 2022-02-10 ENCOUNTER — HOSPITAL ENCOUNTER (OUTPATIENT)
Dept: RADIOLOGY | Facility: HOSPITAL | Age: 57
Discharge: HOME | End: 2022-02-10
Attending: INTERNAL MEDICINE
Payer: COMMERCIAL

## 2022-02-10 VITALS
BODY MASS INDEX: 26.08 KG/M2 | WEIGHT: 156.5 LBS | DIASTOLIC BLOOD PRESSURE: 88 MMHG | HEIGHT: 65 IN | HEART RATE: 78 BPM | SYSTOLIC BLOOD PRESSURE: 134 MMHG

## 2022-02-10 DIAGNOSIS — M05.9 SEROPOSITIVE RHEUMATOID ARTHRITIS: ICD-10-CM

## 2022-02-10 DIAGNOSIS — M71.9 BURSOPATHY, UNSPECIFIED: ICD-10-CM

## 2022-02-10 DIAGNOSIS — M05.9 SEROPOSITIVE RHEUMATOID ARTHRITIS: Primary | ICD-10-CM

## 2022-02-10 DIAGNOSIS — Z71.89 COUNSELING ON HEALTH PROMOTION AND DISEASE PREVENTION: ICD-10-CM

## 2022-02-10 PROCEDURE — 20610 PR DRAIN/INJECT LARGE JOINT/BURSA: ICD-10-PCS | Mod: RT,S$GLB,, | Performed by: INTERNAL MEDICINE

## 2022-02-10 PROCEDURE — 73521 X-RAY EXAM HIPS BI 2 VIEWS: CPT | Mod: 26,,, | Performed by: RADIOLOGY

## 2022-02-10 PROCEDURE — 1159F PR MEDICATION LIST DOCUMENTED IN MEDICAL RECORD: ICD-10-PCS | Mod: CPTII,S$GLB,, | Performed by: INTERNAL MEDICINE

## 2022-02-10 PROCEDURE — 73521 XR HIPS BILATERAL 2 VIEW INCL AP PELVIS: ICD-10-PCS | Mod: 26,,, | Performed by: RADIOLOGY

## 2022-02-10 PROCEDURE — 99999 PR PBB SHADOW E&M-EST. PATIENT-LVL IV: CPT | Mod: PBBFAC,,, | Performed by: INTERNAL MEDICINE

## 2022-02-10 PROCEDURE — 73521 X-RAY EXAM HIPS BI 2 VIEWS: CPT | Mod: TC

## 2022-02-10 PROCEDURE — 3008F BODY MASS INDEX DOCD: CPT | Mod: CPTII,S$GLB,, | Performed by: INTERNAL MEDICINE

## 2022-02-10 PROCEDURE — 99214 PR OFFICE/OUTPT VISIT, EST, LEVL IV, 30-39 MIN: ICD-10-PCS | Mod: 25,S$GLB,, | Performed by: INTERNAL MEDICINE

## 2022-02-10 PROCEDURE — 3008F PR BODY MASS INDEX (BMI) DOCUMENTED: ICD-10-PCS | Mod: CPTII,S$GLB,, | Performed by: INTERNAL MEDICINE

## 2022-02-10 PROCEDURE — 1159F MED LIST DOCD IN RCRD: CPT | Mod: CPTII,S$GLB,, | Performed by: INTERNAL MEDICINE

## 2022-02-10 PROCEDURE — 99214 OFFICE O/P EST MOD 30 MIN: CPT | Mod: 25,S$GLB,, | Performed by: INTERNAL MEDICINE

## 2022-02-10 PROCEDURE — 99999 PR PBB SHADOW E&M-EST. PATIENT-LVL IV: ICD-10-PCS | Mod: PBBFAC,,, | Performed by: INTERNAL MEDICINE

## 2022-02-10 PROCEDURE — 20610 DRAIN/INJ JOINT/BURSA W/O US: CPT | Mod: RT,S$GLB,, | Performed by: INTERNAL MEDICINE

## 2022-02-10 RX ORDER — TRIAMCINOLONE ACETONIDE 40 MG/ML
40 INJECTION, SUSPENSION INTRA-ARTICULAR; INTRAMUSCULAR
Status: DISCONTINUED | OUTPATIENT
Start: 2022-02-10 | End: 2022-02-10 | Stop reason: HOSPADM

## 2022-02-10 RX ADMIN — TRIAMCINOLONE ACETONIDE 40 MG: 40 INJECTION, SUSPENSION INTRA-ARTICULAR; INTRAMUSCULAR at 12:02

## 2022-02-10 NOTE — PROGRESS NOTES
RHEUMATOLOGY OUTPATIENT CLINIC NOTE    2/10/2022    Attending Rheumatologist: Perez Thomas  Primary Care Provider/Physician Requesting Consultation: Neel Matos MD   Chief Complaint/Reason For Consultation:  Rheumatoid Arthritis      Subjective:     Ayesha Arcos is a 56 y.o. Black or  female with rheumatoid arthritis comes for features of trochanteric bursa syndrome.    Main concern right trochanteric bursa area pain, onset approximately 2 weeks ago without any particular precipitating event.  Denies falls or recent trauma.  No previous events.  Denies associated back pain or other significant arthralgias.  Adherence with methotrexate and Rinvoq without side effects.  Currently without fever, focal neurological symptoms, suspicious rashes, acute /GI complaints.  Improvement of symptoms since using NSAIDs topically and systemically.    Review of Systems   Constitutional: Negative for malaise/fatigue.   Respiratory: Negative for shortness of breath.    Musculoskeletal: Positive for joint pain.   Skin: Negative for rash.   Neurological: Negative for focal weakness.       Chronic comorbid conditions affecting medical decision making today:  Past Medical History:   Diagnosis Date    Carpal tunnel syndrome, bilateral     Hypertension     Rheumatoid arthritis      Past Surgical History:   Procedure Laterality Date    CARPAL TUNNEL RELEASE      right    skin cancer removal      nose    TUBAL LIGATION       Family History   Problem Relation Age of Onset    Diabetes Mother     Heart disease Father     Hypertension Father     Early death Father         Early 50's    Stroke Paternal Uncle     Heart disease Paternal Uncle      Social History     Substance and Sexual Activity   Alcohol Use Yes    Comment: social      Social History     Tobacco Use   Smoking Status Never Smoker   Smokeless Tobacco Never Used     Social History     Substance and Sexual Activity   Drug Use No        Current Outpatient Medications:     diclofenac sodium (VOLTAREN) 1 % Gel, Apply 2 g topically 4 (four) times daily. for 14 days, Disp: 200 g, Rfl: 0    FLAXSEED OIL ORAL, Take by mouth., Disp: , Rfl:     folic acid (FOLVITE) 1 MG tablet, Take 1 tablet (1 mg total) by mouth once daily., Disp: 30 tablet, Rfl: 4    meloxicam (MOBIC) 7.5 MG tablet, Take 1 tablet (7.5 mg total) by mouth once daily. Take with food for 14 days, Disp: 14 tablet, Rfl: 0    methotrexate 2.5 MG Tab, Take 10 tablets (25 mg total) by mouth every 7 days., Disp: 120 tablet, Rfl: 2    multivitamin capsule, Take 1 capsule by mouth once daily., Disp: , Rfl:     multivitamin with minerals (HAIR,SKIN AND NAILS ORAL), Take by mouth once daily., Disp: , Rfl:     rosuvastatin (CRESTOR) 10 MG tablet, Take 1 tablet (10 mg total) by mouth once daily., Disp: 90 tablet, Rfl: 3    TURMERIC ORAL, Take by mouth., Disp: , Rfl:     upadacitinib (RINVOQ) 15 mg 24 hr tablet, Take 1 tablet (15 mg total) by mouth once daily., Disp: 90 tablet, Rfl: 1    upadacitinib (RINVOQ) 15 mg 24 hr tablet, Take 1 tablet (15 mg total) by mouth once daily., Disp: 90 tablet, Rfl: 2    valACYclovir (VALTREX) 1000 MG tablet, Take 1 tablet (1,000 mg total) by mouth 2 (two) times daily. for 10 days, Disp: 20 tablet, Rfl: 1     Objective:     Vitals:    02/10/22 1207   BP: 134/88   Pulse: 78     Physical Exam   Constitutional: She does not appear ill.   Eyes: Conjunctivae are normal.   Pulmonary/Chest: No respiratory distress.   Musculoskeletal:         General: Swelling (Chronic synovitis some PIP and MCP joints.) and tenderness (Exquisite tenderness to palpation right trochanteric bursa area.  No warmth or erythema present.) present. Normal range of motion.   Neurological: Gait (Antalgic gait, favoring left leg) abnormal.       Reviewed available old and all outside pertinent medical records available.    All lab results personally reviewed and interpreted by me.  Lab  Results   Component Value Date    WBC 8.10 12/14/2021    HGB 14.0 12/14/2021    HCT 44.5 12/14/2021    MCV 93 12/14/2021    RDW 15.0 (H) 12/14/2021     12/14/2021       Lab Results   Component Value Date     12/14/2021    K 3.7 12/14/2021     12/14/2021    CO2 24 12/14/2021    GLU 90 12/14/2021    BUN 10 12/14/2021    CALCIUM 9.4 12/14/2021    PROT 8.3 12/14/2021    ALBUMIN 4.6 12/14/2021    BILITOT 0.6 12/14/2021    AST 29 12/14/2021    ALKPHOS 97 12/14/2021    ALT 46 (H) 12/14/2021       Lab Results   Component Value Date    COLORU Yellow 08/08/2018    APPEARANCEUA Clear 08/08/2018    SPECGRAV 1.025 08/08/2018    PHUR 6.0 08/08/2018    PROTEINUA Negative 08/08/2018    KETONESU Negative 08/08/2018    LEUKOCYTESUR Negative 08/08/2018    NITRITE Negative 08/08/2018       No results found for: PTH    Lab Results   Component Value Date    URICACID 4.5 07/26/2018       Lab Results   Component Value Date    CRP 0.1 12/14/2021       No results found for: ANATITER    No components found for: TSPOTTB,  QUANTIFERON     ASSESSMENT:     Seropositive , erosive rheumatoid with mild disease activity.  Current complaints consistent with acute trochanteric bursa syndrome.  Improvement since on NSAIDs and topical therapy.  No focal neurological symptoms on exam, no evidence of rheumatoid relapse.  Continue methotrexate and Rinvoq unchanged.  Will provide local steroid injection for symptomatic relief and recommend range of motion, flexibility, and strengthening exercises through physical therapy.  X-ray to rule out any hip pathology.  Repeat blood work prior next encounter.    PLAN:     1. Seropositive rheumatoid arthritis  - X-Ray Hips Bilateral [employer] AP Pelvis; Future    2. Bursopathy, unspecified  - X-Ray Hips Bilateral [employer] AP Pelvis; Future  - Ambulatory referral/consult to Physical/Occupational Therapy; Future  - Large Joint Aspiration/Injection    3. High risk medication use      No follow-ups on  file.  RT 4 months    Disclaimer: This note is prepared using voice recognition software and as such is likely to have errors and has not been proof read. Please contact me for questions.     30 minutes of total time spent on the encounter, which includes face to face time and non-face to face time preparing to see the patient (eg, review of tests), Obtaining and/or reviewing separately obtained history, Documenting clinical information in the electronic or other health record, Independently interpreting results (not separately reported) and communicating results to the patient/family/caregiver, or Care coordination (not separately reported).     Large Joint Aspiration/Injection    Date/Time: 2/10/2022 12:00 PM  Performed by: Perez Thomas MD  Authorized by: Perez Thomas MD     Consent Done?:  Yes (Verbal)  Indications:  Pain  Site marked: the procedure site was marked    Timeout: prior to procedure the correct patient, procedure, and site was verified    Prep: patient was prepped and draped in usual sterile fashion    Local anesthetic:  Lidocaine 2% without epinephrine    Details:  Needle Size:  25 G  Ultrasonic Guidance for needle placement?: No    Approach:  Lateral (Right trochanteric bursa area)  Medications:  40 mg triamcinolone acetonide 40 mg/mL  Patient tolerance:  Patient tolerated the procedure well with no immediate complications        Perez Thomas M.D.

## 2022-02-10 NOTE — PROGRESS NOTES
Health Maintenance Due   Topic Date Due    TETANUS VACCINE  Never done     Updates were requested from care everywhere.  Chart was reviewed for overdue Proactive [facility] Encounters (MEJIA) topics (CRS, Breast Cancer Screening, Eye exam)  Health Maintenance has been updated.  LINKS immunization registry triggered.  Immunizations were reconciled.

## 2022-02-10 NOTE — PROCEDURES
Large Joint Aspiration/Injection    Date/Time: 2/10/2022 12:00 PM  Performed by: Perez Thomas MD  Authorized by: Perez Thomas MD     Consent Done?:  Yes (Verbal)  Indications:  Pain  Site marked: the procedure site was marked    Timeout: prior to procedure the correct patient, procedure, and site was verified    Prep: patient was prepped and draped in usual sterile fashion    Local anesthetic:  Lidocaine 2% without epinephrine    Details:  Needle Size:  25 G  Ultrasonic Guidance for needle placement?: No    Approach:  Lateral (Right trochanteric bursa area)  Medications:  40 mg triamcinolone acetonide 40 mg/mL  Patient tolerance:  Patient tolerated the procedure well with no immediate complications

## 2022-02-10 NOTE — PATIENT INSTRUCTIONS
"Patient Education       Bursitis   The Basics   Written by the doctors and editors at [facility]   What is bursitis? -- Bursitis is a condition that can cause pain or swelling next to a joint. Most of the time, bursitis happens around the shoulder, elbow, hip, or knee. It can also happen around other joints in the body.  A "bursa" is a small fluid-filled sac that sits near a bone. It cushions and protects nearby tissues when they rub on or slide over bones. These sacs, called "bursae," are found in many places throughout the body (figure 1 and figure 2). Bursitis happens when a bursa gets irritated and swollen. This can happen when a person:  · Moves a joint over and over again in the same way, over a short period of time  · Sits on a hard surface or stays in a position that presses on the bursa for a long time  · Has certain kinds of arthritis, such as gout or rheumatoid arthritis, that can affect their joints and bursae  · Gets hurt near a bursa  · Has an infection that spreads to a bursa  What are the symptoms of bursitis? -- Symptoms of bursitis can include:  · Pain or tenderness  · Swelling  · Trouble moving the joint  A bursa can get infected if a person gets a cut on the skin nearby. An infected bursa can cause a fever and the area around the bursa to be:  · Red  · Swollen  · Warm  · Painful  If you have any of the symptoms of an infected bursa, let your doctor or nurse know as soon as possible.  Is there a test for bursitis? -- Yes. Your doctor or nurse will ask about your symptoms and do an exam.  If you have symptoms of an infected bursa, your doctor might use a needle to remove some fluid from the bursa. Then they can do lab tests on the fluid to find out what is causing the bursitis, and if you need antibiotics.  They might also order imaging tests, such as an MRI scan or ultrasound. Imaging tests can create pictures of the inside of the body.  What can I do to treat my bursitis? -- To treat your bursitis, " "you can:  · Rest, cushion, and protect the area - Try not to irritate the area that hurts. For example, people with very painful shoulder bursitis might need to avoid lifting or carrying heavy things for a while. They might also need to wear an arm sling. People with bursitis behind the heel might need to use a thick heel pad. This can raise the heel so that it does not rub against the back of the shoe.  · Avoid positions that put pressure on the area - For example, people with bursitis in the front of the knee should avoid kneeling.  · Put ice on the area to reduce pain - Use a frozen bag of peas or a cold gel pack a few times a day for 20 minutes each time.  · Put heat on the area to reduce pain and stiffness - Do not use heat for more than 20 minutes at a time. Also, do not use anything too hot that could burn your skin.  What other treatments might I have? -- Your doctor or nurse might use other treatments, depending on your symptoms and where your bursitis is. Treatments can include:  · Pain-relieving medicines called "nonsteroidal antiinflammatory drugs" or "NSAIDs" - NSAIDs include ibuprofen (sample brand names: Advil, Motrin), and naproxen (sample brand name: Aleve). These medicines can reduce pain and prevent the bursae from getting swollen and painful.  · Steroid injections - Steroid medicines help reduce inflammation. These are not the same as the steroids some athletes take illegally. Doctors can inject steroids into the area of the bursitis to help reduce symptoms.  · Exercises and stretches - Your doctor or nurse might recommend that you work with a physical therapist. A physical therapist can teach you stretches and exercises to help reduce your symptoms.  · Surgery - A doctor can do surgery if other treatments do not work and you have had symptoms for a long time.  People with an infected bursa might also have treatment that includes:  · Antibiotics  · Having the fluid in the bursa drained - A doctor " "can drain the fluid using a needle and syringe, or by doing surgery.  Can bursitis be prevented? -- Yes. To help reduce the chance that you get bursitis, you can:  · Use cushions or pads to avoid putting too much pressure on joints - For example, people who garden can kneel on a kneeling pad. People who sit for a long time can sit on a cushioned chair.  · Take breaks, if you are using a certain joint too much  · Stop an activity or change the way you are doing it, if you feel pain  · Exercise  · Lose weight, if you are overweight   · Use good posture  All topics are updated as new evidence becomes available and our peer review process is complete.  This topic retrieved from [employer] on: Sep 21, 2021.  Topic 39356 Version 12.0  Release: 29.4.2 - C29.263  © 2021 UpToDate, Inc. and/or its affiliates. All rights reserved.  figure 1: Bursae near the hip     These sacs, called "bursae," are filled with fluid. They help cushion and protect the joints. "Bursitis" is the medical term for when one of these sacs gets irritated or inflamed.  Graphic 32625 Version 7.0    figure 2: Knee bursa (prepatellar bursa)     Graphic 75270 Version 3.0    Consumer Information Use and Disclaimer   This information is not specific medical advice and does not replace information you receive from your health care provider. This is only a brief summary of general information. It does NOT include all information about conditions, illnesses, injuries, tests, procedures, treatments, therapies, discharge instructions or life-style choices that may apply to you. You must talk with your health care provider for complete information about your health and treatment options. This information should not be used to decide whether or not to accept your health care provider's advice, instructions or recommendations. Only your health care provider has the knowledge and training to provide advice that is right for you. The use of this information is governed " by the [employer] End User License Agreement, available at https://www.[employer].Typemock/en/solutions/PFSweb/about/shaylee.The use of [employer] content is governed by the [employer] Terms of Use. ©2021 UpToDate, Inc. All rights reserved.  Copyright   © 2021 UpToDate, Inc. and/or its affiliates. All rights reserved.  Patient Education       Bursitis Discharge Instructions   About this topic   A bursa is a small, fluid-filled sac. It acts as a cushion between your bone and tendon. A tendon is a thick band that attaches your muscle to the bone. Bursa help the tendons glide and let your joints move easier. A bursa can get swollen and hurt. This causes pain and swelling around a joint. Bursitis most often happens in the shoulder, elbow, hips, and knee. It is caused by:   · Doing the same motion over and over again, like throwing a baseball.  · Staying in the same position for a long time, like kneeling, sitting, or resting on your elbows  · Falling on a bursa or hitting a bursa very hard  · Infection  · Gout  · Rheumatoid arthritis  What care is needed at home?   · Ask your doctor what you need to do when you go home. Make sure you ask questions if you do not understand what the doctor says. This way you will know what you need to do.  · Rest the area and keep it still. For example, if you have shoulder bursitis, try not to lift items with that arm. You may need to wear a sling to keep it still.  · Do not put pressure on the area. For example, if you have knee bursitis, do not kneel. Sleep with a pillow between your legs if you sleep on your side.  · Place an ice pack or a bag of frozen peas wrapped in a towel over the painful part. Never put ice right on the skin. Do not leave the ice on more than 10 to 15 minutes at a time.  · Prop the painful part on pillows to help with swelling.  What follow-up care is needed?   · Your doctor may ask you to make visits to the office to check on your progress. Be sure to keep these  visits.  · Your doctor may suggest physical therapy or exercises to build muscle strength.  What drugs may be needed?   The doctor may order drugs to:  · Help with pain and swelling  · Prevent infection  The doctor may give you a shot of an anti-inflammatory drug called a corticosteroid. This will help with swelling. Talk with your doctor about the risks of this shot.  Will physical activity be limited?   You may need to limit your activity for a while. You should not do physical activity that makes your health problem worse. Talk to your doctor if you run, work out, or play sports. You may not be able to do those things until your health problem get better.   What problems could happen?   · Bursitis can come back  · Bursitis may not go away  · Infection  · Could turn into a hard mass  What can be done to prevent this health problem?   · Use cushions or knee pads for things you do on your knees like gardening and scrubbing floors. If you have a job where you are on your knees a lot, like a , , or , use knee pads.  · Protect your knees if you play contact sports like football and wrestling.  · Take breaks often when doing things that use repeat movements.  · Keep a healthy weight so there is not extra stress on your joints.  · Stay active and work out to keep your muscles strong and flexible.   When do I need to call the doctor?   · Signs of infection. These include a fever of 100.4°F (38°C) or higher, chills.  · Pain or swelling gets worse  Teach Back: Helping You Understand   The Teach Back Method helps you understand the information we are giving you. After you talk with the staff, tell them in your own words what you learned. This helps to make sure the staff has described each thing clearly. It also helps to explain things that may have been confusing. Before going home, make sure you can do these:  · I can tell you about my condition.  · I can tell you what may help ease my pain.  · I  can tell you what I will do if I have more pain or swelling.  Where can I learn more?   Family [email or web address].org  https://familydoctor.org/condition/bursitis-of-the-hip/   National [employer] of Arthritis and Musculoskeletal Diseases  http://www.niams.nih.gov/Health_Info/Bursitis/default.asp   Last Reviewed Date   2020-10-28  Consumer Information Use and Disclaimer   This information is not specific medical advice and does not replace information you receive from your health care provider. This is only a brief summary of general information. It does NOT include all information about conditions, illnesses, injuries, tests, procedures, treatments, therapies, discharge instructions or life-style choices that may apply to you. You must talk with your health care provider for complete information about your health and treatment options. This information should not be used to decide whether or not to accept your health care providers advice, instructions or recommendations. Only your health care provider has the knowledge and training to provide advice that is right for you.  Copyright   Copyright © 2021 UpToDate, Inc. and its affiliates and/or licensors. All rights reserved.  Patient Education       Diclofenac (Topical) (dye KLOE fen ak)   Brand Names: US Diclo Gel with Xrylix Sheets [DSC]; Diclo Gel [DSC]; Diclozor; DST Plus Ramesh [DSC]; EnovaRX-Diclofenac Sodium; Flector; Klofensaid II [DSC]; Lexixryl [DSC]; Licart; Pennsaid; Rexaphenac [DSC]; ValcoPrep-100; VennGel One; Voltaren [DSC]; Voltaren [OTC]; Vopac MDS [DSC]; Xrylix   Brand Names: Filomena JAMP Diclofenac; Pennsaid; PMS-Diclofenac; TARO-Diclofenac   Warning   · This drug may raise the risk of heart and blood vessel problems like heart attack and stroke. These effects can be deadly. The risk may be greater if you have heart disease or risks for heart disease. However, it can also be raised even if you do not have heart disease or risks for heart disease. The risk can  happen within the first weeks of using this drug and may be greater with higher doses or long-term use. Do not use this drug right before or after bypass heart surgery.  · This drug may raise the chance of severe and sometimes deadly stomach or bowel problems like ulcers or bleeding. The risk is greater in older people, and in people who have had stomach or bowel ulcers or bleeding before. These problems may occur without warning signs.    What is this drug used for?   · It is used to treat a precancerous skin problem called actinic keratosis.  · It is used to ease pain.  · It is used to treat some types of arthritis.    What do I need to tell my doctor BEFORE I take this drug?   · If you are allergic to this drug; any part of this drug; or any other drugs, foods, or substances. Tell your doctor about the allergy and what signs you had.  · If you have an allergy to aspirin or nonsteroidal anti-inflammatory drugs (NSAIDs) like ibuprofen or naproxen.  · If you have ever had asthma caused by a salicylate drug like aspirin or a drug like this one like NSAIDs.  · If you have any of these health problems: Dehydration, GI (gastrointestinal) bleeding, heart failure (weak heart), kidney disease, or liver disease.  · If you have had a recent heart attack.  · If you are taking any other NSAID, a salicylate drug like aspirin, or pemetrexed.  · If you are having trouble getting pregnant or you are having your fertility checked.  · If you are pregnant, plan to become pregnant, or get pregnant while taking this drug. This drug may cause harm to an unborn baby if taken at 20 weeks or later in pregnancy. If you are between 20 to 30 weeks of pregnancy, only take this drug if your doctor has told you to. Do not take this drug if you are more than 30 weeks pregnant.  This is not a list of all drugs or health problems that interact with this drug.  Tell your doctor and pharmacist about all of your drugs (prescription or OTC, natural  products, vitamins) and health problems. You must check to make sure that it is safe for you to take this drug with all of your drugs and health problems. Do not start, stop, or change the dose of any drug without checking with your doctor.  What are some things I need to know or do while I take this drug?   All products:   · Tell all of your health care providers that you take this drug. This includes your doctors, nurses, pharmacists, and dentists.  · Have blood work checked as you have been told by the doctor. Talk with the doctor.  · High blood pressure has happened with drugs like this one. Have your blood pressure checked as you have been told by your doctor.  · If you smoke, talk with your doctor.  · If you have asthma, talk with your doctor. You may be more sensitive to this drug.  · Talk with your doctor before you drink alcohol.  · Do not use on skin that has any problems.  · Do not use more than told. Unsafe side effects may happen.  · Do not use longer than you have been told by the doctor.  · The chance of heart failure is raised with the use of drugs like this one. In people who already have heart failure, the chance of heart attack, having to go to the hospital for heart failure, and death is raised. Talk with the doctor.  · The chance of heart attack and heart-related death is raised in people taking drugs like this one after a recent heart attack. People taking drugs like this one after a first heart attack were also more likely to die in the year after the heart attack compared with people not taking drugs like this one. Talk with the doctor.  · If you are taking aspirin to help prevent a heart attack, talk with your doctor.  · A severe and sometimes deadly reaction has happened with drugs like this one. Most of the time, this reaction has signs like fever, rash, or swollen glands with problems in body organs like the liver, kidney, blood, heart, muscles and joints, or lungs. If you have questions,  talk with the doctor.  · This drug is not approved for use in children. Talk with the doctor.  · If you are 60 or older, use this drug with care. You could have more side effects.  · NSAIDs like this drug may affect egg release (ovulation). This may affect being able to get pregnant. This goes back to normal when this drug is stopped. Talk with the doctor.  · Tell your doctor if you are breast-feeding. You will need to talk about any risks to your baby.  Skin patch:   · This drug may cause harm if chewed or swallowed. If this drug has been put in the mouth, call a doctor or poison control center right away.  All other products:   · Avoid sunlight on treated area.  · This drug may cause harm if swallowed. If this drug is swallowed, call a doctor or poison control center right away.  What are some side effects that I need to call my doctor about right away?   WARNING/CAUTION: Even though it may be rare, some people may have very bad and sometimes deadly side effects when taking a drug. Tell your doctor or get medical help right away if you have any of the following signs or symptoms that may be related to a very bad side effect:  All products:   · Signs of an allergic reaction, like rash; hives; itching; red, swollen, blistered, or peeling skin with or without fever; wheezing; tightness in the chest or throat; trouble breathing, swallowing, or talking; unusual hoarseness; or swelling of the mouth, face, lips, tongue, or throat.  · Signs of bleeding like throwing up or coughing up blood; vomit that looks like coffee grounds; blood in the urine; black, red, or tarry stools; bleeding from the gums; abnormal vaginal bleeding; bruises without a cause or that get bigger; or bleeding you cannot stop.  · Signs of high blood pressure like very bad headache or dizziness, passing out, or change in eyesight.  · Signs of kidney problems like unable to pass urine, change in how much urine is passed, blood in the urine, or a big  weight gain.  · Signs of high potassium levels like a heartbeat that does not feel normal; feeling confused; feeling weak, lightheaded, or dizzy; feeling like passing out; numbness or tingling; or shortness of breath.  · Skin irritation.  · Chest pain or pressure or a fast heartbeat.  · Shortness of breath, a big weight gain, or swelling in the arms or legs.  · Weakness on 1 side of the body, trouble speaking or thinking, change in balance, drooping on one side of the face, or blurred eyesight.  · Feeling very tired or weak.  · Flu-like signs.  · Very bad back pain.  · Swollen gland.  · A severe skin reaction (John-Kvng syndrome/toxic epidermal necrolysis) may happen. It can cause severe health problems that may not go away, and sometimes death. Get medical help right away if you have signs like red, swollen, blistered, or peeling skin (with or without fever); red or irritated eyes; or sores in your mouth, throat, nose, or eyes.  · Liver problems have happened with drugs like this one. Sometimes, this has been deadly. Call your doctor right away if you have signs of liver problems like dark urine, feeling tired, not hungry, upset stomach or stomach pain, light-colored stools, throwing up, or yellow skin or eyes.  3% gel:   · A burning, numbness, or tingling feeling that is not normal.  What are some other side effects of this drug?   All drugs may cause side effects. However, many people have no side effects or only have minor side effects. Call your doctor or get medical help if any of these side effects or any other side effects bother you or do not go away:  · Dizziness or headache.  · Constipation, diarrhea, stomach pain, upset stomach, or throwing up.  · Gas.  · Heartburn.  These are not all of the side effects that may occur. If you have questions about side effects, call your doctor. Call your doctor for medical advice about side effects.  You may report side effects to your national health agency.  You  may report side effects to the FDA at 1-457.344.2196. You may also report side effects at https://www.fda.gov/medwatch.  How is this drug best taken?   Use this drug as ordered by your doctor. Read all information given to you. Follow all instructions closely.  All products:   · Do not take this drug by mouth. Use on your skin only. Keep out of your mouth, nose, and eyes (may burn).  · Wash your hands before and after use.  · Clean affected part before use. Make sure to dry well.  · If you get this drug in your eyes, wash right away with water. If you have eye irritation that lasts or a change in eyesight, call your doctor.  Cream and gel:   · Put a thin layer on the affected skin and rub in gently.  · Do not use sunscreen, insect repellant, or other drugs on affected part.  · If putting this drug on the hand, do not wash your hands for at least 1 hour after putting on.  · Do not use heat or bandages on the treated part.  · Let the drug dry for at least 10 minutes before you cover it with clothes or gloves.  · Do not bathe, shower, or swim for 1 hour after putting on.  · Do not use on open wounds or infected skin.  Gel (Voltaren):   · This drug comes with a dosing card. Be sure you know how to use it. Talk with your doctor or pharmacist if you have any questions.  Skin patch:   · Put patch on clean, dry, healthy skin.  · Do not put on cuts, scrapes, eczema, or damaged skin.  · Do not bathe, shower, or swim after putting on.  · If the patch loosens, put tape ONLY on the edges of the patch to hold it in place.  · If the patch does not stick well, talk with your pharmacist about what to do. Certain things can be done to help hold it in place.  · If the patch falls off, put a new one on.  Skin solution:   · Some products come in a pump. Some products come in a packet or in a bottle with a dropper. If you are using a pump, you will need to prime it before you use it the first time. Prime the pump as you are told in the  package insert.  · Put on clean, dry, healthy skin. You may put right on the knee or on the hand and then onto the knee.  · Spread evenly on front, back, and side of knee.  · Let dry before covering with clothing.  · Do not bathe, shower, or swim for 30 minutes after applying.  · Do not use on open wounds or infected skin.  · You may use cosmetics, lotions, insect repellant, sunscreen, or other skin drugs after the skin has dried.  · Do not use heat or bandages on the treated part.  · Let the treated skin dry before touching it or letting it touch anyone else's skin.  What do I do if I miss a dose?   · Skip the missed dose and go back to your normal time unless your doctor tells you to do something else.  · Do not put on 2 doses at the same time or extra doses.    How do I store and/or throw out this drug?   All products:   · Store at room temperature. Do not freeze.  · Protect from heat.  · Keep all drugs in a safe place. Keep all drugs out of the reach of children and pets.  · Throw away unused or  drugs. Do not flush down a toilet or pour down a drain unless you are told to do so. Check with your pharmacist if you have questions about the best way to throw out drugs. There may be drug take-back programs in your area.  Skin patch:   · Store in the envelope that this drug comes in to help keep away from children. Do not open the envelope until you are ready to use this drug.  · After opening, be sure you know how long the product is good for and how to store it. Ask the doctor or pharmacist if you are not sure.  · After you take off a skin patch, be sure to fold the sticky sides of the patch to each other. Throw away used patches where children and pets cannot get to them.  · Throw away unused patches when they are no longer needed. Take them from the pouch, take off liner, and fold the sticky side of the patch together.  Skin solution:   · If you are using a pump, store upright with the cap on.  General  drug facts   · If your symptoms or health problems do not get better or if they become worse, call your doctor.  · Do not share your drugs with others and do not take anyone else's drugs.  · Some drugs may have another patient information leaflet. If you have any questions about this drug, please talk with your doctor, nurse, pharmacist, or other health care provider.  · This drug comes with an extra patient fact sheet called a Medication Guide. Read it with care. Read it again each time this drug is refilled. If you have any questions about this drug, please talk with the doctor, pharmacist, or other health care provider.  · If you think there has been an overdose, call your poison control center or get medical care right away. Be ready to tell or show what was taken, how much, and when it happened.    Consumer Information Use and Disclaimer   This generalized information is a limited summary of diagnosis, treatment, and/or medication information. It is not meant to be comprehensive and should be used as a tool to help the user understand and/or assess potential diagnostic and treatment options. It does NOT include all information about conditions, treatments, medications, side effects, or risks that may apply to a specific patient. It is not intended to be medical advice or a substitute for the medical advice, diagnosis, or treatment of a health care provider based on the health care provider's examination and assessment of a patient's specific and unique circumstances. Patients must speak with a health care provider for complete information about their health, medical questions, and treatment options, including any risks or benefits regarding use of medications. This information does not endorse any treatments or medications as safe, effective, or approved for treating a specific patient. [employer] and its affiliates disclaim any warranty or liability relating to this information or the use thereof. The use  of this information is governed by the Terms of Use, available at https://www.[employer].com.com/en/solutions/lexicomp/about/shaylee.  Last Reviewed Date   2020-12-01  Copyright   © 2021 [employer] and its affiliates and/or licensors. All rights reserved.

## 2022-02-15 ENCOUNTER — TELEPHONE (OUTPATIENT)
Dept: RHEUMATOLOGY | Facility: CLINIC | Age: 57
End: 2022-02-15
Payer: COMMERCIAL

## 2022-02-15 ENCOUNTER — CLINICAL SUPPORT (OUTPATIENT)
Dept: REHABILITATION | Facility: HOSPITAL | Age: 57
End: 2022-02-15
Attending: INTERNAL MEDICINE
Payer: COMMERCIAL

## 2022-02-15 ENCOUNTER — SPECIALTY PHARMACY (OUTPATIENT)
Dept: PHARMACY | Facility: CLINIC | Age: 57
End: 2022-02-15
Payer: COMMERCIAL

## 2022-02-15 DIAGNOSIS — M62.81 PROXIMAL MUSCLE WEAKNESS: ICD-10-CM

## 2022-02-15 DIAGNOSIS — Z74.09 DECREASED FUNCTIONAL MOBILITY AND ENDURANCE: ICD-10-CM

## 2022-02-15 DIAGNOSIS — M71.9 BURSOPATHY, UNSPECIFIED: ICD-10-CM

## 2022-02-15 PROCEDURE — 97162 PT EVAL MOD COMPLEX 30 MIN: CPT

## 2022-02-15 NOTE — TELEPHONE ENCOUNTER
Specialty Pharmacy - Refill Coordination    Specialty Medication Orders Linked to Encounter    Flowsheet Row Most Recent Value   Medication #1 upadacitinib (RINVOQ) 15 mg 24 hr tablet (Order#[id number], Rx#[id number])          Refill Questions - Documented Responses    Flowsheet Row Most Recent Value   Patient Availability and HIPAA Verification    Does patient want to proceed with activity? Yes   HIPAA/medical authority confirmed? Yes   Relationship to patient of person spoken to? Self   Refill Screening Questions    Changes to allergies? No   Changes to medications? No   New conditions since last clinic visit? No   Unplanned office visit, urgent care, ED, or hospital admission in the last 4 weeks? No   How does patient/caregiver feel medication is working? Good   Financial problems or insurance changes? No   How many doses of your specialty medications were missed in the last 4 weeks? 0   Would patient like to speak to a pharmacist? No   When does the patient need to receive the medication? 02/19/22   Refill Delivery Questions    How will the patient receive the medication? Delivery Nini   When does the patient need to receive the medication? 02/19/22   Shipping Address Home   Address in [facility] confirmed and updated if neccessary? Yes   Expected Copay ($) 5   Is the patient able to afford the medication copay? Yes   Payment Method CC on file   Days supply of Refill 30   Supplies needed? No supplies needed   Refill activity completed? Yes   Refill activity plan Refill scheduled   Shipment/Pickup Date: 02/17/22          Current Outpatient Medications   Medication Sig    diclofenac sodium (VOLTAREN) 1 % Gel Apply 2 g topically 4 (four) times daily. for 14 days    FLAXSEED OIL ORAL Take by mouth.    folic acid (FOLVITE) 1 MG tablet Take 1 tablet (1 mg total) by mouth once daily.    meloxicam (MOBIC) 7.5 MG tablet Take 1 tablet (7.5 mg total) by mouth once daily. Take with food for 14 days     methotrexate 2.5 MG Tab Take 10 tablets (25 mg total) by mouth every 7 days.    multivitamin capsule Take 1 capsule by mouth once daily.    multivitamin with minerals (HAIR,SKIN AND NAILS ORAL) Take by mouth once daily.    rosuvastatin (CRESTOR) 10 MG tablet Take 1 tablet (10 mg total) by mouth once daily.    TURMERIC ORAL Take by mouth.    upadacitinib (RINVOQ) 15 mg 24 hr tablet Take 1 tablet (15 mg total) by mouth once daily.    upadacitinib (RINVOQ) 15 mg 24 hr tablet Take 1 tablet (15 mg total) by mouth once daily.    valACYclovir (VALTREX) 1000 MG tablet Take 1 tablet (1,000 mg total) by mouth 2 (two) times daily. for 10 days   Last reviewed on 2/10/2022 12:13 PM by Sabine Souza LPN    Review of patient's allergies indicates:  No Known Allergies Last reviewed on  2/10/2022 12:12 PM by Sabine Souza      Tasks added this encounter   3/14/2022 - Refill Call (Auto Added)   Tasks due within next 3 months   No tasks due.     [facility]  [address] [place] [id number]  Phone: [phone]  Fax: [phone]

## 2022-02-15 NOTE — PLAN OF CARE
"[facility]   Physical Therapy Initial Evaluation   Date: 2/15/2022   Name: Ayesha Arcos  Clinic Number: [id number]    Therapy Diagnosis:    Encounter Diagnoses   Name Primary?    Bursopathy, unspecified     Decreased functional mobility and endurance     Proximal muscle weakness       Physician: Perez Thomas MD     Physician Orders: PT Eval and Treat  Medical Diagnosis from Referral: Bursopathy  Evaluation Date: 2/15/2022  Authorization Period Expiration: 2/10/2023  Plan of Care Expiration: 5/15/2022  Progress Note Due: 3/15/2022  Visit # / Visits authorized: 1/ 1   FOTO: 1/ 3     Precautions: Standard and Immunosuppression    Time In: 0715  Time Out: 0745  Total Billable Time (timed & untimed codes): 30 minutes    SUBJECTIVE   Date of onset: ~1 week ago    History of current condition - Ayesha reports insidious onset of R lateral hip pain last week. States she had multiple "judith horses" in quadriceps that morning and is not sure if this is related to her pain. Her MD gave an injection in the hip which has helped with symptoms.     Falls: last fall 3 years ago after tripping over crack in the concrete. No other incidents.     Exercise Regimen: enjoys walking and bike riding for exercise but is currently avoiding this due to pain     Imaging: x-ray hips performed on 2/10/2022/2022    Pain:  Current 1/10, worst 10/10, best 0/10   Location: R lateral and posterior hip   Description: stabbing, nagging toothache   Aggravating Factors: sleeping on her side, standing, walking, squatting  Easing Factors: laying in recliner, injection     Prior Therapy: N/A  Social History: Pt lives with their family  Occupation: Pt is  (sedentary job)   Prior Level of Function: Independent and pain free with all ADL, IADL, community mobility and functional activities.   Current Level of Function: Independent with all ADL, IADL, community mobility and functional " activities with reports of increased pain and need for increased time and frequent breaks.  Prior to injection states she required assistance to get out of the car and needed cane for ambulation. Significant improvement since injection.    Dominant Extremity: right    Pts goals: Pt reported goals are to decrease overall pain levels in order to return to prior functional level.       Medical History:   Past Medical History:   Diagnosis Date    Carpal tunnel syndrome, bilateral     Hypertension     Rheumatoid arthritis        Surgical History:   Ayesha Arcos  has a past surgical history that includes Carpal tunnel release; Tubal ligation; and skin cancer removal.    Medications:   Ayesha has a current medication list which includes the following prescription(s): diclofenac sodium, flaxseed oil, folic acid, meloxicam, methotrexate, multivitamin, multivitamin with minerals, rosuvastatin, turmeric, rinvoq, rinvoq, and valacyclovir.    Allergies:   Review of patient's allergies indicates:  No Known Allergies     OBJECTIVE     RANGE OF MOTION:    Lumbar Right  (spine) Left   Pain/Dysfunction with Movement Goal   Lumbar Flexion  100% ---     Lumbar Extension  100% ---     Lumbar Side Bending  100% 100%       Hip AROM/PROM Right Left Pain/Dysfunction with Movement Goal   Hip Flexion (120) 120 120     Hip Extension (30) 20 20     Hip Abduction (45) 45 45     Hip IR (45) 30 30     Hip ER (45) 45 45         STRENGTH:    L/E MMT Right  (spine) Left Pain/Dysfunction with Movement Goal   Hip Flexion  4-/5 4/5 R hip pain 4+/5 B   Hip Extension  4-/5 4/5 R hip pain 4+/5 B   Hip Abduction  4-/5 4/5 R hip pain 4+/5 B   Knee Extension 5/5 5/5  5/5 B   Knee Flexion 4-/5 4+/5  5/5 B   Hip IR 4-/5 4/5 R hip pain 4+/5 B   Hip ER 4-/5 4+/5 R hip pain 4+/5 B   Ankle DF 5/5 5/5  5/5 B   Ankle PF 5/5 5/5 10/10 calf raises with reports of R hip pain  5/5 B       MUSCLE LENGTH:     Muscle Tested  Right Left  Goal   Hip Flexors   decreased decreased Normal B   Quadriceps decreased decreased Normal B   Hamstrings  decreased decreased Normal B   Piriformis  decreased decreased Normal B   Gastrocnemius  decreased decreased Normal B   Soleus  decreased decreased Normal B       JOINT MOBILITY:     Joint Motion Tested Right  (spine) Left  Goal   Lumbar PA  Normal  Normal   Lumbar Unilat. PA (rotation) Normal Normal Normal B   Hip Distraction Normal Normal Normal B       Sensation:  Sensation is intact to light touch in B lower extremities     Palpation: Increased tone and tenderness noted with palpation of right glutes and ITB. Increased tenderness noted with palpation of right greater trochanter .     Posture:  Pt presents with postural abnormalities which include: forward head and rounded shoulders     Gait Analysis: The patient ambulated with the following assistive device: none; the pt presents with the following gait abnormalities: trendelenburg      Movement Analysis Observations noted   Squat  Mild B genu valgum, excessive anterior translations of B knees, B foot pronation       Balance  Right (spine) Left Pain/dysfunction noted Goal   Tandem Stance 60 60 Significant fatigue in B hips  60 seconds   Single Leg Stance 35 35 Significant fatigue in B hips  60 seconds       FUNCTION:     CMS Impairment/Limitation/Restriction for FOTO Hip Survey    Therapist reviewed FOTO scores for Ayesha on 2/15/2022.   FOTO documents entered into [employer] - see Media section.    Limitation Score: 20%         TREATMENT     Ayesha received the treatments listed below:       THERAPEUTIC EXERCISES to develop strength, endurance, ROM, flexibility, posture and core stabilization for (0) minutes including:    Intervention Performed Today                                              Plan for Next Visit: ITB stretch, glute stretch, piriformis stretch, posterior pelvic tilt, clamshells, reverse clamshells, bridges, table top        PATIENT EDUCATION AND HOME EXERCISES        Education provided: (5) minutes   Patient educated on the impairments noted above and the effects of physical therapy intervention to improve overall condition and QOL.    Patient educated on the importance of improved core and lower extremity strength in order to improve alignment of the spine and lower extremities with static positions and dynamic movement.    Patient educated on the importance of strong core and lower extremity musculature in order to improve both static and dynamic balance, improve gait mechanics, reduce fall risk and improve household and community mobility.       Written Home Exercises Provided: not provided today due to time, will provide next session     ASSESSMENT   Ayesha is a 56 y.o. female referred to outpatient Physical Therapy with a medical diagnosis of Bursopathy. Pt presents with impairments including: decreased strength, decreased muscle length, impaired balance, gait abnormalities and decreased overall function.    Pt prognosis is Excellent.   Pt will benefit from skilled outpatient Physical Therapy to address the deficits stated above and in the chart below, provide pt/family education, and to maximize pt's level of independence.     Plan of care discussed with patient: Yes  Pt's spiritual, cultural and educational needs considered and patient is agreeable to the plan of care and goals as stated below:     Anticipated Barriers for therapy: co-morbidities and adherence to treatment plan    Medical Necessity is demonstrated by the following  History  Co-morbidities and personal factors that may impact the plan of care Co-morbidities:   immunosuppression and rheumatoid arthritis    Personal Factors:   no deficits     moderate   Examination  Body Structures and Functions, activity limitations and participation restrictions that may impact the plan of care Body Regions:   back  lower extremities    Body Systems:    strength  balance  gait  motor control    Participation  "Restrictions:   See above in "Current Level of Function"     Activity limitations:   Learning and applying knowledge  no deficits    General Tasks and Commands  no deficits    Communication  no deficits    Mobility  walking  standing, squatting    Self care  toileting  looking after one's health    Domestic Life  shopping  cooking  doing house work (cleaning house, washing dishes, laundry)  assisting others    Interactions/Relationships  no deficits    Life Areas  no deficits    Community and Social Life  community life  recreation and leisure         moderate   Clinical Presentation evolving clinical presentation with changing clinical characteristics moderate   Decision Making/ Complexity Score: moderate       GOALS:    Short Term Goals:  6 weeks Progress   2/15/2022   1. Pain: Pt will demonstrate improved pain by reports of less than or equal to 5/10 worst pain on the verbal rating scale in order to progress toward maximal functional ability and improve QOL. PC   2. Function: Patient will demonstrate improved function as indicated by a functional limitation score of less than or equal to 15 out of 100 on FOTO. PC   3. Strength: Patient will improve strength to 50% of stated goals, listed in objective measures above, in order to progress towards independence with functional activities.  PC   4. HEP: Patient will demonstrate independence with HEP in order to progress toward functional independence. PC     Long Term Goals:  12 weeks Progress  2/15/2022   1. Pain: Pt will demonstrate improved pain by reports of less than or equal to 1/10 worst pain on the verbal rating scale in order to progress toward maximal functional ability and improve QOL.   PC   2. Function: Patient will demonstrate improved function as indicated by a functional limitation score of less than or equal to 10 out of 100 on FOTO. PC   3. Strength: Patient will improve strength to stated goals, listed in objective measures above, in order to " improve functional independence and quality of life. PC   4. Gait: Patient will demonstrate normalized gait mechanics with minimal compensation in order to return to PLOF. PC   5. Patient will return to regular walking and biking for exercise 3 days per week in order to return to prior level of function and maximize quality of life  PC    PC= progressing/continue; PM= partially met;        DC= discontinue    PLAN   Plan of care Certification: 2/15/2022 to 5/15/2022.    Outpatient Physical Therapy 2 times weekly for 12 weeks to include any combination of the following interventions: virtual visits, dry needling, modalities, electrical stimulation (IFC, Pre-Mod, Attended with Functional Dry Needling), Cervical/Lumbar Traction, Gait Training, Manual Therapy, Neuromuscular Re-ed, Patient Education, Self Care, Therapeutic Activites, and Therapeutic Exercise     Liana Paul, PT, DPT    I CERTIFY THE NEED FOR THESE SERVICES FURNISHED UNDER THIS PLAN OF TREATMENT AND WHILE UNDER MY CARE   Physician's comments:     Physician's Signature: ___________________________________________________

## 2022-02-15 NOTE — TELEPHONE ENCOUNTER
----- Message from Dinorah Reese sent at 2/15/2022  4:47 PM CST -----  Contact: self  Ayesha Arcos would like a call back at [phone], in regards to therapy.

## 2022-02-16 ENCOUNTER — TELEPHONE (OUTPATIENT)
Dept: RHEUMATOLOGY | Facility: CLINIC | Age: 57
End: 2022-02-16
Payer: COMMERCIAL

## 2022-02-16 NOTE — TELEPHONE ENCOUNTER
----- Message from Gamaliel Tavares sent at 2/16/2022  4:33 PM CST -----  Contact: self [phone]  Pt requesting a call back in regards to therapy.    Please call and advise

## 2022-02-18 ENCOUNTER — TELEPHONE (OUTPATIENT)
Dept: RHEUMATOLOGY | Facility: CLINIC | Age: 57
End: 2022-02-18
Payer: COMMERCIAL

## 2022-02-18 PROBLEM — Z74.09 DECREASED FUNCTIONAL MOBILITY AND ENDURANCE: Status: ACTIVE | Noted: 2022-02-18

## 2022-02-18 PROBLEM — M62.81 PROXIMAL MUSCLE WEAKNESS: Status: ACTIVE | Noted: 2022-02-18

## 2022-02-18 NOTE — TELEPHONE ENCOUNTER
----- Message from Rose Norton sent at 2/18/2022  7:27 AM CST -----  Contact: self/[phone]  Patient is calling regarding her therapy, please call back at [phone]. Thanks/ar

## 2022-02-18 NOTE — TELEPHONE ENCOUNTER
Spoke to patient ans she is requesting that her OT is transferred to [facility] from her current OT provider.    Please advise

## 2022-02-21 ENCOUNTER — CLINICAL SUPPORT (OUTPATIENT)
Dept: REHABILITATION | Facility: HOSPITAL | Age: 57
End: 2022-02-21
Attending: INTERNAL MEDICINE
Payer: COMMERCIAL

## 2022-02-21 DIAGNOSIS — Z74.09 DECREASED FUNCTIONAL MOBILITY AND ENDURANCE: Primary | ICD-10-CM

## 2022-02-21 DIAGNOSIS — M62.81 PROXIMAL MUSCLE WEAKNESS: ICD-10-CM

## 2022-02-21 PROCEDURE — 97110 THERAPEUTIC EXERCISES: CPT | Mod: CQ

## 2022-02-21 PROCEDURE — 97140 MANUAL THERAPY 1/> REGIONS: CPT | Mod: CQ

## 2022-02-21 NOTE — PROGRESS NOTES
[facility]   Physical Therapy Treatment Note     Name: Ayesha Arcos  Clinic Number: [id number]    Therapy Diagnosis:   Encounter Diagnoses   Name Primary?    Decreased functional mobility and endurance Yes    Proximal muscle weakness      Physician: Perez Thomas MD    Visit Date: 2/21/2022    Physician Orders: PT Eval and Treat  Medical Diagnosis from Referral: Bursopathy  Evaluation Date: 2/15/2022  Authorization Period Expiration: 12/31/2022  Plan of Care Expiration: 5/15/2022  Progress Note Due: 3/15/2022  Visit # / Visits authorized: 1/20 (+1 eval)  FOTO: 1/ 3      Precautions: Standard and Immunosuppression    PTA Visit #: 1/5     Time In: 1400  Time Out: 1445  Total Billable Time: 43 minutes     SUBJECTIVE     Patient reports: she is feeling pretty good and the injection really helped. Her pain is not bad at all today, but she had some increased pain this morning. She stretched, and her pain improved.    He/She N/A compliant with home exercise program.  Response to previous treatment: good  Functional change: able to do more home activities    Pain: 3/10     Location: right lower extremity    OBJECTIVE     Objective Measures updated at progress report unless specified.       TREATMENT     Ayesha received the treatments listed below:     MANUAL THERAPY TECHNIQUES were applied for (13) minutes, including:    Manual Intervention Performed Today    Soft Tissue Mobilization x Right glute, hip, IT band with use of the stick   Joint Mobilizations     Mobilization with movement     Long axis distraction x Right    Functional Dry Needling        Plan for Next Visit:          THERAPEUTIC EXERCISES to develop strength, endurance, ROM, flexibility, posture and core stabilization for (30) minutes including:    Intervention Performed Today    Recumbent bike for endurance x Level 1 5 minutes   Piriformis stretch x 1 minute each bilateral   Single knee to chest x 1 minute each  bilateral   Ball squeezes x 3 minutes 3s holds   Sit to stands x 3x10 chair   Clamshells x 2x10 each bilateral   sidelying hip abduction x 2x10 each bilateral   Prone quad stretch x 2x1 minute right   Bridges x 3x10     Plan for Next Visit:                PATIENT EDUCATION AND HOME EXERCISES     Home Exercises Provided and Patient Education Provided     Education provided: () minutes   Patient educated on biomechanical justification for therapeutic exercise and importance of compliance with HEP in order to improve overall impairments and QOL    Patient was educated on all the above exercise prior/during/after for proper posture, positioning, and execution for safe performance with home exercise program.    Patient educated on postural awareness and the use of a lumbar roll when in a seated position to reduce stress and maintain optimal alignment of the spine.    Patient educated on proper ergonomics at the work station in order to maintain optimal alignment of the musculoskeletal system and improve efficiency in the work environment.   Patient educated on the importance of improved core and upper and lower extremity strength in order to improve alignment of the spine and upper and lower extremities with static positions and dynamic movement.    Patient educated on the importance of strong core and lower extremity musculature in order to improve both static and dynamic balance, improve gait mechanics, reduce fall risk and improve household and community mobility.       Written Home Exercises Provided: no.  Exercises were reviewed and Ayesha was able to demonstrate them prior to the end of the session.  Ayesha demonstrated good  understanding of the education provided. See EMR under Patient Instructions for exercises provided during therapy sessions.      ASSESSMENT     Patient did well with session today with minimal complaints of discomfort. Patient with decreased endurance on recumbent bike today, able to  finish without rest breaks though. Patient with good performance of hip strengthening activities, minimal verbal cues necessary for proper performance. Patient with good response to manual therapy, leaving session with reports of decreased pain.     Ayesha is progressing well towards her goals.   Pt prognosis is Excellent.     Pt will continue to benefit from skilled outpatient physical therapy to address the deficits listed in the problem list box on initial evaluation, provide pt/family education and to maximize pt's level of independence in the home and community environment.     Pt's spiritual, cultural and educational needs considered and pt agreeable to plan of care and goals.     Anticipated Barriers for therapy: co-morbidities and adherence to treatment plan    GOALS:     Short Term Goals:  6 weeks Progress   2/15/2022   1. Pain: Pt will demonstrate improved pain by reports of less than or equal to 5/10 worst pain on the verbal rating scale in order to progress toward maximal functional ability and improve QOL. PC   2. Function: Patient will demonstrate improved function as indicated by a functional limitation score of less than or equal to 15 out of 100 on FOTO. PC   3. Strength: Patient will improve strength to 50% of stated goals, listed in objective measures above, in order to progress towards independence with functional activities.  PC   4. HEP: Patient will demonstrate independence with HEP in order to progress toward functional independence. PC      Long Term Goals:  12 weeks Progress  2/15/2022   1. Pain: Pt will demonstrate improved pain by reports of less than or equal to 1/10 worst pain on the verbal rating scale in order to progress toward maximal functional ability and improve QOL.   PC   2. Function: Patient will demonstrate improved function as indicated by a functional limitation score of less than or equal to 10 out of 100 on FOTO. PC   3. Strength: Patient will improve strength to stated  goals, listed in objective measures above, in order to improve functional independence and quality of life. PC   4. Gait: Patient will demonstrate normalized gait mechanics with minimal compensation in order to return to PLOF. PC   5. Patient will return to regular walking and biking for exercise 3 days per week in order to return to prior level of function and maximize quality of life  PC    PC= progressing/continue;    PM= partially met;             DC= discontinue      PLAN   Plan of care Certification: 2/15/2022 to 5/15/2022.    Continue Plan of Care (POC) and progress per patient tolerance. See treatment section for details on planned progressions next session.      Maye Rose, PTA

## 2022-02-22 DIAGNOSIS — M71.9 BURSOPATHY, UNSPECIFIED: Primary | ICD-10-CM

## 2022-02-22 DIAGNOSIS — M19.90 OSTEOARTHRITIS, UNSPECIFIED OSTEOARTHRITIS TYPE, UNSPECIFIED SITE: ICD-10-CM

## 2022-02-23 ENCOUNTER — CLINICAL SUPPORT (OUTPATIENT)
Dept: REHABILITATION | Facility: HOSPITAL | Age: 57
End: 2022-02-23
Attending: INTERNAL MEDICINE
Payer: COMMERCIAL

## 2022-02-23 DIAGNOSIS — R29.898 DECREASED GRIP STRENGTH: ICD-10-CM

## 2022-02-23 DIAGNOSIS — M71.9 BURSOPATHY, UNSPECIFIED: ICD-10-CM

## 2022-02-23 DIAGNOSIS — M19.90 OSTEOARTHRITIS, UNSPECIFIED OSTEOARTHRITIS TYPE, UNSPECIFIED SITE: ICD-10-CM

## 2022-02-23 DIAGNOSIS — M25.60 DECREASED RANGE OF MOTION: ICD-10-CM

## 2022-02-23 DIAGNOSIS — Z78.9 DECREASED ACTIVITIES OF DAILY LIVING (ADL): ICD-10-CM

## 2022-02-23 PROCEDURE — 97110 THERAPEUTIC EXERCISES: CPT | Performed by: OCCUPATIONAL THERAPIST

## 2022-02-23 PROCEDURE — 97165 OT EVAL LOW COMPLEX 30 MIN: CPT | Performed by: OCCUPATIONAL THERAPIST

## 2022-02-23 NOTE — PROGRESS NOTES
[facility]  Occupational Therapy Initial Evaluation    Date: 2/23/2022  Name: Ayesha Arcos  Clinic Number: [id number]    Therapy Diagnosis:   Encounter Diagnoses   Name Primary?    Bursopathy, unspecified     Osteoarthritis, unspecified osteoarthritis type, unspecified site      Physician: Perez Thomas MD    Physician Orders: Evaluation and treatment   Medical Diagnosis:   M71.9 (ICD-10-CM) - Bursopathy, unspecified   M19.90 (ICD-10-CM) - Osteoarthritis, unspecified osteoarthritis type, unspecified site       Surgical Procedure and Date: NA,   Evaluation Date: 2/23/2022  Insurance Authorization Period Expiration: 2/22/2023  Plan of Care Certification Period: 2/23/2022 to 4/23/2022  Progress Note Due: 2/23/2022   Date of Return to MD: 7/8/2022  Visit # / Visits authorized: 1 / 1  FOTO: 1/3    Precautions:  Standard    Time In:12:45 pm  Time Out: 1:30 pm  Total Appointment Time (timed & untimed codes): 45 minutes    SUBJECTIVE     Date of Onset: 3 years    History of Current Condition/Mechanism of Injury: Ayesha reports: she was going to therapy for outpatient Occupational therapy at [facility] and since she was having physical therapy here wanted to change so both therapies could be in the same place. She hasn't been to therapy in 3 weeks. She was receiving hot packs on her right wrist and left shoulder and TENS . Patient is a right handed 56 year old female with diagnosis of osteoarthritis. She is complaining of dropping objects with her right hand and weakness with difficulty opening a jar. She had a carpal tunnel release on her right hand. She further reports swelling of her index,middle and ring fingers.      PMH: Carpal tunnel surgery:3 years ago right hand    Falls: 1 fall with left shoulder pain 2 years ago    Involved Side: bilateral   Dominant Side: Right  Imaging: X-ray FINDINGS:  Right hand: Bones remain demineralized.  Multi articular degenerative  changes are seen with findings having progressed since the comparison study.  Degenerative findings involve the 1st carpometacarpal joint and 3rd PIP with osteophyte formation .  Interval development of obliteration of the right 5th PIP joint with progressive erosive changes and pseudoarticulation with ulnar-sided dislocation and surrounding soft tissue swelling as can be seen with rheumatoid arthritis.  Interval worsening erosive changes at the 4th D IP joint.  New osseous periarticular erosion along the medial proximal margin proximal phalanx 2nd ray.     Left hand: Interval progression of degenerative findings at the left 2nd the IP joint with erosive change.  There is mild surrounding soft tissue swelling no fracture or dislocation.  Bones appear slightly demineralized.     Prior Therapy: 1 1/5 years, stopped therapy 3 weeks ago. She is getting PT here for hip bursitis and lower back pain.    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     Current Functional Status   Home/Living environment: lives with their family      Limitation of Functional Status as follows:   ADLs/IADLs:     - Feeding: None    - Bathing: None    - Dressing/Grooming: mild difficulty with zippers    - Driving: None     Leisure: long distance driving    Pain:  Functional Pain Scale Rating 0-10: Current 2/10, worst 3/10, best 0/10   Location: left shoulder and right elbow  Description: tenderness  Aggravating Factors: Extension  Easing Factors: rest    Patient's Goals for Therapy: increase shoulder extension and decrease hand pain and swelling.    Medical History:   Past Medical History:   Diagnosis Date    Carpal tunnel syndrome, bilateral     Hypertension     Rheumatoid arthritis        Surgical History:     has a past surgical history that includes Carpal tunnel release; Tubal ligation; and skin cancer removal.    Medications:   has a current medication list which includes the following prescription(s): diclofenac sodium, flaxseed oil, folic acid, meloxicam, methotrexate, multivitamin, multivitamin with minerals, rosuvastatin, turmeric, rinvoq, rinvoq, and valacyclovir.    Allergies:   Review of patient's allergies indicates:  No Known Allergies       OBJECTIVE     Observation: Skin intact, Skin shiny/tight, Joint stiffness and Deformities noted: right 5th digit proximal interphalangeal joint swelling and distal interphalangeal joint lateral deviation and left index finger distal interphalangeal joint lateral deviation.    Sensation:  Intact for light touch    Palpation: tender over the lateral epicondyle on the right      Special Tests: none performed secondary to time constraints      Edema: Circumferential measurements:mild swelling right digits to include index/middle and ring finger.  Range of Motion: bilateral  Active  Hands: within normal limits   Thumb Opposition: within normal limits   Palmar Abduction: within normal limits   Radial Abduction: within normal limits     Wrist Ext/Flex: within normal limits /within normal limits   Wrist RD/UD: within normal limits /within normal limits   Supination/Pronation: within normal limits /within normal limits   Elbow extension/flexion: within normal limits /within normal limits   Shoulder flexion: 170  Shoulder abduction: 64  Internal rotation 90    Manual Muscle Test:   Muscle   Strength  Wrist flexion: 5/5   Wrist extension 4/5  Wrist ulnar/radial deviation: 5/5       Strength: (WARD Dynamometer in lbs.) Average 3 trials, Position II  Right: 30#  Left: 35#    Pinch Strength: (Pinch Gauge in psi's), Average 3 trials  No pain with   Fine Sadiq Coordination Tests: functional       Limitation/Restriction for FOTO hand Survey    Therapist reviewed FOTO scores for  Ayesha Arcos on 2/23/2022.   FOTO documents entered into [employer] - see Media section.    Limitation Score: 37%         Treatment   Total Treatment time (time-based codes) separate from Evaluation: 8 minutes    Ayesha received the treatments listed below:     Therapeutic exercises to develop ROM and flexibility for 8 minutes, including:  Supine dowel flexion: 20x  Supine shoulder flexion stretches with dowel: 3/30 second holds  Supine external rotation stretches with dowel: 3/30 second holds  Wall shoulder flexion stretches: 3/30 second holds  Wall shoulder external rotation: 1/30 second hold      Patient Education and Home Exercises       Education provided:   - Importance of stretching to maintain flexibility and decrease pain  -postural control    Written Home Exercises Provided: yes.  Exercises were reviewed and Ayesha was able to demonstrate them prior to the end of the session.  Ayesha demonstrated good  understanding of the education provided. See EMR under Patient Instructions for exercises provided during therapy sessions.     Pt was advised to perform these exercises free of pain, and to stop performing them if pain occurs.    Patient/Family Education: role of OT, goals for OT, scheduling/cancellations - pt verbalized understanding. Discussed insurance limitations with patient.      ASSESSMENT     Ayesha Arcos is a 56 y.o. female referred to outpatient occupational therapy and presents with a medical diagnosis of osteoarthritis of her upper extremities.  Patient presents with the following therapy deficits: Decreased ROM, Decreased  strength, Decreased pinch strength, Decreased muscle strength, Decreased functional hand use, Increased pain, Edema and Joint Stiffness and demonstrates limitations as described in the chart below. Following medical record review it is determined that pt will benefit from occupational therapy services in order to maximize pain free and/or functional use of  bilateral hands,elbow and left shoulder. The following goals were discussed with the patient and patient is in agreement with them as to be addressed in the treatment plan. The patient's rehab potential is Good.     Anticipated barriers to occupational therapy: chronicity of pain  Pt has no cultural, educational or language barriers to learning provided.    Profile and History Assessment of Occupational Performance Level of Clinical Decision Making Complexity Score   Occupational Profile:   Ayesha Arcos is a 56 y.o. female who lives with their family and is currently employed Ayesha Arcos has difficulty with  ADLs and IADLs as listed previously, which  [facility].      Comorbidities:    has a past medical history of Carpal tunnel syndrome, bilateral, Hypertension, and Rheumatoid arthritis.    Medical and Therapy History Review:   Brief               Performance Deficits    Physical:  Muscle Power/Strength  Edema   Strength  Pinch Strength  Postural Control  Pain    Cognitive:  No Deficits    Psychosocial:    No Deficits     Clinical Decision Making:  low    Assessment Process:  Problem-Focused Assessments    Modification/Need for Assistance:  Not Necessary    Intervention Selection:  Limited Treatment Options       low  Based on PMHX, co morbidities , data from assessments and functional level of assistance required with task and clinical presentation directly impacting function.       The following goals were discussed with the patient and patient is in agreement with them as to be addressed in the treatment plan.     GOALS:     Short Term Goals:  4 weeks     1. Pain: Pt will demonstrate improved pain by reports of less than or equal to 5/10 worst pain on the verbal rating scale in order to progress toward maximal functional ability and improve QOL.     2. Mobility: Patient will improve AROM to 50% of stated goals, listed in objective measures above, in order to  progress towards independence with functional activities.      3. Strength: Patient will improve strength to 50% of stated goals, listed in objective measures above, in order to progress towards independence with functional activities.      4. Self Care: Patient will demonstrate improved self care skills listed in objective measure above,in order to progress towards independence with functional activities.      5. HEP: Patient will demonstrate independence with HEP in order to progress toward functional independence.        Long Term Goals:  8 weeks     1. Pain: Pt will demonstrate improved pain by reports of less than or equal to 1/10 worst pain on the verbal rating scale in order to progress toward maximal functional ability and improve QOL.       2. Function: Patient will demonstrate improved function as indicated by a functional limitation score of less than or equal to 33 out of 100 on FOTO.     3. Mobility: Patient will improve AROM to stated goals, listed in objective measures above, in order to return to maximal functional potential and improve quality of life.     4. Strength: Patient will improve strength to stated goals, listed in objective measures above, in order to improve functional independence and quality of life.     5. Self Care: Patient will demonstrate increased self care skills to an independent level or modified independent level with adaptive equipment as needed.     6. Patient will return to normal ADL's, IADL's, community involvement, recreational activities, and work-related activities with less than or equal to 0/10 pain and maximal function.              PLAN   Plan of Care Certification: 2/23/2022 to 4/23/2022.     Outpatient Occupational Therapy 2 times weekly for 8 weeks to include the following interventions: Paraffin, Manual therapy/joint mobilizations, Therapeutic exercises/activities., Strengthening and Edema Control.      Katie Lentz, VANDANAR      I CERTIFY THE NEED FOR THESE  SERVICES FURNISHED UNDER THIS PLAN OF TREATMENT AND WHILE UNDER MY CARE

## 2022-02-28 ENCOUNTER — CLINICAL SUPPORT (OUTPATIENT)
Dept: REHABILITATION | Facility: HOSPITAL | Age: 57
End: 2022-02-28
Attending: INTERNAL MEDICINE
Payer: COMMERCIAL

## 2022-02-28 DIAGNOSIS — Z74.09 DECREASED FUNCTIONAL MOBILITY AND ENDURANCE: Primary | ICD-10-CM

## 2022-02-28 DIAGNOSIS — M62.81 PROXIMAL MUSCLE WEAKNESS: ICD-10-CM

## 2022-02-28 PROCEDURE — 97140 MANUAL THERAPY 1/> REGIONS: CPT | Mod: CQ

## 2022-02-28 PROCEDURE — 97110 THERAPEUTIC EXERCISES: CPT | Mod: CQ

## 2022-02-28 NOTE — PROGRESS NOTES
[facility]   Physical Therapy Treatment Note     Name: Ayesha Arcos  Clinic Number: [id number]    Therapy Diagnosis:   Encounter Diagnoses   Name Primary?    Decreased functional mobility and endurance Yes    Proximal muscle weakness      Physician: Perez Thomas MD    Visit Date: 2/28/2022    Physician Orders: PT Eval and Treat  Medical Diagnosis from Referral: Bursopathy  Evaluation Date: 2/15/2022  Authorization Period Expiration: 12/31/2022  Plan of Care Expiration: 5/15/2022  Progress Note Due: 3/15/2022  Visit # / Visits authorized: 2/20 (+1 eval)  FOTO: 1/ 3      Precautions: Standard and Immunosuppression    PTA Visit #: 2/5     Time In: 1445  Time Out: 1527  Total Billable Time: 40 minutes     SUBJECTIVE     Patient reports: she went to a [place] ball this weekend. She did not want to do too much so she did not dance. She felt good after last session, and she is feeling good today.     He/She N/A compliant with home exercise program.  Response to previous treatment: good  Functional change: able to do more home activities    Pain: 1/10     Location: right lower extremity    OBJECTIVE     Objective Measures updated at progress report unless specified.       TREATMENT     Ayesha received the treatments listed below:     MANUAL THERAPY TECHNIQUES were applied for (10) minutes, including:    Manual Intervention Performed Today    Soft Tissue Mobilization  Right glute, hip, IT band with use of the stick   Joint Mobilizations     Mobilization with movement     Long axis distraction x Right    Functional Dry Needling        Plan for Next Visit:          THERAPEUTIC EXERCISES to develop strength, endurance, ROM, flexibility, posture and core stabilization for (30) minutes including:    Intervention Performed Today    Recumbent bike for endurance x Level 1 7 minutes   Shuttle x Level 3 3 minutes  Level 2 2 minutes right only   Piriformis stretch x 1 minute each bilateral    Single knee to chest x 1 minute each bilateral   Ball squeezes x 3 minutes 3s holds   Sit to stands  3x10 chair   Clamshells x 3x10 each bilateral red band   sidelying hip abduction x 3x10 each bilateral   Prone quad stretch x x1 minute right   Bridges x 3x10     Plan for Next Visit:                PATIENT EDUCATION AND HOME EXERCISES     Home Exercises Provided and Patient Education Provided     Education provided: () minutes   Patient educated on biomechanical justification for therapeutic exercise and importance of compliance with HEP in order to improve overall impairments and QOL    Patient was educated on all the above exercise prior/during/after for proper posture, positioning, and execution for safe performance with home exercise program.    Patient educated on postural awareness and the use of a lumbar roll when in a seated position to reduce stress and maintain optimal alignment of the spine.    Patient educated on proper ergonomics at the work station in order to maintain optimal alignment of the musculoskeletal system and improve efficiency in the work environment.   Patient educated on the importance of improved core and upper and lower extremity strength in order to improve alignment of the spine and upper and lower extremities with static positions and dynamic movement.    Patient educated on the importance of strong core and lower extremity musculature in order to improve both static and dynamic balance, improve gait mechanics, reduce fall risk and improve household and community mobility.       Written Home Exercises Provided: no.  Exercises were reviewed and Ayesha was able to demonstrate them prior to the end of the session.  Ayesha demonstrated good  understanding of the education provided. See EMR under Patient Instructions for exercises provided during therapy sessions.      ASSESSMENT     Patient did well with session today with no complaints of discomfort. Increased recumbent bike  time and added shuttle activities, patient with good fatigue noted. Added red band to clamshells, patient very challenged but no pain accompanied. Patient with good response to long axis distraction. Patient left session with reports of feeling relaxed.   Ayesha is progressing well towards her goals.   Pt prognosis is Excellent.     Pt will continue to benefit from skilled outpatient physical therapy to address the deficits listed in the problem list box on initial evaluation, provide pt/family education and to maximize pt's level of independence in the home and community environment.     Pt's spiritual, cultural and educational needs considered and pt agreeable to plan of care and goals.     Anticipated Barriers for therapy: co-morbidities and adherence to treatment plan    GOALS:     Short Term Goals:  6 weeks Progress   2/15/2022   1. Pain: Pt will demonstrate improved pain by reports of less than or equal to 5/10 worst pain on the verbal rating scale in order to progress toward maximal functional ability and improve QOL. PC   2. Function: Patient will demonstrate improved function as indicated by a functional limitation score of less than or equal to 15 out of 100 on FOTO. PC   3. Strength: Patient will improve strength to 50% of stated goals, listed in objective measures above, in order to progress towards independence with functional activities.  PC   4. HEP: Patient will demonstrate independence with HEP in order to progress toward functional independence. PC      Long Term Goals:  12 weeks Progress  2/15/2022   1. Pain: Pt will demonstrate improved pain by reports of less than or equal to 1/10 worst pain on the verbal rating scale in order to progress toward maximal functional ability and improve QOL.   PC   2. Function: Patient will demonstrate improved function as indicated by a functional limitation score of less than or equal to 10 out of 100 on FOTO. PC   3. Strength: Patient will improve strength to  stated goals, listed in objective measures above, in order to improve functional independence and quality of life. PC   4. Gait: Patient will demonstrate normalized gait mechanics with minimal compensation in order to return to PLOF. PC   5. Patient will return to regular walking and biking for exercise 3 days per week in order to return to prior level of function and maximize quality of life  PC    PC= progressing/continue;    PM= partially met;             DC= discontinue      PLAN   Plan of care Certification: 2/15/2022 to 5/15/2022.    Continue Plan of Care (POC) and progress per patient tolerance. See treatment section for details on planned progressions next session.      Maye Rose, PTA

## 2022-03-03 ENCOUNTER — TELEPHONE (OUTPATIENT)
Dept: REHABILITATION | Facility: HOSPITAL | Age: 57
End: 2022-03-03
Payer: COMMERCIAL

## 2022-03-03 ENCOUNTER — CLINICAL SUPPORT (OUTPATIENT)
Dept: REHABILITATION | Facility: HOSPITAL | Age: 57
End: 2022-03-03
Payer: COMMERCIAL

## 2022-03-03 DIAGNOSIS — Z74.09 DECREASED FUNCTIONAL MOBILITY AND ENDURANCE: Primary | ICD-10-CM

## 2022-03-03 DIAGNOSIS — M62.81 PROXIMAL MUSCLE WEAKNESS: ICD-10-CM

## 2022-03-03 PROCEDURE — 97110 THERAPEUTIC EXERCISES: CPT

## 2022-03-03 PROCEDURE — 97140 MANUAL THERAPY 1/> REGIONS: CPT

## 2022-03-03 NOTE — TELEPHONE ENCOUNTER
Name: Ayesha Arcos  Clinic Number: [id number]      Call Date: 3/3/2022    Reason for call: missed appointment    Discussion: Ayesha Arcos was reached via phone number and stated that she completely forgot. She would like to reschedule her appointment today if possible. Offered opening today at 1:45 on Liana's schedule, patient took it.     Plan: 1:45 today with Liana rescheduled

## 2022-03-04 ENCOUNTER — OFFICE VISIT (OUTPATIENT)
Dept: INTERNAL MEDICINE | Facility: CLINIC | Age: 57
End: 2022-03-04
Payer: COMMERCIAL

## 2022-03-04 VITALS
TEMPERATURE: 97 F | OXYGEN SATURATION: 100 % | HEART RATE: 80 BPM | SYSTOLIC BLOOD PRESSURE: 120 MMHG | DIASTOLIC BLOOD PRESSURE: 80 MMHG | BODY MASS INDEX: 25.68 KG/M2 | WEIGHT: 154.31 LBS

## 2022-03-04 DIAGNOSIS — Z00.00 ANNUAL PHYSICAL EXAM: Primary | ICD-10-CM

## 2022-03-04 DIAGNOSIS — D84.9 IMMUNOSUPPRESSED STATUS: ICD-10-CM

## 2022-03-04 PROCEDURE — 1159F MED LIST DOCD IN RCRD: CPT | Mod: CPTII,S$GLB,, | Performed by: FAMILY MEDICINE

## 2022-03-04 PROCEDURE — 90471 IMMUNIZATION ADMIN: CPT | Mod: S$GLB,,, | Performed by: FAMILY MEDICINE

## 2022-03-04 PROCEDURE — 3079F PR MOST RECENT DIASTOLIC BLOOD PRESSURE 80-89 MM HG: ICD-10-PCS | Mod: CPTII,S$GLB,, | Performed by: FAMILY MEDICINE

## 2022-03-04 PROCEDURE — 90715 TDAP VACCINE GREATER THAN OR EQUAL TO 7YO IM: ICD-10-PCS | Mod: S$GLB,,, | Performed by: FAMILY MEDICINE

## 2022-03-04 PROCEDURE — 3008F PR BODY MASS INDEX (BMI) DOCUMENTED: ICD-10-PCS | Mod: CPTII,S$GLB,, | Performed by: FAMILY MEDICINE

## 2022-03-04 PROCEDURE — 3079F DIAST BP 80-89 MM HG: CPT | Mod: CPTII,S$GLB,, | Performed by: FAMILY MEDICINE

## 2022-03-04 PROCEDURE — 90715 TDAP VACCINE 7 YRS/> IM: CPT | Mod: S$GLB,,, | Performed by: FAMILY MEDICINE

## 2022-03-04 PROCEDURE — 99396 PREV VISIT EST AGE 40-64: CPT | Mod: 25,S$GLB,, | Performed by: FAMILY MEDICINE

## 2022-03-04 PROCEDURE — 90471 TDAP VACCINE GREATER THAN OR EQUAL TO 7YO IM: ICD-10-PCS | Mod: S$GLB,,, | Performed by: FAMILY MEDICINE

## 2022-03-04 PROCEDURE — 3074F PR MOST RECENT SYSTOLIC BLOOD PRESSURE < 130 MM HG: ICD-10-PCS | Mod: CPTII,S$GLB,, | Performed by: FAMILY MEDICINE

## 2022-03-04 PROCEDURE — 1159F PR MEDICATION LIST DOCUMENTED IN MEDICAL RECORD: ICD-10-PCS | Mod: CPTII,S$GLB,, | Performed by: FAMILY MEDICINE

## 2022-03-04 PROCEDURE — 99999 PR PBB SHADOW E&M-EST. PATIENT-LVL IV: CPT | Mod: PBBFAC,,, | Performed by: FAMILY MEDICINE

## 2022-03-04 PROCEDURE — 3008F BODY MASS INDEX DOCD: CPT | Mod: CPTII,S$GLB,, | Performed by: FAMILY MEDICINE

## 2022-03-04 PROCEDURE — 99396 PR PREVENTIVE VISIT,EST,40-64: ICD-10-PCS | Mod: 25,S$GLB,, | Performed by: FAMILY MEDICINE

## 2022-03-04 PROCEDURE — 3074F SYST BP LT 130 MM HG: CPT | Mod: CPTII,S$GLB,, | Performed by: FAMILY MEDICINE

## 2022-03-04 PROCEDURE — 99999 PR PBB SHADOW E&M-EST. PATIENT-LVL IV: ICD-10-PCS | Mod: PBBFAC,,, | Performed by: FAMILY MEDICINE

## 2022-03-04 NOTE — PROGRESS NOTES
Subjective:      Patient ID: Ayesha Arcos is a 56 y.o. female.    Chief Complaint: Annual Exam    HPI 56 y.o.   female patient with a PMHx of hypertension, rheumatoid arthritis, and bilateral carpal tunnel syndrome presents to clinic for annual exam. The patient was last seen on July 2, 2021      Today she reports that she is doing well overall.     Patient has no major systemic complaints or concerns. She denies chest pain, SOB, and bowel changes.    Past Medical History:   Diagnosis Date    Carpal tunnel syndrome, bilateral     Hypertension     Rheumatoid arthritis      Family History   Problem Relation Age of Onset    Diabetes Mother     Heart disease Father     Hypertension Father     Early death Father         Early 50's    Stroke Paternal Uncle     Heart disease Paternal Uncle      Past Surgical History:   Procedure Laterality Date    CARPAL TUNNEL RELEASE      right    skin cancer removal      nose    TUBAL LIGATION       Social History     Tobacco Use    Smoking status: Never Smoker    Smokeless tobacco: Never Used   Substance Use Topics    Alcohol use: Yes     Comment: social     Drug use: No       /80   Pulse 80   Temp 97.4 °F (36.3 °C)   Wt 70 kg (154 lb 5.2 oz)   LMP 07/14/2021 (Approximate)   SpO2 100%   BMI 25.68 kg/m²     Review of Systems   Constitutional: Negative for activity change, appetite change, chills, diaphoresis, fatigue, fever and unexpected weight change.   HENT: Negative for hearing loss, rhinorrhea, tinnitus and trouble swallowing.    Eyes: Negative for discharge and visual disturbance.   Respiratory: Negative for cough, chest tightness, shortness of breath and wheezing.    Cardiovascular: Negative for chest pain, palpitations and leg swelling.   Gastrointestinal: Negative for abdominal distention, abdominal pain, blood in stool, constipation, diarrhea and vomiting.   Endocrine: Negative for polydipsia and polyuria.   Genitourinary: Negative for  difficulty urinating, dysuria, frequency, hematuria, menstrual problem and urgency.   Musculoskeletal: Negative for arthralgias, back pain, joint swelling and neck pain.   Skin: Negative for color change and rash.   Neurological: Negative for weakness and headaches.   Hematological: Negative for adenopathy.   Psychiatric/Behavioral: Negative for confusion, decreased concentration and dysphoric mood.       Objective:     Physical Exam  Vitals and nursing note reviewed.   Constitutional:       General: She is not in acute distress.  HENT:      Right Ear: External ear normal.      Left Ear: External ear normal.      Nose: Nose normal.   Eyes:      Conjunctiva/sclera: Conjunctivae normal.      Pupils: Pupils are equal, round, and reactive to light.   Cardiovascular:      Rate and Rhythm: Normal rate and regular rhythm.      Heart sounds: Normal heart sounds.   Pulmonary:      Effort: Pulmonary effort is normal. No respiratory distress.      Breath sounds: Normal breath sounds. No wheezing or rales.   Abdominal:      General: Bowel sounds are normal. There is no distension.      Palpations: Abdomen is soft.      Tenderness: There is no abdominal tenderness. There is no guarding.   Musculoskeletal:      Cervical back: Normal range of motion and neck supple.      Right lower leg: No edema.      Left lower leg: No edema.   Skin:     General: Skin is warm and dry.      Findings: No rash.   Neurological:      Mental Status: She is alert and oriented to person, place, and time.   Psychiatric:         Mood and Affect: Mood normal.         Behavior: Behavior normal.         Thought Content: Thought content normal.         Judgment: Judgment normal.         Lab Results   Component Value Date    WBC 8.10 12/14/2021    HGB 14.0 12/14/2021    HCT 44.5 12/14/2021     12/14/2021    CHOL 200 (H) 06/24/2021    TRIG 113 06/24/2021    HDL 62 06/24/2021    ALT 46 (H) 12/14/2021    AST 29 12/14/2021     12/14/2021    K 3.7  12/14/2021     12/14/2021    CREATININE 0.8 12/14/2021    BUN 10 12/14/2021    CO2 24 12/14/2021    TSH 1.045 03/11/2021    HGBA1C 5.4 03/11/2021       Assessment:     1. Annual physical exam    2. Immunosuppressed status         Plan:     Annual physical exam  -     Lipid Panel; Future; Expected date: 03/07/2022  -     Hemoglobin A1C; Future; Expected date: 03/07/2022  -     TSH; Future; Expected date: 03/07/2022    Immunosuppressed status    Other orders  -     (In Office Administered) Tdap Vaccine        Vitals reviewed and stable. BP within normal range at 120/80. Patient due for annual labs. She is not fasting. She will need to schedule labs. Patient will receive lab results via my chart with physician comments and recommendations.     Reviewed medications. Patient is taking medications as prescribed with no adverse effects.      Patient is up-to-date on preventative care. She is scheduled to have colon cancer screening with GI.     She is doing well. Patient chronic conditions overall stable with no medications changes. She will continue with current regimen.     Cont per Rheum  Cont PT for bursitis  Questions and concerns addressed.          Documentation entered by Eduardo Frye, acting as scribe for Dr. Neel Valdez. 03/04/2022 7:11 AM.

## 2022-03-07 ENCOUNTER — LAB VISIT (OUTPATIENT)
Dept: LAB | Facility: HOSPITAL | Age: 57
End: 2022-03-07
Attending: FAMILY MEDICINE
Payer: COMMERCIAL

## 2022-03-07 ENCOUNTER — CLINICAL SUPPORT (OUTPATIENT)
Dept: REHABILITATION | Facility: HOSPITAL | Age: 57
End: 2022-03-07
Payer: COMMERCIAL

## 2022-03-07 DIAGNOSIS — R29.898 DECREASED GRIP STRENGTH: ICD-10-CM

## 2022-03-07 DIAGNOSIS — Z00.00 ANNUAL PHYSICAL EXAM: ICD-10-CM

## 2022-03-07 DIAGNOSIS — Z78.9 DECREASED ACTIVITIES OF DAILY LIVING (ADL): ICD-10-CM

## 2022-03-07 DIAGNOSIS — M25.60 DECREASED RANGE OF MOTION: Primary | ICD-10-CM

## 2022-03-07 PROCEDURE — 36415 COLL VENOUS BLD VENIPUNCTURE: CPT | Performed by: FAMILY MEDICINE

## 2022-03-07 PROCEDURE — 80061 LIPID PANEL: CPT | Performed by: FAMILY MEDICINE

## 2022-03-07 PROCEDURE — 97110 THERAPEUTIC EXERCISES: CPT | Performed by: OCCUPATIONAL THERAPIST

## 2022-03-07 PROCEDURE — 97140 MANUAL THERAPY 1/> REGIONS: CPT | Performed by: OCCUPATIONAL THERAPIST

## 2022-03-07 PROCEDURE — 83036 HEMOGLOBIN GLYCOSYLATED A1C: CPT | Performed by: FAMILY MEDICINE

## 2022-03-07 PROCEDURE — 84443 ASSAY THYROID STIM HORMONE: CPT | Performed by: FAMILY MEDICINE

## 2022-03-07 NOTE — PROGRESS NOTES
OCCUPATIONAL THERAPY DAILY NOTE    Name: Ayesha Arcos  Clinic Number: [id number]    Therapy Diagnosis:   Encounter Diagnoses   Name Primary?    Decreased range of motion Yes    Decreased  strength     Decreased activities of daily living (ADL)      Physician: Perez Thomas MD    Visit Date: 3/7/2022  Physician Orders: Evaluation and treatment   Medical Diagnosis:   M71.9 (ICD-10-CM) - Bursopathy, unspecified   M19.90 (ICD-10-CM) - Osteoarthritis, unspecified osteoarthritis type, unspecified site         Surgical Procedure and Date: NA,   Evaluation Date: 2/23/2022  Insurance Authorization Period Expiration: 12/31/2022  Plan of Care Certification Period: 2/23/2022 to 4/23/2022  Progress Note Due: 2/23/2022   Date of Return to MD: 7/8/2022  Visit # / Visits authorized: 2 / 20  FOTO: 1/3     Precautions:  Standard     Time In: 0740  Time Out: 0830  Total Appointment Time (timed & untimed codes): 50 minutes       SUBJECTIVE     Today, pt reports: that her worst pain was a 3/10 on a 1-10 pain scale     He/She was compliant with home exercise program.  Response to previous treatment: decreased pain  Functional change: increased range of motion for overhead reach.    Pre-Treatment Pain: 1/10  Post-Treatment Pain: 1/10  Location: behind her left shoulder  TREATMENT   ACTIVE RANGE OF MOTION : UPDATED 3/7/2022  Shoulder flexion: 170  Shoulder abduction: 64  Increased to 150   Internal rotation 90  Functional internal rotation to the right hip    Palpation: tendon over biceps tendon anterior subacromial space    Ayesha received therapeutic exercises to develop strength, endurance, ROM, flexibility, posture and core stabilization for 34 minutes including:  UBE x3 minutes 1.0 level for endurance and warm up    Pulley exercises: 4 minutes  Shoulder flexion  Shoulder abduction  Standingdowel flexion on corner: 20x  Supine external rotation stretches with dowel: 3/30 second holds  Wall shoulder flexion stretches:  1/30 second holds  Wall shoulder external rotation: 1/30 second hold  Tendon glides: 20x    ISOTONIC EXERCISES: 2 SETS 10 red band  Bilateral low and mid rows   Scaption: 1#   20x  Bilateral upper extremities      Ayesha received the following manual therapy techniques: Soft tissue Mobilization were applied to the: right shoulder for 15 minutes, including:  STM of right upper trapezius, infraspinatus , deltoid and biceps    Glenohmeral joint mobilizations inferior glides grade 2      Ayesha participated in neuromuscular re-education activities to improve: Posture for 1 minutes. The following activities were included:    Exercise 3/7/2022   Scapular depression and retraction in side lying with resistance at scapula and elbow 10/10 second holds                                     Ayesha received the following supervised modalities after being cleared for contradictions:     Ayesha received hot pack for 0 minutes.    Ayesha received cold pack for 0 minutes.      Home Exercises Provided and Patient Education Provided     Education/Self-Care provided: (5 minutes) during exercises on bands and on wall   Patient educated on biomechanical justification for therapeutic exercise and importance of compliance with HEP in order to improve overall impairments and QOL    Patient educated on postural awareness.    Patient educated on proper ergonomics at the work station in order to maintain optimal alignment of the musculoskeletal system and improve efficiency in the work environment.    Written Home Exercises Provided: yes.  Exercises were reviewed and Ayesah was able to demonstrate them prior to the end of the session.  Ayesha demonstrated good  understanding of the education provided.     See EMR under Patient Instructions for exercises provided 3/7/2022.    ASSESSMENT   Pt tolerated manual therapy well with reports of decreased pain and tension in musculature following intervention. Pt tolerated exercise well with  reports of increased fatigue but no increased pain. Pt demonstrated good understanding of exercises and required minimal cueing to maintain proper form.  Patient demonstrates increased right shoulder range of motion and decreased pain.    Ayesha Is progressing well towards her goals.   Pt prognosis is Good.     Pt will continue to benefit from skilled outpatient occupational therapy to address the deficits listed in the problem list box on initial evaluation, provide pt/family education and to maximize pt's level of independence in the home and community environment.     Pt's spiritual, cultural and educational needs considered and pt agreeable to plan of care and goals.     Anticipated barriers to occupational therapy: chronicity of pain.    GOALS:     Short Term Goals:  4 weeks     1. Pain: Pt will demonstrate improved pain by reports of less than or equal to 5/10 worst pain on the verbal rating scale in order to progress toward maximal functional ability and improve QOL.     2. Mobility: Patient will improve AROM to 50% of stated goals, listed in objective measures above, in order to progress towards independence with functional activities.      3. Strength: Patient will improve strength to 50% of stated goals, listed in objective measures above, in order to progress towards independence with functional activities.      4. Self Care: Patient will demonstrate improved self care skills listed in objective measure above,in order to progress towards independence with functional activities.      5. HEP: Patient will demonstrate independence with HEP in order to progress toward functional independence.        Long Term Goals:  8 weeks     1. Pain: Pt will demonstrate improved pain by reports of less than or equal to 1/10 worst pain on the verbal rating scale in order to progress toward maximal functional ability and improve QOL.       2. Function: Patient will demonstrate improved function as indicated by a functional  limitation score of less than or equal to 33 out of 100 on FOTO.     3. Mobility: Patient will improve AROM to stated goals, listed in objective measures above, in order to return to maximal functional potential and improve quality of life.     4. Strength: Patient will improve strength to stated goals, listed in objective measures above, in order to improve functional independence and quality of life.     5. Self Care: Patient will demonstrate increased self care skills to an independent level or modified independent level with adaptive equipment as needed.     6. Patient will return to normal ADL's, IADL's, community involvement, recreational activities, and work-related activities with less than or equal to 0/10 pain and maximal           PLAN   Continue Plan of Care (POC) and progress per patient tolerance.    Katie Lentz, OTR, LOTR

## 2022-03-08 LAB
CHOLEST SERPL-MCNC: 214 MG/DL (ref 120–199)
CHOLEST/HDLC SERPL: 3 {RATIO} (ref 2–5)
ESTIMATED AVG GLUCOSE: 117 MG/DL (ref 68–131)
HBA1C MFR BLD: 5.7 % (ref 4–5.6)
HDLC SERPL-MCNC: 72 MG/DL (ref 40–75)
HDLC SERPL: 33.6 % (ref 20–50)
LDLC SERPL CALC-MCNC: 123.6 MG/DL (ref 63–159)
NONHDLC SERPL-MCNC: 142 MG/DL
TRIGL SERPL-MCNC: 92 MG/DL (ref 30–150)
TSH SERPL DL<=0.005 MIU/L-ACNC: 0.87 UIU/ML (ref 0.4–4)

## 2022-03-10 ENCOUNTER — CLINICAL SUPPORT (OUTPATIENT)
Dept: REHABILITATION | Facility: HOSPITAL | Age: 57
End: 2022-03-10
Payer: COMMERCIAL

## 2022-03-10 DIAGNOSIS — M62.81 PROXIMAL MUSCLE WEAKNESS: ICD-10-CM

## 2022-03-10 DIAGNOSIS — Z74.09 DECREASED FUNCTIONAL MOBILITY AND ENDURANCE: Primary | ICD-10-CM

## 2022-03-10 PROCEDURE — 97110 THERAPEUTIC EXERCISES: CPT | Mod: CQ

## 2022-03-10 PROCEDURE — 97112 NEUROMUSCULAR REEDUCATION: CPT | Mod: CQ

## 2022-03-10 NOTE — PROGRESS NOTES
[facility]   Physical Therapy Treatment Note     Name: Ayesha Arcos  Clinic Number: [id number]    Therapy Diagnosis:   Encounter Diagnoses   Name Primary?    Decreased functional mobility and endurance Yes    Proximal muscle weakness      Physician: Perez Thomas MD    Visit Date: 3/10/2022    Physician Orders: PT Eval and Treat  Medical Diagnosis from Referral: Bursopathy  Evaluation Date: 2/15/2022  Authorization Period Expiration: 12/31/2022  Plan of Care Expiration: 5/15/2022  Progress Note Due: 3/15/2022  Visit # / Visits authorized: 4/20 (+1 eval)  FOTO: 1/ 3      Precautions: Standard and Immunosuppression    PTA Visit #: 1/5     Time In: 0705  Time Out: 0750  Total Billable Time: 44 minutes     SUBJECTIVE     Patient reports: she is not having any pain today and she is feeling good. She got a tetanus shot yesterday and her right arm is very sore, but her hip is feeling fine. She was able to walk 2 miles on Sunday with a friend with no issues.     He/She was compliant with home exercise program.  Response to previous treatment: good  Functional change: able to do more home activities    Pain: 0/10     Location: right lower extremity    OBJECTIVE     Objective Measures updated at progress report unless specified.       TREATMENT     Ayesha received the treatments listed below:     MANUAL THERAPY TECHNIQUES were applied for (0) minutes, including:    Manual Intervention Performed Today    Soft Tissue Mobilization  Right glute, hip, IT band, TFL    Joint Mobilizations     Mobilization with movement     Long axis distraction  Right    Functional Dry Needling        Plan for Next Visit:          THERAPEUTIC EXERCISES to develop strength, endurance, ROM, flexibility, posture and core stabilization for (32) minutes including:    Intervention Performed Today    Recumbent bike for endurance x Level 1, 8 minutes   Stretches  X  X  X   Hamstring (superior with strap): 30s   ITB  (superior with strap): 2 x 30s   Piriformis: 2 x 30s   Quadriceps (prone with strap):    Shuttle  Level 3 3 minutes  Level 2 2 minutes right only   Single knee to chest  1 minute each bilateral   Ball squeezes  3 minutes 3s holds   Clamshells x 3 x 10 on R red   Reverse clamshells  x 3 x 10 on R red   sidelying hip abduction x 3 x 10 each bilateral   Bridges x 3 x 10   Calf raises  x 3 x 10      Plan for Next Visit:      NEUROMUSCULAR RE-EDUCATION ACTIVITIES to improve Balance, Coordination, Kinesthetic, Sense, Proprioception and Posture for (12) minutes.  The following were included:    Intervention Performed Today    Sit to stands x 3x10 red band at knees   Side stepping x 3 parallel bar laps red band ankles   Monster walks x 3 parallel bar laps red band ankles                              Plan for Next Visit:          PATIENT EDUCATION AND HOME EXERCISES     Home Exercises Provided and Patient Education Provided     Education provided: () minutes   Patient educated on biomechanical justification for therapeutic exercise and importance of compliance with HEP in order to improve overall impairments and QOL    Patient was educated on all the above exercise prior/during/after for proper posture, positioning, and execution for safe performance with home exercise program.    Patient educated on postural awareness and the use of a lumbar roll when in a seated position to reduce stress and maintain optimal alignment of the spine.    Patient educated on proper ergonomics at the work station in order to maintain optimal alignment of the musculoskeletal system and improve efficiency in the work environment.   Patient educated on the importance of improved core and upper and lower extremity strength in order to improve alignment of the spine and upper and lower extremities with static positions and dynamic movement.    Patient educated on the importance of strong core and lower extremity musculature in order to improve  both static and dynamic balance, improve gait mechanics, reduce fall risk and improve household and community mobility.       Written Home Exercises Provided: no.  Exercises were reviewed and Ayesha was able to demonstrate them prior to the end of the session.  Ayesha demonstrated good  understanding of the education provided. See EMR under Patient Instructions for exercises provided during therapy sessions.      ASSESSMENT     Patient did well with session today with minimal complaints of discomfort, mostly related to increased fatigue and soreness. Standing hip activities added today, patient challenged by exercises. Strength and endurance progressions with mat activities, patient with good fatigue noted. Good response to stretching, patient left session with soreness and no reports of increased pain.   Ayesha is progressing well towards her goals.   Pt prognosis is Excellent.     Pt will continue to benefit from skilled outpatient physical therapy to address the deficits listed in the problem list box on initial evaluation, provide pt/family education and to maximize pt's level of independence in the home and community environment.     Pt's spiritual, cultural and educational needs considered and pt agreeable to plan of care and goals.     Anticipated Barriers for therapy: co-morbidities and adherence to treatment plan    GOALS:     Short Term Goals:  6 weeks Progress   2/15/2022   1. Pain: Pt will demonstrate improved pain by reports of less than or equal to 5/10 worst pain on the verbal rating scale in order to progress toward maximal functional ability and improve QOL. PC   2. Function: Patient will demonstrate improved function as indicated by a functional limitation score of less than or equal to 15 out of 100 on FOTO. PC   3. Strength: Patient will improve strength to 50% of stated goals, listed in objective measures above, in order to progress towards independence with functional activities.  PC    4. HEP: Patient will demonstrate independence with HEP in order to progress toward functional independence. PC      Long Term Goals:  12 weeks Progress  2/15/2022   1. Pain: Pt will demonstrate improved pain by reports of less than or equal to 1/10 worst pain on the verbal rating scale in order to progress toward maximal functional ability and improve QOL.   PC   2. Function: Patient will demonstrate improved function as indicated by a functional limitation score of less than or equal to 10 out of 100 on FOTO. PC   3. Strength: Patient will improve strength to stated goals, listed in objective measures above, in order to improve functional independence and quality of life. PC   4. Gait: Patient will demonstrate normalized gait mechanics with minimal compensation in order to return to PLOF. PC   5. Patient will return to regular walking and biking for exercise 3 days per week in order to return to prior level of function and maximize quality of life  PC    PC= progressing/continue;    PM= partially met;             DC= discontinue      PLAN   Plan of care Certification: 2/15/2022 to 5/15/2022.    Continue Plan of Care (POC) and progress per patient tolerance. See treatment section for details on planned progressions next session.      Maye Rose, PTA

## 2022-03-11 ENCOUNTER — CLINICAL SUPPORT (OUTPATIENT)
Dept: REHABILITATION | Facility: HOSPITAL | Age: 57
End: 2022-03-11
Payer: COMMERCIAL

## 2022-03-11 DIAGNOSIS — M25.60 DECREASED RANGE OF MOTION: Primary | ICD-10-CM

## 2022-03-11 DIAGNOSIS — Z78.9 DECREASED ACTIVITIES OF DAILY LIVING (ADL): ICD-10-CM

## 2022-03-11 DIAGNOSIS — R29.898 DECREASED GRIP STRENGTH: ICD-10-CM

## 2022-03-11 PROCEDURE — 97140 MANUAL THERAPY 1/> REGIONS: CPT | Performed by: OCCUPATIONAL THERAPIST

## 2022-03-11 PROCEDURE — 97110 THERAPEUTIC EXERCISES: CPT | Performed by: OCCUPATIONAL THERAPIST

## 2022-03-11 NOTE — PROGRESS NOTES
OCCUPATIONAL THERAPY DAILY NOTE    Name: Ayesha Arcos  Clinic Number: [id number]    Therapy Diagnosis:   Encounter Diagnoses   Name Primary?    Decreased range of motion Yes    Decreased  strength     Decreased activities of daily living (ADL)      Physician: Perez Thomas MD    Visit Date: 3/11/2022  Physician Orders: Evaluation and treatment   Medical Diagnosis:   M71.9 (ICD-10-CM) - Bursopathy, unspecified   M19.90 (ICD-10-CM) - Osteoarthritis, unspecified osteoarthritis type, unspecified site      Surgical Procedure and Date: NA,   Evaluation Date: 2/23/2022  Insurance Authorization Period Expiration: 12/31/2022  Plan of Care Certification Period: 2/23/2022 to 4/23/2022  Progress Note Due: 3/23/2022   Date of Return to MD: 7/8/2022  Visit # / Visits authorized: 3 / 20  FOTO: 1/3     Precautions:  Standard     Time In: 3:15 pm  Time Out: 4:15 pm  Total Appointment Time (timed & untimed codes): 60 minutes     SUBJECTIVE     Today, pt reports: no left shoulder pain. She is having right lateral deltoid pain from an injection a couple of days ago.    He/She was compliant with home exercise program.  Response to previous treatment: decreased pain  Functional change: None reported today    Pre-Treatment Pain: 0/10  Post-Treatment Pain: 0/10  Location: NA  TREATMENT   ACTIVE RANGE OF MOTION : UPDATED 3/7/2022  Shoulder flexion: 170  Shoulder abduction: 64  Increased to 150   Internal rotation 90  Functional internal rotation to the right hip    Palpation: tendon over biceps tendon anterior subacromial space    Ayesha received therapeutic exercises to develop strength, endurance, ROM, flexibility, posture and core stabilization for 49 minutes including:  UBE x5 minutes 1.0 level for endurance and warm up    Pulley exercises: 6 minutes both directions  Shoulder flexion  Shoulder abduction    Standing dowel flexion on corner: 20x  Supine external rotation stretches (90) with dowel: 3/30 second  holds  Wall shoulder flexion/abduction stretches: 1/30 second holds each  Wall shoulder external rotation: 3/30 second hold  External rotation with dowel in standing and 0 degrees abduction: 20x    Isometric external rotation: 10/5 second holds    ISOTONIC EXERCISES: 2 SETS 10   Bilateral low and mid rows  - red band  Scaption: 1#   20x  Bilateral upper extremities  Supine shoulder flexion with elbow flexion:2#  2 sets 10  Side lying external rotation 10x      Ayesha received the following manual therapy techniques: Soft tissue Mobilization were applied to the: right shoulder for 10 minutes, including:  STM of right upper trapezius, infraspinatus , deltoid and biceps    Glenohmeral joint mobilizations inferior glides grade 2      Ayesha participated in neuromuscular re-education activities to improve: Posture for 1 minutes. The following activities were included:    Exercise 3/11/2022   Scapular depression and retraction in side lying with resistance at scapula and elbow 10/10 second holds                                     Ayesha received the following supervised modalities after being cleared for contradictions:     Ayesha received hot pack for 0 minutes.    Ayesha received cold pack for 0 minutes.      Home Exercises Provided and Patient Education Provided     Education/Self-Care provided: (5 minutes) during exercises on bands and on wall   Patient educated on biomechanical justification for therapeutic exercise and importance of compliance with HEP in order to improve overall impairments and QOL    Patient educated on postural awareness.    Patient educated on proper ergonomics at the work station in order to maintain optimal alignment of the musculoskeletal system and improve efficiency in the work environment.    Written Home Exercises Provided: yes.  Exercises were reviewed and Ayesha was able to demonstrate them prior to the end of the session.  Ayesha demonstrated good  understanding of the  education provided.     See EMR under Patient Instructions for exercises provided 3/11/2022.    ASSESSMENT   Pt tolerated manual therapy well with reports of decreased pain and tension in musculature following intervention. Pt tolerated exercise well with reports of increased fatigue but no increased pain. Pt demonstrated good understanding of exercises and required minimal cueing to maintain proper form.  Patient demonstrates significant weakness of the  left rotator cuff external rotators    Ayesha Is progressing well towards her goals.   Pt prognosis is Good.     Pt will continue to benefit from skilled outpatient occupational therapy to address the deficits listed in the problem list box on initial evaluation, provide pt/family education and to maximize pt's level of independence in the home and community environment.     Pt's spiritual, cultural and educational needs considered and pt agreeable to plan of care and goals.     Anticipated barriers to occupational therapy: chronicity of pain.    GOALS:     Short Term Goals:  4 weeks                                          updated  3/11/2022   1. Pain: Pt will demonstrate improved pain by reports of less than or equal to 5/10 worst pain on the verbal rating scale in order to progress toward maximal functional ability and improve QOL. Met 3/11/2022   2. Mobility: Patient will improve AROM to 50% of stated goals, listed in objective measures above, in order to progress towards independence with functional activities.  progressing   3. Strength: Patient will improve strength to 50% of stated goals, listed in objective measures above, in order to progress towards independence with functional activities.  progressing   4. Self Care: Patient will demonstrate improved self care skills listed in objective measure above,in order to progress towards independence with functional activities.  progressing   5. HEP: Patient will demonstrate independence with HEP in order to  progress toward functional independence. ongoing      Long Term Goals:  8 weeks     1. Pain: Pt will demonstrate improved pain by reports of less than or equal to 1/10 worst pain on the verbal rating scale in order to progress toward maximal functional ability and improve QOL.       2. Function: Patient will demonstrate improved function as indicated by a functional limitation score of less than or equal to 33 out of 100 on FOTO.     3. Mobility: Patient will improve AROM to stated goals, listed in objective measures above, in order to return to maximal functional potential and improve quality of life.     4. Strength: Patient will improve strength to stated goals, listed in objective measures above, in order to improve functional independence and quality of life.     5. Self Care: Patient will demonstrate increased self care skills to an independent level or modified independent level with adaptive equipment as needed.     6. Patient will return to normal ADL's, IADL's, community involvement, recreational activities, and work-related activities with less than or equal to 0/10 pain and maximal           PLAN   Continue Plan of Care (POC) and progress per patient tolerance.    JR Blakely, RICHI

## 2022-03-14 ENCOUNTER — CLINICAL SUPPORT (OUTPATIENT)
Dept: REHABILITATION | Facility: HOSPITAL | Age: 57
End: 2022-03-14
Payer: COMMERCIAL

## 2022-03-14 ENCOUNTER — SPECIALTY PHARMACY (OUTPATIENT)
Dept: PHARMACY | Facility: CLINIC | Age: 57
End: 2022-03-14
Payer: COMMERCIAL

## 2022-03-14 DIAGNOSIS — R29.898 DECREASED GRIP STRENGTH: ICD-10-CM

## 2022-03-14 DIAGNOSIS — Z78.9 DECREASED ACTIVITIES OF DAILY LIVING (ADL): ICD-10-CM

## 2022-03-14 DIAGNOSIS — M25.60 DECREASED RANGE OF MOTION: Primary | ICD-10-CM

## 2022-03-14 DIAGNOSIS — M05.9 SEROPOSITIVE RHEUMATOID ARTHRITIS: Primary | ICD-10-CM

## 2022-03-14 PROCEDURE — 97110 THERAPEUTIC EXERCISES: CPT

## 2022-03-14 PROCEDURE — 97112 NEUROMUSCULAR REEDUCATION: CPT

## 2022-03-14 RX ORDER — UPADACITINIB 15 MG/1
15 TABLET, EXTENDED RELEASE ORAL DAILY
Qty: 90 TABLET | Refills: 1 | Status: CANCELLED | OUTPATIENT
Start: 2022-03-14 | End: 2022-06-12

## 2022-03-14 NOTE — TELEPHONE ENCOUNTER
Pt reports 5 tablet remaining. No refills in wamb. Pt confirmed she wishes to continue with our services. Message sent to provider for refill authorization.

## 2022-03-14 NOTE — PROGRESS NOTES
[facility]   Physical Therapy Treatment Note     Name: Ayesha Arcos  Clinic Number: [id number]    Therapy Diagnosis:   Encounter Diagnoses   Name Primary?    Decreased range of motion Yes    Decreased  strength     Decreased activities of daily living (ADL)      Physician: Perez Thomas MD    Visit Date: 3/14/2022    Physician Orders: PT Eval and Treat  Medical Diagnosis from Referral: Bursopathy  Evaluation Date: 2/15/2022  Authorization Period Expiration: 12/31/2022  Plan of Care Expiration: 5/15/2022  Progress Note Due: 3/15/2022  Visit # / Visits authorized: 5/20 (+1 eval)  FOTO: 1/ 3      Precautions: Standard and Immunosuppression    PTA Visit #: 1/5     Time In: 0800  Time Out: 0844  Total Billable Time: 42 minutes     SUBJECTIVE     Patient reports: her pain has improved but she continues to have muscle spasms in the leg. States they are less frequent and happen about every other day but that they are very painful and more intense than a typical judith horse.     He/She was compliant with home exercise program.  Response to previous treatment: good  Functional change: able to do more home activities    Pain: 0/10     Location: right lower extremity    OBJECTIVE     Objective Measures updated at progress report unless specified.       TREATMENT     Ayesha received the treatments listed below:     MANUAL THERAPY TECHNIQUES were applied for (0) minutes, including:    Manual Intervention Performed Today    Soft Tissue Mobilization  Right glute, hip, IT band, TFL    Joint Mobilizations     Mobilization with movement     Long axis distraction  Right    Functional Dry Needling        Plan for Next Visit:          THERAPEUTIC EXERCISES to develop strength, endurance, ROM, flexibility, posture and core stabilization for (34) minutes including:    Intervention Performed Today    Recumbent bike for endurance x Level 1, 8 minutes   Stretches  X  X    x Hamstring (superior  with strap): 30s   ITB (superior with strap): 2 x 30s   Piriformis: 2 x 30s   Quadriceps (prone with strap): 2 x 1 min   Shuttle x Level 4, 5 minutes  Level 2 2 minutes right only   Single knee to chest  1 minute each bilateral   Clamshells  3 x 10 on R red   Straight leg raise flexion  x 2 x 10 B    sidelying hip abduction x 3 x 10 each bilateral   Bridges + abduction isometric  x 2 x 10 with 3s holds, red band at knees    Calf raises  x 3 x 10    Prone hip extension with knee flexed  x 2 x 10 B                Plan for Next Visit:        NEUROMUSCULAR RE-EDUCATION ACTIVITIES to improve Balance, Coordination, Kinesthetic, Sense, Proprioception and Posture for (8) minutes.  The following were included:    Intervention Performed Today    Sit to stands x 3x10 red band at knees   Side stepping  3 parallel bar laps red band ankles   Monster walks  3 parallel bar laps red band ankles   Single leg stance  x 2 x 30s B                          Plan for Next Visit:          PATIENT EDUCATION AND HOME EXERCISES     Home Exercises Provided and Patient Education Provided     Education provided: () minutes   Patient educated on biomechanical justification for therapeutic exercise and importance of compliance with HEP in order to improve overall impairments and QOL    Patient was educated on all the above exercise prior/during/after for proper posture, positioning, and execution for safe performance with home exercise program.    Patient educated on postural awareness and the use of a lumbar roll when in a seated position to reduce stress and maintain optimal alignment of the spine.    Patient educated on proper ergonomics at the work station in order to maintain optimal alignment of the musculoskeletal system and improve efficiency in the work environment.   Patient educated on the importance of improved core and upper and lower extremity strength in order to improve alignment of the spine and upper and lower extremities  with static positions and dynamic movement.    Patient educated on the importance of strong core and lower extremity musculature in order to improve both static and dynamic balance, improve gait mechanics, reduce fall risk and improve household and community mobility.       Written Home Exercises Provided: no.  Exercises were reviewed and Ayesha was able to demonstrate them prior to the end of the session.  Ayesha demonstrated good  understanding of the education provided. See EMR under Patient Instructions for exercises provided during therapy sessions.      ASSESSMENT     Patient tolerated session well today with good tolerance of continued exercise progression. Progressed bridges with abduction isometric and 3s hold time. Increased resistance and time with shuttle leg press with good fatigue noted. Also incorporated single leg stance with significant lateral hip fatigue reported     Ayesha is progressing well towards her goals.   Pt prognosis is Excellent.     Pt will continue to benefit from skilled outpatient physical therapy to address the deficits listed in the problem list box on initial evaluation, provide pt/family education and to maximize pt's level of independence in the home and community environment.     Pt's spiritual, cultural and educational needs considered and pt agreeable to plan of care and goals.     Anticipated Barriers for therapy: co-morbidities and adherence to treatment plan    GOALS:     Short Term Goals:  6 weeks Progress   2/15/2022   1. Pain: Pt will demonstrate improved pain by reports of less than or equal to 5/10 worst pain on the verbal rating scale in order to progress toward maximal functional ability and improve QOL. PC   2. Function: Patient will demonstrate improved function as indicated by a functional limitation score of less than or equal to 15 out of 100 on FOTO. PC   3. Strength: Patient will improve strength to 50% of stated goals, listed in objective measures  above, in order to progress towards independence with functional activities.  PC   4. HEP: Patient will demonstrate independence with HEP in order to progress toward functional independence. PC      Long Term Goals:  12 weeks Progress  2/15/2022   1. Pain: Pt will demonstrate improved pain by reports of less than or equal to 1/10 worst pain on the verbal rating scale in order to progress toward maximal functional ability and improve QOL.   PC   2. Function: Patient will demonstrate improved function as indicated by a functional limitation score of less than or equal to 10 out of 100 on FOTO. PC   3. Strength: Patient will improve strength to stated goals, listed in objective measures above, in order to improve functional independence and quality of life. PC   4. Gait: Patient will demonstrate normalized gait mechanics with minimal compensation in order to return to PLOF. PC   5. Patient will return to regular walking and biking for exercise 3 days per week in order to return to prior level of function and maximize quality of life  PC    PC= progressing/continue;    PM= partially met;             DC= discontinue      PLAN   Plan of care Certification: 2/15/2022 to 5/15/2022.    Continue Plan of Care (POC) and progress per patient tolerance. See treatment section for details on planned progressions next session.      Liana Paul, PT

## 2022-03-15 RX ORDER — UPADACITINIB 15 MG/1
15 TABLET, EXTENDED RELEASE ORAL DAILY
Qty: 90 TABLET | Refills: 1 | Status: SHIPPED | OUTPATIENT
Start: 2022-03-15 | End: 2022-07-08 | Stop reason: SDUPTHER

## 2022-03-17 ENCOUNTER — CLINICAL SUPPORT (OUTPATIENT)
Dept: REHABILITATION | Facility: HOSPITAL | Age: 57
End: 2022-03-17
Payer: COMMERCIAL

## 2022-03-17 DIAGNOSIS — M62.81 PROXIMAL MUSCLE WEAKNESS: ICD-10-CM

## 2022-03-17 DIAGNOSIS — Z74.09 DECREASED FUNCTIONAL MOBILITY AND ENDURANCE: Primary | ICD-10-CM

## 2022-03-17 PROCEDURE — 97112 NEUROMUSCULAR REEDUCATION: CPT | Mod: CQ

## 2022-03-17 PROCEDURE — 97110 THERAPEUTIC EXERCISES: CPT | Mod: CQ

## 2022-03-17 NOTE — TELEPHONE ENCOUNTER
Specialty Pharmacy - Refill Coordination  Specialty Pharmacy - Medication/Referral Authorization    Specialty Medication Orders Linked to Encounter    Flowsheet Row Most Recent Value   Medication #1 upadacitinib (RINVOQ) 15 mg 24 hr tablet (Order#[id number], Rx#[id number])          Refill Questions - Documented Responses    Flowsheet Row Most Recent Value   Patient Availability and HIPAA Verification    Does patient want to proceed with activity? Yes   HIPAA/medical authority confirmed? Yes   Relationship to patient of person spoken to? Self   Refill Screening Questions    Changes to allergies? No   Changes to medications? No   New conditions since last clinic visit? No   Unplanned office visit, urgent care, ED, or hospital admission in the last 4 weeks? No   How does patient/caregiver feel medication is working? Good   Financial problems or insurance changes? No   How many doses of your specialty medications were missed in the last 4 weeks? 0   Would patient like to speak to a pharmacist? No   When does the patient need to receive the medication? 03/23/22   Refill Delivery Questions    How will the patient receive the medication? Delivery Nini   When does the patient need to receive the medication? 03/23/22   Shipping Address Home   Address in [facility] confirmed and updated if neccessary? Yes   Expected Copay ($) 5   Is the patient able to afford the medication copay? Yes   Payment Method CC on file   Days supply of Refill 30   Supplies needed? No supplies needed   Refill activity completed? Yes   Refill activity plan Refill scheduled   Shipment/Pickup Date: 03/18/22          Current Outpatient Medications   Medication Sig    diclofenac sodium (VOLTAREN) 1 % Gel Apply 2 g topically 4 (four) times daily. for 14 days    FLAXSEED OIL ORAL Take by mouth.    folic acid (FOLVITE) 1 MG tablet TAKE 1 TABLET(1 MG) BY MOUTH EVERY DAY    methotrexate 2.5 MG Tab Take 10 tablets (25 mg total) by mouth every 7 days.     multivitamin capsule Take 1 capsule by mouth once daily.    multivitamin with minerals (HAIR,SKIN AND NAILS ORAL) Take by mouth once daily.    rosuvastatin (CRESTOR) 10 MG tablet Take 1 tablet (10 mg total) by mouth once daily.    TURMERIC ORAL Take by mouth.    upadacitinib (RINVOQ) 15 mg 24 hr tablet Take 1 tablet (15 mg total) by mouth once daily.    valACYclovir (VALTREX) 1000 MG tablet Take 1 tablet (1,000 mg total) by mouth 2 (two) times daily. for 10 days   Last reviewed on 3/4/2022  7:30 AM by Domenica Claros LPN    Review of patient's allergies indicates:  No Known Allergies Last reviewed on  3/4/2022 7:30 AM by Domenica Claros      Tasks added this encounter   4/15/2022 - Refill Call (Auto Added)   Tasks due within next 3 months   No tasks due.     Nat Gant, PharmD  [facility]  [address] [place] [id number]  Phone: [phone]  Fax: [phone]

## 2022-03-17 NOTE — TELEPHONE ENCOUNTER
Rinvoq for RA refill order received, no changes to dose. Labs reviewed, ALT 46 mildly elevated -liver injury not suspected. Refill appropriate.

## 2022-03-17 NOTE — PROGRESS NOTES
[facility]   Physical Therapy Treatment Note + Progress Note    Name: Ayesha Arcos  Clinic Number: [id number]    Therapy Diagnosis:   Encounter Diagnoses   Name Primary?    Decreased functional mobility and endurance Yes    Proximal muscle weakness      Physician: Perez Thomas MD    Visit Date: 3/17/2022    Physician Orders: PT Eval and Treat  Medical Diagnosis from Referral: Bursopathy  Evaluation Date: 2/15/2022  Authorization Period Expiration: 12/31/2022  Plan of Care Expiration: 5/15/2022  Progress Note Due: 4/17/2022  Visit # / Visits authorized: 6/20 (+1 eval)  FOTO: 1/ 3      Precautions: Standard and Immunosuppression    PTA Visit #: 1/5     Time In: 0700  Time Out: 0743  Total Billable Time: 40 minutes     SUBJECTIVE     Patient reports: she is feeling good and not having much pain. She states she does have some tightness and soreness in her left calf, but other than that, she is doing well.     He/She was compliant with home exercise program.  Response to previous treatment: good  Functional change: able to do more home activities    Pain: 0/10     Location: right lower extremity    OBJECTIVE     Objective Measures updated at progress report unless specified.     STRENGTH:     L/E MMT Right  (spine) Left Pain/Dysfunction with Movement Goal   Hip Flexion  4+/5 4+/5 R hip pain 4+/5 B   Hip Extension  4/5 4+/5 R hip pain 4+/5 B   Hip Abduction  4+/5 4+/5 R hip pain 4+/5 B   Knee Extension 5/5 5/5   5/5 B   Knee Flexion 4/5 4+/5   5/5 B   Hip IR 4+/5 5/5 R hip pain 4+/5 B   Hip ER 4/5 4+/5 R hip pain 4+/5 B   Ankle DF 5/5 5/5   5/5 B   Ankle PF 5/5 5/5 10/10 calf raises with reports of R hip pain  5/5 B               TREATMENT     Ayesha received the treatments listed below:     MANUAL THERAPY TECHNIQUES were applied for (0) minutes, including:    Manual Intervention Performed Today    Soft Tissue Mobilization  Right glute, hip, IT band, TFL    Joint Mobilizations      Mobilization with movement     Long axis distraction  Right    Functional Dry Needling        Plan for Next Visit:          THERAPEUTIC EXERCISES to develop strength, endurance, ROM, flexibility, posture and core stabilization for (25) minutes including:    Intervention Performed Today    Recumbent bike for endurance x Level 1, 8 minutes   Stretches          X Hamstring (superior with strap): 30s   ITB (superior with strap): 2 x 30s   Piriformis: 2 x 30s   Quadriceps (prone with strap): 2 x 1 min  gastroc stretch 3x30s at wedge   Shuttle  Level 4, 5 minutes  Level 2 2 minutes right only   Single knee to chest  1 minute each bilateral   Clamshells  3 x 10 on R red   Straight leg raise flexion   2 x 10 B    sidelying hip abduction x 3 x 10 each bilateral   Bridges + abduction isometric  x 3 x 10 with 3s holds, red band at knees    Calf raises  x 3 x 10    Prone hip extension with knee flexed  x 3 x 10 B           Plan for Next Visit:        NEUROMUSCULAR RE-EDUCATION ACTIVITIES to improve Balance, Coordination, Kinesthetic, Sense, Proprioception and Posture for (15) minutes.  The following were included:    Intervention Performed Today    Sit to stands x 3x10 red band at knees   Single leg sit to stands x 2x10 hi-lo mat each bilateral   Side stepping  3 parallel bar laps red band ankles   Monster walks  3 parallel bar laps red band ankles   Single leg stance  x 2 x 30s B    Supine hamstring curls with theraball Next visit                     Plan for Next Visit:          PATIENT EDUCATION AND HOME EXERCISES     Home Exercises Provided and Patient Education Provided     Education provided: () minutes   Patient educated on biomechanical justification for therapeutic exercise and importance of compliance with HEP in order to improve overall impairments and QOL    Patient was educated on all the above exercise prior/during/after for proper posture, positioning, and execution for safe performance with home exercise  program.    Patient educated on postural awareness and the use of a lumbar roll when in a seated position to reduce stress and maintain optimal alignment of the spine.    Patient educated on proper ergonomics at the work station in order to maintain optimal alignment of the musculoskeletal system and improve efficiency in the work environment.   Patient educated on the importance of improved core and upper and lower extremity strength in order to improve alignment of the spine and upper and lower extremities with static positions and dynamic movement.    Patient educated on the importance of strong core and lower extremity musculature in order to improve both static and dynamic balance, improve gait mechanics, reduce fall risk and improve household and community mobility.       Written Home Exercises Provided: no.  Exercises were reviewed and Ayesha was able to demonstrate them prior to the end of the session.  Ayesha demonstrated good  understanding of the education provided. See EMR under Patient Instructions for exercises provided during therapy sessions.      ASSESSMENT     Patient tolerated session well today with minimal complaints of discomfort. Progressed to single leg sit to stands, patient more challenged with right compared to left. Verbal cues for proper performance and decreased compensation necessary. Patient left session with soreness but no reports of increased pain.    Manual Muscle Testing assessed today. Patient with increased lower extremity strength per measurements taken today. Decreased strength noted in hip extensors and hamstrings today, but improved from evaluation. Patient will continue to benefit from physical therapy to address remaining limitations in strength, range of motion, flexibility, and stability.   Ayesha is progressing well towards her goals.   Pt prognosis is Excellent.     Pt will continue to benefit from skilled outpatient physical therapy to address the deficits  listed in the problem list box on initial evaluation, provide pt/family education and to maximize pt's level of independence in the home and community environment.     Pt's spiritual, cultural and educational needs considered and pt agreeable to plan of care and goals.     Anticipated Barriers for therapy: co-morbidities and adherence to treatment plan    GOALS:     Short Term Goals:  6 weeks Progress   3/17/2022   1. Pain: Pt will demonstrate improved pain by reports of less than or equal to 5/10 worst pain on the verbal rating scale in order to progress toward maximal functional ability and improve QOL. Met   2. Function: Patient will demonstrate improved function as indicated by a functional limitation score of less than or equal to 15 out of 100 on FOTO. NT   3. Strength: Patient will improve strength to 50% of stated goals, listed in objective measures above, in order to progress towards independence with functional activities.  Met   4. HEP: Patient will demonstrate independence with HEP in order to progress toward functional independence. PC      Long Term Goals:  12 weeks Progress  3/17/2022   1. Pain: Pt will demonstrate improved pain by reports of less than or equal to 1/10 worst pain on the verbal rating scale in order to progress toward maximal functional ability and improve QOL.   Met   2. Function: Patient will demonstrate improved function as indicated by a functional limitation score of less than or equal to 10 out of 100 on FOTO. NT   3. Strength: Patient will improve strength to stated goals, listed in objective measures above, in order to improve functional independence and quality of life. PC   4. Gait: Patient will demonstrate normalized gait mechanics with minimal compensation in order to return to PLOF. PC   5. Patient will return to regular walking and biking for exercise 3 days per week in order to return to prior level of function and maximize quality of life  PC    PC=  progressing/continue;    PM= partially met;             DC= discontinue      PLAN   Plan of care Certification: 2/15/2022 to 5/15/2022.    Continue Plan of Care (POC) and progress per patient tolerance. See treatment section for details on planned progressions next session.      Maye Rose, PTA

## 2022-03-18 ENCOUNTER — TELEPHONE (OUTPATIENT)
Dept: REHABILITATION | Facility: HOSPITAL | Age: 57
End: 2022-03-18
Payer: COMMERCIAL

## 2022-03-22 ENCOUNTER — CLINICAL SUPPORT (OUTPATIENT)
Dept: REHABILITATION | Facility: HOSPITAL | Age: 57
End: 2022-03-22
Payer: COMMERCIAL

## 2022-03-22 DIAGNOSIS — Z74.09 DECREASED FUNCTIONAL MOBILITY AND ENDURANCE: Primary | ICD-10-CM

## 2022-03-22 DIAGNOSIS — M62.81 PROXIMAL MUSCLE WEAKNESS: ICD-10-CM

## 2022-03-22 PROCEDURE — 97110 THERAPEUTIC EXERCISES: CPT | Mod: CQ

## 2022-03-22 PROCEDURE — 97112 NEUROMUSCULAR REEDUCATION: CPT | Mod: CQ

## 2022-03-22 NOTE — PROGRESS NOTES
[facility]   Physical Therapy Treatment Note    Name: Ayesha Arcos  Clinic Number: [id number]    Therapy Diagnosis:   Encounter Diagnoses   Name Primary?    Decreased functional mobility and endurance Yes    Proximal muscle weakness      Physician: Perez Thomas MD    Visit Date: 3/22/2022    Physician Orders: PT Eval and Treat  Medical Diagnosis from Referral: Bursopathy  Evaluation Date: 2/15/2022  Authorization Period Expiration: 12/31/2022  Plan of Care Expiration: 5/15/2022  Progress Note Due: 4/17/2022  Visit # / Visits authorized: 7/20 (+1 eval)  FOTO: 2/ 3 (last done on 3/22/222)     Precautions: Standard and Immunosuppression    PTA Visit #: 2/5     Time In: 0704  Time Out: 0745  Total Billable Time: 40 minutes     SUBJECTIVE     Patient reports: she continues to not have any pain and is feeling good.     He/She was compliant with home exercise program.  Response to previous treatment: good  Functional change: able to do more home activities    Pain: 0/10     Location: right lower extremity    OBJECTIVE     Objective Measures updated at progress report unless specified.       TREATMENT     Ayesha received the treatments listed below:     MANUAL THERAPY TECHNIQUES were applied for (0) minutes, including:    Manual Intervention Performed Today    Soft Tissue Mobilization  Right glute, hip, IT band, TFL    Joint Mobilizations     Mobilization with movement     Long axis distraction  Right    Functional Dry Needling        Plan for Next Visit:          THERAPEUTIC EXERCISES to develop strength, endurance, ROM, flexibility, posture and core stabilization for (15) minutes including:    Intervention Performed Today    Recumbent bike for endurance x Level 1, 8 minutes   Stretches          X Hamstring (superior with strap): 30s   ITB (superior with strap): 2 x 30s   Piriformis: 2 x 30s   Quadriceps (prone with strap): 2 x 1 min  gastroc stretch 3x30s at wedge   Shuttle  Level  4, 5 minutes  Level 2 2 minutes right only   Single knee to chest  1 minute each bilateral   Clamshells  3 x 10 on R red   Straight leg raise flexion   2 x 10 B    sidelying hip abduction  3 x 10 each bilateral   Bridges + abduction isometric   3 x 10 with 3s holds, red band at knees    Calf raises  x 3 x 10    Prone hip extension with knee flexed   3 x 10 B      Plan for Next Visit:        NEUROMUSCULAR RE-EDUCATION ACTIVITIES to improve Balance, Coordination, Kinesthetic, Sense, Proprioception and Posture for (25) minutes.  The following were included:    Intervention Performed Today    Sit to stands x 3x10 red band at knees   Single leg sit to stands  2x10 hi-lo mat each bilateral   Side stepping x 3x10 each way   Monster walks x 3x10 each way   Standing hip 3 way x Red band 3x7 each bilateral   Standing clamshells x Red 3x10 eaach bilateral                    Plan for Next Visit:          PATIENT EDUCATION AND HOME EXERCISES     Home Exercises Provided and Patient Education Provided     Education provided: () minutes   Patient educated on biomechanical justification for therapeutic exercise and importance of compliance with HEP in order to improve overall impairments and QOL    Patient was educated on all the above exercise prior/during/after for proper posture, positioning, and execution for safe performance with home exercise program.    Patient educated on postural awareness and the use of a lumbar roll when in a seated position to reduce stress and maintain optimal alignment of the spine.    Patient educated on proper ergonomics at the work station in order to maintain optimal alignment of the musculoskeletal system and improve efficiency in the work environment.   Patient educated on the importance of improved core and upper and lower extremity strength in order to improve alignment of the spine and upper and lower extremities with static positions and dynamic movement.    Patient educated on the  importance of strong core and lower extremity musculature in order to improve both static and dynamic balance, improve gait mechanics, reduce fall risk and improve household and community mobility.       Written Home Exercises Provided: no.  Exercises were reviewed and Ayesha was able to demonstrate them prior to the end of the session.  Ayesha demonstrated good  understanding of the education provided. See EMR under Patient Instructions for exercises provided during therapy sessions.      ASSESSMENT     Patient tolerated session well today with no complaints of discomfort. Standing strengthening activities added today, patient with good fatigue noted. Challenged by hip activities, rest breaks taken as needed. Patient plans to wean down to once a week for PT due to being pain free for the last couple of weeks. Patient left session with soreness.     Ayesha is progressing well towards her goals.   Pt prognosis is Excellent.     Pt will continue to benefit from skilled outpatient physical therapy to address the deficits listed in the problem list box on initial evaluation, provide pt/family education and to maximize pt's level of independence in the home and community environment.     Pt's spiritual, cultural and educational needs considered and pt agreeable to plan of care and goals.     Anticipated Barriers for therapy: co-morbidities and adherence to treatment plan    GOALS:     Short Term Goals:  6 weeks Progress   3/17/2022   1. Pain: Pt will demonstrate improved pain by reports of less than or equal to 5/10 worst pain on the verbal rating scale in order to progress toward maximal functional ability and improve QOL. Met   2. Function: Patient will demonstrate improved function as indicated by a functional limitation score of less than or equal to 15 out of 100 on FOTO. NT   3. Strength: Patient will improve strength to 50% of stated goals, listed in objective measures above, in order to progress towards  independence with functional activities.  Met   4. HEP: Patient will demonstrate independence with HEP in order to progress toward functional independence. PC      Long Term Goals:  12 weeks Progress  3/17/2022   1. Pain: Pt will demonstrate improved pain by reports of less than or equal to 1/10 worst pain on the verbal rating scale in order to progress toward maximal functional ability and improve QOL.   Met   2. Function: Patient will demonstrate improved function as indicated by a functional limitation score of less than or equal to 10 out of 100 on FOTO. NT   3. Strength: Patient will improve strength to stated goals, listed in objective measures above, in order to improve functional independence and quality of life. PC   4. Gait: Patient will demonstrate normalized gait mechanics with minimal compensation in order to return to PLOF. PC   5. Patient will return to regular walking and biking for exercise 3 days per week in order to return to prior level of function and maximize quality of life  PC    PC= progressing/continue;    PM= partially met;             DC= discontinue      PLAN   Plan of care Certification: 2/15/2022 to 5/15/2022.    Continue Plan of Care (POC) and progress per patient tolerance. See treatment section for details on planned progressions next session.      Maye Rose, PTA

## 2022-03-25 ENCOUNTER — CLINICAL SUPPORT (OUTPATIENT)
Dept: REHABILITATION | Facility: HOSPITAL | Age: 57
End: 2022-03-25
Payer: COMMERCIAL

## 2022-03-25 DIAGNOSIS — M25.60 DECREASED RANGE OF MOTION: Primary | ICD-10-CM

## 2022-03-25 DIAGNOSIS — Z78.9 DECREASED ACTIVITIES OF DAILY LIVING (ADL): ICD-10-CM

## 2022-03-25 DIAGNOSIS — R29.898 DECREASED GRIP STRENGTH: ICD-10-CM

## 2022-03-25 PROCEDURE — 97140 MANUAL THERAPY 1/> REGIONS: CPT | Performed by: OCCUPATIONAL THERAPIST

## 2022-03-25 PROCEDURE — 97110 THERAPEUTIC EXERCISES: CPT | Performed by: OCCUPATIONAL THERAPIST

## 2022-03-25 NOTE — PROGRESS NOTES
OCCUPATIONAL THERAPY DAILY NOTE    Name: Ayesha Arcos  Clinic Number: [id number]    Therapy Diagnosis:   Encounter Diagnoses   Name Primary?    Decreased range of motion Yes    Decreased  strength     Decreased activities of daily living (ADL)      Physician: Perez Thomas MD    Visit Date: 3/25/2022  Physician Orders: Evaluation and treatment   Medical Diagnosis:   M71.9 (ICD-10-CM) - Bursopathy, unspecified   M19.90 (ICD-10-CM) - Osteoarthritis, unspecified osteoarthritis type, unspecified site      Surgical Procedure and Date: NA,   Evaluation Date: 2/23/2022  Insurance Authorization Period Expiration: 12/31/2022  Plan of Care Certification Period: 2/23/2022 to 4/23/2022  Progress Note Due: 3/23/2022   Date of Return to MD: 7/8/2022  Visit # / Visits authorized: 4 / 20  FOTO: 1/3     Precautions:  Standard     Time In: 3:00 pm  Time Out: 4:00 pm  Total Appointment Time (timed & untimed codes): 60 minutes     SUBJECTIVE     Today, pt reports: mild left shoulder pain.     He/She was compliant with home exercise program.  Response to previous treatment: decreased pain  Functional change: None reported today    Pre-Treatment Pain: 1/10  Post-Treatment Pain: 0/10  Location: NA  TREATMENT   ACTIVE RANGE OF MOTION : UPDATED 3/7/2022  Shoulder flexion: 170  Shoulder abduction: 64  Increased to 150   Internal rotation 90  Functional internal rotation to the right hip    Palpation: tendon over biceps tendon anterior subacromial space    Ayesha received therapeutic exercises to develop strength, endurance, ROM, flexibility, posture and core stabilization for 48 minutes including:  UBE x4 minutes 2.0 level for endurance and warm up    Pulley exercises: 6 minutes both directions  Shoulder flexion  Shoulder abduction    Standing dowel flexion on corner: 20x  Supine external rotation stretches (90) with dowel: 3/30 second holds  Wall shoulder flexion/abduction stretches: 1/30 second holds each  Wall shoulder  external rotation: 3/30 second hold  External rotation with dowel in standing and 0 degrees abduction: 20x    Isometric external rotation: 10/5 second holds    ISOTONIC EXERCISES: 2 SETS 10   Bilateral low and mid rows  - red band  Scaption: 1#   20x  Bilateral upper extremities  Supine shoulder flexion with elbow flexion:2#  2 sets 10  Side lying external rotation 10x      Ayesha received the following manual therapy techniques: Soft tissue Mobilization were applied to the: right shoulder for 10 minutes, including:  STM of right upper trapezius, infraspinatus , deltoid and biceps    Glenohmeral joint mobilizations inferior glides grade 2      Ayesha participated in neuromuscular re-education activities to improve: Posture for 2 minutes. The following activities were included:    Exercise 3/25/2022   Scapular depression and retraction in side lying with resistance at scapula and elbow 10/10 second holds                                   Home Exercises Provided and Patient Education Provided     Education/Self-Care provided: (5 minutes) during exercises on bands and on wall   Patient educated on biomechanical justification for therapeutic exercise and importance of compliance with HEP in order to improve overall impairments and QOL    Patient educated on postural awareness during band exercises.   Patient educated on proper ergonomics at the work station in order to maintain optimal alignment of the musculoskeletal system and improve efficiency in the work environment.    Written Home Exercises Provided: yes.  Exercises were reviewed and Ayesha was able to demonstrate them prior to the end of the session.  Ayesha demonstrated good  understanding of the education provided.     See EMR under Patient Instructions for exercises provided 3/11/2022.    ASSESSMENT    Pt tolerated exercise well with reports of increased fatigue but no increased pain. Pt demonstrated good understanding of exercises and required minimal  cueing to maintain proper form.  Patient demonstrates significant weakness of the  left rotator cuff external rotators    Ayesha Is progressing well towards her goals.   Pt prognosis is Good.     Pt will continue to benefit from skilled outpatient occupational therapy to address the deficits listed in the problem list box on initial evaluation, provide pt/family education and to maximize pt's level of independence in the home and community environment.     Pt's spiritual, cultural and educational needs considered and pt agreeable to plan of care and goals.     Anticipated barriers to occupational therapy: chronicity of pain.    GOALS:     Short Term Goals:  4 weeks                                          updated  3/11/2022   1. Pain: Pt will demonstrate improved pain by reports of less than or equal to 5/10 worst pain on the verbal rating scale in order to progress toward maximal functional ability and improve QOL. Met 3/11/2022   2. Mobility: Patient will improve AROM to 50% of stated goals, listed in objective measures above, in order to progress towards independence with functional activities.  progressing   3. Strength: Patient will improve strength to 50% of stated goals, listed in objective measures above, in order to progress towards independence with functional activities.  progressing   4. Self Care: Patient will demonstrate improved self care skills listed in objective measure above,in order to progress towards independence with functional activities.  progressing   5. HEP: Patient will demonstrate independence with HEP in order to progress toward functional independence. ongoing      Long Term Goals:  8 weeks     1. Pain: Pt will demonstrate improved pain by reports of less than or equal to 1/10 worst pain on the verbal rating scale in order to progress toward maximal functional ability and improve QOL.       2. Function: Patient will demonstrate improved function as indicated by a functional limitation  score of less than or equal to 33 out of 100 on FOTO.     3. Mobility: Patient will improve AROM to stated goals, listed in objective measures above, in order to return to maximal functional potential and improve quality of life.     4. Strength: Patient will improve strength to stated goals, listed in objective measures above, in order to improve functional independence and quality of life.     5. Self Care: Patient will demonstrate increased self care skills to an independent level or modified independent level with adaptive equipment as needed.     6. Patient will return to normal ADL's, IADL's, community involvement, recreational activities, and work-related activities with less than or equal to 0/10 pain and maximal           PLAN   Continue Plan of Care (POC) and progress per patient tolerance.    Katie Lentz, OTR, LOTR

## 2022-03-28 ENCOUNTER — CLINICAL SUPPORT (OUTPATIENT)
Dept: REHABILITATION | Facility: HOSPITAL | Age: 57
End: 2022-03-28
Payer: COMMERCIAL

## 2022-03-28 DIAGNOSIS — R29.898 DECREASED GRIP STRENGTH: ICD-10-CM

## 2022-03-28 DIAGNOSIS — M25.60 DECREASED RANGE OF MOTION: Primary | ICD-10-CM

## 2022-03-28 DIAGNOSIS — Z78.9 DECREASED ACTIVITIES OF DAILY LIVING (ADL): ICD-10-CM

## 2022-03-28 PROCEDURE — 97110 THERAPEUTIC EXERCISES: CPT | Performed by: OCCUPATIONAL THERAPIST

## 2022-03-28 PROCEDURE — 97140 MANUAL THERAPY 1/> REGIONS: CPT | Performed by: OCCUPATIONAL THERAPIST

## 2022-03-28 NOTE — PROGRESS NOTES
OCCUPATIONAL THERAPY DAILY NOTE    Name: Ayesha Arcos  Clinic Number: [id number]    Therapy Diagnosis:   Encounter Diagnoses   Name Primary?    Decreased range of motion Yes    Decreased  strength     Decreased activities of daily living (ADL)      Physician: Perez Thomas MD    Visit Date: 3/28/2022  Physician Orders: Evaluation and treatment   Medical Diagnosis:   M71.9 (ICD-10-CM) - Bursopathy, unspecified   M19.90 (ICD-10-CM) - Osteoarthritis, unspecified osteoarthritis type, unspecified site      Surgical Procedure and Date: NA,   Evaluation Date: 2/23/2022  Insurance Authorization Period Expiration: 12/31/2022  Plan of Care Certification Period: 2/23/2022 to 4/23/2022  Progress Note Due: 3/23/2022 - unable to update today as patient was late and left early - to perform next session.  Date of Return to MD: 7/8/2022  Visit # / Visits authorized: 4 / 20  FOTO: 1/3     Precautions:  Standard     Time In: 0705  Time Out: 0740  Total Appointment Time (timed & untimed codes): 35 minutes     SUBJECTIVE     Today, pt reports: therapy was painful at her last session, but she feels much better now and has increased range of motion.    He/She was compliant with home exercise program.  Response to previous treatment: decreased pain and increased movement.  Functional change: None reported today    Pre-Treatment Pain: 0/10  Post-Treatment Pain: 0/10  Location: NA  TREATMENT   ACTIVE RANGE OF MOTION : UPDATED 3/7/2022  Shoulder flexion: 170  Shoulder abduction: 64  Increased to 150   Internal rotation 90  Functional internal rotation to the right hip    Palpation: tendon over biceps tendon anterior subacromial space    Ayesha received therapeutic exercises to develop strength, endurance, ROM, flexibility, posture and core stabilization for 22 minutes including:  UBE x4 minutes 2.0 level for endurance and warm up  2 forward and 2 minutes backward.    Pulley exercises: 3 minutes both directions  Shoulder  flexion  Shoulder abduction    Standing dowel flexion on corner: 20x  Supine external rotation stretches (90) with dowel: 3/30 second holds  Wall shoulder flexion/abduction stretches: 1/30 second holds each  Wall shoulder external rotation: 3/30 second hold  External rotation with dowel in standing and 0 degrees abduction: 20x    Isometric external rotation: 10/5 second holds    ISOTONIC EXERCISES: 2 SETS 10   Bilateral low and mid rows  - red band  Scaption: 1#   20x  Bilateral upper extremities  Supine shoulder flexion with elbow flexion:2#  2 sets 10  Side lying external rotation 10x      Ayesha received the following manual therapy techniques: Soft tissue Mobilization were applied to the: right shoulder for 8 minutes, including:  STM of right upper trapezius, infraspinatus , deltoid and biceps    Glenohmeral joint mobilizations inferior glides grade 2      Ayesha participated in neuromuscular re-education activities to improve: Posture for 5 minutes. The following activities were included:    Exercise 3/28/2022   Scapular depression and retraction in side lying with resistance at scapula and elbow 10/10 second holds   Scapular depression with external rotation 10/10 second holds   Scapular depression with abudciotn                            Home Exercises Provided and Patient Education Provided     Education/Self-Care provided: (5 minutes) during exercises on bands    Patient educated on biomechanical justification for therapeutic exercise and importance of compliance with HEP in order to improve overall impairments and QOL    Patient educated on postural awareness during band exercises.   Patient educated on proper ergonomics at the work station in order to maintain optimal alignment of the musculoskeletal system and improve efficiency in the work environment.    Written Home Exercises Provided: yes.  Exercises were reviewed and Ayesha was able to demonstrate them prior to the end of the session.   Ayesha demonstrated good  understanding of the education provided.     See EMR under Patient Instructions for exercises provided 3/11/2022.    ASSESSMENT    Pt tolerated exercise well with reports of increased fatigue but no increased pain. Pt demonstrated good understanding of exercises and required moderate cueing to maintain proper form.  Patient demonstrates over use of her left upper trapezius muscles and decreased postural control.    Ayesha Is progressing well towards her goals.   Pt prognosis is Good.     Pt will continue to benefit from skilled outpatient occupational therapy to address the deficits listed in the problem list box on initial evaluation, provide pt/family education and to maximize pt's level of independence in the home and community environment.     Pt's spiritual, cultural and educational needs considered and pt agreeable to plan of care and goals.     Anticipated barriers to occupational therapy: chronicity of pain.    GOALS:     Short Term Goals:  4 weeks                                          updated  3/11/2022   1. Pain: Pt will demonstrate improved pain by reports of less than or equal to 5/10 worst pain on the verbal rating scale in order to progress toward maximal functional ability and improve QOL. Met 3/11/2022   2. Mobility: Patient will improve AROM to 50% of stated goals, listed in objective measures above, in order to progress towards independence with functional activities.  progressing   3. Strength: Patient will improve strength to 50% of stated goals, listed in objective measures above, in order to progress towards independence with functional activities.  progressing   4. Self Care: Patient will demonstrate improved self care skills listed in objective measure above,in order to progress towards independence with functional activities.  progressing   5. HEP: Patient will demonstrate independence with HEP in order to progress toward functional independence. ongoing       Long Term Goals:  8 weeks     1. Pain: Pt will demonstrate improved pain by reports of less than or equal to 1/10 worst pain on the verbal rating scale in order to progress toward maximal functional ability and improve QOL.       2. Function: Patient will demonstrate improved function as indicated by a functional limitation score of less than or equal to 33 out of 100 on FOTO.     3. Mobility: Patient will improve AROM to stated goals, listed in objective measures above, in order to return to maximal functional potential and improve quality of life.     4. Strength: Patient will improve strength to stated goals, listed in objective measures above, in order to improve functional independence and quality of life.     5. Self Care: Patient will demonstrate increased self care skills to an independent level or modified independent level with adaptive equipment as needed.     6. Patient will return to normal ADL's, IADL's, community involvement, recreational activities, and work-related activities with less than or equal to 0/10 pain and maximal           PLAN   Continue Plan of Care (POC) and progress per patient tolerance.    Katie Lentz, OTR, RICHI

## 2022-03-30 ENCOUNTER — CLINICAL SUPPORT (OUTPATIENT)
Dept: REHABILITATION | Facility: HOSPITAL | Age: 57
End: 2022-03-30
Payer: COMMERCIAL

## 2022-03-30 DIAGNOSIS — Z78.9 DECREASED ACTIVITIES OF DAILY LIVING (ADL): ICD-10-CM

## 2022-03-30 DIAGNOSIS — M25.60 DECREASED RANGE OF MOTION: Primary | ICD-10-CM

## 2022-03-30 DIAGNOSIS — R29.898 DECREASED GRIP STRENGTH: ICD-10-CM

## 2022-03-30 PROCEDURE — 97110 THERAPEUTIC EXERCISES: CPT | Performed by: OCCUPATIONAL THERAPIST

## 2022-03-30 PROCEDURE — 97112 NEUROMUSCULAR REEDUCATION: CPT | Performed by: OCCUPATIONAL THERAPIST

## 2022-03-30 NOTE — PROGRESS NOTES
OCCUPATIONAL THERAPY PROGRESS NOTE    Name: Ayesha Arcso  Clinic Number: [id number]    Therapy Diagnosis:   Encounter Diagnoses   Name Primary?    Decreased range of motion Yes    Decreased  strength     Decreased activities of daily living (ADL)      Physician: Perez Thomas MD    Visit Date: 3/30/2022  Physician Orders: Evaluation and treatment   Medical Diagnosis:   M71.9 (ICD-10-CM) - Bursopathy, unspecified   M19.90 (ICD-10-CM) - Osteoarthritis, unspecified osteoarthritis type, unspecified site      Surgical Procedure and Date: NA,   Evaluation Date: 2/23/2022  Insurance Authorization Period Expiration: 12/31/2022  Plan of Care Certification Period: 2/23/2022 to 4/23/2022  Progress Note Due: 3/30/3033  Date of Return to MD: 7/8/2022  Visit # / Visits authorized: 5 / 20  FOTO: 2/3     Precautions:  Standard     Time In: 0705  Time Out: 0805  Total Appointment Time (timed & untimed codes): 60 minutes     SUBJECTIVE     Today, pt reports: she has difficulty not over using her right upper trap muscle.    He/She was compliant with home exercise program.  Response to previous treatment: decreased pain.  Functional change: None reported today    Pre-Treatment Pain: 0/10  Post-Treatment Pain: 0/10  Location: NA      CMS Impairment/Limitation/Restriction for FOTO shoulder Survey    Therapist reviewed FOTO scores for Ayesha Arcos on 3/30/2022.   FOTO documents entered into [employer] - see Media section.    Limitation Score: 24%        TREATMENT   UPDATED 3/30/2022    RANGE OF MOTION:  Shoulder flexion: ACTIVE RANGE OF MOTION: 170   PASSIVE RANGE OF MOTION: 175  Shoulder abduction: ACTIVE RANGE OF MOTION: 64  Increased to 155   PASSIVE RANGE OF MOTION: 160   Internal rotation 90  EXTERNAL ROTATION: ACTIVE RANGE OF MOTION: 60   PASSIVE RANGE OF MOTION: 70  Shoulder extension: 40  Functional internal rotation to the middle of her back     Strength: (WARD Dynamometer in lbs.) Average 3 trials,  Position II  Right: 30#  Increased 55#  Left: 35#    Increased to 50#    MUSCLE STRENGTH:  Shoulder flexion: 4+/5  Shoulder abduction: 4+/5  Internal rotation: 5/5  External rotation: 2-/5    Palpation: tendon over biceps tendon anterior subacromial space    Ayesha received therapeutic exercises to develop strength, endurance, ROM, flexibility, posture and core stabilization for 45 minutes including:  UBE x4 minutes 2.0 level for endurance and warm up  2 forward and 2 minutes backward.    Pulley exercises: 3 minutes both directions  Shoulder flexion  Shoulder abduction    Standing dowel flexion on corner: 20x  Supine external rotation stretches (90) with dowel: 3/30 second holds  Wall shoulder flexion/abduction stretches: 1/30 second holds each  Wall shoulder external rotation: 3/30 second hold  External rotation with dowel in standing and 0 degrees abduction: 20x    Isometric external rotation: 10/5 second holds    ISOTONIC EXERCISES: 2 SETS 10   Bilateral low rows  - red band  Scaption: 1#   20x  Bilateral upper extremities  Supine shoulder flexion with elbow flexion:2#  2 sets 10  Side lying external rotation 10x  Side lying flexion/abduction: 10-20x  Prone rows and extension 20x with 1#  Biceps curls: 2#  Supine external rotation at 45 degrees; with 1# weight      Ayesha received the following manual therapy techniques: Soft tissue Mobilization were applied to the: right shoulder for 5 minutes, including:  STM of right upper trapezius, infraspinatus , deltoid and biceps        Ayesha participated in neuromuscular re-education activities to improve: Posture for 10 minutes. The following activities were included:    Exercise 3/30/2022   Scapular depression and retraction in side lying with resistance at scapula and elbow 10/10 second holds   Scapular depression with external rotation 10/10 second holds   Scapular depression with abudciotn                            Home Exercises Provided and Patient Education  Provided     Education/Self-Care provided: (5 minutes) during exercises on bands every session   Patient educated on biomechanical justification for therapeutic exercise and importance of compliance with HEP in order to improve overall impairments and QOL    Patient educated on postural awareness during band exercises.   Patient educated on proper ergonomics at the work station in order to maintain optimal alignment of the musculoskeletal system and improve efficiency in the work environment.    Written Home Exercises Provided: yes.  Exercises were reviewed and Ayesha was able to demonstrate them prior to the end of the session.  Ayesha demonstrated good  understanding of the education provided.     See EMR under Patient Instructions for exercises provided 3/11/2022.    ASSESSMENT    Pt tolerated exercise well with reports of increased fatigue but no increased pain. Pt demonstrated good understanding of exercises and required moderate cueing to maintain proper form.  Patient demonstrates rotator cuff weakness, however overall shoulder strength is improving. FOTO scores have improved.    Ayesha Is progressing well towards her goals.   Pt prognosis is Good.     Pt will continue to benefit from skilled outpatient occupational therapy to address the deficits listed in the problem list box on initial evaluation, provide pt/family education and to maximize pt's level of independence in the home and community environment.     Pt's spiritual, cultural and educational needs considered and pt agreeable to plan of care and goals.     Anticipated barriers to occupational therapy: chronicity of pain.    GOALS:     Short Term Goals:  4 weeks                                          updated  3/30/2022   1. Pain: Pt will demonstrate improved pain by reports of less than or equal to 5/10 worst pain on the verbal rating scale in order to progress toward maximal functional ability and improve QOL. Met 3/11/2022   2. Mobility:  Patient will improve AROM to 50% of stated goals, listed in objective measures above, in order to progress towards independence with functional activities.  Met 3/20/2022   3. Strength: Patient will improve strength to 50% of stated goals, listed in objective measures above, in order to progress towards independence with functional activities.  progressing   4. Self Care: Patient will demonstrate improved self care skills listed in objective measure above,in order to progress towards independence with functional activities.  Met 3/30/2022   5. HEP: Patient will demonstrate independence with HEP in order to progress toward functional independence. ongoing      Long Term Goals:  8 weeks     1. Pain: Pt will demonstrate improved pain by reports of less than or equal to 1/10 worst pain on the verbal rating scale in order to progress toward maximal functional ability and improve QOL.    progressing   2. Function: Patient will demonstrate improved function as indicated by a functional limitation score of less than or equal to 33 out of 100 on FOTO. Met 3/30/2022   3. Mobility: Patient will improve AROM to stated goals, listed in objective measures above, in order to return to maximal functional potential and improve quality of life.     4. Strength: Patient will improve strength to stated goals, listed in objective measures above, in order to improve functional independence and quality of life.     5. Self Care: Patient will demonstrate increased self care skills to an independent level or modified independent level with adaptive equipment as needed.     6. Patient will return to normal ADL's, IADL's, community involvement, recreational activities, and work-related activities with less than or equal to 0/10 pain and maximal           PLAN   Continue Plan of Care (POC) and progress per patient tolerance.    Katie Lentz, JR, RICHI

## 2022-03-31 ENCOUNTER — TELEPHONE (OUTPATIENT)
Dept: RHEUMATOLOGY | Facility: CLINIC | Age: 57
End: 2022-03-31
Payer: COMMERCIAL

## 2022-03-31 ENCOUNTER — TELEPHONE (OUTPATIENT)
Dept: REHABILITATION | Facility: HOSPITAL | Age: 57
End: 2022-03-31
Payer: COMMERCIAL

## 2022-03-31 ENCOUNTER — TELEPHONE (OUTPATIENT)
Dept: INTERNAL MEDICINE | Facility: CLINIC | Age: 57
End: 2022-03-31
Payer: COMMERCIAL

## 2022-03-31 NOTE — TELEPHONE ENCOUNTER
Patient want to know what to take for sinus she recently tested neg for Covid. Due to her medical hx and current medications what can she take otc that wont counteract with her meds?

## 2022-03-31 NOTE — TELEPHONE ENCOUNTER
----- Message from Carolyne Sotomayor sent at 3/31/2022  9:34 AM CDT -----  Patient would like a call back at [phone] in regards to her sinus. She has a couple of questions about  Otc medication.    Thanks

## 2022-03-31 NOTE — TELEPHONE ENCOUNTER
Secure chat message sent to Dr. Thomas, reply below      Thank you! OK to proceed with Allegra. Recommend to follow with PMD or allergist for symptoms of allergic rhinitis

## 2022-03-31 NOTE — TELEPHONE ENCOUNTER
Spoke with pt and she was calling because Dr. Thomas always told her to consult with us before taking any OTC meds. States that she is having some problems with her sinuses, congestion, states that the drainage is clear, under her eyes were puffy yesterday but she took some ibuprofen and that has resolved, denies any other symptoms. States she did an at home covid test and it was negative. Would like to know if it is okay to allegra.       Currently on MTX and Rinvoq.

## 2022-03-31 NOTE — TELEPHONE ENCOUNTER
Name: Ayesha Arcos  Clinic Number: [id number]      Call Date: 3/31/2022    Reason for call: missed appointment    Discussion: Ayesha Arcos was reached via phone number and stated that she forgot about her appointment this morning.    Follow up Visit's: 4/7/22 with Maye    Plan: 1215 opening later today may be available, patient stated she is off today and if we call her 30 minutes ahead of time, she can make it in. Expressed to patient to call in the morning if she does not hear from us today to potentially get on tomorrow's schedule if availability allows.

## 2022-03-31 NOTE — TELEPHONE ENCOUNTER
----- Message from Carolyne Sotomayor sent at 3/31/2022  9:36 AM CDT -----  Contact: Ayesha  Patient would like a call back at [phone] in regards to her sinus. She has a couple of questions about  OTC medication.    Thanks

## 2022-03-31 NOTE — TELEPHONE ENCOUNTER
----- Message from Quita Eastman sent at 3/31/2022  1:15 PM CDT -----  Contact: [employer] [phone]  Patient is returning a phone call.  Who left a message for the patient: Jose Martin Guevara LPN   Does patient know what this is regarding:  A message from Jose Martin Guevara LPN   Would you like a call back, or a response through your [facility] portal?:   Phone

## 2022-04-04 ENCOUNTER — CLINICAL SUPPORT (OUTPATIENT)
Dept: REHABILITATION | Facility: HOSPITAL | Age: 57
End: 2022-04-04
Attending: INTERNAL MEDICINE
Payer: COMMERCIAL

## 2022-04-04 DIAGNOSIS — R29.898 DECREASED GRIP STRENGTH: ICD-10-CM

## 2022-04-04 DIAGNOSIS — Z78.9 DECREASED ACTIVITIES OF DAILY LIVING (ADL): ICD-10-CM

## 2022-04-04 DIAGNOSIS — M25.60 DECREASED RANGE OF MOTION: Primary | ICD-10-CM

## 2022-04-04 PROCEDURE — 97530 THERAPEUTIC ACTIVITIES: CPT | Performed by: OCCUPATIONAL THERAPIST

## 2022-04-04 PROCEDURE — 97140 MANUAL THERAPY 1/> REGIONS: CPT | Performed by: OCCUPATIONAL THERAPIST

## 2022-04-04 PROCEDURE — 97110 THERAPEUTIC EXERCISES: CPT | Performed by: OCCUPATIONAL THERAPIST

## 2022-04-04 NOTE — PROGRESS NOTES
OCCUPATIONAL THERAPY DAILY NOTE    Name: Ayesha Arcos  Clinic Number: [id number]    Therapy Diagnosis:   Encounter Diagnoses   Name Primary?    Decreased range of motion Yes    Decreased  strength     Decreased activities of daily living (ADL)      Physician: Perez Thomas MD    Visit Date: 4/4/2022  Physician Orders: Evaluation and treatment   Medical Diagnosis:   M71.9 (ICD-10-CM) - Bursopathy, unspecified   M19.90 (ICD-10-CM) - Osteoarthritis, unspecified osteoarthritis type, unspecified site      Surgical Procedure and Date: NA,   Evaluation Date: 2/23/2022  Insurance Authorization Period Expiration: 12/31/2022  Plan of Care Certification Period: 2/23/2022 to 4/23/2022  Progress Note Due: 5/4/2022  Date of Return to MD: 7/8/2022  Visit # / Visits authorized: 7 / 20  FOTO: 2/3     Precautions:  Standard     Time In: 0705  Time Out: 0750  Total Appointment Time (timed & untimed codes): 45 minutes     SUBJECTIVE     Today, pt reports: she has no pain.    He/She was compliant with home exercise program.  Response to previous treatment: decreased pain.  Functional change: None reported today    Pre-Treatment Pain: 0/10  Post-Treatment Pain: 0/10  Location: NA      TREATMENT   UPDATED 3/30/2022    RANGE OF MOTION:  Shoulder flexion: ACTIVE RANGE OF MOTION: 170   PASSIVE RANGE OF MOTION: 175  Shoulder abduction: ACTIVE RANGE OF MOTION: 64  Increased to 155   PASSIVE RANGE OF MOTION: 160   Internal rotation 90  EXTERNAL ROTATION: ACTIVE RANGE OF MOTION: 60   PASSIVE RANGE OF MOTION: 70  Shoulder extension: 40  Functional internal rotation to the middle of her back     Strength: (WARD Dynamometer in lbs.) Average 3 trials, Position II  Right: 30#  Increased 55#  Left: 35#    Increased to 50#    MUSCLE STRENGTH:  Shoulder flexion: 4+/5  Shoulder abduction: 4+/5  Internal rotation: 5/5  External rotation: 2-/5    Palpation: tendon over biceps tendon anterior subacromial space    Ayesha flores  therapeutic exercises to develop strength, endurance, ROM, flexibility, posture and core stabilization for 25 minutes including:  UBE x4 minutes 2.0 level for endurance and warm up  2 forward and 2 minutes backward.    Pulley exercises: 3 minutes both directions  Shoulder flexion  Shoulder abduction    Standing dowel flexion on corner: 20x  Supine external rotation stretches (90) with dowel: 3/30 second holds  Wall shoulder flexion/abduction stretches: 1/30 second holds each  Wall shoulder external rotation: 3/30 second hold  External rotation with dowel in standing and 0 degrees abduction: 20x    Isometric external rotation: 10/5 second holds    ISOTONIC EXERCISES: 2 SETS 10   Bilateral low rows  - red band  Scaption: 1#   20x  Bilateral upper extremities  Supine shoulder flexion with elbow flexion:2#  2 sets 10  Side lying external rotation 10x  Side lying flexion/abduction: 10-20x  SUPINE figure eights 2# 20x  Prone rows and extension 20x with 1-2#  Biceps curls: 2#  Supine external rotation at 45 degrees; with 1# weight      Ayesha received the following manual therapy techniques: Soft tissue Mobilization were applied to the: right shoulder for 10 minutes, including:  STM of right upper trapezius, infraspinatus , deltoid and biceps        Ayesha participated in neuromuscular re-education activities to improve: Posture for 10 minutes. The following activities were included:    Exercise 4/4/2022   Scapular depression and retraction in side lying with resistance at scapula and elbow 10/10 second holds   Scapular depression with external rotation 10/10 second holds   Scapular depression with abudciotn                            Home Exercises Provided and Patient Education Provided     Education/Self-Care provided: (5 minutes) during exercises on bands every session   Patient educated on biomechanical justification for therapeutic exercise and importance of compliance with HEP in order to improve overall  impairments and QOL    Patient educated on postural awareness during band exercises.every session    Written Home Exercises Provided: yes.  Exercises were reviewed and Ayesha was able to demonstrate them prior to the end of the session.  Ayesha demonstrated good  understanding of the education provided.     See EMR under Patient Instructions for exercises provided 3/11/2022.    ASSESSMENT    Pt tolerated exercise well with reports of increased fatigue but no increased pain. Pt demonstrated good understanding of exercises and required moderate cueing to maintain proper form.  Patient demonstrates increased shoulder range of motion.    Ayesha Is progressing well towards her goals.   Pt prognosis is Good.     Pt will continue to benefit from skilled outpatient occupational therapy to address the deficits listed in the problem list box on initial evaluation, provide pt/family education and to maximize pt's level of independence in the home and community environment.     Pt's spiritual, cultural and educational needs considered and pt agreeable to plan of care and goals.     Anticipated barriers to occupational therapy: chronicity of pain.    GOALS:     Short Term Goals:  4 weeks                                          updated  3/30/2022   1. Pain: Pt will demonstrate improved pain by reports of less than or equal to 5/10 worst pain on the verbal rating scale in order to progress toward maximal functional ability and improve QOL. Met 3/11/2022   2. Mobility: Patient will improve AROM to 50% of stated goals, listed in objective measures above, in order to progress towards independence with functional activities.  Met 3/20/2022   3. Strength: Patient will improve strength to 50% of stated goals, listed in objective measures above, in order to progress towards independence with functional activities.  progressing   4. Self Care: Patient will demonstrate improved self care skills listed in objective measure above,in  order to progress towards independence with functional activities.  Met 3/30/2022   5. HEP: Patient will demonstrate independence with HEP in order to progress toward functional independence. ongoing      Long Term Goals:  8 weeks     1. Pain: Pt will demonstrate improved pain by reports of less than or equal to 1/10 worst pain on the verbal rating scale in order to progress toward maximal functional ability and improve QOL.    progressing   2. Function: Patient will demonstrate improved function as indicated by a functional limitation score of less than or equal to 33 out of 100 on FOTO. Met 3/30/2022   3. Mobility: Patient will improve AROM to stated goals, listed in objective measures above, in order to return to maximal functional potential and improve quality of life.     4. Strength: Patient will improve strength to stated goals, listed in objective measures above, in order to improve functional independence and quality of life.     5. Self Care: Patient will demonstrate increased self care skills to an independent level or modified independent level with adaptive equipment as needed.     6. Patient will return to normal ADL's, IADL's, community involvement, recreational activities, and work-related activities with less than or equal to 0/10 pain and maximal           PLAN   Continue Plan of Care (POC) and progress per patient tolerance.    JR Blakely, RICHI

## 2022-04-06 ENCOUNTER — CLINICAL SUPPORT (OUTPATIENT)
Dept: REHABILITATION | Facility: HOSPITAL | Age: 57
End: 2022-04-06
Attending: INTERNAL MEDICINE
Payer: COMMERCIAL

## 2022-04-06 DIAGNOSIS — M25.60 DECREASED RANGE OF MOTION: Primary | ICD-10-CM

## 2022-04-06 DIAGNOSIS — Z78.9 DECREASED ACTIVITIES OF DAILY LIVING (ADL): ICD-10-CM

## 2022-04-06 DIAGNOSIS — R29.898 DECREASED GRIP STRENGTH: ICD-10-CM

## 2022-04-06 PROCEDURE — 97110 THERAPEUTIC EXERCISES: CPT | Performed by: OCCUPATIONAL THERAPIST

## 2022-04-06 PROCEDURE — 97112 NEUROMUSCULAR REEDUCATION: CPT | Performed by: OCCUPATIONAL THERAPIST

## 2022-04-06 NOTE — PROGRESS NOTES
OCCUPATIONAL THERAPY DAILY NOTE    Name: Ayesha Arcos  Clinic Number: [id number]    Therapy Diagnosis:   Encounter Diagnoses   Name Primary?    Decreased range of motion Yes    Decreased  strength     Decreased activities of daily living (ADL)      Physician: Perez Thomas MD    Visit Date: 4/6/2022  Physician Orders: Evaluation and treatment   Medical Diagnosis:   M71.9 (ICD-10-CM) - Bursopathy, unspecified   M19.90 (ICD-10-CM) - Osteoarthritis, unspecified osteoarthritis type, unspecified site      Surgical Procedure and Date: NA,   Evaluation Date: 2/23/2022  Insurance Authorization Period Expiration: 12/31/2022  Plan of Care Certification Period: 2/23/2022 to 4/23/2022  Progress Note Due: 5/4/2022  Date of Return to MD: 7/8/2022  Visit # / Visits authorized: 7 / 20  FOTO: 2/3     Precautions:  Standard     Time In: 0705  Time Out: 0800  Total Appointment Time (timed & untimed codes): 55 minutes     SUBJECTIVE     Today, pt reports: she has no pain.    He/She was compliant with home exercise program.  Response to previous treatment: decreased pain.in the morning  Functional change: self care    Pre-Treatment Pain: 0/10  Post-Treatment Pain: 0/10  Location: NA      TREATMENT   UPDATED 3/30/2022    RANGE OF MOTION:  Shoulder flexion: ACTIVE RANGE OF MOTION: 170   PASSIVE RANGE OF MOTION: 175  Shoulder abduction: ACTIVE RANGE OF MOTION: 64  Increased to 155   PASSIVE RANGE OF MOTION: 160   Internal rotation 90  EXTERNAL ROTATION: ACTIVE RANGE OF MOTION: 60   PASSIVE RANGE OF MOTION: 70  Shoulder extension: 40  Functional internal rotation to the middle of her back     Strength: (WARD Dynamometer in lbs.) Average 3 trials, Position II  Right: 30#  Increased 55#  Left: 35#    Increased to 50#    MUSCLE STRENGTH:  Shoulder flexion: 4+/5  Shoulder abduction: 4+/5  Internal rotation: 5/5  External rotation: 2-/5    Palpation: tendon over biceps tendon anterior subacromial space    Ayesha flores  therapeutic exercises to develop strength, endurance, ROM, flexibility, posture and core stabilization for 45 minutes including:  UBE x4 minutes 2.0 level for endurance and warm up  2 forward and 2 minutes backward.    Pulley exercises: 3 minutes both directions not today   Shoulder flexion  Shoulder abduction    Standing dowel flexion on corner: 20x  Supine external rotation stretches (90) with dowel: 3/30 second holds  Wall shoulder flexion/abduction stretches: 1/30 second holds each  Wall shoulder external rotation: 3/30 second hold  External rotation with dowel in standing and 0 degrees abduction: 20x    Isometric external rotation: 10/5 second holds  rthymic stabilization exercises in external rotation at 0 and 45 degrees    ISOTONIC EXERCISES: 2 SETS 10   Bilateral low and mid rows  - green band  Scaption: 1#   20x  Bilateral upper extremities  Supine shoulder flexion with elbow flexion:2#  2 sets 10  Side lying external rotation 10x-20x  Side lying flexion/abduction: 10-20x  SUPINE figure eights 2# 20x  Biceps curls: 3#  Supine external rotation at 45 degrees; with 1# weight with towel under arm  Shoulder flexion and abduction: 30-60 degrees 1#      Ayesha received the following manual therapy techniques: Soft tissue Mobilization were applied to the: right shoulder for 0 minutes, including:  STM of right upper trapezius, infraspinatus , deltoid and biceps        Ayesha participated in neuromuscular re-education activities to improve: Posture for 10 minutes. The following activities were included:    Exercise 4/6/2022   Scapular depression and retraction in side lying with resistance at scapula and elbow 10/10 second holds   Scapular depression with external rotation 10/10 second holds   Scapular depression with abudciotn                            Home Exercises Provided and Patient Education Provided     Education/Self-Care provided: (5 minutes) during exercises on bands every session   Patient educated  on biomechanical justification for therapeutic exercise and importance of compliance with HEP in order to improve overall impairments and QOL every session   Patient educated on postural awareness during band exercises.every session    Written Home Exercises Provided: yes.  Exercises were reviewed and Ayesha was able to demonstrate them prior to the end of the session.  Ayesha demonstrated good  understanding of the education provided.     See EMR under Patient Instructions for exercises provided 3/11/2022.    ASSESSMENT    Pt tolerated exercise well with reports of increased fatigue but no increased pain, except mild lateral deltoid pain with shoulder abduction. Pt demonstrated good understanding of exercises and required moderate cueing to maintain proper form as she elevates her shoulder with active range of motion.  Patient demonstrates increased rotator cuff strength.    Ayesha Is progressing well towards her goals.   Pt prognosis is Good.     Pt will continue to benefit from skilled outpatient occupational therapy to address the deficits listed in the problem list box on initial evaluation, provide pt/family education and to maximize pt's level of independence in the home and community environment.     Pt's spiritual, cultural and educational needs considered and pt agreeable to plan of care and goals.     Anticipated barriers to occupational therapy: chronicity of pain.    GOALS:     Short Term Goals:  4 weeks                                          updated  3/30/2022   1. Pain: Pt will demonstrate improved pain by reports of less than or equal to 5/10 worst pain on the verbal rating scale in order to progress toward maximal functional ability and improve QOL. Met 3/11/2022   2. Mobility: Patient will improve AROM to 50% of stated goals, listed in objective measures above, in order to progress towards independence with functional activities.  Met 3/20/2022   3. Strength: Patient will improve strength to  50% of stated goals, listed in objective measures above, in order to progress towards independence with functional activities.  progressing   4. Self Care: Patient will demonstrate improved self care skills listed in objective measure above,in order to progress towards independence with functional activities.  Met 3/30/2022   5. HEP: Patient will demonstrate independence with HEP in order to progress toward functional independence. ongoing      Long Term Goals:  8 weeks     1. Pain: Pt will demonstrate improved pain by reports of less than or equal to 1/10 worst pain on the verbal rating scale in order to progress toward maximal functional ability and improve QOL.    progressing   2. Function: Patient will demonstrate improved function as indicated by a functional limitation score of less than or equal to 33 out of 100 on FOTO. Met 3/30/2022   3. Mobility: Patient will improve AROM to stated goals, listed in objective measures above, in order to return to maximal functional potential and improve quality of life.     4. Strength: Patient will improve strength to stated goals, listed in objective measures above, in order to improve functional independence and quality of life.     5. Self Care: Patient will demonstrate increased self care skills to an independent level or modified independent level with adaptive equipment as needed.     6. Patient will return to normal ADL's, IADL's, community involvement, recreational activities, and work-related activities with less than or equal to 0/10 pain and maximal           PLAN   Continue Plan of Care (POC) and progress per patient tolerance.    JR Blakely, RICHI

## 2022-04-07 ENCOUNTER — TELEPHONE (OUTPATIENT)
Dept: RHEUMATOLOGY | Facility: CLINIC | Age: 57
End: 2022-04-07
Payer: COMMERCIAL

## 2022-04-07 ENCOUNTER — CLINICAL SUPPORT (OUTPATIENT)
Dept: REHABILITATION | Facility: HOSPITAL | Age: 57
End: 2022-04-07
Attending: INTERNAL MEDICINE
Payer: COMMERCIAL

## 2022-04-07 ENCOUNTER — DOCUMENTATION ONLY (OUTPATIENT)
Dept: REHABILITATION | Facility: HOSPITAL | Age: 57
End: 2022-04-07

## 2022-04-07 DIAGNOSIS — M62.81 PROXIMAL MUSCLE WEAKNESS: ICD-10-CM

## 2022-04-07 DIAGNOSIS — Z74.09 DECREASED FUNCTIONAL MOBILITY AND ENDURANCE: Primary | ICD-10-CM

## 2022-04-07 PROCEDURE — 97110 THERAPEUTIC EXERCISES: CPT | Mod: CQ

## 2022-04-07 PROCEDURE — 97112 NEUROMUSCULAR REEDUCATION: CPT | Mod: CQ

## 2022-04-07 NOTE — PROGRESS NOTES
[facility]   Physical Therapy Treatment Note + Progress Note    Name: Ayesha Arcos  Clinic Number: [id number]    Therapy Diagnosis:   Encounter Diagnoses   Name Primary?    Decreased functional mobility and endurance Yes    Proximal muscle weakness      Physician: Perez Thomas MD    Visit Date: 4/7/2022    Physician Orders: PT Eval and Treat  Medical Diagnosis from Referral: Bursopathy  Evaluation Date: 2/15/2022  Authorization Period Expiration: 12/31/2022  Plan of Care Expiration: 5/15/2022  Progress Note Due: 4/17/2022  Visit # / Visits authorized: 8/20 (+1 eval)  FOTO: 3/ 3 (last done on 4/7/22)     Precautions: Standard and Immunosuppression    PTA Visit #: 3/5     Time In: 0710 (late arrival)  Time Out: 0743  Total Billable Time: 30 minutes     SUBJECTIVE     Patient reports: she is feeling good and not having any pain. She plans to join a gym and will also walk around the trail outside today.      He/She was compliant with home exercise program.  Response to previous treatment: good  Functional change: able to do more home activities    Pain: 0/10     Location: right lower extremity    OBJECTIVE     Objective Measures updated at progress report unless specified.     STRENGTH:     L/E MMT Right  (spine) Left Pain/Dysfunction with Movement Goal   Hip Flexion  4+/5 4+/5  4+/5 B   Hip Extension  4+/5 5/5  4+/5 B   Hip Abduction  5/5 5/5  4+/5 B   Knee Extension 5/5 5/5  5/5 B   Knee Flexion 4+/5 4+/5  5/5 B   Hip IR 5/5 5/5  4+/5 B   Hip ER 4+/5 4+/5  4+/5 B   Ankle DF 5/5 5/5   5/5 B   Ankle PF 5/5 5/5   5/5 B          TREATMENT     Ayesha received the treatments listed below:     MANUAL THERAPY TECHNIQUES were applied for (0) minutes, including:    Manual Intervention Performed Today    Soft Tissue Mobilization  Right glute, hip, IT band, TFL    Joint Mobilizations     Mobilization with movement     Long axis distraction  Right    Functional Dry Needling        Plan for  Next Visit:          THERAPEUTIC EXERCISES to develop strength, endurance, ROM, flexibility, posture and core stabilization for (10) minutes including:    Intervention Performed Today    Recumbent bike for endurance x Level 1, 8 minutes   Stretches           Hamstring (superior with strap): 30s   ITB (superior with strap): 2 x 30s   Piriformis: 2 x 30s   Quadriceps (prone with strap): 2 x 1 min  gastroc stretch 3x30s at wedge   Shuttle  Level 4, 5 minutes  Level 2 2 minutes right only   Single knee to chest  1 minute each bilateral   Clamshells  3 x 10 on R red   Straight leg raise flexion   2 x 10 B    sidelying hip abduction  3 x 10 each bilateral   Bridges + abduction isometric   3 x 10 with 3s holds, red band at knees    Calf raises  x 3 x 10    Prone hip extension with knee flexed   3 x 10 B      Plan for Next Visit:        NEUROMUSCULAR RE-EDUCATION ACTIVITIES to improve Balance, Coordination, Kinesthetic, Sense, Proprioception and Posture for (20) minutes.  The following were included:    Intervention Performed Today    Sit to stands x 3x10 red band at knees   Single leg sit to stands  2x10 hi-lo mat each bilateral   Side stepping x 3x10 each way   Monster walks x 3x10 each way   Standing hip 3 way x Red band 3x7 each bilateral   Standing clamshells x Red 3x10 eaach bilateral                    Plan for Next Visit:          PATIENT EDUCATION AND HOME EXERCISES     Home Exercises Provided and Patient Education Provided     Education provided: () minutes   Patient educated on biomechanical justification for therapeutic exercise and importance of compliance with HEP in order to improve overall impairments and QOL    Patient was educated on all the above exercise prior/during/after for proper posture, positioning, and execution for safe performance with home exercise program.    Patient educated on postural awareness and the use of a lumbar roll when in a seated position to reduce stress and maintain optimal  alignment of the spine.    Patient educated on proper ergonomics at the work station in order to maintain optimal alignment of the musculoskeletal system and improve efficiency in the work environment.   Patient educated on the importance of improved core and upper and lower extremity strength in order to improve alignment of the spine and upper and lower extremities with static positions and dynamic movement.    Patient educated on the importance of strong core and lower extremity musculature in order to improve both static and dynamic balance, improve gait mechanics, reduce fall risk and improve household and community mobility.       Written Home Exercises Provided: yes.  Exercises were reviewed and Ayesha was able to demonstrate them prior to the end of the session.  Ayesha demonstrated good  understanding of the education provided. See EMR under Patient Instructions for exercises provided during therapy sessions.      ASSESSMENT     Patient did well with session today with no complaints of discomfort. Good fatigue noted throughout session, rest breaks taken as needed. Improved core activation and less verbal cues necessary for proper performance of activities. Patient left session with some soreness present.     Manual Muscle Testing assessed today. Patient with improved lower extremity strength per measurements taken today. Patient also self reports significant decreased limitation status percentage per FOTO results. Patient will benefit from being discharged to a home exercise program to maintain the gains made in physical therapy with range of motion, flexibility, stability, and strength.     Ayesha is progressing well towards her goals.   Pt prognosis is Excellent.     Pt will continue to benefit from skilled outpatient physical therapy to address the deficits listed in the problem list box on initial evaluation, provide pt/family education and to maximize pt's level of independence in the home and  community environment.     Pt's spiritual, cultural and educational needs considered and pt agreeable to plan of care and goals.     Anticipated Barriers for therapy: co-morbidities and adherence to treatment plan    GOALS:     Short Term Goals:  6 weeks Progress   4/7/2022   1. Pain: Pt will demonstrate improved pain by reports of less than or equal to 5/10 worst pain on the verbal rating scale in order to progress toward maximal functional ability and improve QOL. Met   2. Function: Patient will demonstrate improved function as indicated by a functional limitation score of less than or equal to 15 out of 100 on FOTO. Met   3. Strength: Patient will improve strength to 50% of stated goals, listed in objective measures above, in order to progress towards independence with functional activities.  Met   4. HEP: Patient will demonstrate independence with HEP in order to progress toward functional independence. Met      Long Term Goals:  12 weeks Progress  4/7/2022   1. Pain: Pt will demonstrate improved pain by reports of less than or equal to 1/10 worst pain on the verbal rating scale in order to progress toward maximal functional ability and improve QOL.   Met   2. Function: Patient will demonstrate improved function as indicated by a functional limitation score of less than or equal to 10 out of 100 on FOTO. Met   3. Strength: Patient will improve strength to stated goals, listed in objective measures above, in order to improve functional independence and quality of life. Met   4. Gait: Patient will demonstrate normalized gait mechanics with minimal compensation in order to return to PLOF. Met   5. Patient will return to regular walking and biking for exercise 3 days per week in order to return to prior level of function and maximize quality of life  PC    PC= progressing/continue;    PM= partially met;             DC= discontinue      PLAN   Plan of care Certification: 2/15/2022 to 5/15/2022.    Continue Plan of  Care (POC) and progress per patient tolerance. See treatment section for details on planned progressions next session.      Maye Rose, MAURICIO

## 2022-04-07 NOTE — TELEPHONE ENCOUNTER
----- Message from Sabine Souza LPN sent at 4/6/2022  3:56 PM CDT -----  Contact: Ayesha    ----- Message -----  From: Noemi Cortez  Sent: 4/6/2022   3:50 PM CDT  To: William Todd Staff    Ayesha called in to say that she received a letter from [facility] regards to an prior authorization request for Upadacitinib (RINVOQ) 15 mg due by July 1st, Please call her at [phone]. and also she wants to know if the office can call [facility] regards to this matter.    Thanks  Td

## 2022-04-07 NOTE — PROGRESS NOTES
PT/PTA met face to face to discuss patient's treatment plan and progress towards established goals. Patient will be seen by physical therapist every sixth visit and minimally once per month.     Maye Rose PTA

## 2022-04-11 ENCOUNTER — CLINICAL SUPPORT (OUTPATIENT)
Dept: REHABILITATION | Facility: HOSPITAL | Age: 57
End: 2022-04-11
Attending: INTERNAL MEDICINE
Payer: COMMERCIAL

## 2022-04-11 DIAGNOSIS — R29.898 DECREASED GRIP STRENGTH: ICD-10-CM

## 2022-04-11 DIAGNOSIS — Z78.9 DECREASED ACTIVITIES OF DAILY LIVING (ADL): ICD-10-CM

## 2022-04-11 DIAGNOSIS — M25.60 DECREASED RANGE OF MOTION: Primary | ICD-10-CM

## 2022-04-11 PROCEDURE — 97112 NEUROMUSCULAR REEDUCATION: CPT | Performed by: OCCUPATIONAL THERAPIST

## 2022-04-11 PROCEDURE — 97110 THERAPEUTIC EXERCISES: CPT | Performed by: OCCUPATIONAL THERAPIST

## 2022-04-11 NOTE — PROGRESS NOTES
OCCUPATIONAL THERAPY DAILY NOTE    Name: Ayesha Arcos  Clinic Number: [id number]    Therapy Diagnosis:   No diagnosis found.  Physician: Perez Thomas MD    Visit Date: 4/11/2022  Physician Orders: Evaluation and treatment   Medical Diagnosis:   M71.9 (ICD-10-CM) - Bursopathy, unspecified   M19.90 (ICD-10-CM) - Osteoarthritis, unspecified osteoarthritis type, unspecified site      Surgical Procedure and Date: NA,   Evaluation Date: 2/23/2022  Insurance Authorization Period Expiration: 12/31/2022  Plan of Care Certification Period: 2/23/2022 to 4/23/2022  Progress Note Due: 5/4/2022  Date of Return to MD: 7/8/2022  Visit # / Visits authorized: 8/ 20  FOTO: 2/3     Precautions:  Standard     Time In: 0705  Time Out: 0850  Total Appointment Time (timed & untimed codes): 45 minutes     SUBJECTIVE     Today, pt reports: she has no pain complaints today.    He/She was compliant with home exercise program.  Response to previous treatment: increased strength  Functional change: self care    Pre-Treatment Pain: 0/10  Post-Treatment Pain: 0/10  Location: NA      TREATMENT   UPDATED 3/30/2022    RANGE OF MOTION:  Shoulder flexion: ACTIVE RANGE OF MOTION: 170   PASSIVE RANGE OF MOTION: 175  Shoulder abduction: ACTIVE RANGE OF MOTION: 64  Increased to 155   PASSIVE RANGE OF MOTION: 160   Internal rotation 90  EXTERNAL ROTATION: ACTIVE RANGE OF MOTION: 60   PASSIVE RANGE OF MOTION: 70  Shoulder extension: 40  Functional internal rotation to the middle of her back     Strength: (WARD Dynamometer in lbs.) Average 3 trials, Position II  Right: 30#  Increased 55#  Left: 35#    Increased to 50#    MUSCLE STRENGTH:  Shoulder flexion: 4+/5  Shoulder abduction: 4+/5  Internal rotation: 5/5  External rotation: 2-/5    Palpation: tendon over biceps tendon anterior subacromial space    Ayesha received therapeutic exercises to develop strength, endurance, ROM, flexibility, posture and core stabilization for 35 minutes  including:  UBE x4 minutes 2.0 level for endurance and warm up  2 forward and 2 minutes backward.    Pulley exercises: 2 minutes both directions   Shoulder flexion  Shoulder abduction    Standing dowel flexion on corner: 20x  Supine external rotation stretches (90) with dowel: 3/30 second holds  Wall shoulder flexion/abduction stretches: 1/30 second holds each  Wall shoulder external rotation: 3/30 second hold  External rotation with dowel in standing and 0 degrees abduction: 20x    Isometric external rotation: 10/5 second holds  rthymic stabilization exercises in external rotation at 0 and 45 degrees    ISOTONIC EXERCISES: 2 SETS 10   Bilateral low and mid rows  - green band  Scaption: 1#   20x  Bilateral upper extremities  Supine shoulder flexion with elbow flexion:2#  2 sets 10  Side lying external rotation 20x  Side lying flexion/abduction: 20x  SUPINE figure eights 2# 20x  Biceps curls: 4#  Supine external rotation at 45 degrees; with 2# weight with towel under arm  Shoulder flexion and abduction: 30-60 degrees 1#  Not today       Ayesha received the following manual therapy techniques: Soft tissue Mobilization were applied to the: right shoulder for 0 minutes, including:  STM of right upper trapezius, infraspinatus , deltoid and biceps        Ayesha participated in neuromuscular re-education activities to improve: Posture for 10 minutes. The following activities were included:    Exercise 4/11/2022   Scapular depression and retraction in side lying with resistance at scapula and elbow 10/10 second holds   Scapular depression with external rotation 10/10 second holds   Scapular depression with abudciotn                            Home Exercises Provided and Patient Education Provided     Education/Self-Care provided: (5 minutes) during exercises on bands every session   Patient educated on biomechanical justification for therapeutic exercise and importance of compliance with HEP in order to improve overall  impairments and QOL every session   Patient educated on postural awareness during band exercises.every session   Recommended daily wall stretches    Written Home Exercises Provided: yes.  Exercises were reviewed and Ayesha was able to demonstrate them prior to the end of the session.  Ayesha demonstrated good  understanding of the education provided.     See EMR under Patient Instructions for exercises provided 3/11/2022.    ASSESSMENT    Pt tolerated exercise well with reports of increased fatigue but no increased pain, except mild lateral deltoid pain with shoulder abduction. Pt demonstrated good understanding of exercises and required moderate cueing to maintain proper form as she elevates her shoulder with active range of motion.  Patient demonstrates increased left shoulder strength in her posterior shoulder muscles and deltoid muscles..    Ayesha Is progressing well towards her goals.   Pt prognosis is Good.     Pt will continue to benefit from skilled outpatient occupational therapy to address the deficits listed in the problem list box on initial evaluation, provide pt/family education and to maximize pt's level of independence in the home and community environment.     Pt's spiritual, cultural and educational needs considered and pt agreeable to plan of care and goals.     Anticipated barriers to occupational therapy: chronicity of pain.    GOALS:     Short Term Goals:  4 weeks                                          updated  3/30/2022   1. Pain: Pt will demonstrate improved pain by reports of less than or equal to 5/10 worst pain on the verbal rating scale in order to progress toward maximal functional ability and improve QOL. Met 3/11/2022   2. Mobility: Patient will improve AROM to 50% of stated goals, listed in objective measures above, in order to progress towards independence with functional activities.  Met 3/20/2022   3. Strength: Patient will improve strength to 50% of stated goals, listed in  objective measures above, in order to progress towards independence with functional activities.  progressing   4. Self Care: Patient will demonstrate improved self care skills listed in objective measure above,in order to progress towards independence with functional activities.  Met 3/30/2022   5. HEP: Patient will demonstrate independence with HEP in order to progress toward functional independence. ongoing      Long Term Goals:  8 weeks     1. Pain: Pt will demonstrate improved pain by reports of less than or equal to 1/10 worst pain on the verbal rating scale in order to progress toward maximal functional ability and improve QOL.    progressing   2. Function: Patient will demonstrate improved function as indicated by a functional limitation score of less than or equal to 33 out of 100 on FOTO. Met 3/30/2022   3. Mobility: Patient will improve AROM to stated goals, listed in objective measures above, in order to return to maximal functional potential and improve quality of life.     4. Strength: Patient will improve strength to stated goals, listed in objective measures above, in order to improve functional independence and quality of life.     5. Self Care: Patient will demonstrate increased self care skills to an independent level or modified independent level with adaptive equipment as needed.     6. Patient will return to normal ADL's, IADL's, community involvement, recreational activities, and work-related activities with less than or equal to 0/10 pain and maximal           PLAN   Continue Plan of Care (POC) and progress per patient tolerance.    JR Blakely, RICHI

## 2022-04-13 ENCOUNTER — CLINICAL SUPPORT (OUTPATIENT)
Dept: REHABILITATION | Facility: HOSPITAL | Age: 57
End: 2022-04-13
Attending: INTERNAL MEDICINE
Payer: COMMERCIAL

## 2022-04-13 ENCOUNTER — TELEPHONE (OUTPATIENT)
Dept: RHEUMATOLOGY | Facility: CLINIC | Age: 57
End: 2022-04-13
Payer: COMMERCIAL

## 2022-04-13 DIAGNOSIS — Z78.9 DECREASED ACTIVITIES OF DAILY LIVING (ADL): ICD-10-CM

## 2022-04-13 DIAGNOSIS — M25.60 DECREASED RANGE OF MOTION: Primary | ICD-10-CM

## 2022-04-13 DIAGNOSIS — R29.898 DECREASED GRIP STRENGTH: ICD-10-CM

## 2022-04-13 PROCEDURE — 97110 THERAPEUTIC EXERCISES: CPT | Performed by: OCCUPATIONAL THERAPIST

## 2022-04-13 PROCEDURE — 97112 NEUROMUSCULAR REEDUCATION: CPT | Performed by: OCCUPATIONAL THERAPIST

## 2022-04-13 NOTE — PROGRESS NOTES
OCCUPATIONAL THERAPY DAILY NOTE    Name: Ayesha Arcos  Clinic Number: [id number]    Therapy Diagnosis:   Encounter Diagnoses   Name Primary?    Decreased range of motion Yes    Decreased  strength     Decreased activities of daily living (ADL)      Physician: Perez Thomas MD    Visit Date: 4/13/2022  Physician Orders: Evaluation and treatment   Medical Diagnosis:   M71.9 (ICD-10-CM) - Bursopathy, unspecified   M19.90 (ICD-10-CM) - Osteoarthritis, unspecified osteoarthritis type, unspecified site      Surgical Procedure and Date: NA,   Evaluation Date: 2/23/2022  Insurance Authorization Period Expiration: 12/31/2022  Plan of Care Certification Period: 2/23/2022 to 4/23/2022  Progress Note Due: 5/4/2022  Date of Return to MD: 7/8/2022  Visit # / Visits authorized: 9/ 20  FOTO: 2/3     Precautions:  Standard     Time In: 0707  Time Out: 0807  Total Appointment Time (timed & untimed codes): 60 minutes     SUBJECTIVE     Today, pt reports: no problems    He/She was compliant with home exercise program.  Response to previous treatment: increased strength  Functional change: over head reach has improved    Pre-Treatment Pain: 0/10  Post-Treatment Pain: 0/10  Location: NA      TREATMENT   UPDATED 4/13/2022    RANGE OF MOTION: left shoulder  Shoulder flexion: ACTIVE RANGE OF MOTION:1770 to 175   PASSIVE RANGE OF MOTION: 175 to 180  Shoulder abduction: ACTIVE RANGE OF MOTION: 64  Increased to 155 to 165   PASSIVE RANGE OF MOTION: 160 to 165  Internal rotation 90  EXTERNAL ROTATION: ACTIVE RANGE OF MOTION: 10   PASSIVE RANGE OF MOTION: 70  Shoulder extension: 40  Functional internal rotation to the middle of her back     Strength: (WARD Dynamometer in lbs.) Average 3 trials, Position II  Right: 30#  Increased 55#  Left: 35#    Increased to 50#    MUSCLE STRENGTH:  Shoulder flexion: 4+/5  Shoulder abduction: 3+/5  Internal rotation: 5/5  External rotation: 2-/5    Palpation: tendon over biceps tendon  anterior subacromial space    Ayesha received therapeutic exercises to develop strength, endurance, ROM, flexibility, posture and core stabilization for 50 minutes including:  UBE x4 minutes 2.0 level for endurance and warm up  2 forward and 2 minutes backward.    Wall shoulder flexion/abduction and external rotation at (90) stretches: 2/30 second holds each    Wall shoulder external rotation: 3/30 second hold  External rotation with dowel in standing and 0 degrees abduction: 20x  Standing dowel flexion on corner: 20x  Supine external rotation stretches (90) with dowel: 3/30 second holds    Isometric external rotation: 10/5 second holds  rthymic stabilization exercises in external rotation at 0 and 45 degrees    ISOTONIC EXERCISES: 2 SETS 10   Bilateral low and mid rows  - green band  Scaption: 2#   20x  Bilateral upper extremities  30-60 degrees  Supine shoulder flexion with elbow flexion:2#  2 sets 10  Side lying external rotation:2#  20x  Side lying flexion/abduction: 20x  SUPINE figure eights 2# 20x  Biceps curls: 4#  Supine external rotation at 45 degrees; with 2# weight with towel under arm  Shoulder flexion and abduction: 30-60 degrees 1#  Not today       Ayesha participated in neuromuscular re-education activities to improve: Posture for 10 minutes. The following activities were included:    Exercise 4/13/2022   Scapular depression and retraction in side lying with resistance at scapula and elbow 10/10 second holds   Scapular depression with external rotation 10/10 second holds   Scapular depression with horizontal abudciotn                            Home Exercises Provided and Patient Education Provided     Education/Self-Care provided: (5 minutes) during exercises on bands every session   Patient educated on postural awareness during band exercises.every session   Recommended daily wall stretches in external rotation    Written Home Exercises Provided: yes.  Exercises were reviewed and Ayesha was  able to demonstrate them prior to the end of the session.  Ayesha demonstrated good  understanding of the education provided.     See EMR under Patient Instructions for exercises provided 3/11/2022.    ASSESSMENT    Pt tolerated exercise well with reports of increased fatigue but no increased pain, except mild lateral deltoid pain with shoulder abduction. Pt demonstrated good understanding of exercises and required moderate cueing to maintain proper form as she elevates her shoulder with active range of motion.  Patient demonstrates increased left shoulder range of motion in flexion and abduction as documented above. Functional usage of her left upper extremity is improving.    Ayesha Is progressing well towards her goals.   Pt prognosis is Good.     Pt will continue to benefit from skilled outpatient occupational therapy to address the deficits listed in the problem list box on initial evaluation, provide pt/family education and to maximize pt's level of independence in the home and community environment.     Pt's spiritual, cultural and educational needs considered and pt agreeable to plan of care and goals.     Anticipated barriers to occupational therapy: chronicity of pain.    GOALS:     Short Term Goals:  4 weeks                                          updated  3/30/2022   1. Pain: Pt will demonstrate improved pain by reports of less than or equal to 5/10 worst pain on the verbal rating scale in order to progress toward maximal functional ability and improve QOL. Met 3/11/2022   2. Mobility: Patient will improve AROM to 50% of stated goals, listed in objective measures above, in order to progress towards independence with functional activities.  Met 3/20/2022   3. Strength: Patient will improve strength to 50% of stated goals, listed in objective measures above, in order to progress towards independence with functional activities.  progressing   4. Self Care: Patient will demonstrate improved self care  skills listed in objective measure above,in order to progress towards independence with functional activities.  Met 3/30/2022   5. HEP: Patient will demonstrate independence with HEP in order to progress toward functional independence. ongoing      Long Term Goals:  8 weeks                              Updated   4/13/2022   1. Pain: Pt will demonstrate improved pain by reports of less than or equal to 1/10 worst pain on the verbal rating scale in order to progress toward maximal functional ability and improve QOL.    progressing   2. Function: Patient will demonstrate improved function as indicated by a functional limitation score of less than or equal to 33 out of 100 on FOTO. Met 3/30/2022   3. Mobility: Patient will improve AROM to stated goals, listed in objective measures above, in order to return to maximal functional potential and improve quality of life.     4. Strength: Patient will improve strength to stated goals, listed in objective measures above, in order to improve functional independence and quality of life.     5. Self Care: Patient will demonstrate increased self care skills to an independent level or modified independent level with adaptive equipment as needed.     6. Patient will return to normal ADL's, IADL's, community involvement, recreational activities, and work-related activities with less than or equal to 0/10 pain and maximal           PLAN   Continue Plan of Care (POC) and progress per patient tolerance.    Katie Lentz, VANDANAR, RICHI

## 2022-04-14 ENCOUNTER — SPECIALTY PHARMACY (OUTPATIENT)
Dept: PHARMACY | Facility: CLINIC | Age: 57
End: 2022-04-14
Payer: COMMERCIAL

## 2022-04-14 NOTE — TELEPHONE ENCOUNTER
Specialty Pharmacy - Refill Coordination    Specialty Medication Orders Linked to Encounter    Flowsheet Row Most Recent Value   Medication #1 upadacitinib (RINVOQ) 15 mg 24 hr tablet (Order#[id number], Rx#)          Refill Questions - Documented Responses    Flowsheet Row Most Recent Value   Patient Availability and HIPAA Verification    Does patient want to proceed with activity? Yes   HIPAA/medical authority confirmed? Yes   Relationship to patient of person spoken to? Self   Refill Screening Questions    Changes to allergies? No   Changes to medications? No   New conditions since last clinic visit? No   Unplanned office visit, urgent care, ED, or hospital admission in the last 4 weeks? No   How does patient/caregiver feel medication is working? Good   Financial problems or insurance changes? No   How many doses of your specialty medications were missed in the last 4 weeks? 0   Would patient like to speak to a pharmacist? No   When does the patient need to receive the medication? 04/19/22   Refill Delivery Questions    How will the patient receive the medication? Delivery Nini   When does the patient need to receive the medication? 04/19/22   Shipping Address Home   Address in [facility] confirmed and updated if neccessary? Yes   Expected Copay ($) 5   Is the patient able to afford the medication copay? Yes   Payment Method CC on file   Days supply of Refill 30   Supplies needed? No supplies needed   Refill activity completed? Yes   Refill activity plan Refill scheduled   Shipment/Pickup Date: 04/19/22          Current Outpatient Medications   Medication Sig    diclofenac sodium (VOLTAREN) 1 % Gel Apply 2 g topically 4 (four) times daily. for 14 days    FLAXSEED OIL ORAL Take by mouth.    folic acid (FOLVITE) 1 MG tablet TAKE 1 TABLET(1 MG) BY MOUTH EVERY DAY    methotrexate 2.5 MG Tab Take 10 tablets (25 mg total) by mouth every 7 days.    multivitamin capsule Take 1 capsule by mouth once daily.     multivitamin with minerals (HAIR,SKIN AND NAILS ORAL) Take by mouth once daily.    rosuvastatin (CRESTOR) 10 MG tablet Take 1 tablet (10 mg total) by mouth once daily.    TURMERIC ORAL Take by mouth.    upadacitinib (RINVOQ) 15 mg 24 hr tablet Take 1 tablet (15 mg total) by mouth once daily.    valACYclovir (VALTREX) 1000 MG tablet Take 1 tablet (1,000 mg total) by mouth 2 (two) times daily. for 10 days   Last reviewed on 3/4/2022  7:30 AM by Domenica Claros LPN    Review of patient's allergies indicates:  No Known Allergies Last reviewed on  3/4/2022 7:30 AM by Domenica Claros      Tasks added this encounter   5/12/2022 - Refill Call (Auto Added)   Tasks due within next 3 months   No tasks due.     Marianne Russo, Patient Care Assistant  [facility]  [address]  [facility] [address]  Phone: [phone]  Fax: [phone]

## 2022-04-18 ENCOUNTER — PATIENT MESSAGE (OUTPATIENT)
Dept: ADMINISTRATIVE | Facility: OTHER | Age: 57
End: 2022-04-18
Payer: COMMERCIAL

## 2022-04-20 ENCOUNTER — CLINICAL SUPPORT (OUTPATIENT)
Dept: REHABILITATION | Facility: HOSPITAL | Age: 57
End: 2022-04-20
Attending: INTERNAL MEDICINE
Payer: COMMERCIAL

## 2022-04-20 DIAGNOSIS — R29.898 DECREASED GRIP STRENGTH: ICD-10-CM

## 2022-04-20 DIAGNOSIS — M25.60 DECREASED RANGE OF MOTION: Primary | ICD-10-CM

## 2022-04-20 DIAGNOSIS — Z78.9 DECREASED ACTIVITIES OF DAILY LIVING (ADL): ICD-10-CM

## 2022-04-20 PROCEDURE — 97110 THERAPEUTIC EXERCISES: CPT | Performed by: OCCUPATIONAL THERAPIST

## 2022-04-20 PROCEDURE — 97112 NEUROMUSCULAR REEDUCATION: CPT | Performed by: OCCUPATIONAL THERAPIST

## 2022-04-20 NOTE — PROGRESS NOTES
OCCUPATIONAL THERAPY DAILY NOTE    Name: Ayesha Arcos  Clinic Number: [id number]    Therapy Diagnosis:   Encounter Diagnoses   Name Primary?    Decreased range of motion Yes    Decreased  strength     Decreased activities of daily living (ADL)      Physician: ePrez Thomas MD    Visit Date: 4/20/2022  Physician Orders: Evaluation and treatment   Medical Diagnosis:   M71.9 (ICD-10-CM) - Bursopathy, unspecified   M19.90 (ICD-10-CM) - Osteoarthritis, unspecified osteoarthritis type, unspecified site      Surgical Procedure and Date: NA,   Evaluation Date: 2/23/2022  Insurance Authorization Period Expiration: 12/31/2022  Plan of Care Certification Period: 2/23/2022 to 4/23/2022  Progress Note Due: 5/4/2022  Date of Return to MD: 7/8/2022  Visit # / Visits authorized: 10/ 20  FOTO: 3/3     Precautions:  Standard     Time In: 0715  Time Out: 0805  Total Appointment Time (timed & untimed codes): 50 minutes     SUBJECTIVE     Today, pt reports:  I can reach my zipper nowself     He/She was compliant with home exercise program.  Response to previous treatment: increased strength  Functional change: care skills are no problem.    Pre-Treatment Pain: 0/10  Post-Treatment Pain: 0/10  Location: NA    CMS Impairment/Limitation/Restriction for FOTO shoulder Survey    Therapist reviewed FOTO scores for Ayesha Arcos on 4/20/2022.   FOTO documents entered into [employer] - see Media section.    Limitation Score: 17%          TREATMENT   UPDATED 4/18/2022    RANGE OF MOTION: left shoulder  Shoulder flexion: ACTIVE RANGE OF MOTION:1770 to 175   PASSIVE RANGE OF MOTION: 175 to 180  Shoulder abduction: ACTIVE RANGE OF MOTION: 64  Increased to 155 to 165   PASSIVE RANGE OF MOTION: 160 to 170  Internal rotation 90  EXTERNAL ROTATION: ACTIVE RANGE OF MOTION: 10   PASSIVE RANGE OF MOTION: 70  Shoulder extension: 40  Functional internal rotation to the middle of her back     Strength: (WARD Dynamometer in lbs.)  Average 3 trials, Position II  Right: 30#  Increased 55#  Left: 35#    Increased to 50#    MUSCLE STRENGTH:  Shoulder flexion: 4+/5  Shoulder abduction: 3+/5  Internal rotation: 5/5  External rotation: 2-/5    Palpation: tendon over biceps tendon anterior subacromial space    Ayesha received therapeutic exercises to develop strength, endurance, ROM, flexibility, posture and core stabilization for 30 minutes including:  UBE x4 minutes 2.0 level for endurance and warm up  2 forward and 2 minutes backward.    Wall shoulder flexion/abduction and external rotation at (90) stretches: 2/30 second holds each    Wall shoulder external rotation: 3/30 second hold  Standing dowel flexion on corner: 20x  Supine external rotation stretches (90) with dowel: 3/30 second holds    Isometric external rotation: 10/5 second holds  rthymic stabilization exercises in external rotation at 0 and 45 degrees    ISOTONIC EXERCISES: 2-3 SETS 10   Bilateral low and mid rows  - green band  Scaption: 2#   20x  Bilateral upper extremities  30-60 degrees  Supine shoulder flexion with elbow flexion:2#  2 sets 10  Side lying external rotation:0#  20x  Side lying flexion/abduction: 20x  1#  SUPINE figure eights 2# 20x  Biceps curls: 4#  Triceps: 3#  Shoulder punches in flexion in standing - yellow - 30x  Supine external rotation at 45 degrees; with 2# weight with towel under arm  Scaption: 30-60 degrees 1#         Ayesha participated in neuromuscular re-education activities to improve: Posture for 15 minutes. The following activities were included:    Exercise 4/20/2022   Scapular depression and retraction in side lying with resistance at scapula and elbow 10/10 second holds   Scapular depression with external rotation 10/10 second holds   Scapular depression with horizontal abudciotn                            Home Exercises Provided and Patient Education Provided     Education/Self-Care provided: (5 minutes) during exercises on bands every  session   Patient educated on postural awareness during band exercises.every session   Recommended daily wall stretches in external rotation    Written Home Exercises Provided: yes.  Exercises were reviewed and Ayesha was able to demonstrate them prior to the end of the session.  Ayesha demonstrated good  understanding of the education provided.   See EMR under Patient Instructions for exercises provided 4/18/2022.    ASSESSMENT    Pt tolerated exercise well with reports of increased fatigue but no increased pain. Pt demonstrated good understanding of exercises and required moderate cueing to maintain scapular control  as she elevates her shoulder with active range of motion.   Functional usage of her left upper extremity continues to improve as well as significant improvement in FOTO scores. No change in external rotation strength,however functional strength is improving.Patient demonstrates mild end range stiffness in external rotation.    Ayesha Is progressing well towards her goals.   Pt prognosis is Good.     Pt will continue to benefit from skilled outpatient occupational therapy to address the deficits listed in the problem list box on initial evaluation, provide pt/family education and to maximize pt's level of independence in the home and community environment.     Pt's spiritual, cultural and educational needs considered and pt agreeable to plan of care and goals.     Anticipated barriers to occupational therapy: chronicity of pain.    GOALS:     Short Term Goals:  4 weeks                                          updated  4/18/2022   1. Pain: Pt will demonstrate improved pain by reports of less than or equal to 5/10 worst pain on the verbal rating scale in order to progress toward maximal functional ability and improve QOL. Met 3/11/2022   2. Mobility: Patient will improve AROM to 50% of stated goals, listed in objective measures above, in order to progress towards independence with functional  activities.  Met 3/20/2022   3. Strength: Patient will improve strength to 50% of stated goals, listed in objective measures above, in order to progress towards independence with functional activities.  progressing   4. Self Care: Patient will demonstrate improved self care skills listed in objective measure above,in order to progress towards independence with functional activities.  Met 3/30/2022   5. HEP: Patient will demonstrate independence with HEP in order to progress toward functional independence. ongoing      Long Term Goals:  8 weeks                              Updated   4/18/2022   1. Pain: Pt will demonstrate improved pain by reports of less than or equal to 1/10 worst pain on the verbal rating scale in order to progress toward maximal functional ability and improve QOL.    progressing   2. Function: Patient will demonstrate improved function as indicated by a functional limitation score of less than or equal to 33 out of 100 on FOTO. Met 3/30/2022   3. Mobility: Patient will improve AROM to stated goals, listed in objective measures above, in order to return to maximal functional potential and improve quality of life.  ongoing   4. Strength: Patient will improve strength to stated goals, listed in objective measures above, in order to improve functional independence and quality of life.     5. Self Care: Patient will demonstrate increased self care skills to an independent level or modified independent level with adaptive equipment as needed.  Met 4/18/2022   6. Patient will return to normal ADL's, IADL's, community involvement, recreational activities, and work-related activities with less than or equal to 0/10 pain and maximal           PLAN   Continue Plan of Care (POC) and progress per patient tolerance.  Follow up with physician concerning an orthopedic evaluation of her rotator cuff.    JR Blakely, RICHI

## 2022-04-22 ENCOUNTER — CLINICAL SUPPORT (OUTPATIENT)
Dept: REHABILITATION | Facility: HOSPITAL | Age: 57
End: 2022-04-22
Payer: COMMERCIAL

## 2022-04-22 DIAGNOSIS — R29.898 DECREASED GRIP STRENGTH: ICD-10-CM

## 2022-04-22 DIAGNOSIS — M25.60 DECREASED RANGE OF MOTION: Primary | ICD-10-CM

## 2022-04-22 DIAGNOSIS — Z78.9 DECREASED ACTIVITIES OF DAILY LIVING (ADL): ICD-10-CM

## 2022-04-22 PROCEDURE — 97110 THERAPEUTIC EXERCISES: CPT | Performed by: OCCUPATIONAL THERAPIST

## 2022-04-22 PROCEDURE — 95852 RANGE OF MOTION MEASUREMENTS: CPT | Performed by: OCCUPATIONAL THERAPIST

## 2022-04-22 PROCEDURE — 97112 NEUROMUSCULAR REEDUCATION: CPT | Performed by: OCCUPATIONAL THERAPIST

## 2022-04-22 NOTE — PROGRESS NOTES
OCCUPATIONAL THERAPY DAILY NOTE/DISCHARGE NOTE    Name: Ayesha Arcos  Clinic Number: [id number]    Therapy Diagnosis:   Encounter Diagnoses   Name Primary?    Decreased range of motion Yes    Decreased  strength     Decreased activities of daily living (ADL)      Physician: Perez Thomas MD    Visit Date: 4/22/2022  Physician Orders: Evaluation and treatment   Medical Diagnosis:   M71.9 (ICD-10-CM) - Bursopathy, unspecified   M19.90 (ICD-10-CM) - Osteoarthritis, unspecified osteoarthritis type, unspecified site       Evaluation Date: 2/23/2022  Insurance Authorization Period Expiration: 12/31/2022    Date of Return to MD: 7/8/2022  Visit # / Visits authorized: 11/ 20  FOTO: 3/3     Precautions:  Standard     Time In: 0755  Time Out: 0850  Total Appointment Time (timed & untimed codes): 55 minutes     SUBJECTIVE     Today, pt reports:that she has good shoulder range of motion and strength.    He/She was compliant with home exercise program.  Response to previous treatment: increased strength  Functional change: care skills are no problem.    Pre-Treatment Pain: 0/10  Post-Treatment Pain: 0/10  Location: NA      CMS Impairment/Limitation/Restriction for FOTO shoulder Survey    Therapist reviewed FOTO scores for Ayesha Arcos on 4/20/2022.   FOTO documents entered into [employer] - see Media section.    Limitation Score: 17%            TREATMENT   UPDATED 4/22/2022    RANGE OF MOTION: left shoulder  Shoulder flexion:                 ACTIVE RANGE OF MOTION:170 to 175                                             PASSIVE RANGE OF MOTION: 175 to 180  Shoulder abduction:            ACTIVE RANGE OF MOTION: 64  to 170                                             PASSIVE RANGE OF MOTION: 160 to 175  EXTERNAL ROTATION:    ACTIVE RANGE OF MOTION: 10 at 0 abduction                                 PASSIVE RANGE OF MOTION: 70 to 80  External rotation: 90         Active range of motion : 50    Shoulder Internal  rotation: ACTIVE RANGE OF MOTION AND PASSIVE RANGE OF MOTION: 90    Shoulder extension: 40  Functional internal rotation to the middle of her back     Strength: (WARD Dynamometer in lbs.) Average 3 trials, Position II  Right: 30#  Increased 55#  Left: 35#    Increased to 50#    MUSCLE STRENGTH:  Shoulder flexion: 4+/5  Shoulder abduction: 3+/5  Internal rotation: 5/5  External rotation: 2-/5  Shoulder extension: 5/5    Palpation: tendon over biceps tendon anterior subacromial space    Ayesha received therapeutic exercises to develop strength, endurance, ROM, flexibility, posture and core stabilization for 30 minutes including:  UBE x4 minutes 2.0 level for endurance and warm up  2 forward and 2 minutes backward.    Wall shoulder flexion/abduction and external rotation at (90) stretches: 2/30 second holds each    Wall shoulder external rotation: 3/30 second hold  Standing dowel flexion on corner: 20x  Supine external rotation stretches (90) with dowel: 3/30 second holds    Isometric external rotation: 10/5 second holds  rthymic stabilization exercises in external rotation at 0 and 45 degrees    ISOTONIC EXERCISES: 2-3 SETS 10   Bilateral low and mid rows  - green band  Scaption: 2#   20x  Bilateral upper extremities  30-60 degrees  Supine shoulder flexion with elbow flexion:2#  2 sets 10  Side lying external rotation:0#  20x  Side lying flexion/abduction: 20x  1#  SUPINE serratus punches: 3# 30x  Biceps curls: 4#  Triceps: 3#  Shoulder punches in flexion in standing - yellow - 30x  Supine external rotation at 45 degrees; with 2# weight with towel under arm  Scaption: 30-60 degrees 1#         Ayesha participated in neuromuscular re-education activities to improve: Posture for 15 minutes. The following activities were included:    Exercise 4/22/2022   Scapular depression and retraction in side lying with resistance at scapula and elbow 10/10 second holds   Scapular depression with external rotation 10/10 second  holds   Scapular depression with horizontal abudciotn                            Home Exercises Provided and Patient Education Provided     Education/Self-Care provided: (5 minutes) during exercises    Patient educated on postural awareness .every session   Recommended daily wall stretches in external rotation and shoulder elevation    Written Home Exercises Provided: yes.  Exercises were reviewed and Ayesha was able to demonstrate them prior to the end of the session.  Ayesha demonstrated good  understanding of the education provided.   See EMR under Patient Instructions for exercises provided 4/22/2022.    ASSESSMENT    Pt tolerated exercise well with reports of increased fatigue but no increased pain. Pt demonstrated good understanding of exercises and required moderate cueing to maintain scapular control  as she elevates her shoulder with active range of motion.   Patient demonstrates mild capsular tightness of the glenohmeral joint that decreased after manual therapy with improved range of motion. Patient has demonstrated increased range of motion and strength in all muscles except her external rotator muscles. Patient has no complains of pain at rest or with activity.      Ayesha Is progressing well towards her goals.   Pt prognosis is Good.     Pt's spiritual, cultural and educational needs considered and pt agreeable to plan of care and goals.     Anticipated barriers to occupational therapy: chronicity of pain.    GOALS:     Short Term Goals:  4 weeks                                          updated  4/22/2022   1. Pain: Pt will demonstrate improved pain by reports of less than or equal to 5/10 worst pain on the verbal rating scale in order to progress toward maximal functional ability and improve QOL. Met 3/11/2022   2. Mobility: Patient will improve AROM to 50% of stated goals, listed in objective measures above, in order to progress towards independence with functional activities.  Met 3/20/2022    3. Strength: Patient will improve strength to 50% of stated goals, listed in objective measures above, in order to progress towards independence with functional activities.  Not met for external rotation   4. Self Care: Patient will demonstrate improved self care skills listed in objective measure above,in order to progress towards independence with functional activities.  Met 3/30/2022   5. HEP: Patient will demonstrate independence with HEP in order to progress toward functional independence. Met 4/22/2022      Long Term Goals:  8 weeks                              Updated   4/22/2022   1. Pain: Pt will demonstrate improved pain by reports of less than or equal to 1/10 worst pain on the verbal rating scale in order to progress toward maximal functional ability and improve QOL.    Met 4/22/2022   2. Function: Patient will demonstrate improved function as indicated by a functional limitation score of less than or equal to 33 out of 100 on FOTO. Met 3/30/2022   3. Mobility: Patient will improve AROM to stated goals, listed in objective measures above, in order to return to maximal functional potential and improve quality of life. Not met for external rotation   4. Strength: Patient will improve strength to stated goals, listed in objective measures above, in order to improve functional independence and quality of life.  not met for external rotation   5. Self Care: Patient will demonstrate increased self care skills to an independent level or modified independent level with adaptive equipment as needed.  Met 4/18/2022   6. Patient will return to normal ADL's, IADL's, community involvement, recreational activities, and work-related activities with less than or equal to 0/10 pain and maximal  mET 4/22/2022         PLAN   DISCHARGE OCCUPATIONAL THERAPY.    JR Blakely, RICHI

## 2022-05-03 ENCOUNTER — PATIENT MESSAGE (OUTPATIENT)
Dept: ORTHOPEDICS | Facility: CLINIC | Age: 57
End: 2022-05-03
Payer: COMMERCIAL

## 2022-05-12 ENCOUNTER — SPECIALTY PHARMACY (OUTPATIENT)
Dept: PHARMACY | Facility: CLINIC | Age: 57
End: 2022-05-12
Payer: COMMERCIAL

## 2022-05-12 NOTE — TELEPHONE ENCOUNTER
Specialty Pharmacy - Refill Coordination    Specialty Medication Orders Linked to Encounter    Flowsheet Row Most Recent Value   Medication #1 upadacitinib (RINVOQ) 15 mg 24 hr tablet (Order#[id number], Rx#)          Refill Questions - Documented Responses    Flowsheet Row Most Recent Value   Patient Availability and HIPAA Verification    Does patient want to proceed with activity? Yes   HIPAA/medical authority confirmed? Yes   Relationship to patient of person spoken to? Self   Refill Screening Questions    Changes to allergies? No   Changes to medications? No   New conditions since last clinic visit? No   Unplanned office visit, urgent care, ED, or hospital admission in the last 4 weeks? No   How does patient/caregiver feel medication is working? Good   Financial problems or insurance changes? No   How many doses of your specialty medications were missed in the last 4 weeks? 0   Would patient like to speak to a pharmacist? No   When does the patient need to receive the medication? 05/18/22   Refill Delivery Questions    How will the patient receive the medication? Delivery Nini   When does the patient need to receive the medication? 05/18/22   Shipping Address Home   Address in [facility] confirmed and updated if neccessary? Yes   Expected Copay ($) 5   Is the patient able to afford the medication copay? Yes   Payment Method CC on file   Days supply of Refill 30   Supplies needed? No supplies needed   Refill activity completed? Yes   Refill activity plan Refill scheduled   Shipment/Pickup Date: 05/18/22          Current Outpatient Medications   Medication Sig    diclofenac sodium (VOLTAREN) 1 % Gel Apply 2 g topically 4 (four) times daily. for 14 days    FLAXSEED OIL ORAL Take by mouth.    folic acid (FOLVITE) 1 MG tablet TAKE 1 TABLET(1 MG) BY MOUTH EVERY DAY    methotrexate 2.5 MG Tab Take 10 tablets (25 mg total) by mouth every 7 days.    multivitamin capsule Take 1 capsule by mouth once daily.     multivitamin with minerals (HAIR,SKIN AND NAILS ORAL) Take by mouth once daily.    rosuvastatin (CRESTOR) 10 MG tablet Take 1 tablet (10 mg total) by mouth once daily.    TURMERIC ORAL Take by mouth.    upadacitinib (RINVOQ) 15 mg 24 hr tablet Take 1 tablet (15 mg total) by mouth once daily.    valACYclovir (VALTREX) 1000 MG tablet Take 1 tablet (1,000 mg total) by mouth 2 (two) times daily. for 10 days   Last reviewed on 3/4/2022  7:30 AM by Domenica Claros LPN    Review of patient's allergies indicates:  No Known Allergies Last reviewed on  3/4/2022 7:30 AM by Domenica Claros      Tasks added this encounter   6/10/2022 - Refill Call (Auto Added)   Tasks due within next 3 months   No tasks due.     [facility]  [address] [place] [id number]  Phone: [phone]  Fax: [phone]

## 2022-06-08 ENCOUNTER — SPECIALTY PHARMACY (OUTPATIENT)
Dept: PHARMACY | Facility: CLINIC | Age: 57
End: 2022-06-08
Payer: COMMERCIAL

## 2022-06-08 NOTE — TELEPHONE ENCOUNTER
Specialty Pharmacy - Clinical Reassessment    Specialty Medication Orders Linked to Encounter    Flowsheet Row Most Recent Value   Medication #1 upadacitinib (RINVOQ) 15 mg 24 hr tablet (Order#[id number], Rx#[id number])        Patient Diagnosis   M05.9 - Seropositive rheumatoid arthritis    Specialty clinical pharmacist review completed for an annual review of reassessment. Reviewed the following areas: current med list, reports of adverse effects, adherence and progress towards therapeutic goals.    Recommendations: none at this time.    Tasks added this encounter   3/8/2023 - Clinical - Follow Up Assesement (Annual)   Tasks due within next 3 months   6/10/2022 - Refill Call (Auto Added)     Nat Gant, SonnyD  [facility]  [address] [facility] [address]  Phone: [phone]  Fax: [phone]

## 2022-06-10 ENCOUNTER — SPECIALTY PHARMACY (OUTPATIENT)
Dept: PHARMACY | Facility: CLINIC | Age: 57
End: 2022-06-10
Payer: COMMERCIAL

## 2022-06-10 ENCOUNTER — PATIENT MESSAGE (OUTPATIENT)
Dept: PHARMACY | Facility: CLINIC | Age: 57
End: 2022-06-10
Payer: COMMERCIAL

## 2022-06-10 NOTE — TELEPHONE ENCOUNTER
Specialty Pharmacy - Refill Coordination    Specialty Medication Orders Linked to Encounter    Flowsheet Row Most Recent Value   Medication #1 upadacitinib (RINVOQ) 15 mg 24 hr tablet (Order#[id number], Rx#[id number])          Refill Questions - Documented Responses    Flowsheet Row Most Recent Value   Patient Availability and HIPAA Verification    Does patient want to proceed with activity? Yes   HIPAA/medical authority confirmed? Yes   Relationship to patient of person spoken to? Self   Refill Screening Questions    Changes to allergies? No   Changes to medications? No   New conditions since last clinic visit? No   Unplanned office visit, urgent care, ED, or hospital admission in the last 4 weeks? No   How does patient/caregiver feel medication is working? Excellent   Financial problems or insurance changes? No   How many doses of your specialty medications were missed in the last 4 weeks? 0   Would patient like to speak to a pharmacist? No   When does the patient need to receive the medication? 06/16/22   Refill Delivery Questions    How will the patient receive the medication? Delivery Nini   When does the patient need to receive the medication? 06/16/22   Shipping Address Home   Address in [facility] confirmed and updated if neccessary? Yes   Expected Copay ($) 5   Is the patient able to afford the medication copay? Yes   Payment Method CC on file   Days supply of Refill 30   Supplies needed? No supplies needed   Refill activity completed? Yes   Refill activity plan Refill scheduled   Shipment/Pickup Date: 06/14/22          Current Outpatient Medications   Medication Sig    diclofenac sodium (VOLTAREN) 1 % Gel Apply 2 g topically 4 (four) times daily. for 14 days    FLAXSEED OIL ORAL Take by mouth.    folic acid (FOLVITE) 1 MG tablet TAKE 1 TABLET(1 MG) BY MOUTH EVERY DAY    methotrexate 2.5 MG Tab Take 10 tablets (25 mg total) by mouth every 7 days.    multivitamin capsule Take 1 capsule by mouth  once daily.    multivitamin with minerals (HAIR,SKIN AND NAILS ORAL) Take by mouth once daily.    rosuvastatin (CRESTOR) 10 MG tablet Take 1 tablet (10 mg total) by mouth once daily.    TURMERIC ORAL Take by mouth.    upadacitinib (RINVOQ) 15 mg 24 hr tablet Take 1 tablet (15 mg total) by mouth once daily.    valACYclovir (VALTREX) 1000 MG tablet Take 1 tablet (1,000 mg total) by mouth 2 (two) times daily. for 10 days   Last reviewed on 3/4/2022  7:30 AM by Domenica Claros LPN    Review of patient's allergies indicates:  No Known Allergies Last reviewed on  3/4/2022 7:30 AM by Domenica Claros      Tasks added this encounter   No tasks added.   Tasks due within next 3 months   6/10/2022 - Refill Call (Auto Added)     [facility]  [facility]  [place] [address]  Phone: [phone]  Fax: [phone]

## 2022-06-30 ENCOUNTER — TELEPHONE (OUTPATIENT)
Dept: INTERNAL MEDICINE | Facility: CLINIC | Age: 57
End: 2022-06-30
Payer: COMMERCIAL

## 2022-06-30 ENCOUNTER — TELEPHONE (OUTPATIENT)
Dept: RHEUMATOLOGY | Facility: CLINIC | Age: 57
End: 2022-06-30
Payer: COMMERCIAL

## 2022-06-30 NOTE — TELEPHONE ENCOUNTER
----- Message from Ricardo Bloom sent at 6/30/2022 10:14 AM CDT -----  Contact: abhay  Pt tested positive at  for covid 19. Since pt has arthritis they advised her to follow up pcp about starting infusion treatment. Please call her back at [phone].              Thanks  DD

## 2022-06-30 NOTE — TELEPHONE ENCOUNTER
----- Message from Rose Norton sent at 6/30/2022  1:06 PM CDT -----  Contact: [phone]  Patient is calling regarding testing positive for covid and urgent care suggested her to have the covid infusion. Please call her back at [phone]. Thanks/ar

## 2022-06-30 NOTE — TELEPHONE ENCOUNTER
Pt tested positive at  for covid 19. Since pt has arthritis they advised her to follow up pcp about starting infusion treatment.

## 2022-07-01 ENCOUNTER — TELEPHONE (OUTPATIENT)
Dept: INTERNAL MEDICINE | Facility: CLINIC | Age: 57
End: 2022-07-01
Payer: COMMERCIAL

## 2022-07-01 ENCOUNTER — TELEPHONE (OUTPATIENT)
Dept: RHEUMATOLOGY | Facility: CLINIC | Age: 57
End: 2022-07-01
Payer: COMMERCIAL

## 2022-07-01 ENCOUNTER — TELEPHONE (OUTPATIENT)
Dept: PRIMARY CARE CLINIC | Facility: CLINIC | Age: 57
End: 2022-07-01
Payer: COMMERCIAL

## 2022-07-01 DIAGNOSIS — U07.1 COVID-19: Primary | ICD-10-CM

## 2022-07-01 NOTE — TELEPHONE ENCOUNTER
----- Message from Keyla Masterson sent at 7/1/2022  2:37 PM CDT -----  Contact: MAGALIS MENDOZA [[id number]] [phone]  ORDERS REQUEST    Patient requests orders for covid infusion placed on her account. She requests a phone call once ready to schedule: [phone]

## 2022-07-01 NOTE — TELEPHONE ENCOUNTER
patient informed that Paxlovid  Was recommended by Dr. Thomas and script called to [facility]

## 2022-07-01 NOTE — TELEPHONE ENCOUNTER
Dr. Thomas recommends Paxlovid. Sent to [facility] as it looks like her local [facility] did not have in stock.

## 2022-07-01 NOTE — TELEPHONE ENCOUNTER
Spoke with pt and she tested positive for Covid, states she had a virtual with her PCP but thought it was later in the day and she missed it and now she is unsure what to do. Wants to get covid infusion if possible due to being immunocompromised. Advised patient to hold off on taking her MTX and Rinvoq and we would give her a call back.

## 2022-07-01 NOTE — TELEPHONE ENCOUNTER
Patient will check with Pharmacy to see if it's okay for her to take  nirmatrelvir-ritonavir with her other medication.

## 2022-07-01 NOTE — TELEPHONE ENCOUNTER
----- Message from Shakira Fuentes sent at 7/1/2022  2:17 PM CDT -----  Contact: Ayesha  Patient is calling to speak with a nurse regarding therapy. Patient reports testing positive for covid and reports not receiving a call back regarding antiviral therapy treatment recommendation. Please give patient an immediate call back at [phone] as requested.  Thank you,  GH

## 2022-07-01 NOTE — TELEPHONE ENCOUNTER
----- Message from Marli Corey sent at 7/1/2022  1:55 PM CDT -----  Please call pt @ [phone] regarding virtual visit for today, pt states she thought it was at 2pm, pt need to know if she can still do virtual for today.

## 2022-07-05 ENCOUNTER — PATIENT MESSAGE (OUTPATIENT)
Dept: INTERNAL MEDICINE | Facility: CLINIC | Age: 57
End: 2022-07-05
Payer: COMMERCIAL

## 2022-07-08 ENCOUNTER — OFFICE VISIT (OUTPATIENT)
Dept: RHEUMATOLOGY | Facility: CLINIC | Age: 57
End: 2022-07-08
Payer: COMMERCIAL

## 2022-07-08 ENCOUNTER — LAB VISIT (OUTPATIENT)
Dept: LAB | Facility: HOSPITAL | Age: 57
End: 2022-07-08
Attending: INTERNAL MEDICINE
Payer: COMMERCIAL

## 2022-07-08 ENCOUNTER — HOSPITAL ENCOUNTER (OUTPATIENT)
Dept: RADIOLOGY | Facility: HOSPITAL | Age: 57
Discharge: HOME | End: 2022-07-08
Attending: INTERNAL MEDICINE
Payer: COMMERCIAL

## 2022-07-08 VITALS
WEIGHT: 152.13 LBS | HEART RATE: 85 BPM | HEIGHT: 65 IN | SYSTOLIC BLOOD PRESSURE: 128 MMHG | DIASTOLIC BLOOD PRESSURE: 84 MMHG | BODY MASS INDEX: 25.34 KG/M2

## 2022-07-08 DIAGNOSIS — M19.90 OSTEOARTHRITIS, UNSPECIFIED OSTEOARTHRITIS TYPE, UNSPECIFIED SITE: ICD-10-CM

## 2022-07-08 DIAGNOSIS — Z71.89 COUNSELING ON HEALTH PROMOTION AND DISEASE PREVENTION: ICD-10-CM

## 2022-07-08 DIAGNOSIS — Z79.899 HIGH RISK MEDICATION USE: ICD-10-CM

## 2022-07-08 DIAGNOSIS — M05.9 SEROPOSITIVE RHEUMATOID ARTHRITIS: ICD-10-CM

## 2022-07-08 DIAGNOSIS — M05.9 SEROPOSITIVE RHEUMATOID ARTHRITIS: Primary | ICD-10-CM

## 2022-07-08 LAB
ALBUMIN SERPL BCP-MCNC: 4.3 G/DL (ref 3.5–5.2)
ALP SERPL-CCNC: 83 U/L (ref 55–135)
ALT SERPL W/O P-5'-P-CCNC: 27 U/L (ref 10–44)
ANION GAP SERPL CALC-SCNC: 11 MMOL/L (ref 8–16)
AST SERPL-CCNC: 17 U/L (ref 10–40)
BASOPHILS # BLD AUTO: 0.02 K/UL (ref 0–0.2)
BASOPHILS NFR BLD: 0.3 % (ref 0–1.9)
BILIRUB SERPL-MCNC: 0.4 MG/DL (ref 0.1–1)
BUN SERPL-MCNC: 11 MG/DL (ref 6–20)
CALCIUM SERPL-MCNC: 9.7 MG/DL (ref 8.7–10.5)
CHLORIDE SERPL-SCNC: 106 MMOL/L (ref 95–110)
CO2 SERPL-SCNC: 24 MMOL/L (ref 23–29)
CREAT SERPL-MCNC: 0.8 MG/DL (ref 0.5–1.4)
CRP SERPL-MCNC: 0.6 MG/L (ref 0–8.2)
DIFFERENTIAL METHOD: ABNORMAL
EOSINOPHIL # BLD AUTO: 0 K/UL (ref 0–0.5)
EOSINOPHIL NFR BLD: 0.5 % (ref 0–8)
ERYTHROCYTE [DISTWIDTH] IN BLOOD BY AUTOMATED COUNT: 15.5 % (ref 11.5–14.5)
ERYTHROCYTE [SEDIMENTATION RATE] IN BLOOD BY PHOTOMETRIC METHOD: 19 MM/HR (ref 0–36)
EST. GFR  (AFRICAN AMERICAN): >60 ML/MIN/1.73 M^2
EST. GFR  (NON AFRICAN AMERICAN): >60 ML/MIN/1.73 M^2
GLUCOSE SERPL-MCNC: 101 MG/DL (ref 70–110)
HCT VFR BLD AUTO: 41.2 % (ref 37–48.5)
HGB BLD-MCNC: 13.3 G/DL (ref 12–16)
IMM GRANULOCYTES # BLD AUTO: 0.02 K/UL (ref 0–0.04)
IMM GRANULOCYTES NFR BLD AUTO: 0.3 % (ref 0–0.5)
LYMPHOCYTES # BLD AUTO: 1.8 K/UL (ref 1–4.8)
LYMPHOCYTES NFR BLD: 27.4 % (ref 18–48)
MCH RBC QN AUTO: 29.3 PG (ref 27–31)
MCHC RBC AUTO-ENTMCNC: 32.3 G/DL (ref 32–36)
MCV RBC AUTO: 91 FL (ref 82–98)
MONOCYTES # BLD AUTO: 0.6 K/UL (ref 0.3–1)
MONOCYTES NFR BLD: 9 % (ref 4–15)
NEUTROPHILS # BLD AUTO: 4.1 K/UL (ref 1.8–7.7)
NEUTROPHILS NFR BLD: 62.5 % (ref 38–73)
NRBC BLD-RTO: 0 /100 WBC
PLATELET # BLD AUTO: 353 K/UL (ref 150–450)
PMV BLD AUTO: 10.1 FL (ref 9.2–12.9)
POTASSIUM SERPL-SCNC: 3.8 MMOL/L (ref 3.5–5.1)
PROT SERPL-MCNC: 7.9 G/DL (ref 6–8.4)
RBC # BLD AUTO: 4.54 M/UL (ref 4–5.4)
SODIUM SERPL-SCNC: 141 MMOL/L (ref 136–145)
WBC # BLD AUTO: 6.53 K/UL (ref 3.9–12.7)

## 2022-07-08 PROCEDURE — 99999 PR PBB SHADOW E&M-EST. PATIENT-LVL III: CPT | Mod: PBBFAC,,, | Performed by: INTERNAL MEDICINE

## 2022-07-08 PROCEDURE — 3044F PR MOST RECENT HEMOGLOBIN A1C LEVEL <7.0%: ICD-10-PCS | Mod: CPTII,S$GLB,, | Performed by: INTERNAL MEDICINE

## 2022-07-08 PROCEDURE — 73030 X-RAY EXAM OF SHOULDER: CPT | Mod: 26,LT,, | Performed by: RADIOLOGY

## 2022-07-08 PROCEDURE — 85025 COMPLETE CBC W/AUTO DIFF WBC: CPT | Performed by: INTERNAL MEDICINE

## 2022-07-08 PROCEDURE — 3074F PR MOST RECENT SYSTOLIC BLOOD PRESSURE < 130 MM HG: ICD-10-PCS | Mod: CPTII,S$GLB,, | Performed by: INTERNAL MEDICINE

## 2022-07-08 PROCEDURE — 1159F PR MEDICATION LIST DOCUMENTED IN MEDICAL RECORD: ICD-10-PCS | Mod: CPTII,S$GLB,, | Performed by: INTERNAL MEDICINE

## 2022-07-08 PROCEDURE — 1159F MED LIST DOCD IN RCRD: CPT | Mod: CPTII,S$GLB,, | Performed by: INTERNAL MEDICINE

## 2022-07-08 PROCEDURE — 99999 PR PBB SHADOW E&M-EST. PATIENT-LVL III: ICD-10-PCS | Mod: PBBFAC,,, | Performed by: INTERNAL MEDICINE

## 2022-07-08 PROCEDURE — 36415 COLL VENOUS BLD VENIPUNCTURE: CPT | Performed by: INTERNAL MEDICINE

## 2022-07-08 PROCEDURE — 3079F DIAST BP 80-89 MM HG: CPT | Mod: CPTII,S$GLB,, | Performed by: INTERNAL MEDICINE

## 2022-07-08 PROCEDURE — 99214 PR OFFICE/OUTPT VISIT, EST, LEVL IV, 30-39 MIN: ICD-10-PCS | Mod: S$GLB,,, | Performed by: INTERNAL MEDICINE

## 2022-07-08 PROCEDURE — 3008F PR BODY MASS INDEX (BMI) DOCUMENTED: ICD-10-PCS | Mod: CPTII,S$GLB,, | Performed by: INTERNAL MEDICINE

## 2022-07-08 PROCEDURE — 80053 COMPREHEN METABOLIC PANEL: CPT | Performed by: INTERNAL MEDICINE

## 2022-07-08 PROCEDURE — 85652 RBC SED RATE AUTOMATED: CPT | Performed by: INTERNAL MEDICINE

## 2022-07-08 PROCEDURE — 73030 X-RAY EXAM OF SHOULDER: CPT | Mod: TC,LT

## 2022-07-08 PROCEDURE — 86140 C-REACTIVE PROTEIN: CPT | Performed by: INTERNAL MEDICINE

## 2022-07-08 PROCEDURE — 3008F BODY MASS INDEX DOCD: CPT | Mod: CPTII,S$GLB,, | Performed by: INTERNAL MEDICINE

## 2022-07-08 PROCEDURE — 3074F SYST BP LT 130 MM HG: CPT | Mod: CPTII,S$GLB,, | Performed by: INTERNAL MEDICINE

## 2022-07-08 PROCEDURE — 3079F PR MOST RECENT DIASTOLIC BLOOD PRESSURE 80-89 MM HG: ICD-10-PCS | Mod: CPTII,S$GLB,, | Performed by: INTERNAL MEDICINE

## 2022-07-08 PROCEDURE — 99214 OFFICE O/P EST MOD 30 MIN: CPT | Mod: S$GLB,,, | Performed by: INTERNAL MEDICINE

## 2022-07-08 PROCEDURE — 3044F HG A1C LEVEL LT 7.0%: CPT | Mod: CPTII,S$GLB,, | Performed by: INTERNAL MEDICINE

## 2022-07-08 PROCEDURE — 73030 XR SHOULDER COMPLETE 2 OR MORE VIEWS LEFT: ICD-10-PCS | Mod: 26,LT,, | Performed by: RADIOLOGY

## 2022-07-08 RX ORDER — FOLIC ACID 1 MG/1
1 TABLET ORAL DAILY
Qty: 90 TABLET | Refills: 2 | Status: SHIPPED | OUTPATIENT
Start: 2022-07-08 | End: 2022-10-06

## 2022-07-08 RX ORDER — METHOTREXATE 2.5 MG/1
25 TABLET ORAL
Qty: 120 TABLET | Refills: 2 | Status: SHIPPED | OUTPATIENT
Start: 2022-07-08 | End: 2023-04-14

## 2022-07-08 RX ORDER — UPADACITINIB 15 MG/1
15 TABLET, EXTENDED RELEASE ORAL DAILY
Qty: 90 TABLET | Refills: 2 | Status: SHIPPED | OUTPATIENT
Start: 2022-07-08 | End: 2023-04-21 | Stop reason: SDUPTHER

## 2022-07-08 NOTE — PROGRESS NOTES
RHEUMATOLOGY OUTPATIENT CLINIC NOTE    7/8/2022    Attending Rheumatologist: Perez Thomas  Primary Care Provider/Physician Requesting Consultation: Neel Matos MD   Chief Complaint/Reason For Consultation:  Rheumatoid Arthritis and Osteoarthritis      Subjective:     Ayesha Arcos is a 57 y.o. Black or  female with seropositive erosive RA for follow up visit.    Voices no acute complaints.  Interval diagnosis of COVID infection last week, completed 5 day course of Paxlovid.  Denies refractory respiratory symptoms or currently with significant arthralgias.    Review of Systems   Constitutional: Negative for fever.   Eyes: Negative for photophobia and pain.   Respiratory: Negative for cough and shortness of breath.    Gastrointestinal: Negative for blood in stool and melena.   Genitourinary: Negative for hematuria.   Musculoskeletal: Negative for joint pain.   Skin: Negative for rash.   Neurological: Negative for focal weakness.       Chronic comorbid conditions affecting medical decision making today:  Past Medical History:   Diagnosis Date    Carpal tunnel syndrome, bilateral     Hypertension     Rheumatoid arthritis      Past Surgical History:   Procedure Laterality Date    CARPAL TUNNEL RELEASE      right    skin cancer removal      nose    TUBAL LIGATION       Family History   Problem Relation Age of Onset    Diabetes Mother     Heart disease Father     Hypertension Father     Early death Father         Early 50's    Stroke Paternal Uncle     Heart disease Paternal Uncle      Social History     Substance and Sexual Activity   Alcohol Use Yes    Comment: social      Social History     Tobacco Use   Smoking Status Never Smoker   Smokeless Tobacco Never Used     Social History     Substance and Sexual Activity   Drug Use No       Current Outpatient Medications:     FLAXSEED OIL ORAL, Take by mouth., Disp: , Rfl:     folic acid (FOLVITE) 1 MG tablet, TAKE 1 TABLET(1  MG) BY MOUTH EVERY DAY, Disp: 30 tablet, Rfl: 4    multivitamin capsule, Take 1 capsule by mouth once daily., Disp: , Rfl:     multivitamin with minerals (HAIR,SKIN AND NAILS ORAL), Take by mouth once daily., Disp: , Rfl:     rosuvastatin (CRESTOR) 10 MG tablet, Take 1 tablet (10 mg total) by mouth once daily., Disp: 90 tablet, Rfl: 3    TURMERIC ORAL, Take by mouth., Disp: , Rfl:     upadacitinib (RINVOQ) 15 mg 24 hr tablet, Take 1 tablet (15 mg total) by mouth once daily., Disp: 90 tablet, Rfl: 1    diclofenac sodium (VOLTAREN) 1 % Gel, Apply 2 g topically 4 (four) times daily. for 14 days, Disp: 200 g, Rfl: 0    methotrexate 2.5 MG Tab, Take 10 tablets (25 mg total) by mouth every 7 days., Disp: 120 tablet, Rfl: 2    valACYclovir (VALTREX) 1000 MG tablet, Take 1 tablet (1,000 mg total) by mouth 2 (two) times daily. for 10 days, Disp: 20 tablet, Rfl: 1     Objective:     Vitals:    07/08/22 0736   BP: 128/84   Pulse: 85     Physical Exam   Eyes: Conjunctivae are normal.   Pulmonary/Chest: Effort normal. No respiratory distress.   Musculoskeletal:         General: Swelling present. No tenderness.   Neurological: She displays no weakness.   Skin: No rash noted.        Reviewed available old and all outside pertinent medical records available.    All lab results personally reviewed and interpreted by me.  Lab Results   Component Value Date    WBC 8.10 12/14/2021    HGB 14.0 12/14/2021    HCT 44.5 12/14/2021    MCV 93 12/14/2021    RDW 15.0 (H) 12/14/2021     12/14/2021       Lab Results   Component Value Date     12/14/2021    K 3.7 12/14/2021     12/14/2021    CO2 24 12/14/2021    GLU 90 12/14/2021    BUN 10 12/14/2021    CALCIUM 9.4 12/14/2021    PROT 8.3 12/14/2021    ALBUMIN 4.6 12/14/2021    BILITOT 0.6 12/14/2021    AST 29 12/14/2021    ALKPHOS 97 12/14/2021    ALT 46 (H) 12/14/2021       Lab Results   Component Value Date    COLORU Yellow 08/08/2018    APPEARANCEUA Clear 08/08/2018     SPECGRAV 1.025 08/08/2018    PHUR 6.0 08/08/2018    PROTEINUA Negative 08/08/2018    KETONESU Negative 08/08/2018    LEUKOCYTESUR Negative 08/08/2018    NITRITE Negative 08/08/2018       No results found for: PTH    Lab Results   Component Value Date    URICACID 4.5 07/26/2018       Lab Results   Component Value Date    CRP 0.1 12/14/2021       No results found for: ANATITER    No components found for: TSPOTTB,  QUANTIFERON     ASSESSMENT:     Seropositive erosive rheumatoid arthritis for follow-up visit.  Contracted COVID last week, completed course of Paxlovid.  Doing well clinically, denies refractory respiratory symptoms, fever, or rashes. Currently without significant arthralgias.  Resumed methotrexate 25mg split dose and Rinvoq (11/2021-)yesterday, may continue unchanged.  Continue range of motion, flexibility, and strengthening exercises for shoulder arthropathy, limited range of motion on exam.  Repeat x-rays, consider evaluation by Orthopedics if radiographic progression.    PLAN:     1. Seropositive RA    2. Shoulder arthropathy    3. High risk medication use    4. Other specified counseling    Disclaimer: This note is prepared using voice recognition software and as such is likely to have errors and has not been proof read. Please contact me for questions.       Perez Thomas M.D.

## 2022-07-11 ENCOUNTER — SPECIALTY PHARMACY (OUTPATIENT)
Dept: PHARMACY | Facility: CLINIC | Age: 57
End: 2022-07-11
Payer: COMMERCIAL

## 2022-07-11 DIAGNOSIS — M05.9 SEROPOSITIVE RHEUMATOID ARTHRITIS: Primary | ICD-10-CM

## 2022-07-11 NOTE — TELEPHONE ENCOUNTER
Specialty Pharmacy - Refill Coordination  Specialty Pharmacy - Clinical Intervention    Specialty Medication Orders Linked to Encounter    Flowsheet Row Most Recent Value   Medication #1 upadacitinib (RINVOQ) 15 mg 24 hr tablet (Order#[id number], Rx#[id number])      Pt reports excess rinvoq doses on hand d/t covid. Addressed in ivent.     Refill Questions - Documented Responses    Flowsheet Row Most Recent Value   Patient Availability and HIPAA Verification    Does patient want to proceed with activity? Yes   HIPAA/medical authority confirmed? Yes   Relationship to patient of person spoken to? Self   Refill Screening Questions    Changes to allergies? No   Changes to medications? Yes   New conditions since last clinic visit? No   Unplanned office visit, urgent care, ED, or hospital admission in the last 4 weeks? Yes   How does patient/caregiver feel medication is working? Good   Financial problems or insurance changes? No   How many doses of your specialty medications were missed in the last 4 weeks? 0   Would patient like to speak to a pharmacist? Yes   When does the patient need to receive the medication? 07/21/22   Refill Delivery Questions    How will the patient receive the medication? Delivery Nini   When does the patient need to receive the medication? 07/21/22   Shipping Address Home   Address in [facility] confirmed and updated if neccessary? Yes   Expected Copay ($) 5   Is the patient able to afford the medication copay? Yes   Payment Method CC on file   Days supply of Refill 30   Supplies needed? No supplies needed   Refill activity completed? Yes   Refill activity plan Refill scheduled   Shipment/Pickup Date: 07/18/22          Current Outpatient Medications   Medication Sig    ascorbic acid, vitamin C, (VITAMIN C) 100 MG tablet Take 100 mg by mouth once daily.    diclofenac sodium (VOLTAREN) 1 % Gel Apply 2 g topically 4 (four) times daily. for 14 days    FLAXSEED OIL ORAL Take by mouth.     folic acid (FOLVITE) 1 MG tablet Take 1 tablet (1 mg total) by mouth once daily.    methotrexate 2.5 MG Tab Take 10 tablets (25 mg total) by mouth every 7 days.    multivitamin capsule Take 1 capsule by mouth once daily.    multivitamin with minerals (HAIR,SKIN AND NAILS ORAL) Take by mouth once daily.    rosuvastatin (CRESTOR) 10 MG tablet Take 1 tablet (10 mg total) by mouth once daily.    TURMERIC ORAL Take by mouth.    upadacitinib (RINVOQ) 15 mg 24 hr tablet Take 1 tablet (15 mg total) by mouth once daily.    valACYclovir (VALTREX) 1000 MG tablet Take 1 tablet (1,000 mg total) by mouth 2 (two) times daily. for 10 days   Last reviewed on 7/8/2022  7:44 AM by Sabine Souza LPN    Review of patient's allergies indicates:  No Known Allergies Last reviewed on  7/8/2022 7:43 AM by Sabine Souza    Interventions added this encounter   Closed: [facility] Patient Intervention: upadacitinib (RINVOQ) 15 mg 24 hr tablet     Tasks added this encounter   8/13/2022 - Refill Call (Auto Added)   Tasks due within next 3 months   No tasks due.     Nat Gant, PharmD  [facility]  [address] [place] [address]  Phone: [phone]  Fax: [phone]

## 2022-07-29 ENCOUNTER — TELEPHONE (OUTPATIENT)
Dept: RHEUMATOLOGY | Facility: CLINIC | Age: 57
End: 2022-07-29
Payer: COMMERCIAL

## 2022-07-29 NOTE — TELEPHONE ENCOUNTER
----- Message from Nat Carlson sent at 7/29/2022  9:33 AM CDT -----  Claudine Calderon with [employer] called regarding a Case Management Program. Fax over clinical notes and medication list. Call back number is [phone] and fax is [phone]. Thx. EL

## 2022-08-09 ENCOUNTER — OFFICE VISIT (OUTPATIENT)
Dept: SPORTS MEDICINE | Facility: CLINIC | Age: 57
End: 2022-08-09
Payer: COMMERCIAL

## 2022-08-09 ENCOUNTER — TELEPHONE (OUTPATIENT)
Dept: ORTHOPEDICS | Facility: CLINIC | Age: 57
End: 2022-08-09
Payer: COMMERCIAL

## 2022-08-09 VITALS — WEIGHT: 152.13 LBS | HEIGHT: 65 IN | BODY MASS INDEX: 25.34 KG/M2

## 2022-08-09 DIAGNOSIS — M19.90 OSTEOARTHRITIS, UNSPECIFIED OSTEOARTHRITIS TYPE, UNSPECIFIED SITE: ICD-10-CM

## 2022-08-09 DIAGNOSIS — S46.009A ROTATOR CUFF INJURY, INITIAL ENCOUNTER: Primary | ICD-10-CM

## 2022-08-09 DIAGNOSIS — M05.9 SEROPOSITIVE RHEUMATOID ARTHRITIS: ICD-10-CM

## 2022-08-09 DIAGNOSIS — M25.512 LEFT SHOULDER PAIN, UNSPECIFIED CHRONICITY: ICD-10-CM

## 2022-08-09 DIAGNOSIS — S46.002A INJURY OF LEFT ROTATOR CUFF, INITIAL ENCOUNTER: Primary | ICD-10-CM

## 2022-08-09 PROCEDURE — 3008F PR BODY MASS INDEX (BMI) DOCUMENTED: ICD-10-PCS | Mod: CPTII,S$GLB,, | Performed by: FAMILY MEDICINE

## 2022-08-09 PROCEDURE — 1159F MED LIST DOCD IN RCRD: CPT | Mod: CPTII,S$GLB,, | Performed by: FAMILY MEDICINE

## 2022-08-09 PROCEDURE — 99204 OFFICE O/P NEW MOD 45 MIN: CPT | Mod: S$GLB,,, | Performed by: FAMILY MEDICINE

## 2022-08-09 PROCEDURE — 99999 PR PBB SHADOW E&M-EST. PATIENT-LVL III: ICD-10-PCS | Mod: PBBFAC,,, | Performed by: FAMILY MEDICINE

## 2022-08-09 PROCEDURE — 1159F PR MEDICATION LIST DOCUMENTED IN MEDICAL RECORD: ICD-10-PCS | Mod: CPTII,S$GLB,, | Performed by: FAMILY MEDICINE

## 2022-08-09 PROCEDURE — 3008F BODY MASS INDEX DOCD: CPT | Mod: CPTII,S$GLB,, | Performed by: FAMILY MEDICINE

## 2022-08-09 PROCEDURE — 99999 PR PBB SHADOW E&M-EST. PATIENT-LVL III: CPT | Mod: PBBFAC,,, | Performed by: FAMILY MEDICINE

## 2022-08-09 PROCEDURE — 3044F PR MOST RECENT HEMOGLOBIN A1C LEVEL <7.0%: ICD-10-PCS | Mod: CPTII,S$GLB,, | Performed by: FAMILY MEDICINE

## 2022-08-09 PROCEDURE — 99204 PR OFFICE/OUTPT VISIT, NEW, LEVL IV, 45-59 MIN: ICD-10-PCS | Mod: S$GLB,,, | Performed by: FAMILY MEDICINE

## 2022-08-09 PROCEDURE — 3044F HG A1C LEVEL LT 7.0%: CPT | Mod: CPTII,S$GLB,, | Performed by: FAMILY MEDICINE

## 2022-08-09 NOTE — PATIENT INSTRUCTIONS
"Apply ice to affected area three times a day for (15) fifteen minutes for the next 48 hours, and reduce activity during that time.  Voltaren gel three times a day to affected area if recommended.  Oral medication if recommended.  Physical therapy if recommended at home or at clinic.  Keep recheck visit. Patient Education       Rotator Cuff Injury   The Basics   Written by the doctors and editors at [facility]   What is a rotator cuff injury? -- A rotator cuff injury is a condition that can cause shoulder pain. The rotator cuff is made up of 4 shoulder muscles and their tendons. Tendons are strong bands of tissue that connect muscles to bones.  People can get different types of rotator cuff injuries. One common injury is "tendinopathy," which is when people have a problem with 1 of their tendons. In most people with tendinopathy, the tendons are not inflamed or swollen. If they do get inflamed or swollen, doctors call it "tendinitis." Tendinopathy and tendinitis can happen if people use their rotator cuff muscles too much or do a lot of activity with their arms overhead.  Another type of rotator cuff injury is a tear in a tendon. Tears can happen if a person falls on the shoulder or moves the shoulder too fast and with too much force. Tears can also happen as a tendon wears out over time.  What are the symptoms of a rotator cuff injury? -- Most people with tendinopathy or tendinitis have pain where the shoulder meets the top of the arm and down the outer part of the upper arm. The pain is usually worse when they move their arm over their head or lie on their shoulder.  People with a torn tendon usually have shoulder pain and might also have trouble raising their arm overhead.  Will I need tests? -- You might. Your doctor or nurse will talk with you and do an exam. If he or she suspects a tear, you might need an imaging test of the shoulder. Imaging tests create pictures of the inside of the body.  How is a rotator cuff " injury treated? -- Many rotator cuff injuries get better on their own, but they can take months to heal completely. To help your shoulder get better, you can:  Rest your shoulder - Avoid doing activities that cause pain or strain your shoulder, such as raising your arm overhead or reaching behind you. In general, try to keep your arm down, close to, and in front of your body. But you can move your shoulder gently if you need to.  Ice your shoulder - Put a cold gel pack, bag of ice, or bag of frozen vegetables on the injured area every 1 to 2 hours, for 15 minutes each time, as needed. Put a thin towel between the ice (or other cold object) and your skin.  Take medicine to reduce the pain and swelling - Your doctor or nurse might recommend that you take ibuprofen (sample brand names: Advil, Motrin) or naproxen (sample brand names: Aleve, Naprosyn).  Some tears, especially large tears, might need to be treated with surgery.  Is there anything I can do on my own to feel better? -- Yes. Different exercises can help your shoulder feel better. Ask your doctor or nurse which exercises you should do, when to start them, and how often to do them.  Some exercises help keep your shoulder from getting too stiff. One of these is called the pendulum stretch. To do this exercise, let your arm relax and hang down. Move your arm back and forth, then side to side, and then around in small circles (figure 1). Your doctor or nurse can also show you other stretches that you can do.  Other exercises can help strengthen your shoulder muscles. Your doctor, nurse, or physical therapist (exercise expert) can show you how to do these types of exercises.  When you do shoulder exercises, it's important to:  Warm up your shoulder first by taking a hot shower or bath, or massaging the area  Start slowly and make the exercises harder over time  Know that some soreness is normal. If you have sharp or tearing pain, stop what you're doing and let  your doctor or nurse know.  What if my symptoms don't get better? -- If your symptoms don't get better, talk with your doctor or nurse about other possible treatments, such as:  Getting a shot of medicine into your shoulder  Surgery  All topics are updated as new evidence becomes available and our peer review process is complete.  This topic retrieved from "[employer]" on: Sep 21, 2021.  Topic 75670 Version 5.0  Release: 29.4.2 - C29.263  © 2021 UpToDate, Inc. and/or its affiliates. All rights reserved.  figure 1: Pendulum swing     To do this exercise, you can sit or stand. Relax your arm and let it hang down. Move your arm back and forth, then side to side, and then around in small circles in both directions. After about a week, you can make the exercise harder by making bigger movements or holding a weight in your hand.  Graphic 42721 Version 3.0    Consumer Information Use and Disclaimer   This information is not specific medical advice and does not replace information you receive from your health care provider. This is only a brief summary of general information. It does NOT include all information about conditions, illnesses, injuries, tests, procedures, treatments, therapies, discharge instructions or life-style choices that may apply to you. You must talk with your health care provider for complete information about your health and treatment options. This information should not be used to decide whether or not to accept your health care provider's advice, instructions or recommendations. Only your health care provider has the knowledge and training to provide advice that is right for you. The use of this information is governed by the [employer] End User License Agreement, available at https://[email or web address].Tunepresto/en/solutions/[employer]/about/shaylee.The use of "[employer]" content is governed by the "[employer]" Terms of Use. ©2021 UpToDate, Inc. All rights reserved.  Copyright   © 2021 UpToDate, Inc. and/or its affiliates.  All rights reserved.

## 2022-08-09 NOTE — PROGRESS NOTES
Subjective:     Patient ID: Ayesha Arcos is a 57 y.o. female.    Chief Complaint: Injury of the Left Shoulder      HPI: Patient is being seen for left shoulder injury that's been present since having a fall years ago. States she has limited ROM. Denies any pain during today's office visit. Reports using Biofreeze gel as needed to help with relief. Participated in physical therapy in the past, but is willing to start new sessions.     Has been on rheumatoid arthritis treatment for several years and apparently doing well and stable.  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    Past Medical History:   Diagnosis Date    Carpal tunnel syndrome, bilateral     Hypertension     Rheumatoid arthritis      Past Surgical History:   Procedure Laterality Date    CARPAL TUNNEL RELEASE      right    skin cancer removal      nose    TUBAL LIGATION       Family History   Problem Relation Age of Onset    Diabetes Mother     Heart disease Father     Hypertension Father     Early death Father         Early 50's    Stroke Paternal Uncle     Heart disease Paternal Uncle      Social History     Socioeconomic History    Marital status:     Number of children: 1   Tobacco Use    Smoking status: Never Smoker    Smokeless tobacco: Never Used   Substance and Sexual Activity    Alcohol use: Yes     Comment: social     Drug use: No    Sexual activity: Yes       Current Outpatient Medications:     ascorbic acid, vitamin C, (VITAMIN C) 100 MG tablet, Take 100 mg by mouth once daily., Disp: , Rfl:     FLAXSEED OIL ORAL, Take by mouth., Disp: , Rfl:     folic acid (FOLVITE) 1 MG tablet, Take 1 tablet (1 mg total) by mouth once daily., Disp: 90 tablet, Rfl: 2    methotrexate 2.5 MG Tab, Take 10 tablets (25 mg total) by mouth every 7 days., Disp: 120 tablet, Rfl: 2    multivitamin capsule, Take 1 capsule by mouth once  daily., Disp: , Rfl:     multivitamin with minerals (HAIR,SKIN AND NAILS ORAL), Take by mouth once daily., Disp: , Rfl:     rosuvastatin (CRESTOR) 10 MG tablet, Take 1 tablet (10 mg total) by mouth once daily., Disp: 90 tablet, Rfl: 3    TURMERIC ORAL, Take by mouth., Disp: , Rfl:     upadacitinib (RINVOQ) 15 mg 24 hr tablet, Take 1 tablet (15 mg total) by mouth once daily., Disp: 90 tablet, Rfl: 2    diclofenac sodium (VOLTAREN) 1 % Gel, Apply 2 g topically 4 (four) times daily. for 14 days, Disp: 200 g, Rfl: 0    valACYclovir (VALTREX) 1000 MG tablet, Take 1 tablet (1,000 mg total) by mouth 2 (two) times daily. for 10 days, Disp: 20 tablet, Rfl: 1  Review of patient's allergies indicates:  No Known Allergies  Review of Systems   Constitutional: Negative for chills, fever and weight loss.   Respiratory: Negative for shortness of breath.    Cardiovascular: Negative for chest pain and palpitations.       Objective:   Body mass index is 25.31 kg/m².  There were no vitals filed for this visit.        Ortho/SPM Exam-alert oriented well-nourished well-developed pleasant adult female ambulatory in no acute distress    Respiratory effort normal    Left shoulder-no acute deformity    Rotator cuff testing is generally normal with strength 4/5 except for markedly decreased external rotation with elbow at her side     Abduction and flexion are normal    Empty can testing is normal    Positive Whitehead impingement sign    Neurovascular intact    Psychiatric good affect cognition    Plan-physical therapy at the [facility] to evaluate for rotator cuff tear    There are no Patient Instructions on file for this visit.    IMAGING: X-Ray Shoulder 2 or More Views Left  Narrative: EXAMINATION:  XR SHOULDER COMPLETE 2 OR MORE VIEWS LEFT    CLINICAL HISTORY:  Rheumatoid arthritis with rheumatoid factor, unspecified    TECHNIQUE:  Two or three views of the left shoulder were performed.    COMPARISON:  04/01/2019    FINDINGS:  No  acute osseous abnormality.  Glenohumeral joint degenerative findings noted including joint space loss and osteophyte formation.  There is loss of the subacromial space suggesting chronic rotator cuff disease.  Minimal AC joint degenerative changes.  Lung parenchyma clear.  Impression: As above    Electronically signed by: Addison Little MD  Date:    07/08/2022  Time:    09:05       Radiographs / Imaging : Relevant imaging results reviewed by me and interpreted by me, discussed with the patient and / or family -x-rays reviewed by me and agree with report and discussed in viewed with patient and clinic today      Assessment:     Encounter Diagnoses   Name Primary?    Seropositive rheumatoid arthritis     Osteoarthritis, unspecified osteoarthritis type, unspecified site         Plan:   Seropositive rheumatoid arthritis  -     Ambulatory referral/consult to Orthopedics    Osteoarthritis, unspecified osteoarthritis type, unspecified site  -     Ambulatory referral/consult to Orthopedics        The patient verbalized good understanding of the medical issues discussed today and expressed appreciation for the care provided.  Patient was given the opportunity to ask questions and be an active participant in their medical care. Patient had no further questions or concerns at this time.     The patient was encouraged to participate in appropriate physical activity.      Cade Orourke M.D.  [facility]        This note was dictated using voice recognition software and may have sound a like errors.

## 2022-08-09 NOTE — TELEPHONE ENCOUNTER
Called patient to confirm that no metal was present in the body for the MRI. Also confirmed preferred MRI locations. Informed patient that she would be [facility]

## 2022-08-15 ENCOUNTER — SPECIALTY PHARMACY (OUTPATIENT)
Dept: PHARMACY | Facility: CLINIC | Age: 57
End: 2022-08-15
Payer: COMMERCIAL

## 2022-08-15 NOTE — TELEPHONE ENCOUNTER
Specialty Pharmacy - Refill Coordination    Specialty Medication Orders Linked to Encounter    Flowsheet Row Most Recent Value   Medication #1 upadacitinib (RINVOQ) 15 mg 24 hr tablet (Order#[id number], Rx#[id number])          Refill Questions - Documented Responses    Flowsheet Row Most Recent Value   Patient Availability and HIPAA Verification    Does patient want to proceed with activity? Yes   HIPAA/medical authority confirmed? Yes   Relationship to patient of person spoken to? Self   Refill Screening Questions    Changes to allergies? No   Changes to medications? No   New conditions since last clinic visit? No   Unplanned office visit, urgent care, ED, or hospital admission in the last 4 weeks? No   How does patient/caregiver feel medication is working? Good   Financial problems or insurance changes? No   How many doses of your specialty medications were missed in the last 4 weeks? 0   Would patient like to speak to a pharmacist? No   When does the patient need to receive the medication? 08/22/22   Refill Delivery Questions    How will the patient receive the medication? Delivery Nini   When does the patient need to receive the medication? 08/22/22   Shipping Address Home   Address in [facility] confirmed and updated if neccessary? Yes   Expected Copay ($) 5   Is the patient able to afford the medication copay? Yes   Payment Method CC on file   Days supply of Refill 30   Supplies needed? No supplies needed   Refill activity completed? Yes   Refill activity plan Refill scheduled   Shipment/Pickup Date: 08/18/22          Current Outpatient Medications   Medication Sig    ascorbic acid, vitamin C, (VITAMIN C) 100 MG tablet Take 100 mg by mouth once daily.    diclofenac sodium (VOLTAREN) 1 % Gel Apply 2 g topically 4 (four) times daily. for 14 days    FLAXSEED OIL ORAL Take by mouth.    folic acid (FOLVITE) 1 MG tablet Take 1 tablet (1 mg total) by mouth once daily.    methotrexate 2.5 MG Tab Take 10  tablets (25 mg total) by mouth every 7 days.    multivitamin capsule Take 1 capsule by mouth once daily.    multivitamin with minerals (HAIR,SKIN AND NAILS ORAL) Take by mouth once daily.    rosuvastatin (CRESTOR) 10 MG tablet Take 1 tablet (10 mg total) by mouth once daily.    TURMERIC ORAL Take by mouth.    upadacitinib (RINVOQ) 15 mg 24 hr tablet Take 1 tablet (15 mg total) by mouth once daily.    valACYclovir (VALTREX) 1000 MG tablet Take 1 tablet (1,000 mg total) by mouth 2 (two) times daily. for 10 days   Last reviewed on 8/9/2022  8:33 AM by Sho Tavares LPN    Review of patient's allergies indicates:  No Known Allergies Last reviewed on  8/9/2022 8:32 AM by Sho Tavares      Tasks added this encounter   9/14/2022 - Refill Call (Auto Added)   Tasks due within next 3 months   No tasks due.     Sanju Cloud, PharmD  [facility]  [facility] [address]  [facility] [address]  Phone: [phone]  Fax: [phone]

## 2022-08-23 ENCOUNTER — HOSPITAL ENCOUNTER (OUTPATIENT)
Dept: RADIOLOGY | Facility: HOSPITAL | Age: 57
Discharge: HOME | End: 2022-08-23
Attending: FAMILY MEDICINE
Payer: COMMERCIAL

## 2022-08-23 DIAGNOSIS — M25.512 LEFT SHOULDER PAIN, UNSPECIFIED CHRONICITY: ICD-10-CM

## 2022-08-23 PROCEDURE — 73221 MRI JOINT UPR EXTREM W/O DYE: CPT | Mod: TC,LT

## 2022-08-23 PROCEDURE — 73221 MRI SHOULDER WITHOUT CONTRAST LEFT: ICD-10-PCS | Mod: 26,LT,, | Performed by: RADIOLOGY

## 2022-08-23 PROCEDURE — 73221 MRI JOINT UPR EXTREM W/O DYE: CPT | Mod: 26,LT,, | Performed by: RADIOLOGY

## 2022-08-24 ENCOUNTER — TELEPHONE (OUTPATIENT)
Dept: SPORTS MEDICINE | Facility: CLINIC | Age: 57
End: 2022-08-24
Payer: COMMERCIAL

## 2022-08-24 NOTE — TELEPHONE ENCOUNTER
LVM asking patient to return call to get MRI results and recommendations. Provided call back number and option to send message via my chart.

## 2022-08-25 ENCOUNTER — CLINICAL SUPPORT (OUTPATIENT)
Dept: REHABILITATION | Facility: HOSPITAL | Age: 57
End: 2022-08-25
Payer: COMMERCIAL

## 2022-08-25 DIAGNOSIS — M25.612 DECREASED RANGE OF MOTION OF LEFT SHOULDER: ICD-10-CM

## 2022-08-25 DIAGNOSIS — G89.29 CHRONIC LEFT SHOULDER PAIN: Primary | ICD-10-CM

## 2022-08-25 DIAGNOSIS — M25.512 CHRONIC LEFT SHOULDER PAIN: Primary | ICD-10-CM

## 2022-08-25 DIAGNOSIS — S46.002A INJURY OF LEFT ROTATOR CUFF, INITIAL ENCOUNTER: ICD-10-CM

## 2022-08-25 DIAGNOSIS — R29.898 DECREASED STRENGTH OF UPPER EXTREMITY: ICD-10-CM

## 2022-08-25 PROCEDURE — 97110 THERAPEUTIC EXERCISES: CPT | Performed by: PHYSICAL THERAPIST

## 2022-08-25 PROCEDURE — 97535 SELF CARE MNGMENT TRAINING: CPT | Performed by: PHYSICAL THERAPIST

## 2022-08-25 PROCEDURE — 97162 PT EVAL MOD COMPLEX 30 MIN: CPT | Performed by: PHYSICAL THERAPIST

## 2022-08-25 NOTE — PLAN OF CARE
[facility]   Physical Therapy Initial Evaluation   Date: 8/25/2022   Name: Ayesha Arcos  Clinic Number: [id number]    Therapy Diagnosis:    Encounter Diagnoses   Name Primary?    Chronic left shoulder pain Yes    Decreased range of motion of left shoulder     Decreased strength of upper extremity     Injury of left rotator cuff, initial encounter       Physician: Cade Orourke MD     Physician Orders: PT Eval and Treat  Medical Diagnosis from Referral: Injury of left rotator cuff, initial encounter  Evaluation Date: 8/25/2022  Authorization Period Expiration: 12/31/2022  Plan of Care Expiration: 11/23/2022  Progress Note Due: 09/24/2022  Visit # / Visits authorized: 1/1  FOTO: 1/3     Precautions: Standard    Time In: 0803  Time Out: 0853  Total Billable Time (timed & untimed codes): 50 minutes    SUBJECTIVE   Date of onset: about 3 years ago    History of current condition - Ayesha reports that she injured her shoulder about 3 years ago, missing her step at a concert and falling on her L side. She reports a history of arthritis in her L shoulder, elbow, and bilateral hands. She has been in therapy on and off for three years, attending this clinic and others. During the initial stages of the condition, she informed her rheumatologist of her shoulder and was given an injection. Patient states that while she was attending occupational therapy for her wrists, it was noticed that she could not externally rotate her L shoulder without pain, so she went to the doctor who conducted an MRI. She was then referred back to therapy in regards to treating her shoulder. She reports that she no longer feels pain during ER but that her range continues to be just as limited. She also states that she feels crepitus and shifting during overhead movements. Patient modulated pain with ibuprofen, heat, and ice as initial stages of condition, but recently has been trying to tolerate the pain  more, using message and stretches as needed. Patient is a  who participates in community events that require her to handle tables and other various objects, as well as frequent desk-work. She reports that she is still participating in her job but requires frequent breaks. Patient denies numbness or tingling.    Imaging: Xray performed on 07/08/2022; MRI performed on 08/09/2022    Pain:  Current 1/10, worst 4-5/10, best 1/10   Location: superior and posterior to shoulder  Description: dull and achy  Aggravating Factors: ER (overhead motion is not painful)  Easing Factors: rest, stretches    Prior Therapy: Yes - arthritis in L shoulder, elbow, and bilateral wrist  Social History: Pt lives with their family  Occupation: Pt is   Prior Level of Function: Independent and pain free with all ADL, IADL, community mobility and functional activities.   Current Level of Function: Independent with all ADL, IADL, community mobility and functional activities with reports of increased pain and need for increased time and frequent breaks.      Dominant Extremity: right    Pts goals: Pt reported goals are to decrease overall pain levels in order to return to prior functional level.       Medical History:   Past Medical History:   Diagnosis Date    Carpal tunnel syndrome, bilateral     Hypertension     Rheumatoid arthritis        Surgical History:   Ayesha Arcos  has a past surgical history that includes Carpal tunnel release; Tubal ligation; and skin cancer removal.    Medications:   Ayesha has a current medication list which includes the following prescription(s): ascorbic acid (vitamin c), diclofenac sodium, flaxseed oil, folic acid, methotrexate, multivitamin, multivitamin with minerals, rosuvastatin, turmeric, rinvoq, and valacyclovir.    Allergies:   Review of patient's allergies indicates:  No Known Allergies     OBJECTIVE     RANGE OF MOTION:    Shoulder  AROM/PROM Right Left Pain/Dysfunction with Movement Goal   Shoulder Flexion (180) 175 165/NT Pain in posterior L shoulder 180   Shoulder Abduction (180) 180 145/NT Pain in posterior and superior L shoulder 170   Shoulder ER (90) T5 T1/47 Minimal pain in L shoulder 80   Shoulder IR (70) T7 T12/68 Greatest pain in L shoulder 70   Patient's muscle guarding prevented accurate flexion and abduction PROM measures in L shoulder    Elbow AROM/PROM Right Left Pain/Dysfunction with Movement Goal   Elbow Flexion (150) 151 146  150   Elbow Extension (0) -6 -10  -5       STRENGTH:    U/E MMT Right Left Pain/Dysfunction with Movement Goal   Shoulder Flexion 4/5 3+/5 Minimal pain in L shoulder 4+/5 B   Shoulder Abduction 4+/5 3+/5 Tension in L shoulder, shifting noted within glenohumeral joint 4+/5 B   Shoulder IR 4/5 4-/5  4+/5 B   Shoulder ER   4/5 3/5  4+/5 B   Elbow Flexion  4/5 4-/5 Pain in L shoulder 5/5 B   Elbow Extension 4+/5 4/5 Tension in L triceps tendon 5/5 B       MUSCLE LENGTH:     Muscle Tested  Right Left  Goal   Upper Trapezius  decreased decreased Normal B    Levator Scapulae  decreased decreased Normal B   Pectoralis Minor  decreased decreased Normal B   Pectoralis Major decreased decreased Normal B     Joint Mobility:  Glenohumeral joint: hypermobile with shifting    Special Tests:  Posterior-Internal Impingement Test: positive on L; negative on R  Drop Arm Test: positive on L; negative on R  Lift-off Test: positive on L; negative on R    Sensation:  Not tested    Palpation: No tenderness to palpation. Tension noted upon palpation of bilateral upper trapezius (R>L). Patient notes tension during palpation to insertion of long head of biceps.     Posture:  Pt presents with postural abnormalities which include: forward head and rounded shoulders       Movement Analysis:   Movement Observations noted   L Side-lying ER Patient has very minimal range actively, but can passively go further. When PT brings patient  into further ER, patient cannot actively maintain position.       FUNCTION:     CMS Impairment/Limitation/Restriction for FOTO Shoulder Survey    Therapist reviewed FOTO scores for Ayesha on 8/25/2022.   FOTO documents entered into [email or web address] - see Media section.    Limitation Score: 37%         TREATMENT     Total Treatment time (time-based codes) separate from Evaluation: 16 minutes     Ayesha received the treatments listed below:      THERAPEUTIC EXERCISES to develop strength, endurance, ROM, flexibility, posture, and core stabilization for (8) minutes including:    Intervention Performed Today    HEP education x Upper trap stretch, levator scap stretch, wall walks to tolerance (flexion, scaption, abduction), scap squeezes                                        Plan for Next Visit:        PATIENT EDUCATION AND HOME EXERCISES     Education provided: (8) minutes  PURPOSE: Patient educated on the impairments noted above and the effects of physical therapy intervention to improve overall condition and QOL.   EXERCISE: Patient was educated on all the above exercise prior/during/after for proper posture, positioning, and execution for safe performance with home exercise program.   STRENGTH: Patient educated on the importance of improved core and extremity strength in order to improve alignment of the spine and extremities with static positions and dynamic movement.       Written Home Exercises Provided: yes.   Exercises were reviewed and Ayesha was able to demonstrate them prior to the end of the session.  Ayesha demonstrated good understanding of the education provided. See EMR under Patient Instructions for exercises provided during therapy sessions.    ASSESSMENT   Ayesha is a 57 y.o. female referred to outpatient Physical Therapy with a medical diagnosis of injury of left rotator cuff, initial encounter. Pt presents with impairments including: decreased ROM, decreased strength, joint hypermobility , decreased muscle  "length and decreased overall function. These impairments will be addressed through manual therapy techniques, exercises, functional training, and modalities as necessary. Patient was treated and educated on exercises for home program, progression of therapy, and benefits of therapy to achieve full functional mobility.     Pt prognosis is Fair.   Pt will benefit from skilled outpatient Physical Therapy to address the deficits stated above and in the chart below, provide pt/family education, and to maximize pt's level of independence.     Plan of care discussed with patient: Yes  Pt's spiritual, cultural and educational needs considered and patient is agreeable to the plan of care and goals as stated below:     Anticipated Barriers for therapy: co-morbidities, sedentary lifestyle and chronicity of condition    Medical Necessity is demonstrated by the following  History  Co-morbidities and personal factors that may impact the plan of care Co-morbidities:   HTN and rheumatoid arthritis    Personal Factors:   no deficits     moderate   Examination  Body Structures and Functions, activity limitations and participation restrictions that may impact the plan of care Body Regions:   upper extremities    Body Systems:    ROM  strength  motor control    Participation Restrictions:   See above in "Current Level of Function"     Activity limitations:   Learning and applying knowledge  no deficits    General Tasks and Commands  no deficits    Communication  no deficits    Mobility  lifting and carrying objects    Self care  no deficits    Domestic Life  shopping  cooking  doing house work (cleaning house, washing dishes, laundry)    Interactions/Relationships  no deficits    Life Areas  no deficits    Community and Social Life  community life  recreation and leisure         high   Clinical Presentation evolving clinical presentation with changing clinical characteristics moderate   Decision Making/ Complexity Score: moderate "       GOALS:    Short Term Goals: 6 weeks  1. Patient will achieve 50% improvement towards ROM goals as shown in objective charts above, in order to improve overall functional capacity and QoL.  2. Patient will achieve 50% improvement towards MMT goals as shown in objective charts above, in order to improve overall functional capacity and QoL.  3. Patient will report a peak pain value of 2/10 on NPRS, in order to improve overall functional capacity and QoL.  4. Patient will score a limitation value of 34 out of 100 on FOTO, in order to improve overall functional capacity and QoL.  5. Patient will demonstrate proficiency and independence in provided HEP, in order to improve function and maximize participation within the clinic.    Long Term Goals: 12 weeks   1. Patient will achieve ROM goals as shown in objective charts above, in order to facilitate return to prior level of function.  2. Patient will achieve MMT goals as shown in objective charts above, in order to facilitate return to prior level of function.  3. Patient will report a peak pain value of 0/10 on NPRS, in order to facilitate return to prior level of function.  4. Patient will score a limitation value of 31 out of 100 on FOTO, in order to facilitate return to prior level of function.  5. Patient will be able to participate in her community outreaches without any limitations.    PLAN   Plan of care Certification: 8/25/2022 to 11/23/2022.     Outpatient Physical Therapy 2 times weekly for 12 weeks to include any combination of the following interventions: virtual visits, dry needling, modalities, electrical stimulation (IFC, Pre-Mod, Attended with Functional Dry Needling), Cervical/Lumbar Traction, Manual Therapy, Neuromuscular Re-ed, Patient Education, Self Care, Therapeutic Exercise, and Therapeutic Activites     Ana Paula Hennessy, RUSSELL, DPT      I CERTIFY THE NEED FOR THESE SERVICES FURNISHED UNDER THIS PLAN OF TREATMENT AND WHILE UNDER MY CARE   Physician's  comments:     Physician's Signature: ___________________________________________________

## 2022-09-02 ENCOUNTER — DOCUMENTATION ONLY (OUTPATIENT)
Dept: REHABILITATION | Facility: HOSPITAL | Age: 57
End: 2022-09-02

## 2022-09-02 ENCOUNTER — CLINICAL SUPPORT (OUTPATIENT)
Dept: REHABILITATION | Facility: HOSPITAL | Age: 57
End: 2022-09-02
Payer: COMMERCIAL

## 2022-09-02 DIAGNOSIS — G89.29 CHRONIC LEFT SHOULDER PAIN: Primary | ICD-10-CM

## 2022-09-02 DIAGNOSIS — R29.898 DECREASED STRENGTH OF UPPER EXTREMITY: ICD-10-CM

## 2022-09-02 DIAGNOSIS — M25.512 CHRONIC LEFT SHOULDER PAIN: Primary | ICD-10-CM

## 2022-09-02 DIAGNOSIS — M25.612 DECREASED RANGE OF MOTION OF LEFT SHOULDER: ICD-10-CM

## 2022-09-02 PROCEDURE — 97140 MANUAL THERAPY 1/> REGIONS: CPT | Mod: CQ

## 2022-09-02 PROCEDURE — 97110 THERAPEUTIC EXERCISES: CPT | Mod: CQ

## 2022-09-02 NOTE — PROGRESS NOTES
[facility]   Physical Therapy Treatment Note     Name: Ayesha Arcos  Clinic Number: [id number]    Therapy Diagnosis:   Encounter Diagnoses   Name Primary?    Chronic left shoulder pain Yes    Decreased range of motion of left shoulder     Decreased strength of upper extremity      Physician: Cade Orourke MD    Visit Date: 9/2/2022       Physician Orders: PT Eval and Treat  Medical Diagnosis from Referral: Injury of left rotator cuff, initial encounter  Evaluation Date: 8/25/2022  Authorization Period Expiration: 12/31/2022  Plan of Care Expiration: 11/23/2022  Progress Note Due: 09/24/2022  Visit # / Visits authorized: 1/20  FOTO: 1/3      Precautions: Standard    PTA Visit #: 1/5     Time In: 0707  Time Out: 0750  Total Billable Time: 40 minutes     SUBJECTIVE     Patient reports: feeling okay his morning. Has to stretch her shoulder in the mornings to get moving.     She was compliant with home exercise program.    Response to previous treatment: felt gold after the evaluation    Functional change: decreased lifting overhead, decreased external rotation, unable to lay on her left side, driving, changed computer lay out and purchased a different key board to assist with improving ergonomics     Pain: 0/10     Location: left anterior, lateral, and posterior joint line pain    OBJECTIVE     Objective Measures updated at progress report unless specified.       TREATMENT     Ayesha received the treatments listed below:       MANUAL THERAPY TECHNIQUES were applied for (10) minutes, including:    Manual Intervention Performed Today    Soft Tissue Mobilization x left periscapular musculature, rhomboids , supraspinatus , infraspinatus , and teres major and minor    Joint Mobilizations     Mobilization with movement          Functional Dry Needling        Plan for Next Visit: PRN         THERAPEUTIC EXERCISES to develop strength, endurance, ROM, flexibility, posture, and core  stabilization for (30) minutes including:    Intervention Performed Today    UBE x 2 minutes forward /backward    Upper trap stretch x 3 x 30 seconds    Levator scapula stretch x 3 x 30 seconds    Wall walks X  X  x flexion X 10 5 second hold left only  Scaption X 10 5 second hold left only  Abduction x 10 5 second hold left only   Scapula retraction x 3 x 10 red band   Shoulder extension x 3 x 10 red band   Shoulder external rotation             Plan for Next Visit: progress scapula and rotator cuff strengthening as tolerated       PATIENT EDUCATION AND HOME EXERCISES     Home Exercises Provided and Patient Education Provided     Education provided: (during session) minutes  PURPOSE: Patient educated on the impairments noted above and the effects of physical therapy intervention to improve overall condition and QOL.   EXERCISE: Patient was educated on all the above exercise prior/during/after for proper posture, positioning, and execution for safe performance with home exercise program.   STRENGTH: Patient educated on the importance of improved core and extremity strength in order to improve alignment of the spine and extremities with static positions and dynamic movement.     Written Home Exercises Provided: yes.  Exercises were reviewed and Ayesha was able to demonstrate them prior to the end of the session.  Ayesha demonstrated good  understanding of the education provided. See EMR under Patient Instructions for exercises provided during therapy sessions.      ASSESSMENT     Patient required instructions for correct hand placement with upper trap and levator stretches to obtain correct line of stretch. As manual therapy progressed, her range of motion improved and she had less pain.     Ayesha is progressing well towards her goals.   Pt prognosis is Fair.     Pt will continue to benefit from skilled outpatient physical therapy to address the deficits listed in the problem list box on initial evaluation,  provide pt/family education and to maximize pt's level of independence in the home and community environment.     Pt's spiritual, cultural and educational needs considered and pt agreeable to plan of care and goals.     Anticipated Barriers for therapy: co-morbidities, sedentary lifestyle and chronicity of condition       GOALS:     Short Term Goals: 6 weeks  Patient will achieve 50% improvement towards ROM goals as shown in objective charts above, in order to improve overall functional capacity and QoL.  Patient will achieve 50% improvement towards MMT goals as shown in objective charts above, in order to improve overall functional capacity and QoL.  Patient will report a peak pain value of 2/10 on NPRS, in order to improve overall functional capacity and QoL.  Patient will score a limitation value of 34 out of 100 on FOTO, in order to improve overall functional capacity and QoL.  Patient will demonstrate proficiency and independence in provided HEP, in order to improve function and maximize participation within the clinic.     Long Term Goals: 12 weeks   Patient will achieve ROM goals as shown in objective charts above, in order to facilitate return to prior level of function.  Patient will achieve MMT goals as shown in objective charts above, in order to facilitate return to prior level of function.  Patient will report a peak pain value of 0/10 on NPRS, in order to facilitate return to prior level of function.  Patient will score a limitation value of 31 out of 100 on FOTO, in order to facilitate return to prior level of function.  Patient will be able to participate in her community outreaches without any limitations.   Goals Key:  PC= progressing/continue; PM= partially met;        DC= discontinue      PLAN     Continue Plan of Care (POC) and progress per patient tolerance. See treatment section for details on planned progressions next session.      Nishant Hudson, PTA

## 2022-09-02 NOTE — PROGRESS NOTES
PT/PTA met face to face to discuss pt's treatment plan and progress towards established goals. Pt will be seen by a physical therapist minimally every 6th visit or every 30 days.      Nishant Hudson PTA

## 2022-09-09 ENCOUNTER — CLINICAL SUPPORT (OUTPATIENT)
Dept: REHABILITATION | Facility: HOSPITAL | Age: 57
End: 2022-09-09
Payer: COMMERCIAL

## 2022-09-09 DIAGNOSIS — M25.512 CHRONIC LEFT SHOULDER PAIN: Primary | ICD-10-CM

## 2022-09-09 DIAGNOSIS — G89.29 CHRONIC LEFT SHOULDER PAIN: Primary | ICD-10-CM

## 2022-09-09 DIAGNOSIS — R29.898 DECREASED STRENGTH OF UPPER EXTREMITY: ICD-10-CM

## 2022-09-09 DIAGNOSIS — M25.612 DECREASED RANGE OF MOTION OF LEFT SHOULDER: ICD-10-CM

## 2022-09-09 PROCEDURE — 97110 THERAPEUTIC EXERCISES: CPT | Performed by: PHYSICAL THERAPIST

## 2022-09-09 PROCEDURE — 97140 MANUAL THERAPY 1/> REGIONS: CPT | Performed by: PHYSICAL THERAPIST

## 2022-09-09 NOTE — PROGRESS NOTES
[facility]   Physical Therapy Treatment Note     Name: Ayesha Arcos  Clinic Number: [id number]    Therapy Diagnosis:   Encounter Diagnoses   Name Primary?    Chronic left shoulder pain Yes    Decreased range of motion of left shoulder     Decreased strength of upper extremity        Physician: Cade Orourke MD    Visit Date: 9/9/2022       Physician Orders: PT Eval and Treat  Medical Diagnosis from Referral: Injury of left rotator cuff, initial encounter  Evaluation Date: 8/25/2022  Authorization Period Expiration: 12/31/2022  Plan of Care Expiration: 11/23/2022  Progress Note Due: 09/24/2022  Visit # / Visits authorized: 2/20  FOTO: 1/3      Precautions: Standard    PTA Visit #: 1/5     Time In: 1349  Time Out: 1430  Total Billable Time: 40 minutes     SUBJECTIVE     Patient reports: that she is feeling a little better overall. She does not have any pain currently, and she only had a little tightness this morning.     She was compliant with home exercise program.    Response to previous treatment: felt gold after the evaluation    Functional change: decreased lifting overhead, decreased external rotation, unable to lay on her left side, driving, changed computer lay out and purchased a different key board to assist with improving ergonomics     Pain: 0/10     Location: left anterior, lateral, and posterior joint line pain    OBJECTIVE     Objective Measures updated at progress report unless specified.       TREATMENT     Ayesha received the treatments listed below:       MANUAL THERAPY TECHNIQUES were applied for (10) minutes, including:    Manual Intervention Performed Today    Soft Tissue Mobilization x left periscapular musculature, rhomboids , supraspinatus , infraspinatus , and teres major and minor    Joint Mobilizations     Mobilization with movement          Functional Dry Needling        Plan for Next Visit: PRN         THERAPEUTIC EXERCISES to develop strength,  endurance, ROM, flexibility, posture, and core stabilization for (30) minutes including:    Intervention Performed Today    UBE x 3 minutes forward /backward    Upper trap stretch x 3 x 30 seconds    Levator scapula stretch x 3 x 30 seconds    Wall walks      flexion X 10 5 second hold left only  Scaption X 10 5 second hold left only  Abduction x 10 5 second hold left only   Pulley's x Flexion and abduction, 3' each    Scapula retraction x 3 x 10 red band   Shoulder extension x 3 x 10 red band   Wand AAROM ER, supine x 2 x 10 (added)     Plan for Next Visit: progress scapula and rotator cuff strengthening as tolerated       PATIENT EDUCATION AND HOME EXERCISES     Home Exercises Provided and Patient Education Provided     Education provided: (during session) minutes  PURPOSE: Patient educated on the impairments noted above and the effects of physical therapy intervention to improve overall condition and QOL.   EXERCISE: Patient was educated on all the above exercise prior/during/after for proper posture, positioning, and execution for safe performance with home exercise program.   STRENGTH: Patient educated on the importance of improved core and extremity strength in order to improve alignment of the spine and extremities with static positions and dynamic movement.     Written Home Exercises Provided: yes.  Exercises were reviewed and Ayesha was able to demonstrate them prior to the end of the session.  Ayesha demonstrated good  understanding of the education provided. See EMR under Patient Instructions for exercises provided during therapy sessions.      ASSESSMENT     Patient tolerated treatment well. She completed exercises without increased complaint, and she demonstrated less difficulty with activities. AAROM wand ER was added to help with ER ROM. Patient required less cues in order to maintain proper form with exercises this visit.     Ayesha is progressing well towards her goals.   Pt prognosis is Fair.      Pt will continue to benefit from skilled outpatient physical therapy to address the deficits listed in the problem list box on initial evaluation, provide pt/family education and to maximize pt's level of independence in the home and community environment.     Pt's spiritual, cultural and educational needs considered and pt agreeable to plan of care and goals.     Anticipated Barriers for therapy: co-morbidities, sedentary lifestyle and chronicity of condition       GOALS:     Short Term Goals: 6 weeks  Patient will achieve 50% improvement towards ROM goals as shown in objective charts above, in order to improve overall functional capacity and QoL.  Patient will achieve 50% improvement towards MMT goals as shown in objective charts above, in order to improve overall functional capacity and QoL.  Patient will report a peak pain value of 2/10 on NPRS, in order to improve overall functional capacity and QoL.  Patient will score a limitation value of 34 out of 100 on FOTO, in order to improve overall functional capacity and QoL.  Patient will demonstrate proficiency and independence in provided HEP, in order to improve function and maximize participation within the clinic.     Long Term Goals: 12 weeks   Patient will achieve ROM goals as shown in objective charts above, in order to facilitate return to prior level of function.  Patient will achieve MMT goals as shown in objective charts above, in order to facilitate return to prior level of function.  Patient will report a peak pain value of 0/10 on NPRS, in order to facilitate return to prior level of function.  Patient will score a limitation value of 31 out of 100 on FOTO, in order to facilitate return to prior level of function.  Patient will be able to participate in her community outreaches without any limitations.   Goals Key:  PC= progressing/continue; PM= partially met;        DC= discontinue      PLAN     Continue Plan of Care (POC) and progress per  patient tolerance. See treatment section for details on planned progressions next session.      Serenity Higuera, PT

## 2022-09-12 ENCOUNTER — CLINICAL SUPPORT (OUTPATIENT)
Dept: REHABILITATION | Facility: HOSPITAL | Age: 57
End: 2022-09-12
Payer: COMMERCIAL

## 2022-09-12 DIAGNOSIS — M25.512 CHRONIC LEFT SHOULDER PAIN: Primary | ICD-10-CM

## 2022-09-12 DIAGNOSIS — G89.29 CHRONIC LEFT SHOULDER PAIN: Primary | ICD-10-CM

## 2022-09-12 DIAGNOSIS — R29.898 DECREASED STRENGTH OF UPPER EXTREMITY: ICD-10-CM

## 2022-09-12 DIAGNOSIS — M25.612 DECREASED RANGE OF MOTION OF LEFT SHOULDER: ICD-10-CM

## 2022-09-12 PROCEDURE — 97140 MANUAL THERAPY 1/> REGIONS: CPT | Mod: CQ

## 2022-09-12 PROCEDURE — 97110 THERAPEUTIC EXERCISES: CPT | Mod: CQ

## 2022-09-12 NOTE — PROGRESS NOTES
[facility]   Physical Therapy Treatment Note     Name: Ayesha Arcos  Clinic Number: [id number]    Therapy Diagnosis:   Encounter Diagnoses   Name Primary?    Chronic left shoulder pain Yes    Decreased range of motion of left shoulder     Decreased strength of upper extremity          Physician: Cade Orourke MD    Visit Date: 9/12/2022       Physician Orders: PT Eval and Treat  Medical Diagnosis from Referral: Injury of left rotator cuff, initial encounter  Evaluation Date: 8/25/2022  Authorization Period Expiration: 12/31/2022  Plan of Care Expiration: 11/23/2022  Progress Note Due: 09/24/2022  Visit # / Visits authorized: 3/20  FOTO: 1/3      Precautions: Standard    PTA Visit #: 1/5     Time In: 1500  Time Out: 1545  Total Billable Time: 41 minutes     SUBJECTIVE     Patient reports: that she is feeling better overall.      She was compliant with home exercise program.    Response to previous treatment: felt gold after the last treatment.     Functional change: decreased lifting overhead, decreased external rotation, unable to lay on her left side, driving, changed computer lay out and purchased a different key board to assist with improving ergonomics     Pain: 0/10     Location: left anterior, lateral, and posterior joint line pain    OBJECTIVE     Objective Measures updated at progress report unless specified.       TREATMENT     Ayesha received the treatments listed below:       MANUAL THERAPY TECHNIQUES were applied for (10) minutes, including:    Manual Intervention Performed Today    Soft Tissue Mobilization x left periscapular musculature, rhomboids , supraspinatus , infraspinatus , and teres major and minor    Joint Mobilizations     Mobilization with movement          Functional Dry Needling        Plan for Next Visit: PRN         THERAPEUTIC EXERCISES to develop strength, endurance, ROM, flexibility, posture, and core stabilization for (31) minutes  including:    Intervention Performed Today    UBE x 3 minutes forward /backward    Upper trap stretch  3 x 30 seconds    Levator scapula stretch  3 x 30 seconds    Wall walks      flexion X 10 5 second hold left only  Scaption X 10 5 second hold left only  Abduction x 10 5 second hold left only   Irca's  Flexion and abduction, 3' each (added)   Scapula retraction x 3 x 12 red band   Shoulder extension x 3 x 12 red band   Shoulder external rotation      Wand AAROM ER, supine  2 x 10 (added)   Cabinet reach x 2 x 10 to top of cabinet   Scapula stability X  x X 2 ABC's supine  X 30 seconds Perturbations    Isometric shoulder external rotation  x 3 x 3 at 3 different angles supine     Plan for Next Visit: progress scapula and rotator cuff strengthening as tolerated       PATIENT EDUCATION AND HOME EXERCISES     Home Exercises Provided and Patient Education Provided     Education provided: (during session) minutes  PURPOSE: Patient educated on the impairments noted above and the effects of physical therapy intervention to improve overall condition and QOL.   EXERCISE: Patient was educated on all the above exercise prior/during/after for proper posture, positioning, and execution for safe performance with home exercise program.   STRENGTH: Patient educated on the importance of improved core and extremity strength in order to improve alignment of the spine and extremities with static positions and dynamic movement.     Written Home Exercises Provided: yes.  Exercises were reviewed and Ayesha was able to demonstrate them prior to the end of the session.  Ayesha demonstrated good  understanding of the education provided. See EMR under Patient Instructions for exercises provided during therapy sessions.      ASSESSMENT     Patient was able to perform more activity with her left shoulder with cues for scapula stability. As she progressed with cabinet reaches, she noticed less popping in her left shoulder. This popping was  asymptomatic. Her range of motion is improving but still is weak in external rotation but is able to attain full external rotation to perform supine elbow press exercise.      Ayesha is progressing well towards her goals.   Pt prognosis is Fair.     Pt will continue to benefit from skilled outpatient physical therapy to address the deficits listed in the problem list box on initial evaluation, provide pt/family education and to maximize pt's level of independence in the home and community environment.     Pt's spiritual, cultural and educational needs considered and pt agreeable to plan of care and goals.     Anticipated Barriers for therapy: co-morbidities, sedentary lifestyle and chronicity of condition       GOALS:     Short Term Goals: 6 weeks  Patient will achieve 50% improvement towards ROM goals as shown in objective charts above, in order to improve overall functional capacity and QoL. MET 9/12/2022  Patient will achieve 50% improvement towards MMT goals as shown in objective charts above, in order to improve overall functional capacity and QoL.  Patient will report a peak pain value of 2/10 on NPRS, in order to improve overall functional capacity and QoL.  Patient will score a limitation value of 34 out of 100 on FOTO, in order to improve overall functional capacity and QoL.  Patient will demonstrate proficiency and independence in provided HEP, in order to improve function and maximize participation within the clinic.     Long Term Goals: 12 weeks   Patient will achieve ROM goals as shown in objective charts above, in order to facilitate return to prior level of function.  Patient will achieve MMT goals as shown in objective charts above, in order to facilitate return to prior level of function.  Patient will report a peak pain value of 0/10 on NPRS, in order to facilitate return to prior level of function.  Patient will score a limitation value of 31 out of 100 on FOTO, in order to facilitate return to  prior level of function.  Patient will be able to participate in her community outreaches without any limitations.   Goals Key:  PC= progressing/continue; PM= partially met;        DC= discontinue      PLAN     Continue Plan of Care (POC) and progress per patient tolerance. See treatment section for details on planned progressions next session.      Nishant Hudson, PTA

## 2022-09-14 ENCOUNTER — PATIENT MESSAGE (OUTPATIENT)
Dept: PHARMACY | Facility: CLINIC | Age: 57
End: 2022-09-14
Payer: COMMERCIAL

## 2022-09-16 ENCOUNTER — CLINICAL SUPPORT (OUTPATIENT)
Dept: REHABILITATION | Facility: HOSPITAL | Age: 57
End: 2022-09-16
Payer: COMMERCIAL

## 2022-09-16 DIAGNOSIS — G89.29 CHRONIC LEFT SHOULDER PAIN: Primary | ICD-10-CM

## 2022-09-16 DIAGNOSIS — M25.512 CHRONIC LEFT SHOULDER PAIN: Primary | ICD-10-CM

## 2022-09-16 DIAGNOSIS — M25.612 DECREASED RANGE OF MOTION OF LEFT SHOULDER: ICD-10-CM

## 2022-09-16 DIAGNOSIS — R29.898 DECREASED STRENGTH OF UPPER EXTREMITY: ICD-10-CM

## 2022-09-16 PROCEDURE — 97110 THERAPEUTIC EXERCISES: CPT

## 2022-09-16 NOTE — PROGRESS NOTES
[facility]   Physical Therapy Treatment Note     Name: Ayesha Arcos  Clinic Number: [id number]    Therapy Diagnosis:   Encounter Diagnoses   Name Primary?    Chronic left shoulder pain Yes    Decreased range of motion of left shoulder     Decreased strength of upper extremity          Physician: Cade Orourke MD    Visit Date: 9/16/2022       Physician Orders: PT Eval and Treat  Medical Diagnosis from Referral: Injury of left rotator cuff, initial encounter  Evaluation Date: 8/25/2022  Authorization Period Expiration: 12/31/2022  Plan of Care Expiration: 11/23/2022  Progress Note Due: 09/24/2022  Visit # / Visits authorized: 4/20  FOTO: 1/3      Precautions: Standard    PTA Visit #: 1/5     Time In: 3:40 PM   Time Out: 4:15 PM  Total Billable Time: 35 minutes     SUBJECTIVE     Patient reports: that her shoulder was a little sore after last visit, but that she felt better leaving.    She was compliant with home exercise program.    Response to previous treatment: increased soreness, but good fatigue    Functional change: decreased lifting overhead, decreased external rotation, unable to lay on her left side, driving, changed computer lay out and purchased a different key board to assist with improving ergonomics     Pain: 3/10     Location: left anterior, lateral, and posterior joint line pain    OBJECTIVE     Objective Measures updated at progress report unless specified.       TREATMENT     Ayesha received the treatments listed below:       MANUAL THERAPY TECHNIQUES were applied for (0) minutes, including:    Manual Intervention Performed Today    Soft Tissue Mobilization  left periscapular musculature, rhomboids , supraspinatus , infraspinatus , and teres major and minor    Joint Mobilizations     Mobilization with movement          Functional Dry Needling        Plan for Next Visit: PRN       THERAPEUTIC EXERCISES to develop strength, endurance, ROM, flexibility,  posture, and core stabilization for (35) minutes including:    Intervention Performed Today    UBE x 3 minutes forward /backward    Upper trap stretch  3 x 30 seconds    Levator scapula stretch  3 x 30 seconds    Wall walks      flexion X 10 5 second hold left only  Scaption X 10 5 second hold left only  Abduction x 10 5 second hold left only   Rica's  Flexion and abduction, 3' each (added)   Scapula retraction x 3 x 12 green band   Shoulder extension x 3 x 12 green band   Shoulder PROPRIOCEPTIVE NEUROMUSCULAR FACILITATION D2 x 2x10 yellow band   Banded Wall Clocks x 3x6 yellow band   Bilateral Shoulder external rotation   3x10 yellow band, attempted but too much crepitus   Wand AAROM ER, supine  2 x 10 (added)   Cabinet reach  2 x 10 to top of cabinet   Scapula stability X   X 2 ABC's supine  X 30 seconds Perturbations    Isometric shoulder external rotation   3 x 3 at 3 different angles supine   PASSIVE RANGE OF MOTION to left shoulder in flexion and abduction x 3x10 each     Plan for Next Visit: progress scapula and rotator cuff strengthening as tolerated       PATIENT EDUCATION AND HOME EXERCISES     Home Exercises Provided and Patient Education Provided     Education provided: (during session) minutes  PURPOSE: Patient educated on the impairments noted above and the effects of physical therapy intervention to improve overall condition and QOL.   EXERCISE: Patient was educated on all the above exercise prior/during/after for proper posture, positioning, and execution for safe performance with home exercise program.   STRENGTH: Patient educated on the importance of improved core and extremity strength in order to improve alignment of the spine and extremities with static positions and dynamic movement.     Written Home Exercises Provided: yes.  Exercises were reviewed and Ayesha was able to demonstrate them prior to the end of the session.  Ayesha demonstrated good  understanding of the education provided.  See EMR under Patient Instructions for exercises provided during therapy sessions.      ASSESSMENT     Added D2 PROPRIOCEPTIVE NEUROMUSCULAR FACILITATION to left shoulder as well as banded shoulder stability exercises with fair tolerance. Patient is limited due to capsular restrictions and poor scapular control. Patient with fair tolerance to PASSIVE RANGE OF MOTION. No adverse effects noted; continue progressing as tolerated.    Ayesha is progressing well towards her goals.   Patient prognosis is Fair.     Patient will continue to benefit from skilled outpatient physical therapy to address the deficits listed in the problem list box on initial evaluation, provide patient/family education and to maximize patient's level of independence in the home and community environment.     Patient's spiritual, cultural and educational needs considered and patient agreeable to plan of care and goals.     Anticipated Barriers for therapy: co-morbidities, sedentary lifestyle and chronicity of condition       GOALS:     Short Term Goals: 6 weeks  Patient will achieve 50% improvement towards ROM goals as shown in objective charts above, in order to improve overall functional capacity and QoL. MET 9/12/2022  Patient will achieve 50% improvement towards MMT goals as shown in objective charts above, in order to improve overall functional capacity and QoL.  Patient will report a peak pain value of 2/10 on NPRS, in order to improve overall functional capacity and QoL.  Patient will score a limitation value of 34 out of 100 on FOTO, in order to improve overall functional capacity and QoL.  Patient will demonstrate proficiency and independence in provided HEP, in order to improve function and maximize participation within the clinic.     Long Term Goals: 12 weeks   Patient will achieve ROM goals as shown in objective charts above, in order to facilitate return to prior level of function.  Patient will achieve MMT goals as shown in  objective charts above, in order to facilitate return to prior level of function.  Patient will report a peak pain value of 0/10 on NPRS, in order to facilitate return to prior level of function.  Patient will score a limitation value of 31 out of 100 on FOTO, in order to facilitate return to prior level of function.  Patient will be able to participate in her community outreaches without any limitations.   Goals Key:  PC= progressing/continue; PM= partially met;        DC= discontinue      PLAN     Continue Plan of Care (POC) and progress per patient tolerance. See treatment section for details on planned progressions next session.      Deanne Loyola, PT

## 2022-09-19 ENCOUNTER — SPECIALTY PHARMACY (OUTPATIENT)
Dept: PHARMACY | Facility: CLINIC | Age: 57
End: 2022-09-19
Payer: COMMERCIAL

## 2022-09-19 ENCOUNTER — CLINICAL SUPPORT (OUTPATIENT)
Dept: REHABILITATION | Facility: HOSPITAL | Age: 57
End: 2022-09-19
Payer: COMMERCIAL

## 2022-09-19 DIAGNOSIS — M25.512 CHRONIC LEFT SHOULDER PAIN: Primary | ICD-10-CM

## 2022-09-19 DIAGNOSIS — G89.29 CHRONIC LEFT SHOULDER PAIN: Primary | ICD-10-CM

## 2022-09-19 DIAGNOSIS — M25.612 DECREASED RANGE OF MOTION OF LEFT SHOULDER: ICD-10-CM

## 2022-09-19 DIAGNOSIS — R29.898 DECREASED STRENGTH OF UPPER EXTREMITY: ICD-10-CM

## 2022-09-19 PROCEDURE — 97110 THERAPEUTIC EXERCISES: CPT

## 2022-09-19 NOTE — TELEPHONE ENCOUNTER
Specialty Pharmacy - Refill Coordination    Specialty Medication Orders Linked to Encounter      Flowsheet Row Most Recent Value   Medication #1 upadacitinib (RINVOQ) 15 mg 24 hr tablet (Order#[id number], Rx#[id number])            Refill Questions - Documented Responses      Flowsheet Row Most Recent Value   Patient Availability and HIPAA Verification    Does patient want to proceed with activity? Yes   HIPAA/medical authority confirmed? Yes   Relationship to patient of person spoken to? Self   Refill Screening Questions    Changes to allergies? No   Changes to medications? No   New conditions since last clinic visit? No   Unplanned office visit, urgent care, ED, or hospital admission in the last 4 weeks? No   How does patient/caregiver feel medication is working? Good   Financial problems or insurance changes? No   How many doses of your specialty medications were missed in the last 4 weeks? 0   Would patient like to speak to a pharmacist? No   When does the patient need to receive the medication? 09/26/22   Refill Delivery Questions    How will the patient receive the medication? Delivery Nini   When does the patient need to receive the medication? 09/26/22   Shipping Address Home   Address in [facility] confirmed and updated if neccessary? Yes   Expected Copay ($) 5   Is the patient able to afford the medication copay? Yes   Payment Method CC on file   Days supply of Refill 30   Supplies needed? No supplies needed   Refill activity completed? Yes   Refill activity plan Refill scheduled   Shipment/Pickup Date: 09/22/22            Current Outpatient Medications   Medication Sig    ascorbic acid, vitamin C, (VITAMIN C) 100 MG tablet Take 100 mg by mouth once daily.    diclofenac sodium (VOLTAREN) 1 % Gel Apply 2 g topically 4 (four) times daily. for 14 days    FLAXSEED OIL ORAL Take by mouth.    folic acid (FOLVITE) 1 MG tablet Take 1 tablet (1 mg total) by mouth once daily.    methotrexate 2.5 MG Tab Take 10  tablets (25 mg total) by mouth every 7 days.    multivitamin capsule Take 1 capsule by mouth once daily.    multivitamin with minerals (HAIR,SKIN AND NAILS ORAL) Take by mouth once daily.    rosuvastatin (CRESTOR) 10 MG tablet Take 1 tablet (10 mg total) by mouth once daily.    TURMERIC ORAL Take by mouth.    upadacitinib (RINVOQ) 15 mg 24 hr tablet Take 1 tablet (15 mg total) by mouth once daily.    valACYclovir (VALTREX) 1000 MG tablet Take 1 tablet (1,000 mg total) by mouth 2 (two) times daily. for 10 days   Last reviewed on 8/9/2022  8:33 AM by Sho Tavares LPN    Review of patient's allergies indicates:  No Known Allergies Last reviewed on  8/9/2022 8:32 AM by Sho Tavares      Tasks added this encounter   10/19/2022 - Refill Call (Auto Added)   Tasks due within next 3 months   No tasks due.     Marianne Russo, Patient Care Assistant  [facility]  [address]  [facility] [address]  Phone: [phone]  Fax: [phone]

## 2022-09-19 NOTE — PROGRESS NOTES
"[facility]   Physical Therapy Treatment Note     Name: Ayesha Arcos  Clinic Number: [id number]    Therapy Diagnosis:   Encounter Diagnoses   Name Primary?    Chronic left shoulder pain Yes    Decreased range of motion of left shoulder     Decreased strength of upper extremity          Physician: Cade Orourke MD    Visit Date: 9/19/2022       Physician Orders: PT Eval and Treat  Medical Diagnosis from Referral: Injury of left rotator cuff, initial encounter  Evaluation Date: 8/25/2022  Authorization Period Expiration: 12/31/2022  Plan of Care Expiration: 11/23/2022  Progress Note Due: 09/24/2022  Visit # / Visits authorized: 5/20  FOTO: 1/3      Precautions: Standard    PTA Visit #: 1/5     Time In: 3:31 PM   Time Out: 4:10 PM  Total Billable Time: 39 minutes     SUBJECTIVE     Patient reports: her shoulder is feeling a lot better and that the therapists "have been doing a great job."    She was compliant with home exercise program.    Response to previous treatment: no soreness, decreased shoulder pain    Functional change: decreased lifting overhead, decreased external rotation, unable to lay on her left side, driving, changed computer lay out and purchased a different key board to assist with improving ergonomics     Pain: 2/10     Location: left anterior, lateral, and posterior joint line pain    OBJECTIVE     Objective Measures updated at progress report unless specified.       TREATMENT     Ayesha received the treatments listed below:       MANUAL THERAPY TECHNIQUES were applied for (0) minutes, including:    Manual Intervention Performed Today    Soft Tissue Mobilization  left periscapular musculature, rhomboids , supraspinatus , infraspinatus , and teres major and minor    Joint Mobilizations     Mobilization with movement          Functional Dry Needling        Plan for Next Visit: PRN       THERAPEUTIC EXERCISES to develop strength, endurance, ROM, flexibility, " posture, and core stabilization for (39) minutes including:    Intervention Performed Today    UBE x 3 minutes forward /backward    Upper trap stretch  3 x 30 seconds    Levator scapula stretch  3 x 30 seconds    Wall walks      flexion X 10 5 second hold left only  Scaption X 10 5 second hold left only  Abduction x 10 5 second hold left only   Rica's  Flexion and abduction, 3' each (added)   Scapula retraction x 3 x 10 green band   Shoulder extension x 3 x 10 green band   Shoulder Scaption x 3x10, 2lbs   Shoulder PROPRIOCEPTIVE NEUROMUSCULAR FACILITATION D2 x 2x10 yellow band   Banded Wall Clocks x 3x10 yellow band   Bilateral Shoulder external rotation   3x10 yellow band, attempted but too much crepitus   Wand AAROM ER, supine  2 x 10 (added)   Cabinet reach  2 x 10 to top of cabinet   Scapula stability X   X 2 ABC's supine, 2lbs  X 30 seconds Perturbations    Isometric shoulder external rotation   3 x 3 at 3 different angles supine   PASSIVE RANGE OF MOTION to left shoulder in flexion and abduction x 3x10 each     Plan for Next Visit: progress scapula and rotator cuff strengthening as tolerated       PATIENT EDUCATION AND HOME EXERCISES     Home Exercises Provided and Patient Education Provided     Education provided: (during session) minutes  PURPOSE: Patient educated on the impairments noted above and the effects of physical therapy intervention to improve overall condition and QOL.   EXERCISE: Patient was educated on all the above exercise prior/during/after for proper posture, positioning, and execution for safe performance with home exercise program.   STRENGTH: Patient educated on the importance of improved core and extremity strength in order to improve alignment of the spine and extremities with static positions and dynamic movement.     Written Home Exercises Provided: yes.  Exercises were reviewed and Ayesha was able to demonstrate them prior to the end of the session.  Ayesha demonstrated good   understanding of the education provided. See EMR under Patient Instructions for exercises provided during therapy sessions.      ASSESSMENT     Added bilateral shoulder scaption with visual feedback for scapular control as well as increased resistance on ABC's. Patient demonstrated good control and tolerated all of the session well. No adverse effects noted; continue progressing as tolerated.    Ayesha is progressing well towards her goals.   Patient prognosis is Fair.     Patient will continue to benefit from skilled outpatient physical therapy to address the deficits listed in the problem list box on initial evaluation, provide patient/family education and to maximize patient's level of independence in the home and community environment.     Patient's spiritual, cultural and educational needs considered and patient agreeable to plan of care and goals.     Anticipated Barriers for therapy: co-morbidities, sedentary lifestyle and chronicity of condition       GOALS:     Short Term Goals: 6 weeks  Patient will achieve 50% improvement towards ROM goals as shown in objective charts above, in order to improve overall functional capacity and QoL. MET 9/12/2022  Patient will achieve 50% improvement towards MMT goals as shown in objective charts above, in order to improve overall functional capacity and QoL.  Patient will report a peak pain value of 2/10 on NPRS, in order to improve overall functional capacity and QoL.  Patient will score a limitation value of 34 out of 100 on FOTO, in order to improve overall functional capacity and QoL.  Patient will demonstrate proficiency and independence in provided HEP, in order to improve function and maximize participation within the clinic.     Long Term Goals: 12 weeks   Patient will achieve ROM goals as shown in objective charts above, in order to facilitate return to prior level of function.  Patient will achieve MMT goals as shown in objective charts above, in order to  facilitate return to prior level of function.  Patient will report a peak pain value of 0/10 on NPRS, in order to facilitate return to prior level of function.  Patient will score a limitation value of 31 out of 100 on FOTO, in order to facilitate return to prior level of function.  Patient will be able to participate in her community outreaches without any limitations.   Goals Key:  PC= progressing/continue; PM= partially met;        DC= discontinue      PLAN     Continue Plan of Care (POC) and progress per patient tolerance. See treatment section for details on planned progressions next session.      Deanne Loyola, PT

## 2022-09-22 ENCOUNTER — CLINICAL SUPPORT (OUTPATIENT)
Dept: REHABILITATION | Facility: HOSPITAL | Age: 57
End: 2022-09-22
Payer: COMMERCIAL

## 2022-09-22 DIAGNOSIS — R29.898 DECREASED STRENGTH OF UPPER EXTREMITY: ICD-10-CM

## 2022-09-22 DIAGNOSIS — M25.612 DECREASED RANGE OF MOTION OF LEFT SHOULDER: ICD-10-CM

## 2022-09-22 DIAGNOSIS — M25.512 CHRONIC LEFT SHOULDER PAIN: Primary | ICD-10-CM

## 2022-09-22 DIAGNOSIS — G89.29 CHRONIC LEFT SHOULDER PAIN: Primary | ICD-10-CM

## 2022-09-22 PROCEDURE — 97110 THERAPEUTIC EXERCISES: CPT | Mod: CQ

## 2022-09-22 PROCEDURE — 97140 MANUAL THERAPY 1/> REGIONS: CPT | Mod: CQ

## 2022-09-22 NOTE — PROGRESS NOTES
"[facility]   Physical Therapy Treatment Note     Name: Ayesha Arcos  Clinic Number: [id number]    Therapy Diagnosis:   Encounter Diagnoses   Name Primary?    Chronic left shoulder pain Yes    Decreased range of motion of left shoulder     Decreased strength of upper extremity        Physician: Cade Orourke MD    Visit Date: 9/22/2022       Physician Orders: PT Eval and Treat  Medical Diagnosis from Referral: Injury of left rotator cuff, initial encounter  Evaluation Date: 8/25/2022  Authorization Period Expiration: 12/31/2022  Plan of Care Expiration: 11/23/2022  Progress Note Due: 09/24/2022  Visit # / Visits authorized: 6/20  FOTO: 1/3      Precautions: Standard    PTA Visit #: 1/5     Time In: 3:10 PM   Time Out: 3:45 PM  Total Billable Time: 35 minutes     SUBJECTIVE     Patient reports: no pain today.     She was compliant with home exercise program.    Response to previous treatment: decreased shoulder pain after manual therapy    Functional change: lifting overhead is easier, decreased external rotation, "try not to" lay on her left side, driving is easy now, changed computer lay out resulting in decreased pain .     Pain: 0/10     Location: left anterior, lateral, and posterior joint line pain    OBJECTIVE     Objective Measures updated at progress report unless specified.       TREATMENT     Ayesha received the treatments listed below:       MANUAL THERAPY TECHNIQUES were applied for (10) minutes, including:    Manual Intervention Performed Today    Soft Tissue Mobilization x left periscapular musculature, rhomboids , supraspinatus , infraspinatus , and teres major and minor    Joint Mobilizations     Mobilization with movement          Functional Dry Needling        Plan for Next Visit: PRN       THERAPEUTIC EXERCISES to develop strength, endurance, ROM, flexibility, posture, and core stabilization for (25) minutes including:    Intervention Performed Today  "   UBE x 3 minutes forward /backward    Upper trap stretch  3 x 30 seconds    Levator scapula stretch  3 x 30 seconds    Wall walks      flexion X 10 5 second hold left only  Scaption X 10 5 second hold left only  Abduction x 10 5 second hold left only   Rica's  Flexion and abduction, 3' each (added)   Scapula retraction x 4 x 10 green band (increased)   Shoulder extension X 3 x 10 green band   Shoulder Scaption  3x10, 2lbs   Shoulder PROPRIOCEPTIVE NEUROMUSCULAR FACILITATION D2  2x10 yellow band   Banded Wall Clocks  3x10 yellow band   Bilateral Shoulder external rotation   3x10 yellow band, attempted but too much crepitus   Side lying shoulder external rotation  x 3 x 8 left only   Wand AAROM ER, supine  2 x 10 (added)   Cabinet reach x 2 x 10 to top of cabinet   Scapula stability    X 2 ABC's supine, 2lbs  X 30 seconds Perturbations    Isometric shoulder external rotation   3 x 3 at 3 different angles supine   PASSIVE RANGE OF MOTION to left shoulder in flexion and abduction  3x10 each     Plan for Next Visit: progress scapula and rotator cuff strengthening as tolerated       PATIENT EDUCATION AND HOME EXERCISES     Home Exercises Provided and Patient Education Provided     Education provided: (during session) minutes  PURPOSE: Patient educated on the impairments noted above and the effects of physical therapy intervention to improve overall condition and QOL.   EXERCISE: Patient was educated on all the above exercise prior/during/after for proper posture, positioning, and execution for safe performance with home exercise program.   STRENGTH: Patient educated on the importance of improved core and extremity strength in order to improve alignment of the spine and extremities with static positions and dynamic movement.     Written Home Exercises Provided: yes.  Exercises were reviewed and Ayesha was able to demonstrate them prior to the end of the session.  Ayesha demonstrated good  understanding of the  education provided. See EMR under Patient Instructions for exercises provided during therapy sessions.      ASSESSMENT     Added side lying left shoulder external rotation but was unable to perform with additional resistance. She is able to demonstrate an improvement in her left shoulder range of motion through out all directions.      Ayesha is progressing well towards her goals.   Patient prognosis is Fair.     Patient will continue to benefit from skilled outpatient physical therapy to address the deficits listed in the problem list box on initial evaluation, provide patient/family education and to maximize patient's level of independence in the home and community environment.     Patient's spiritual, cultural and educational needs considered and patient agreeable to plan of care and goals.     Anticipated Barriers for therapy: co-morbidities, sedentary lifestyle and chronicity of condition       GOALS:     Short Term Goals: 6 weeks  Patient will achieve 50% improvement towards ROM goals as shown in objective charts above, in order to improve overall functional capacity and QoL. MET 9/12/2022  Patient will achieve 50% improvement towards MMT goals as shown in objective charts above, in order to improve overall functional capacity and QoL.  Patient will report a peak pain value of 2/10 on NPRS, in order to improve overall functional capacity and QoL.  Patient will score a limitation value of 34 out of 100 on FOTO, in order to improve overall functional capacity and QoL.  Patient will demonstrate proficiency and independence in provided HEP, in order to improve function and maximize participation within the clinic.     Long Term Goals: 12 weeks   Patient will achieve ROM goals as shown in objective charts above, in order to facilitate return to prior level of function.  Patient will achieve MMT goals as shown in objective charts above, in order to facilitate return to prior level of function.  Patient will report  a peak pain value of 0/10 on NPRS, in order to facilitate return to prior level of function.  Patient will score a limitation value of 31 out of 100 on FOTO, in order to facilitate return to prior level of function.  Patient will be able to participate in her community outreaches without any limitations.   Goals Key:  PC= progressing/continue; PM= partially met;        DC= discontinue      PLAN     Continue Plan of Care (POC) and progress per patient tolerance. See treatment section for details on planned progressions next session.      Nishant Hudson, PTA

## 2022-09-26 ENCOUNTER — CLINICAL SUPPORT (OUTPATIENT)
Dept: REHABILITATION | Facility: HOSPITAL | Age: 57
End: 2022-09-26
Payer: COMMERCIAL

## 2022-09-26 DIAGNOSIS — M25.612 DECREASED RANGE OF MOTION OF LEFT SHOULDER: ICD-10-CM

## 2022-09-26 DIAGNOSIS — R29.898 DECREASED STRENGTH OF UPPER EXTREMITY: ICD-10-CM

## 2022-09-26 DIAGNOSIS — M25.512 CHRONIC LEFT SHOULDER PAIN: Primary | ICD-10-CM

## 2022-09-26 DIAGNOSIS — G89.29 CHRONIC LEFT SHOULDER PAIN: Primary | ICD-10-CM

## 2022-09-26 PROCEDURE — 97110 THERAPEUTIC EXERCISES: CPT | Performed by: PHYSICAL THERAPIST

## 2022-09-26 PROCEDURE — 97140 MANUAL THERAPY 1/> REGIONS: CPT | Performed by: PHYSICAL THERAPIST

## 2022-09-26 NOTE — PROGRESS NOTES
"[facility]   Physical Therapy Treatment Note + Progress Note    Name: Ayesha Arcos  Clinic Number: [id number]    Therapy Diagnosis:   Encounter Diagnoses   Name Primary?    Chronic left shoulder pain Yes    Decreased range of motion of left shoulder     Decreased strength of upper extremity        Physician: Cade Orourke MD    Visit Date: 9/26/2022       Physician Orders: PT Eval and Treat  Medical Diagnosis from Referral: Injury of left rotator cuff, initial encounter  Evaluation Date: 8/25/2022  Authorization Period Expiration: 12/31/2022  Plan of Care Expiration: 11/23/2022  Progress Note Due: 10/26/2022  Visit # / Visits authorized: 7/20 (+ eval)  FOTO: 1/3      Precautions: Standard    PTA Visit #: 1/5     Time In: 0709  Time Out: 0747  Total Billable Time: 38 minutes     SUBJECTIVE     Patient reports: that she is feeling much better overall. She does not have any pain today.     She was compliant with home exercise program.    Response to previous treatment: decreased shoulder pain after manual therapy    Functional change: lifting overhead is easier, decreased external rotation, "try not to" lay on her left side, driving is easy now, changed computer lay out resulting in decreased pain .     Pain: 0/10     Location: left anterior, lateral, and posterior joint line pain    OBJECTIVE     Objective Measures updated at progress report unless specified.     RANGE OF MOTION:     Shoulder AROM/PROM Right Left Pain/Dysfunction with Movement Goal   Shoulder Flexion (180) 180 168/175  (165/NT initial) Minimal pain in posterior L shoulder on the way down 180   Shoulder Abduction (180) 180 174/175  (145/NT initial) Minimal pain in posterior and superior L shoulder on the way down 170   Shoulder ER (90) T5 T3/70  (T1/47 initial) No pain today 80   Shoulder IR (70) T7 T9/70  (T12/68 initial) Greatest pain in L shoulder, but decreased as compared to IE 70        Elbow AROM/PROM " Right Left Pain/Dysfunction with Movement Goal   Elbow Flexion (150) 151 152  (146 initial)   150   Elbow Extension (0) 0 0  (-10 initial)   -5         STRENGTH:     U/E MMT Right Left Pain/Dysfunction with Movement Goal Right   9/26/2022  Left  9/26/2022    Shoulder Flexion 4/5 3+/5 Minimal pain in L shoulder 4+/5 B 4+ 4-   Shoulder Abduction 4+/5 3+/5 Tension in L shoulder, shifting noted within glenohumeral joint 4+/5 B 4+ 4-   Shoulder IR 4/5 4-/5   4+/5 B 4+ 4   Shoulder ER    4/5 3/5   4+/5 B 4 3   Elbow Flexion  4/5 4-/5 Pain in L shoulder 5/5 B 4+ 4+   Elbow Extension 4+/5 4/5 Tension in L triceps tendon 5/5 B 5 4+         MUSCLE LENGTH:      Muscle Tested  Right Left  Goal   Upper Trapezius  decreased decreased Normal B    Levator Scapulae  decreased decreased Normal B   Pectoralis Minor  decreased decreased Normal B   Pectoralis Major decreased decreased Normal B      Joint Mobility:  Glenohumeral joint: hypermobile with shifting     Special Tests: - NT 9/26/2022  Posterior-Internal Impingement Test: positive on L; negative on R  Drop Arm Test: positive on L; negative on R  Lift-off Test: positive on L; negative on R        Palpation: No tenderness to palpation. Tension noted upon palpation of bilateral upper trapezius (R>L), but decreased as compared to previous visits.      Posture:  Pt presents with postural abnormalities which include: forward head and rounded shoulders         Movement Analysis:   Movement Observations noted   L Side-lying ER Patient has very minimal range actively, but can passively go further. When PT brings patient into further ER, patient cannot actively maintain position.         FUNCTION:       TREATMENT     Ayesha received the treatments listed below:       MANUAL THERAPY TECHNIQUES were applied for (10) minutes, including:    Manual Intervention Performed Today    Soft Tissue Mobilization x left periscapular musculature, rhomboids , supraspinatus , infraspinatus , and teres  major and minor    Joint Mobilizations     Mobilization with movement          Functional Dry Needling        Plan for Next Visit: PRN       THERAPEUTIC EXERCISES to develop strength, endurance, ROM, flexibility, posture, and core stabilization for (28) minutes including:    Intervention Performed Today    UBE x 3 minutes forward /backward    Upper trap stretch  3 x 30 seconds    Levator scapula stretch  3 x 30 seconds    Wall walks      flexion X 10 5 second hold left only  Scaption X 10 5 second hold left only  Abduction x 10 5 second hold left only   Rica's  Flexion and abduction, 3' each (added)   Scapula retraction x 4 x 10 green band    Shoulder extension X 3 x 10 green band   Shoulder Scaption  3x10, 2lbs   Shoulder PROPRIOCEPTIVE NEUROMUSCULAR FACILITATION D2  2x10 yellow band   Banded Wall Clocks  3x10 yellow band   Bilateral Shoulder external rotation   3x10 yellow band, attempted but too much crepitus   Side lying shoulder external rotation  Resume next visit 3 x 8 left only   Wand AAROM ER, supine  2 x 10 (added)   Cabinet reach  2 x 10 to top of cabinet   Scapula stability    X 2 ABC's supine, 2lbs  X 30 seconds Perturbations    Isometric shoulder external rotation   3 x 3 at 3 different angles supine   PASSIVE RANGE OF MOTION to left shoulder all planes x 8'   Progress Note x ROM and MMT re-assessment      Plan for Next Visit: progress scapula and rotator cuff strengthening as tolerated       PATIENT EDUCATION AND HOME EXERCISES     Home Exercises Provided and Patient Education Provided     Education provided: (during session) minutes  PURPOSE: Patient educated on the impairments noted above and the effects of physical therapy intervention to improve overall condition and QOL.   EXERCISE: Patient was educated on all the above exercise prior/during/after for proper posture, positioning, and execution for safe performance with home exercise program.   STRENGTH: Patient educated on the importance of  improved core and extremity strength in order to improve alignment of the spine and extremities with static positions and dynamic movement.     Written Home Exercises Provided: yes.  Exercises were reviewed and Ayesha was able to demonstrate them prior to the end of the session.  Ayesha demonstrated good  understanding of the education provided. See EMR under Patient Instructions for exercises provided during therapy sessions.      ASSESSMENT     Patient tolerated treatment well and has attended 7 PT treatment sessions. She has made good overall progress with PT, demonstrating improvements in left shoulder ROM, upper extremity strength, and postural stability. She presents with decreased soft tissue adhesions, and decreased tenderness to palpation. Patient is returning to more functional activities with less limitation. However, despite improvements, she still presents with decreased shoulder ROM and upper extremity strength, especially left shoulder external rotation. She would benefit from continued PT in order to address these remaining limitations and to reach max functional potential.     Ayesha is progressing well towards her goals.   Patient prognosis is Fair.     Patient will continue to benefit from skilled outpatient physical therapy to address the deficits listed in the problem list box on initial evaluation, provide patient/family education and to maximize patient's level of independence in the home and community environment.     Patient's spiritual, cultural and educational needs considered and patient agreeable to plan of care and goals.     Anticipated Barriers for therapy: co-morbidities, sedentary lifestyle and chronicity of condition       GOALS: 9/26/2022      Short Term Goals: 6 weeks  Patient will achieve 50% improvement towards ROM goals as shown in objective charts above, in order to improve overall functional capacity and QoL. MET 9/12/2022  Patient will achieve 50% improvement towards MMT  goals as shown in objective charts above, in order to improve overall functional capacity and QoL. - Partially Met  Patient will report a peak pain value of 2/10 on NPRS, in order to improve overall functional capacity and QoL. - Met 9/26/2022  Patient will score a limitation value of 34 out of 100 on FOTO, in order to improve overall functional capacity and QoL. - MET 9/26/2022  Patient will demonstrate proficiency and independence in provided HEP, in order to improve function and maximize participation within the clinic. - Met 9/26/2022     Long Term Goals: 12 weeks   Patient will achieve ROM goals as shown in objective charts above, in order to facilitate return to prior level of function. - Partially met  Patient will achieve MMT goals as shown in objective charts above, in order to facilitate return to prior level of function. - Ongoing  Patient will report a peak pain value of 0/10 on NPRS, in order to facilitate return to prior level of function. - Ongoing  Patient will score a limitation value of 31 out of 100 on FOTO, in order to facilitate return to prior level of function. - MET 9/26/2022  Patient will be able to participate in her community outreaches without any limitations. - Partially Met  Goals Key:  PC= progressing/continue; PM= partially met;        DC= discontinue      PLAN     Continue Plan of Care (POC) and progress per patient tolerance. See treatment section for details on planned progressions next session.    9/26/2022: It is my recommendation that patient continue with PT at her current frequency of 2 times per week for the remainder of her approved visits. Her treatment plan will remain the same, and she will be progressed appropriately.     Serenity Higuera, PT

## 2022-09-29 ENCOUNTER — CLINICAL SUPPORT (OUTPATIENT)
Dept: REHABILITATION | Facility: HOSPITAL | Age: 57
End: 2022-09-29
Payer: COMMERCIAL

## 2022-09-29 DIAGNOSIS — G89.29 CHRONIC LEFT SHOULDER PAIN: Primary | ICD-10-CM

## 2022-09-29 DIAGNOSIS — M25.612 DECREASED RANGE OF MOTION OF LEFT SHOULDER: ICD-10-CM

## 2022-09-29 DIAGNOSIS — R29.898 DECREASED STRENGTH OF UPPER EXTREMITY: ICD-10-CM

## 2022-09-29 DIAGNOSIS — M25.512 CHRONIC LEFT SHOULDER PAIN: Primary | ICD-10-CM

## 2022-09-29 PROCEDURE — 97110 THERAPEUTIC EXERCISES: CPT | Performed by: PHYSICAL THERAPIST

## 2022-09-29 PROCEDURE — 97140 MANUAL THERAPY 1/> REGIONS: CPT | Performed by: PHYSICAL THERAPIST

## 2022-09-29 NOTE — PROGRESS NOTES
"[facility]   Physical Therapy Treatment Note     Name: Ayesha Arcos  Clinic Number: [id number]    Therapy Diagnosis:   Encounter Diagnoses   Name Primary?    Chronic left shoulder pain Yes    Decreased range of motion of left shoulder     Decreased strength of upper extremity        Physician: Cade Orourke MD    Visit Date: 9/29/2022       Physician Orders: PT Eval and Treat  Medical Diagnosis from Referral: Injury of left rotator cuff, initial encounter  Evaluation Date: 8/25/2022  Authorization Period Expiration: 12/31/2022  Plan of Care Expiration: 11/23/2022  Progress Note Due: 10/26/2022  Visit # / Visits authorized: 8/20 (+ eval)  FOTO: 1/3      Precautions: Standard    PTA Visit #: 1/5     Time In: 0707  Time Out: 0742  Total Billable Time: 35 minutes     SUBJECTIVE     Patient reports: that she is not having any pain this morning, despite the cold weather and driving to and from [place] yesterday evening.     She was compliant with home exercise program.    Response to previous treatment: decreased shoulder pain after manual therapy    Functional change: lifting overhead is easier, decreased external rotation, "try not to" lay on her left side, driving is easy now, changed computer lay out resulting in decreased pain .     Pain: 0/10     Location: left anterior, lateral, and posterior joint line pain    OBJECTIVE     Objective Measures updated at progress report unless specified.       TREATMENT     Ayesha received the treatments listed below:       MANUAL THERAPY TECHNIQUES were applied for (10) minutes, including:    Manual Intervention Performed Today    Soft Tissue Mobilization x left periscapular musculature, rhomboids , supraspinatus , infraspinatus , and teres major and minor    Joint Mobilizations     Mobilization with movement          Functional Dry Needling        Plan for Next Visit: PRN       THERAPEUTIC EXERCISES to develop strength, endurance, " ROM, flexibility, posture, and core stabilization for (25) minutes including:    Intervention Performed Today    UBE x 3 minutes forward /backward    Upper trap stretch  3 x 30 seconds    Levator scapula stretch  3 x 30 seconds    Wall walks      flexion X 10 5 second hold left only  Scaption X 10 5 second hold left only  Abduction x 10 5 second hold left only   Rica's  Flexion and abduction, 3' each (added)   Scapula retraction x 3 x 10 green band    Shoulder extension X 3 x 10 green band   Shoulder Scaption  3x10, 2lbs   Shoulder PROPRIOCEPTIVE NEUROMUSCULAR FACILITATION D2 x 2x10 yellow band, SUPINE   Banded Wall Clocks  3x10 yellow band   Bilateral Shoulder external rotation  X  x 2 x 10 standing  2 x 10 supine   Side lying shoulder external rotation  x 3 x 8 left only   Wand AAROM ER, supine x 2 x 10    Cabinet reach  2 x 10 to top of cabinet   Scapula stability    X 2 ABC's supine, 2lbs  X 30 seconds Perturbations    Isometric shoulder external rotation   3 x 3 at 3 different angles supine   PASSIVE RANGE OF MOTION to left shoulder all planes x 6'   Progress Note  ROM and MMT re-assessment      Plan for Next Visit: progress scapula and rotator cuff strengthening as tolerated       PATIENT EDUCATION AND HOME EXERCISES     Home Exercises Provided and Patient Education Provided     Education provided: (during session) minutes  PURPOSE: Patient educated on the impairments noted above and the effects of physical therapy intervention to improve overall condition and QOL.   EXERCISE: Patient was educated on all the above exercise prior/during/after for proper posture, positioning, and execution for safe performance with home exercise program.   STRENGTH: Patient educated on the importance of improved core and extremity strength in order to improve alignment of the spine and extremities with static positions and dynamic movement.     Written Home Exercises Provided: yes.  Exercises were reviewed and Ayesha was able  to demonstrate them prior to the end of the session.  Ayesha demonstrated good  understanding of the education provided. See EMR under Patient Instructions for exercises provided during therapy sessions.      ASSESSMENT     Patient did well with treatment today. She completed exercises without increased complaint. Changed PNF pattern to supine, and patient demonstrated less difficulty with improved neuromuscular planning/firing. Improved PROM noted this visit. Patient progressing well towards goals.     Ayesha is progressing well towards her goals.   Patient prognosis is Fair.     Patient will continue to benefit from skilled outpatient physical therapy to address the deficits listed in the problem list box on initial evaluation, provide patient/family education and to maximize patient's level of independence in the home and community environment.     Patient's spiritual, cultural and educational needs considered and patient agreeable to plan of care and goals.     Anticipated Barriers for therapy: co-morbidities, sedentary lifestyle and chronicity of condition       GOALS: 9/29/2022      Short Term Goals: 6 weeks  Patient will achieve 50% improvement towards ROM goals as shown in objective charts above, in order to improve overall functional capacity and QoL. MET 9/12/2022  Patient will achieve 50% improvement towards MMT goals as shown in objective charts above, in order to improve overall functional capacity and QoL. - Partially Met  Patient will report a peak pain value of 2/10 on NPRS, in order to improve overall functional capacity and QoL. - Met 9/26/2022  Patient will score a limitation value of 34 out of 100 on FOTO, in order to improve overall functional capacity and QoL. - MET 9/26/2022  Patient will demonstrate proficiency and independence in provided HEP, in order to improve function and maximize participation within the clinic. - Met 9/26/2022     Long Term Goals: 12 weeks   Patient will achieve ROM  goals as shown in objective charts above, in order to facilitate return to prior level of function. - Partially met  Patient will achieve MMT goals as shown in objective charts above, in order to facilitate return to prior level of function. - Ongoing  Patient will report a peak pain value of 0/10 on NPRS, in order to facilitate return to prior level of function. - Ongoing  Patient will score a limitation value of 31 out of 100 on FOTO, in order to facilitate return to prior level of function. - MET 9/26/2022  Patient will be able to participate in her community outreaches without any limitations. - Partially Met  Goals Key:  PC= progressing/continue; PM= partially met;        DC= discontinue      PLAN     Continue Plan of Care (POC) and progress per patient tolerance. See treatment section for details on planned progressions next session.    9/26/2022: It is my recommendation that patient continue with PT at her current frequency of 2 times per week for the remainder of her approved visits. Her treatment plan will remain the same, and she will be progressed appropriately.     Serenity Higuera, PT

## 2022-10-03 ENCOUNTER — CLINICAL SUPPORT (OUTPATIENT)
Dept: REHABILITATION | Facility: HOSPITAL | Age: 57
End: 2022-10-03
Payer: COMMERCIAL

## 2022-10-03 DIAGNOSIS — M25.612 DECREASED RANGE OF MOTION OF LEFT SHOULDER: ICD-10-CM

## 2022-10-03 DIAGNOSIS — G89.29 CHRONIC LEFT SHOULDER PAIN: Primary | ICD-10-CM

## 2022-10-03 DIAGNOSIS — R29.898 DECREASED STRENGTH OF UPPER EXTREMITY: ICD-10-CM

## 2022-10-03 DIAGNOSIS — M25.512 CHRONIC LEFT SHOULDER PAIN: Primary | ICD-10-CM

## 2022-10-03 PROCEDURE — 97110 THERAPEUTIC EXERCISES: CPT | Mod: CQ

## 2022-10-03 NOTE — PROGRESS NOTES
"[facility]   Physical Therapy Treatment Note     Name: Ayesha Arcos  Clinic Number: [id number]    Therapy Diagnosis:   Encounter Diagnoses   Name Primary?    Chronic left shoulder pain Yes    Decreased range of motion of left shoulder     Decreased strength of upper extremity        Physician: Cade Orourke MD    Visit Date: 10/3/2022       Physician Orders: PT Eval and Treat  Medical Diagnosis from Referral: Injury of left rotator cuff, initial encounter  Evaluation Date: 8/25/2022  Authorization Period Expiration: 12/31/2022  Plan of Care Expiration: 11/23/2022  Progress Note Due: 10/26/2022  Visit # / Visits authorized: 9/20 (+ eval)  FOTO: 1/3      Precautions: Standard    PTA Visit #: 1/5     Time In: 0705  Time Out: 0745  Total Billable Time: 40 minutes     SUBJECTIVE     Patient reports: that she is not having any pain this morning which is good due to the weather change.      She was compliant with home exercise program.    Response to previous treatment: decreased shoulder pain after manual therapy    Functional change: lifting overhead is easier, decreased external rotation, "try not to" lay on her left side, driving is easy now, changed computer lay out resulting in decreased pain .     Pain: 0/10     Location: left anterior, lateral, and posterior joint line pain    OBJECTIVE     Objective Measures updated at progress report unless specified.       TREATMENT     Ayesha received the treatments listed below:       MANUAL THERAPY TECHNIQUES were applied for (0) minutes, including:    Manual Intervention Performed Today    Soft Tissue Mobilization  left periscapular musculature, rhomboids , supraspinatus , infraspinatus , and teres major and minor    Joint Mobilizations     Mobilization with movement          Functional Dry Needling        Plan for Next Visit: PRN       THERAPEUTIC EXERCISES to develop strength, endurance, ROM, flexibility, posture, and core " stabilization for (40) minutes including:    Intervention Performed Today    UBE x 3 minutes forward /backward    Upper trap stretch  3 x 30 seconds    Levator scapula stretch  3 x 30 seconds    Wall walks      flexion X 10 5 second hold left only  Scaption X 10 5 second hold left only  Abduction x 10 5 second hold left only   Rica's  Flexion and abduction, 3' each (added)   Scapula retraction x 3 x 10 green band    Shoulder extension X 3 x 10 green band   Shoulder Scaption  3x10, 2lbs   Shoulder PROPRIOCEPTIVE NEUROMUSCULAR FACILITATION D2 x 2x10 yellow band, SUPINE   Banded Wall Clocks  3x10 yellow band   Cookie tray      Attempted but unable   Windshield wipers      Attempted but unable   Bilateral Shoulder external rotation     2 x 10 standing  2 x 10 supine   Side lying shoulder external rotation  x 3 x 8 left only   Wand AAROM ER, supine  2 x 10    Cabinet reach x 2 x 10 to top of cabinet   Scapula stability    X 2 ABC's supine, 2lbs  X 30 seconds Perturbations    Isometric shoulder external rotation   3 x 3 at 3 different angles supine   PASSIVE RANGE OF MOTION to left shoulder all planes x 6'   Progress Note  ROM and MMT re-assessment      Plan for Next Visit: progress scapula and rotator cuff strengthening as tolerated       PATIENT EDUCATION AND HOME EXERCISES     Home Exercises Provided and Patient Education Provided     Education provided: (during session) minutes  PURPOSE: Patient educated on the impairments noted above and the effects of physical therapy intervention to improve overall condition and QOL.   EXERCISE: Patient was educated on all the above exercise prior/during/after for proper posture, positioning, and execution for safe performance with home exercise program.   STRENGTH: Patient educated on the importance of improved core and extremity strength in order to improve alignment of the spine and extremities with static positions and dynamic movement.   10/3/2022: issued isometric shoulder  external rotation home exercise program      Written Home Exercises Provided: yes.  Exercises were reviewed and Ayesha was able to demonstrate them prior to the end of the session.  Ayesha demonstrated good  understanding of the education provided. See EMR under Patient Instructions for exercises provided during therapy sessions.      ASSESSMENT     Patient did well with treatment today but is very limited with left external rotation strength. Demonstrates good passive range of motion in flexion and extension but  limited in internal rotation and external rotation.      Ayesha is progressing well towards her goals.   Patient prognosis is Fair.     Patient will continue to benefit from skilled outpatient physical therapy to address the deficits listed in the problem list box on initial evaluation, provide patient/family education and to maximize patient's level of independence in the home and community environment.     Patient's spiritual, cultural and educational needs considered and patient agreeable to plan of care and goals.     Anticipated Barriers for therapy: co-morbidities, sedentary lifestyle and chronicity of condition       GOALS: 10/3/2022      Short Term Goals: 6 weeks  Patient will achieve 50% improvement towards ROM goals as shown in objective charts above, in order to improve overall functional capacity and QoL. MET 9/12/2022  Patient will achieve 50% improvement towards MMT goals as shown in objective charts above, in order to improve overall functional capacity and QoL. - Partially Met  Patient will report a peak pain value of 2/10 on NPRS, in order to improve overall functional capacity and QoL. - Met 9/26/2022  Patient will score a limitation value of 34 out of 100 on FOTO, in order to improve overall functional capacity and QoL. - MET 9/26/2022  Patient will demonstrate proficiency and independence in provided HEP, in order to improve function and maximize participation within the clinic. -  Met 9/26/2022     Long Term Goals: 12 weeks   Patient will achieve ROM goals as shown in objective charts above, in order to facilitate return to prior level of function. - Partially met  Patient will achieve MMT goals as shown in objective charts above, in order to facilitate return to prior level of function. - Ongoing  Patient will report a peak pain value of 0/10 on NPRS, in order to facilitate return to prior level of function. - Ongoing  Patient will score a limitation value of 31 out of 100 on FOTO, in order to facilitate return to prior level of function. - MET 9/26/2022  Patient will be able to participate in her community outreaches without any limitations. - Partially Met  Goals Key:  PC= progressing/continue; PM= partially met;        DC= discontinue      PLAN     Continue Plan of Care (POC) and progress per patient tolerance. See treatment section for details on planned progressions next session.    9/26/2022: It is my recommendation that patient continue with PT at her current frequency of 2 times per week for the remainder of her approved visits. Her treatment plan will remain the same, and she will be progressed appropriately.     Nishant Hudson, PTA

## 2022-10-04 ENCOUNTER — DOCUMENTATION ONLY (OUTPATIENT)
Dept: REHABILITATION | Facility: HOSPITAL | Age: 57
End: 2022-10-04
Payer: COMMERCIAL

## 2022-10-05 ENCOUNTER — OFFICE VISIT (OUTPATIENT)
Dept: SPORTS MEDICINE | Facility: CLINIC | Age: 57
End: 2022-10-05
Payer: COMMERCIAL

## 2022-10-05 VITALS — BODY MASS INDEX: 25.34 KG/M2 | HEIGHT: 65 IN | WEIGHT: 152.13 LBS

## 2022-10-05 DIAGNOSIS — G89.29 CHRONIC LEFT SHOULDER PAIN: ICD-10-CM

## 2022-10-05 DIAGNOSIS — M25.512 CHRONIC LEFT SHOULDER PAIN: ICD-10-CM

## 2022-10-05 DIAGNOSIS — M05.9 SEROPOSITIVE RHEUMATOID ARTHRITIS: ICD-10-CM

## 2022-10-05 DIAGNOSIS — S46.012D TRAUMATIC COMPLETE TEAR OF LEFT ROTATOR CUFF, SUBSEQUENT ENCOUNTER: Primary | ICD-10-CM

## 2022-10-05 PROBLEM — M75.102 NONTRAUMATIC TEAR OF LEFT ROTATOR CUFF: Status: ACTIVE | Noted: 2022-10-05

## 2022-10-05 PROCEDURE — 1159F PR MEDICATION LIST DOCUMENTED IN MEDICAL RECORD: ICD-10-PCS | Mod: CPTII,S$GLB,, | Performed by: FAMILY MEDICINE

## 2022-10-05 PROCEDURE — 99999 PR PBB SHADOW E&M-EST. PATIENT-LVL III: CPT | Mod: PBBFAC,,, | Performed by: FAMILY MEDICINE

## 2022-10-05 PROCEDURE — 3044F HG A1C LEVEL LT 7.0%: CPT | Mod: CPTII,S$GLB,, | Performed by: FAMILY MEDICINE

## 2022-10-05 PROCEDURE — 99214 PR OFFICE/OUTPT VISIT, EST, LEVL IV, 30-39 MIN: ICD-10-PCS | Mod: S$GLB,,, | Performed by: FAMILY MEDICINE

## 2022-10-05 PROCEDURE — 99999 PR PBB SHADOW E&M-EST. PATIENT-LVL III: ICD-10-PCS | Mod: PBBFAC,,, | Performed by: FAMILY MEDICINE

## 2022-10-05 PROCEDURE — 3044F PR MOST RECENT HEMOGLOBIN A1C LEVEL <7.0%: ICD-10-PCS | Mod: CPTII,S$GLB,, | Performed by: FAMILY MEDICINE

## 2022-10-05 PROCEDURE — 1159F MED LIST DOCD IN RCRD: CPT | Mod: CPTII,S$GLB,, | Performed by: FAMILY MEDICINE

## 2022-10-05 PROCEDURE — 3008F PR BODY MASS INDEX (BMI) DOCUMENTED: ICD-10-PCS | Mod: CPTII,S$GLB,, | Performed by: FAMILY MEDICINE

## 2022-10-05 PROCEDURE — 99214 OFFICE O/P EST MOD 30 MIN: CPT | Mod: S$GLB,,, | Performed by: FAMILY MEDICINE

## 2022-10-05 PROCEDURE — 3008F BODY MASS INDEX DOCD: CPT | Mod: CPTII,S$GLB,, | Performed by: FAMILY MEDICINE

## 2022-10-05 RX ORDER — CYANOCOBALAMIN (VITAMIN B-12) 500 MCG
TABLET ORAL
COMMUNITY
End: 2022-12-15

## 2022-10-05 NOTE — PROGRESS NOTES
Subjective:     Patient ID: Ayesha Arcos is a 57 y.o. female.    Chief Complaint: Pain of the Left Shoulder      HPI: Patient is being seen for left shoulder follow up since last office visit on 8/9/22. Is participating in physical therapy twice a week at the [place] location. Says therapy has been helping a lot and that her ROM is better. Denies any pain during today's office visit. States pain at its worst is 2/10. Was  taking Duexis as needed to help with pain relief when she receives it through home delivery. .    Raising her arm is improving with increase strength and she is working also all external rotation.  MRI showed tear of the infraspinatus and supraspinatus tendons.  She is happy with the reduced pain and with her improve functionality at this point.  She has about 12 more physical therapy visits.    Past Medical History:   Diagnosis Date    Carpal tunnel syndrome, bilateral     Hypertension     Rheumatoid arthritis      Past Surgical History:   Procedure Laterality Date    CARPAL TUNNEL RELEASE      right    skin cancer removal      nose    TUBAL LIGATION       Family History   Problem Relation Age of Onset    Diabetes Mother     Heart disease Father     Hypertension Father     Early death Father         Early 50's    Stroke Paternal Uncle     Heart disease Paternal Uncle      Social History     Socioeconomic History    Marital status:     Number of children: 1   Tobacco Use    Smoking status: Never    Smokeless tobacco: Never   Substance and Sexual Activity    Alcohol use: Yes     Comment: social     Drug use: No    Sexual activity: Yes       Current Outpatient Medications:     ascorbic acid, vitamin C, (VITAMIN C) 100 MG tablet, Take 100 mg by mouth once daily., Disp: , Rfl:     FLAXSEED OIL ORAL, Take by mouth., Disp: , Rfl:     folic acid (FOLVITE) 1 MG tablet, Take 1 tablet (1 mg total) by mouth once daily., Disp: 90 tablet, Rfl: 2    folic acid 0.8 mg Cap, folic acid Take No date  recorded No form recorded No frequency recorded No route recorded No set duration recorded No set duration amount recorded active No dosage strength recorded No dosage strength units of measure recorded, Disp: , Rfl:     multivitamin capsule, Take 1 capsule by mouth once daily., Disp: , Rfl:     multivitamin with minerals (HAIR,SKIN AND NAILS ORAL), Take by mouth once daily., Disp: , Rfl:     rosuvastatin (CRESTOR) 10 MG tablet, Take 1 tablet (10 mg total) by mouth once daily., Disp: 90 tablet, Rfl: 3    TURMERIC ORAL, Take by mouth., Disp: , Rfl:     upadacitinib (RINVOQ) 15 mg 24 hr tablet, Take 1 tablet (15 mg total) by mouth once daily., Disp: 90 tablet, Rfl: 2    diclofenac sodium (VOLTAREN) 1 % Gel, Apply 2 g topically 4 (four) times daily. for 14 days, Disp: 200 g, Rfl: 0    methotrexate 2.5 MG Tab, Take 10 tablets (25 mg total) by mouth every 7 days. (Patient not taking: Reported on 10/5/2022), Disp: 120 tablet, Rfl: 2    methotrexate 2.5 mg/mL Soln, methotrexate Take No date recorded No form recorded No frequency recorded No route recorded No set duration recorded No set duration amount recorded active No dosage strength recorded No dosage strength units of measure recorded, Disp: , Rfl:     valACYclovir (VALTREX) 1000 MG tablet, Take 1 tablet (1,000 mg total) by mouth 2 (two) times daily. for 10 days, Disp: 20 tablet, Rfl: 1  Review of patient's allergies indicates:  No Known Allergies  Review of Systems   Constitutional:  Negative for chills, fever and weight loss.   Respiratory:  Negative for shortness of breath.    Cardiovascular:  Negative for chest pain and palpitations.     Objective:   Body mass index is 25.31 kg/m².  There were no vitals filed for this visit.        Ortho/SPM Exam-alert and oriented well-nourished well-developed ambulatory no acute distress     Respiratory effort appears normal     Left shoulder-no acute deformity in normal position pain     Decreased external rotation with her  elbow at her side but is improving     Abduction is also improving    Nontender to palpation     Neurovascular intact     Psychiatric good affect and cognition     Plan-finish physical therapy visits and then recheck in clinic.  Patient does not wish procedural solution as long as she can maintain good function and a low pain level bed doing her physical therapy and home exercise program.    There are no Patient Instructions on file for this visit.    IMAGING: MRI Shoulder Without Contrast Left  Narrative: EXAMINATION:  MRI SHOULDER WITHOUT CONTRAST LEFT    CLINICAL HISTORY:  Shoulder pain, labral tear suspected, xray done; Pain in left shoulder    TECHNIQUE:  Left shoulder MRI without IV contrast.    COMPARISON:  Left shoulder radiograph 07/08/2022    FINDINGS:  Full-thickness left supraspinatus tendon tear results in 4 cm medial retraction.  There is similar full-thickness infraspinatus tendon tear with medial retraction measuring 5 cm.  Mild left infraspinatus muscle fatty atrophy is evident.  No supraspinatus muscle fatty atrophy identified.    Very mild tendinosis affects cranial subscapularis tendon fibers.  Teres minor tendon is normal.    Intra-articular long head of biceps tendon appears markedly diminutive in size and is poorly visualized in part due to motion.  The appearance suggest either high-grade partial-thickness tear diffusely or full-thickness tear.  Long head of biceps tendon becomes normal caliber inferior to level lesser tuberosity.    Superior migration of left humeral head with decreased acromial humeral interval is evident.  Full-thickness cartilage loss appears diffuse throughout the left glenohumeral joint.  Circumferential tear is affect the left glenoid labrum.  Inferomedial left humeral head osteophyte is evident.  Geodes affect posteroinferior glenoid.  Small paralabral cyst is suggested along the anterior superior glenoid.    Small left glenohumeral joint effusion freely communicates  with tiny volume fluid in subacromial subdeltoid bursa.  Glenohumeral joint synovitis is suggested.    Mild left AC joint osteoarthrosis is present.  Subcortical geodes affect the anterior acromion.  Type 2 acromion demonstrates no significant lateral downsloping.  Inferior glenohumeral ligaments are unremarkable.  Impression: 1. Full-thickness medially retracted tears of left supraspinatus and infraspinatus tendons with mild infraspinatus muscle fatty atrophy.  2. Very mild tendinosis of subscapularis tendon.  3. Diminutive intra-articular long head of biceps tendon which is poorly visualized either due to high-grade partial-thickness tear or full-thickness tear.  4. Advanced left glenohumeral joint osteoarthrosis, with associated synovitis.  5. Mild left AC joint osteoarthrosis    Electronically signed by: Stephen Nguyen MD  Date:    08/23/2022  Time:    20:12       Radiographs / Imaging : Relevant imaging results reviewed by me and interpreted by me, discussed with the patient and / or family -agree with report      Assessment:     Encounter Diagnoses   Name Primary?    Nontraumatic tear of left rotator cuff, unspecified tear extent Yes    Chronic left shoulder pain     Seropositive rheumatoid arthritis         Plan:   Nontraumatic tear of left rotator cuff, unspecified tear extent    Chronic left shoulder pain    Seropositive rheumatoid arthritis      The patient verbalized good understanding of the medical issues discussed today and expressed appreciation for the care provided.  Patient was given the opportunity to ask questions and be an active participant in their medical care. Patient had no further questions or concerns at this time.     The patient was encouraged to participate in appropriate physical activity.      Cade Orourke M.D.  [facility]        This note was dictated using voice recognition software and may have sound a like errors.

## 2022-10-05 NOTE — PATIENT INSTRUCTIONS
"Patient Education       Rotator Cuff Injury   The Basics   Written by the doctors and editors at [facility]   What is a rotator cuff injury? -- A rotator cuff injury is a condition that can cause shoulder pain. The rotator cuff is made up of 4 shoulder muscles and their tendons. Tendons are strong bands of tissue that connect muscles to bones.  People can get different types of rotator cuff injuries. One common injury is "tendinopathy," which is when people have a problem with 1 of their tendons. In most people with tendinopathy, the tendons are not inflamed or swollen. If they do get inflamed or swollen, doctors call it "tendinitis." Tendinopathy and tendinitis can happen if people use their rotator cuff muscles too much or do a lot of activity with their arms overhead.  Another type of rotator cuff injury is a tear in a tendon. Tears can happen if a person falls on the shoulder or moves the shoulder too fast and with too much force. Tears can also happen as a tendon wears out over time.  What are the symptoms of a rotator cuff injury? -- Most people with tendinopathy or tendinitis have pain where the shoulder meets the top of the arm and down the outer part of the upper arm. The pain is usually worse when they move their arm over their head or lie on their shoulder.  People with a torn tendon usually have shoulder pain and might also have trouble raising their arm overhead.  Will I need tests? -- You might. Your doctor or nurse will talk with you and do an exam. If he or she suspects a tear, you might need an imaging test of the shoulder. Imaging tests create pictures of the inside of the body.  How is a rotator cuff injury treated? -- Many rotator cuff injuries get better on their own, but they can take months to heal completely. To help your shoulder get better, you can:  Rest your shoulder - Avoid doing activities that cause pain or strain your shoulder, such as raising your arm overhead or reaching behind you. " In general, try to keep your arm down, close to, and in front of your body. But you can move your shoulder gently if you need to.  Ice your shoulder - Put a cold gel pack, bag of ice, or bag of frozen vegetables on the injured area every 1 to 2 hours, for 15 minutes each time, as needed. Put a thin towel between the ice (or other cold object) and your skin.  Take medicine to reduce the pain and swelling - Your doctor or nurse might recommend that you take ibuprofen (sample brand names: Advil, Motrin) or naproxen (sample brand names: Aleve, Naprosyn).  Some tears, especially large tears, might need to be treated with surgery.  Is there anything I can do on my own to feel better? -- Yes. Different exercises can help your shoulder feel better. Ask your doctor or nurse which exercises you should do, when to start them, and how often to do them.  Some exercises help keep your shoulder from getting too stiff. One of these is called the pendulum stretch. To do this exercise, let your arm relax and hang down. Move your arm back and forth, then side to side, and then around in small circles (figure 1). Your doctor or nurse can also show you other stretches that you can do.  Other exercises can help strengthen your shoulder muscles. Your doctor, nurse, or physical therapist (exercise expert) can show you how to do these types of exercises.  When you do shoulder exercises, it's important to:  Warm up your shoulder first by taking a hot shower or bath, or massaging the area  Start slowly and make the exercises harder over time  Know that some soreness is normal. If you have sharp or tearing pain, stop what you're doing and let your doctor or nurse know.  What if my symptoms don't get better? -- If your symptoms don't get better, talk with your doctor or nurse about other possible treatments, such as:  Getting a shot of medicine into your shoulder  Surgery  All topics are updated as new evidence becomes available and our peer  review process is complete.  This topic retrieved from [employer] on: Sep 21, 2021.  Topic 85767 Version 5.0  Release: 29.4.2 - C29.263  © 2021 UpToDate, Inc. and/or its affiliates. All rights reserved.  figure 1: Pendulum swing     To do this exercise, you can sit or stand. Relax your arm and let it hang down. Move your arm back and forth, then side to side, and then around in small circles in both directions. After about a week, you can make the exercise harder by making bigger movements or holding a weight in your hand.  Graphic 33235 Version 3.0    Consumer Information Use and Disclaimer   This information is not specific medical advice and does not replace information you receive from your health care provider. This is only a brief summary of general information. It does NOT include all information about conditions, illnesses, injuries, tests, procedures, treatments, therapies, discharge instructions or life-style choices that may apply to you. You must talk with your health care provider for complete information about your health and treatment options. This information should not be used to decide whether or not to accept your health care provider's advice, instructions or recommendations. Only your health care provider has the knowledge and training to provide advice that is right for you. The use of this information is governed by the [employer] End User License Agreement, available at https://www.[employer]/en/solutions/[employer]/about/shaylee.The use of [employer] content is governed by the [employer] Terms of Use. ©2021 UpToDate, Inc. All rights reserved.  Copyright   © 2021 Up[employer]Date, Inc. and/or its affiliates. All rights reserved.

## 2022-10-07 ENCOUNTER — CLINICAL SUPPORT (OUTPATIENT)
Dept: REHABILITATION | Facility: HOSPITAL | Age: 57
End: 2022-10-07
Payer: COMMERCIAL

## 2022-10-07 DIAGNOSIS — R29.898 DECREASED STRENGTH OF UPPER EXTREMITY: ICD-10-CM

## 2022-10-07 DIAGNOSIS — M25.512 CHRONIC LEFT SHOULDER PAIN: Primary | ICD-10-CM

## 2022-10-07 DIAGNOSIS — G89.29 CHRONIC LEFT SHOULDER PAIN: Primary | ICD-10-CM

## 2022-10-07 DIAGNOSIS — M25.612 DECREASED RANGE OF MOTION OF LEFT SHOULDER: ICD-10-CM

## 2022-10-07 PROCEDURE — 97110 THERAPEUTIC EXERCISES: CPT | Mod: CQ

## 2022-10-07 NOTE — PROGRESS NOTES
"[facility]   Physical Therapy Treatment Note     Name: Ayesha Arcos  Clinic Number: [id number]    Therapy Diagnosis:   Encounter Diagnoses   Name Primary?    Chronic left shoulder pain Yes    Decreased range of motion of left shoulder     Decreased strength of upper extremity        Physician: Cade Orourke MD    Visit Date: 10/7/2022       Physician Orders: PT Eval and Treat  Medical Diagnosis from Referral: Injury of left rotator cuff, initial encounter  Evaluation Date: 8/25/2022  Authorization Period Expiration: 12/31/2022  Plan of Care Expiration: 11/23/2022  Progress Note Due: 10/26/2022  Visit # / Visits authorized: 10/20 (+ eval)  FOTO: 1/3      Precautions: Standard    PTA Visit #: 1/5     Time In: 0710  Time Out: 0755  Total Billable Time: 40 minutes     SUBJECTIVE     Patient reports: that she is feeling good this morning.      She was compliant with home exercise program.    Response to previous treatment: feeling looser after manual therapy    Functional change: lifting overhead is easier, decreased external rotation, "try not to" lay on her left side, driving is easy now, changed computer lay out resulting in decreased pain .     Pain: 0/10     Location: left anterior, lateral, and posterior joint line pain    OBJECTIVE     Objective Measures updated at progress report unless specified.       TREATMENT     Ayesha received the treatments listed below:       MANUAL THERAPY TECHNIQUES were applied for (0) minutes, including:    Manual Intervention Performed Today    Soft Tissue Mobilization  left periscapular musculature, rhomboids , supraspinatus , infraspinatus , and teres major and minor    Joint Mobilizations     Mobilization with movement          Functional Dry Needling        Plan for Next Visit: PRN       THERAPEUTIC EXERCISES to develop strength, endurance, ROM, flexibility, posture, and core stabilization for (40) minutes including:    Intervention " Performed Today    UBE x 3 minutes forward /backward    Upper trap stretch  3 x 30 seconds    Levator scapula stretch  3 x 30 seconds    Wall walks      flexion X 10 5 second hold left only  Scaption X 10 5 second hold left only  Abduction x 10 5 second hold left only   Rica's  Flexion and abduction, 3' each (added)   Scapula retraction x 3 x 10 green band    Shoulder extension X 3 x 10 green band   Shoulder Scaption x 3x10, 2 pounds   Shoulder PROPRIOCEPTIVE NEUROMUSCULAR FACILITATION D2 x 2x10 yellow band, SUPINE   Banded Wall Clocks  3x10 yellow band   Cookie tray        Windshield wipers         Bilateral Shoulder external rotation     2 x 10 standing  2 x 10 supine   Side lying shoulder external rotation   3 x 8 left only   Wand active assistive range of motion  external rotation supine x 2 x 10    Cabinet reach  2 x 10 to top of cabinet   Scapula stability X  x X 2 ABC's supine, 2 pounds  X 30 seconds Perturbations    Isometric shoulder external rotation   3 x 3 at 3 different angles supine   PASSIVE RANGE OF MOTION to left shoulder all planes x 6'   Progress Note  ROM and MMT re-assessment      Plan for Next Visit: progress scapula and rotator cuff strengthening as tolerated       PATIENT EDUCATION AND HOME EXERCISES     Home Exercises Provided and Patient Education Provided     Education provided: (during session) minutes  PURPOSE: Patient educated on the impairments noted above and the effects of physical therapy intervention to improve overall condition and QOL.   EXERCISE: Patient was educated on all the above exercise prior/during/after for proper posture, positioning, and execution for safe performance with home exercise program.   STRENGTH: Patient educated on the importance of improved core and extremity strength in order to improve alignment of the spine and extremities with static positions and dynamic movement.   10/3/2022: issued isometric shoulder external rotation home exercise program       Written Home Exercises Provided: yes.  Exercises were reviewed and Ayesha was able to demonstrate them prior to the end of the session.  Ayesha demonstrated good  understanding of the education provided. See EMR under Patient Instructions for exercises provided during therapy sessions.      ASSESSMENT     Patient did well with treatment today but is very limited with left external rotation strength. Demonstrates better passive range of motion in all directions.      Ayesha is progressing well towards her goals.   Patient prognosis is Fair.     Patient will continue to benefit from skilled outpatient physical therapy to address the deficits listed in the problem list box on initial evaluation, provide patient/family education and to maximize patient's level of independence in the home and community environment.     Patient's spiritual, cultural and educational needs considered and patient agreeable to plan of care and goals.     Anticipated Barriers for therapy: co-morbidities, sedentary lifestyle and chronicity of condition       GOALS: 10/7/2022      Short Term Goals: 6 weeks  Patient will achieve 50% improvement towards ROM goals as shown in objective charts above, in order to improve overall functional capacity and QoL. MET 9/12/2022  Patient will achieve 50% improvement towards MMT goals as shown in objective charts above, in order to improve overall functional capacity and QoL. - Partially Met  Patient will report a peak pain value of 2/10 on NPRS, in order to improve overall functional capacity and QoL. - Met 9/26/2022  Patient will score a limitation value of 34 out of 100 on FOTO, in order to improve overall functional capacity and QoL. - MET 9/26/2022  Patient will demonstrate proficiency and independence in provided HEP, in order to improve function and maximize participation within the clinic. - Met 9/26/2022     Long Term Goals: 12 weeks   Patient will achieve ROM goals as shown in objective  charts above, in order to facilitate return to prior level of function. - Partially met  Patient will achieve MMT goals as shown in objective charts above, in order to facilitate return to prior level of function. - Progressing 10/7/2022  Patient will report a peak pain value of 0/10 on NPRS, in order to facilitate return to prior level of function. - Ongoing  Patient will score a limitation value of 31 out of 100 on FOTO, in order to facilitate return to prior level of function. - MET 9/26/2022  Patient will be able to participate in her community outreaches without any limitations. - Partially Met  Goals Key:  PC= progressing/continue; PM= partially met;        DC= discontinue      PLAN     Continue Plan of Care (POC) and progress per patient tolerance. See treatment section for details on planned progressions next session.    9/26/2022: It is my recommendation that patient continue with PT at her current frequency of 2 times per week for the remainder of her approved visits. Her treatment plan will remain the same, and she will be progressed appropriately.     Nishant Hudson, PTA

## 2022-10-10 ENCOUNTER — CLINICAL SUPPORT (OUTPATIENT)
Dept: REHABILITATION | Facility: HOSPITAL | Age: 57
End: 2022-10-10
Payer: COMMERCIAL

## 2022-10-10 DIAGNOSIS — G89.29 CHRONIC LEFT SHOULDER PAIN: Primary | ICD-10-CM

## 2022-10-10 DIAGNOSIS — M25.512 CHRONIC LEFT SHOULDER PAIN: Primary | ICD-10-CM

## 2022-10-10 DIAGNOSIS — R29.898 DECREASED STRENGTH OF UPPER EXTREMITY: ICD-10-CM

## 2022-10-10 DIAGNOSIS — M25.612 DECREASED RANGE OF MOTION OF LEFT SHOULDER: ICD-10-CM

## 2022-10-10 PROCEDURE — 97140 MANUAL THERAPY 1/> REGIONS: CPT | Mod: CQ

## 2022-10-10 PROCEDURE — 97110 THERAPEUTIC EXERCISES: CPT | Mod: CQ

## 2022-10-10 NOTE — PROGRESS NOTES
"[facility]   Physical Therapy Treatment Note     Name: Ayesha Arcos  Clinic Number: [id number]    Therapy Diagnosis:   Encounter Diagnoses   Name Primary?    Chronic left shoulder pain Yes    Decreased range of motion of left shoulder     Decreased strength of upper extremity        Physician: Cade Orourke MD    Visit Date: 10/10/2022       Physician Orders: PT Eval and Treat  Medical Diagnosis from Referral: Injury of left rotator cuff, initial encounter  Evaluation Date: 8/25/2022  Authorization Period Expiration: 12/31/2022  Plan of Care Expiration: 11/23/2022  Progress Note Due: 10/26/2022  Visit # / Visits authorized: 11/20 (+ eval)  FOTO: 1/3      Precautions: Standard    PTA Visit #: 2/5     Time In: 0700  Time Out: 0745  Total Billable Time: 40 minutes     SUBJECTIVE     Patient reports: that she has noticed a little more shoulder external rotation    She was compliant with home exercise program.    Response to previous treatment: feeling looser after manual therapy    Functional change: lifting overhead is easier, decreased external rotation, "try not to" lay on her left side, driving is easy now, changed computer lay out resulting in decreased pain .     Pain: 0/10     Location: left anterior, lateral, and posterior joint line pain    OBJECTIVE     Objective Measures updated at progress report unless specified.       TREATMENT     Ayesha received the treatments listed below:       MANUAL THERAPY TECHNIQUES were applied for (15) minutes, including:    Manual Intervention Performed Today    Soft Tissue Mobilization  left periscapular musculature, rhomboids , supraspinatus , infraspinatus , and teres major and minor    Joint Mobilizations     Mobilization with movement          Functional Dry Needling        Plan for Next Visit: PRN       THERAPEUTIC EXERCISES to develop strength, endurance, ROM, flexibility, posture, and core stabilization for (25) minutes " including:    Intervention Performed Today    UBE x 3 minutes forward /backward    Upper trap stretch  3 x 30 seconds    Levator scapula stretch  3 x 30 seconds    Wall walks      flexion X 10 5 second hold left only  Scaption X 10 5 second hold left only  Abduction x 10 5 second hold left only   Pulley's x 3 minutes external rotation    Scapula retraction  3 x 10 green band    Shoulder extension  3 x 10 green band   Shoulder Scaption  3x10, 2 pounds   Shoulder PROPRIOCEPTIVE NEUROMUSCULAR FACILITATION D2  2x10 yellow band, SUPINE   Banded Wall Clocks  3x10 yellow band   Cookie tray        [place] wipers         Bilateral Shoulder external rotation     2 x 10 standing  2 x 10 supine   Side lying shoulder external rotation  x 3 x 8 left only with assistance of red band   Wand active assistive range of motion  external rotation supine  2 x 10    Cabinet reach  2 x 10 to top of cabinet   Scapula stability X    x X 2 ABC's supine, 2 pounds  X 30 seconds Perturbations   3 x 10 serratus anterior press   Isometric shoulder external rotation  x 3 x 8 at 3 different angles standing  3 x 8 supine into manual resistance   PASSIVE RANGE OF MOTION to left shoulder all planes x 18 minutes   Progress Note  ROM and MMT re-assessment      Plan for Next Visit: progress scapula and rotator cuff strengthening as tolerated       PATIENT EDUCATION AND HOME EXERCISES     Home Exercises Provided and Patient Education Provided     Education provided: (during session) minutes  PURPOSE: Patient educated on the impairments noted above and the effects of physical therapy intervention to improve overall condition and QOL.   EXERCISE: Patient was educated on all the above exercise prior/during/after for proper posture, positioning, and execution for safe performance with home exercise program.   STRENGTH: Patient educated on the importance of improved core and extremity strength in order to improve alignment of the spine and extremities with  static positions and dynamic movement.   10/3/2022: issued isometric shoulder external rotation home exercise program      Written Home Exercises Provided: yes.  Exercises were reviewed and Ayesha was able to demonstrate them prior to the end of the session.  Ayseha demonstrated good  understanding of the education provided. See EMR under Patient Instructions for exercises provided during therapy sessions.      ASSESSMENT     Patient did well with treatment today but is very limited with left external rotation strength but she is able to attain passive external rotation range of motion more than 50%. Her external rotation strength remains the same. She is also able to attain good shoulder abduction.       Ayesha is progressing well towards her goals.   Patient prognosis is Fair.     Patient will continue to benefit from skilled outpatient physical therapy to address the deficits listed in the problem list box on initial evaluation, provide patient/family education and to maximize patient's level of independence in the home and community environment.     Patient's spiritual, cultural and educational needs considered and patient agreeable to plan of care and goals.     Anticipated Barriers for therapy: co-morbidities, sedentary lifestyle and chronicity of condition       GOALS: 10/10/2022      Short Term Goals: 6 weeks  Patient will achieve 50% improvement towards ROM goals as shown in objective charts above, in order to improve overall functional capacity and QoL. MET 9/12/2022  Patient will achieve 50% improvement towards MMT goals as shown in objective charts above, in order to improve overall functional capacity and QoL. - Partially Met  Patient will report a peak pain value of 2/10 on NPRS, in order to improve overall functional capacity and QoL. - Met 9/26/2022  Patient will score a limitation value of 34 out of 100 on FOTO, in order to improve overall functional capacity and QoL. - MET 9/26/2022  Patient  will demonstrate proficiency and independence in provided HEP, in order to improve function and maximize participation within the clinic. - Met 9/26/2022     Long Term Goals: 12 weeks   Patient will achieve ROM goals as shown in objective charts above, in order to facilitate return to prior level of function. - Partially met  Patient will achieve MMT goals as shown in objective charts above, in order to facilitate return to prior level of function. - Progressing 10/7/2022  Patient will report a peak pain value of 0/10 on NPRS, in order to facilitate return to prior level of function. - Ongoing  Patient will score a limitation value of 31 out of 100 on FOTO, in order to facilitate return to prior level of function. - MET 9/26/2022  Patient will be able to participate in her community outreaches without any limitations. - Partially Met  Goals Key:  PC= progressing/continue; PM= partially met;        DC= discontinue      PLAN     Continue Plan of Care (POC) and progress per patient tolerance. See treatment section for details on planned progressions next session.    9/26/2022: It is my recommendation that patient continue with PT at her current frequency of 2 times per week for the remainder of her approved visits. Her treatment plan will remain the same, and she will be progressed appropriately.     Nishant Hudson, PTA

## 2022-10-17 ENCOUNTER — CLINICAL SUPPORT (OUTPATIENT)
Dept: REHABILITATION | Facility: HOSPITAL | Age: 57
End: 2022-10-17
Payer: COMMERCIAL

## 2022-10-17 DIAGNOSIS — G89.29 CHRONIC LEFT SHOULDER PAIN: Primary | ICD-10-CM

## 2022-10-17 DIAGNOSIS — R29.898 DECREASED STRENGTH OF UPPER EXTREMITY: ICD-10-CM

## 2022-10-17 DIAGNOSIS — M25.612 DECREASED RANGE OF MOTION OF LEFT SHOULDER: ICD-10-CM

## 2022-10-17 DIAGNOSIS — M25.512 CHRONIC LEFT SHOULDER PAIN: Primary | ICD-10-CM

## 2022-10-17 PROCEDURE — 97140 MANUAL THERAPY 1/> REGIONS: CPT | Performed by: PHYSICAL THERAPIST

## 2022-10-17 PROCEDURE — 97110 THERAPEUTIC EXERCISES: CPT | Performed by: PHYSICAL THERAPIST

## 2022-10-17 NOTE — PROGRESS NOTES
"[facility]   Physical Therapy Treatment Note     Name: Ayesha Arcos  Clinic Number: [id number]    Therapy Diagnosis:   Encounter Diagnoses   Name Primary?    Chronic left shoulder pain Yes    Decreased range of motion of left shoulder     Decreased strength of upper extremity        Physician: Cade Orourke MD    Visit Date: 10/17/2022       Physician Orders: PT Eval and Treat  Medical Diagnosis from Referral: Injury of left rotator cuff, initial encounter  Evaluation Date: 8/25/2022  Authorization Period Expiration: 12/31/2022  Plan of Care Expiration: 11/23/2022  Progress Note Due: 10/26/2022  Visit # / Visits authorized: 12/20 (+ eval)  FOTO: 1/3      Precautions: Standard    PTA Visit #: 0/5     Time In: 0709  Time Out: 0748  Total Billable Time: 38 minutes     SUBJECTIVE     Patient reports: that her shoulders are feeling tense today, but she did a lot of driving throughout the weekend.     She was compliant with home exercise program.    Response to previous treatment: feeling looser after manual therapy    Functional change: lifting overhead is easier, decreased external rotation, "try not to" lay on her left side, driving is easy now, changed computer lay out resulting in decreased pain .     Pain: 3-4/10     Location: left anterior, lateral, and posterior joint line pain    OBJECTIVE     Objective Measures updated at progress report unless specified.       TREATMENT     Ayesha received the treatments listed below:       MANUAL THERAPY TECHNIQUES were applied for (15) minutes, including:    Manual Intervention Performed Today    Soft Tissue Mobilization x left periscapular musculature, rhomboids , supraspinatus , infraspinatus , and teres major and minor    Joint Mobilizations     Mobilization with movement          Functional Dry Needling        Plan for Next Visit: PRN       THERAPEUTIC EXERCISES to develop strength, endurance, ROM, flexibility, posture, and core " stabilization for (23) minutes including:    Intervention Performed Today    UBE x 3 minutes forward /backward    Upper trap stretch  3 x 30 seconds    Levator scapula stretch  3 x 30 seconds    Wall walks      flexion X 10 5 second hold left only  Scaption X 10 5 second hold left only  Abduction x 10 5 second hold left only   Pulley's x 3 minutes external rotation    Scapula retraction x 3 x 10 green band    Shoulder extension x 3 x 10 green band   Shoulder Scaption  3x10, 2 pounds   Shoulder PROPRIOCEPTIVE NEUROMUSCULAR FACILITATION D2  2x10 yellow band, SUPINE   Banded Wall Clocks  3x10 yellow band   Cookie tray        Windshield wipers         Bilateral Shoulder external rotation     2 x 10 standing  2 x 10 supine   Side lying shoulder external rotation  x 3 x 8 left only with assistance of red band   Wand active assistive range of motion  external rotation supine  2 x 10    Cabinet reach  2 x 10 to top of cabinet   Scapula stability X  x  x X 2 ABC's supine, 2 pounds  X 30 seconds Perturbations   3 x 10 serratus anterior press, 2 pounds   Isometric shoulder external rotation  x 3 x 8 at 3 different angles standing  3 x 8 supine into manual resistance   PASSIVE RANGE OF MOTION to left shoulder all planes x 10 minutes   Progress Note  ROM and MMT re-assessment      Plan for Next Visit: progress scapula and rotator cuff strengthening as tolerated       PATIENT EDUCATION AND HOME EXERCISES     Home Exercises Provided and Patient Education Provided     Education provided: (during session) minutes  PURPOSE: Patient educated on the impairments noted above and the effects of physical therapy intervention to improve overall condition and QOL.   EXERCISE: Patient was educated on all the above exercise prior/during/after for proper posture, positioning, and execution for safe performance with home exercise program.   STRENGTH: Patient educated on the importance of improved core and extremity strength in order to improve  alignment of the spine and extremities with static positions and dynamic movement.   10/3/2022: issued isometric shoulder external rotation home exercise program      Written Home Exercises Provided: yes.  Exercises were reviewed and Ayesha was able to demonstrate them prior to the end of the session.  Ayesha demonstrated good  understanding of the education provided. See EMR under Patient Instructions for exercises provided during therapy sessions.      ASSESSMENT     Patient tolerated treatment well today, despite initial complaints of tension. She completed exercises with good form and without increased complaint. Patient still lacks full active range of motion in all planes, but especially external rotation. However, she presents today with improved PROM external rotation, as well as improved glenohumeral joint mobility. Worked on proper scapular mobility with assistance from PT during AROM flexion and abduction while seated today. Patient reported good relief overall post treatment today.       Ayesha is progressing well towards her goals.   Patient prognosis is Fair.     Patient will continue to benefit from skilled outpatient physical therapy to address the deficits listed in the problem list box on initial evaluation, provide patient/family education and to maximize patient's level of independence in the home and community environment.     Patient's spiritual, cultural and educational needs considered and patient agreeable to plan of care and goals.     Anticipated Barriers for therapy: co-morbidities, sedentary lifestyle and chronicity of condition       GOALS: 10/17/2022      Short Term Goals: 6 weeks  Patient will achieve 50% improvement towards ROM goals as shown in objective charts above, in order to improve overall functional capacity and QoL. MET 9/12/2022  Patient will achieve 50% improvement towards MMT goals as shown in objective charts above, in order to improve overall functional capacity and  QoL. - Partially Met  Patient will report a peak pain value of 2/10 on NPRS, in order to improve overall functional capacity and QoL. - Met 9/26/2022  Patient will score a limitation value of 34 out of 100 on FOTO, in order to improve overall functional capacity and QoL. - MET 9/26/2022  Patient will demonstrate proficiency and independence in provided HEP, in order to improve function and maximize participation within the clinic. - Met 9/26/2022     Long Term Goals: 12 weeks   Patient will achieve ROM goals as shown in objective charts above, in order to facilitate return to prior level of function. - Partially met  Patient will achieve MMT goals as shown in objective charts above, in order to facilitate return to prior level of function. - Progressing 10/7/2022  Patient will report a peak pain value of 0/10 on NPRS, in order to facilitate return to prior level of function. - Ongoing  Patient will score a limitation value of 31 out of 100 on FOTO, in order to facilitate return to prior level of function. - MET 9/26/2022  Patient will be able to participate in her community outreaches without any limitations. - Partially Met  Goals Key:  PC= progressing/continue; PM= partially met;        DC= discontinue      PLAN     Continue Plan of Care (POC) and progress per patient tolerance. See treatment section for details on planned progressions next session.    9/26/2022: It is my recommendation that patient continue with PT at her current frequency of 2 times per week for the remainder of her approved visits. Her treatment plan will remain the same, and she will be progressed appropriately.     Serenity Higuera, PT

## 2022-10-19 ENCOUNTER — SPECIALTY PHARMACY (OUTPATIENT)
Dept: PHARMACY | Facility: CLINIC | Age: 57
End: 2022-10-19
Payer: COMMERCIAL

## 2022-10-19 NOTE — TELEPHONE ENCOUNTER
Specialty Pharmacy - Refill Coordination    Specialty Medication Orders Linked to Encounter      Flowsheet Row Most Recent Value   Medication #1 upadacitinib (RINVOQ) 15 mg 24 hr tablet (Order#[id number], Rx#[id number])            Refill Questions - Documented Responses      Flowsheet Row Most Recent Value   Patient Availability and HIPAA Verification    Does patient want to proceed with activity? Yes   HIPAA/medical authority confirmed? Yes   Relationship to patient of person spoken to? Self   Refill Screening Questions    Changes to allergies? No   Changes to medications? No   New conditions since last clinic visit? No   Unplanned office visit, urgent care, ED, or hospital admission in the last 4 weeks? No   How does patient/caregiver feel medication is working? Good   Financial problems or insurance changes? No   How many doses of your specialty medications were missed in the last 4 weeks? 0   Would patient like to speak to a pharmacist? No   When does the patient need to receive the medication? 10/24/22   Refill Delivery Questions    How will the patient receive the medication? [facility]   When does the patient need to receive the medication? 10/24/22   Shipping Address Home   Address in [facility] confirmed and updated if neccessary? Yes   Expected Copay ($) 5   Is the patient able to afford the medication copay? Yes   Payment Method CC on file   Days supply of Refill 30   Supplies needed? No supplies needed   Refill activity completed? Yes   Refill activity plan Refill scheduled   Shipment/Pickup Date: 10/21/22            Current Outpatient Medications   Medication Sig    ascorbic acid, vitamin C, (VITAMIN C) 100 MG tablet Take 100 mg by mouth once daily.    diclofenac sodium (VOLTAREN) 1 % Gel Apply 2 g topically 4 (four) times daily. for 14 days    FLAXSEED OIL ORAL Take by mouth.    folic acid 0.8 mg Cap folic acid Take No date recorded No form recorded No frequency recorded No route recorded No set  duration recorded No set duration amount recorded active No dosage strength recorded No dosage strength units of measure recorded    methotrexate 2.5 MG Tab Take 10 tablets (25 mg total) by mouth every 7 days. (Patient not taking: Reported on 10/5/2022)    methotrexate 2.5 mg/mL Soln methotrexate Take No date recorded No form recorded No frequency recorded No route recorded No set duration recorded No set duration amount recorded active No dosage strength recorded No dosage strength units of measure recorded    multivitamin capsule Take 1 capsule by mouth once daily.    multivitamin with minerals (HAIR,SKIN AND NAILS ORAL) Take by mouth once daily.    rosuvastatin (CRESTOR) 10 MG tablet Take 1 tablet (10 mg total) by mouth once daily.    TURMERIC ORAL Take by mouth.    upadacitinib (RINVOQ) 15 mg 24 hr tablet Take 1 tablet (15 mg total) by mouth once daily.    valACYclovir (VALTREX) 1000 MG tablet Take 1 tablet (1,000 mg total) by mouth 2 (two) times daily. for 10 days   Last reviewed on 10/5/2022  8:10 AM by Sho Tavares LPN    Review of patient's allergies indicates:  No Known Allergies Last reviewed on  10/5/2022 8:07 AM by Sho Tavares      Tasks added this encounter   11/16/2022 - Refill Call (Auto Added)   Tasks due within next 3 months   No tasks due.     [facility]  [address] [place] [id number]  Phone: [phone]  Fax: [phone]

## 2022-10-20 ENCOUNTER — CLINICAL SUPPORT (OUTPATIENT)
Dept: REHABILITATION | Facility: HOSPITAL | Age: 57
End: 2022-10-20
Payer: COMMERCIAL

## 2022-10-20 DIAGNOSIS — G89.29 CHRONIC LEFT SHOULDER PAIN: Primary | ICD-10-CM

## 2022-10-20 DIAGNOSIS — M25.512 CHRONIC LEFT SHOULDER PAIN: Primary | ICD-10-CM

## 2022-10-20 DIAGNOSIS — R29.898 DECREASED STRENGTH OF UPPER EXTREMITY: ICD-10-CM

## 2022-10-20 DIAGNOSIS — M25.612 DECREASED RANGE OF MOTION OF LEFT SHOULDER: ICD-10-CM

## 2022-10-20 PROCEDURE — 97110 THERAPEUTIC EXERCISES: CPT | Performed by: PHYSICAL THERAPIST

## 2022-10-20 NOTE — PROGRESS NOTES
"[facility]   Physical Therapy Treatment Note     Name: Ayesha Arcos  Clinic Number: [id number]    Therapy Diagnosis:   Encounter Diagnoses   Name Primary?    Chronic left shoulder pain Yes    Decreased range of motion of left shoulder     Decreased strength of upper extremity        Physician: Cade Orourke MD    Visit Date: 10/20/2022       Physician Orders: PT Eval and Treat  Medical Diagnosis from Referral: Injury of left rotator cuff, initial encounter  Evaluation Date: 8/25/2022  Authorization Period Expiration: 12/31/2022  Plan of Care Expiration: 11/23/2022  Progress Note Due: 10/26/2022  Visit # / Visits authorized: 13/20 (+ eval)  FOTO: 1/3      Precautions: Standard    PTA Visit #: 0/5     Time In: 0704  Time Out: 0730  Total Billable Time: 26 minutes     SUBJECTIVE     Patient reports: that her shoulders are feeling tense again today, but she has been getting ready for her care event watch party in [place]. She will have to drive to and from [place] today. Patient states that because she needs to be in [place] this morning, she needs to leave a little early today.     She was compliant with home exercise program.    Response to previous treatment: feeling looser after manual therapy    Functional change: lifting overhead is easier, decreased external rotation, "try not to" lay on her left side, driving is easy now, changed computer lay out resulting in decreased pain .     Pain: 3-4/10     Location: left anterior, lateral, and posterior joint line pain    OBJECTIVE     Objective Measures updated at progress report unless specified.       TREATMENT     Ayesha received the treatments listed below:       MANUAL THERAPY TECHNIQUES were applied for (3) minutes, including:    Manual Intervention Performed Today    Soft Tissue Mobilization x left periscapular musculature, rhomboids , supraspinatus , infraspinatus , and teres major and minor    Joint " Mobilizations     Mobilization with movement          Functional Dry Needling        Plan for Next Visit: PRN       THERAPEUTIC EXERCISES to develop strength, endurance, ROM, flexibility, posture, and core stabilization for (23) minutes including:    Intervention Performed Today    UBE x 3 minutes forward /backward    Upper trap stretch  3 x 30 seconds    Levator scapula stretch  3 x 30 seconds    Wall walks      flexion X 10 5 second hold left only  Scaption X 10 5 second hold left only  Abduction x 10 5 second hold left only   Pulley's x 3 minutes external rotation    Scapula retraction x 3 x 10 green band    Shoulder extension x 3 x 10 green band   Shoulder Scaption  3x10, 2 pounds   Shoulder PROPRIOCEPTIVE NEUROMUSCULAR FACILITATION D2  2x10 yellow band, SUPINE   Banded Wall Clocks  3x10 yellow band   Cookie tray        Windshield wipers         Bilateral Shoulder external rotation     2 x 10 standing  2 x 10 supine   Side lying shoulder external rotation   3 x 8 left only with assistance of red band   Wand active assistive range of motion  external rotation supine  2 x 10    Cabinet reach x 2 x 10 to top of cabinet   Scapula stability    X 2 ABC's supine, 2 pounds  X 30 seconds Perturbations   3 x 10 serratus anterior press, 2 pounds   Isometric shoulder external rotation   3 x 8 at 3 different angles standing  3 x 8 supine into manual resistance   PASSIVE RANGE OF MOTION to left shoulder all planes x 10 minutes   Progress Note  ROM and MMT re-assessment      Plan for Next Visit: progress scapula and rotator cuff strengthening as tolerated       PATIENT EDUCATION AND HOME EXERCISES     Home Exercises Provided and Patient Education Provided     Education provided: (during session) minutes  PURPOSE: Patient educated on the impairments noted above and the effects of physical therapy intervention to improve overall condition and QOL.   EXERCISE: Patient was educated on all the above exercise prior/during/after for  proper posture, positioning, and execution for safe performance with home exercise program.   STRENGTH: Patient educated on the importance of improved core and extremity strength in order to improve alignment of the spine and extremities with static positions and dynamic movement.   10/3/2022: issued isometric shoulder external rotation home exercise program      Written Home Exercises Provided: yes.  Exercises were reviewed and Ayesha was able to demonstrate them prior to the end of the session.  Ayesha demonstrated good  understanding of the education provided. See EMR under Patient Instructions for exercises provided during therapy sessions.      ASSESSMENT     Patient did well with treatment today. Therex cut short today due to patient having to leave a little early today.  She responded well to manual therapy, reporting a decrease in tension following. Improved PROM external rotation noted today.     Ayesha is progressing well towards her goals.   Patient prognosis is Fair.     Patient will continue to benefit from skilled outpatient physical therapy to address the deficits listed in the problem list box on initial evaluation, provide patient/family education and to maximize patient's level of independence in the home and community environment.     Patient's spiritual, cultural and educational needs considered and patient agreeable to plan of care and goals.     Anticipated Barriers for therapy: co-morbidities, sedentary lifestyle and chronicity of condition       GOALS: 10/20/2022      Short Term Goals: 6 weeks  Patient will achieve 50% improvement towards ROM goals as shown in objective charts above, in order to improve overall functional capacity and QoL. MET 9/12/2022  Patient will achieve 50% improvement towards MMT goals as shown in objective charts above, in order to improve overall functional capacity and QoL. - Partially Met  Patient will report a peak pain value of 2/10 on NPRS, in order to improve  overall functional capacity and QoL. - Met 9/26/2022  Patient will score a limitation value of 34 out of 100 on FOTO, in order to improve overall functional capacity and QoL. - MET 9/26/2022  Patient will demonstrate proficiency and independence in provided HEP, in order to improve function and maximize participation within the clinic. - Met 9/26/2022     Long Term Goals: 12 weeks   Patient will achieve ROM goals as shown in objective charts above, in order to facilitate return to prior level of function. - Partially met  Patient will achieve MMT goals as shown in objective charts above, in order to facilitate return to prior level of function. - Progressing 10/7/2022  Patient will report a peak pain value of 0/10 on NPRS, in order to facilitate return to prior level of function. - Ongoing  Patient will score a limitation value of 31 out of 100 on FOTO, in order to facilitate return to prior level of function. - MET 9/26/2022  Patient will be able to participate in her community outreaches without any limitations. - Partially Met  Goals Key:  PC= progressing/continue; PM= partially met;        DC= discontinue      PLAN     Continue Plan of Care (POC) and progress per patient tolerance. See treatment section for details on planned progressions next session.    9/26/2022: It is my recommendation that patient continue with PT at her current frequency of 2 times per week for the remainder of her approved visits. Her treatment plan will remain the same, and she will be progressed appropriately.     Serenity Higuera, PT

## 2022-10-24 ENCOUNTER — CLINICAL SUPPORT (OUTPATIENT)
Dept: REHABILITATION | Facility: HOSPITAL | Age: 57
End: 2022-10-24
Payer: COMMERCIAL

## 2022-10-24 DIAGNOSIS — R29.898 DECREASED STRENGTH OF UPPER EXTREMITY: ICD-10-CM

## 2022-10-24 DIAGNOSIS — G89.29 CHRONIC LEFT SHOULDER PAIN: Primary | ICD-10-CM

## 2022-10-24 DIAGNOSIS — M25.512 CHRONIC LEFT SHOULDER PAIN: Primary | ICD-10-CM

## 2022-10-24 DIAGNOSIS — M25.612 DECREASED RANGE OF MOTION OF LEFT SHOULDER: ICD-10-CM

## 2022-10-24 PROCEDURE — 97140 MANUAL THERAPY 1/> REGIONS: CPT | Mod: CQ

## 2022-10-24 PROCEDURE — 97110 THERAPEUTIC EXERCISES: CPT | Mod: CQ

## 2022-10-24 NOTE — PROGRESS NOTES
"[facility]   Physical Therapy Treatment Note     Name: Ayesha Arcos  Clinic Number: [id number]    Therapy Diagnosis:   Encounter Diagnoses   Name Primary?    Chronic left shoulder pain Yes    Decreased range of motion of left shoulder     Decreased strength of upper extremity        Physician: Cade Orourke MD    Visit Date: 10/24/2022       Physician Orders: PT Eval and Treat  Medical Diagnosis from Referral: Injury of left rotator cuff, initial encounter  Evaluation Date: 8/25/2022  Authorization Period Expiration: 12/31/2022  Plan of Care Expiration: 11/23/2022  Progress Note Due: 10/26/2022  Visit # / Visits authorized: 14/20 (+ eval)  FOTO: 1/3      Precautions: Standard    PTA Visit #: 1/5     Time In: 0705  Time Out: 0745  Total Billable Time: 41 minutes     SUBJECTIVE     Patient reports: that she is fatigued from weekend activities. Her shoulder hurts in the mornings but after she get moving and does some stretches, her pain lessens.     She was compliant with home exercise program.    Response to previous treatment: feeling looser after manual therapy    Functional change: lifting overhead is easier, decreased external rotation, "try not to" lay on her left side, driving is easy now, changed computer lay out resulting in decreased pain .     Pain: 2/10     Location: left anterior, lateral, and posterior joint line pain    OBJECTIVE     Objective Measures updated at progress report unless specified.       TREATMENT     Ayesha received the treatments listed below:       MANUAL THERAPY TECHNIQUES were applied for (11) minutes, including:    Manual Intervention Performed Today    Soft Tissue Mobilization x left periscapular musculature, rhomboids , supraspinatus , infraspinatus , and teres major and minor  scapula release   Joint Mobilizations     Mobilization with movement          Functional Dry Needling        Plan for Next Visit: PRN       THERAPEUTIC EXERCISES to " develop strength, endurance, ROM, flexibility, posture, and core stabilization for (30) minutes including:    Intervention Performed Today    UBE x 3 minutes forward /backward    Upper trap stretch  3 x 30 seconds    Levator scapula stretch  3 x 30 seconds    Wall walks      flexion X 10 5 second hold left only  Scaption X 10 5 second hold left only  Abduction x 10 5 second hold left only   Rica's  3 minutes external rotation    Scapula retraction x 3 x 10 green band    Shoulder extension x 3 x 10 green band   Shoulder Scaption  3x10, 2 pounds   Shoulder PROPRIOCEPTIVE NEUROMUSCULAR FACILITATION D2 x X 20 2 pounds standing resting as needed   Banded Wall Clocks  3x10 yellow band   Cookie tray        Windshield wipers         Bilateral Shoulder external rotation     2 x 10 standing  2 x 10 supine   Side lying shoulder external rotation   3 x 8 left only with assistance of red band   Wand active assistive range of motion  external rotation supine  2 x 10    Cabinet reach x 2 x 10 to top of cabinet   Scapula stability    X 2 ABC's supine, 2 pounds  X 30 seconds Perturbations   3 x 10 serratus anterior press, 2 pounds   Isometric shoulder external rotation  x 3 x 8 at 3 different angles standing  3 x 8 supine into manual resistance   PASSIVE RANGE OF MOTION to left shoulder all planes x 18 minutes   Standing shoulder external rotation stretch against corner of wall x 3 x 30 seconds   Progress Note  ROM and MMT re-assessment      Plan for Next Visit: progress scapula and rotator cuff strengthening as tolerated       PATIENT EDUCATION AND HOME EXERCISES     Home Exercises Provided and Patient Education Provided     Education provided: (during session) minutes  PURPOSE: Patient educated on the impairments noted above and the effects of physical therapy intervention to improve overall condition and QOL.   EXERCISE: Patient was educated on all the above exercise prior/during/after for proper posture, positioning, and  execution for safe performance with home exercise program.   STRENGTH: Patient educated on the importance of improved core and extremity strength in order to improve alignment of the spine and extremities with static positions and dynamic movement.   10/3/2022: issued isometric shoulder external rotation home exercise program      Written Home Exercises Provided: yes.  Exercises were reviewed and Ayesha was able to demonstrate them prior to the end of the session.  Ayesha demonstrated good  understanding of the education provided. See EMR under Patient Instructions for exercises provided during therapy sessions.      ASSESSMENT     Patient did well with treatment today. Improved PROM external rotation noted today with the introduction of standing external rotation stretch against the corner of a wall. Noted trigger point in her left upper trap which decreased in size during manual therapy.     Ayesha is progressing well towards her goals.   Patient prognosis is Fair.     Patient will continue to benefit from skilled outpatient physical therapy to address the deficits listed in the problem list box on initial evaluation, provide patient/family education and to maximize patient's level of independence in the home and community environment.     Patient's spiritual, cultural and educational needs considered and patient agreeable to plan of care and goals.     Anticipated Barriers for therapy: co-morbidities, sedentary lifestyle and chronicity of condition       GOALS: 10/24/2022      Short Term Goals: 6 weeks  Patient will achieve 50% improvement towards ROM goals as shown in objective charts above, in order to improve overall functional capacity and QoL. MET 9/12/2022  Patient will achieve 50% improvement towards MMT goals as shown in objective charts above, in order to improve overall functional capacity and QoL. - Partially Met  Patient will report a peak pain value of 2/10 on NPRS, in order to improve overall  functional capacity and QoL. - Met 9/26/2022  Patient will score a limitation value of 34 out of 100 on FOTO, in order to improve overall functional capacity and QoL. - MET 9/26/2022  Patient will demonstrate proficiency and independence in provided HEP, in order to improve function and maximize participation within the clinic. - Met 9/26/2022     Long Term Goals: 12 weeks   Patient will achieve ROM goals as shown in objective charts above, in order to facilitate return to prior level of function. - Partially met  Patient will achieve MMT goals as shown in objective charts above, in order to facilitate return to prior level of function. - Progressing 10/7/2022  Patient will report a peak pain value of 0/10 on NPRS, in order to facilitate return to prior level of function. - Ongoing  Patient will score a limitation value of 31 out of 100 on FOTO, in order to facilitate return to prior level of function. - MET 9/26/2022  Patient will be able to participate in her community outreaches without any limitations. - Partially Met  Goals Key:  PC= progressing/continue; PM= partially met;        DC= discontinue      PLAN     Continue Plan of Care (POC) and progress per patient tolerance. See treatment section for details on planned progressions next session.    9/26/2022: It is my recommendation that patient continue with PT at her current frequency of 2 times per week for the remainder of her approved visits. Her treatment plan will remain the same, and she will be progressed appropriately.     Nishant Hudson, PTA

## 2022-10-27 ENCOUNTER — TELEPHONE (OUTPATIENT)
Dept: REHABILITATION | Facility: HOSPITAL | Age: 57
End: 2022-10-27
Payer: COMMERCIAL

## 2022-10-27 NOTE — TELEPHONE ENCOUNTER
Called patient about no show, and she stated she forgot. Patient rescheduled to tomorrow morning.  Serenity Higuera, PT, DPT

## 2022-10-28 ENCOUNTER — TELEPHONE (OUTPATIENT)
Dept: INTERNAL MEDICINE | Facility: CLINIC | Age: 57
End: 2022-10-28
Payer: COMMERCIAL

## 2022-10-28 ENCOUNTER — CLINICAL SUPPORT (OUTPATIENT)
Dept: REHABILITATION | Facility: HOSPITAL | Age: 57
End: 2022-10-28
Payer: COMMERCIAL

## 2022-10-28 DIAGNOSIS — G89.29 CHRONIC LEFT SHOULDER PAIN: Primary | ICD-10-CM

## 2022-10-28 DIAGNOSIS — R29.898 DECREASED STRENGTH OF UPPER EXTREMITY: ICD-10-CM

## 2022-10-28 DIAGNOSIS — M25.512 CHRONIC LEFT SHOULDER PAIN: Primary | ICD-10-CM

## 2022-10-28 DIAGNOSIS — M25.612 DECREASED RANGE OF MOTION OF LEFT SHOULDER: ICD-10-CM

## 2022-10-28 PROCEDURE — 97140 MANUAL THERAPY 1/> REGIONS: CPT | Performed by: PHYSICAL THERAPIST

## 2022-10-28 PROCEDURE — 97110 THERAPEUTIC EXERCISES: CPT | Performed by: PHYSICAL THERAPIST

## 2022-10-28 NOTE — PROGRESS NOTES
"[facility]   Physical Therapy Treatment Note + Discharge Summary    Name: Ayesha Arcos  Clinic Number: [id number]    Therapy Diagnosis:   Encounter Diagnoses   Name Primary?    Chronic left shoulder pain Yes    Decreased range of motion of left shoulder     Decreased strength of upper extremity        Physician: Cade Orourke MD    Visit Date: 10/28/2022       Physician Orders: PT Eval and Treat  Medical Diagnosis from Referral: Injury of left rotator cuff, initial encounter  Evaluation Date: 8/25/2022  Authorization Period Expiration: 12/31/2022  Plan of Care Expiration: 11/23/2022  Progress Note Due: 10/26/2022  Visit # / Visits authorized: 15/20 (+ eval)  FOTO: 1/3      Precautions: Standard    PTA Visit #: 1/5     Time In: 0925  Time Out: 1003  Total Billable Time: 38 minutes     SUBJECTIVE     Patient reports: that she is feeling much better today. She is confident with her ability to keep up with HEP, and she is satisfied with her overall progress.     She was compliant with home exercise program.    Response to previous treatment: feeling looser after manual therapy    Functional change: lifting overhead is easier, decreased external rotation, "try not to" lay on her left side, driving is easy now, changed computer lay out resulting in decreased pain .     Pain: 0/10     Location: left anterior, lateral, and posterior joint line pain    OBJECTIVE     Objective Measures updated at progress report unless specified.     RANGE OF MOTION:     Shoulder AROM/PROM Right Left Pain/Dysfunction with Movement Goal   Shoulder Flexion (180) 180 180/180  (165/NT initial) No pain just minimal stiffness 180   Shoulder Abduction (180) 180 174/175  (145/NT initial) No pain just minimal stiffness 170   Shoulder ER (90) T5 T5/70  (T1/47 initial) No pain today 80   Shoulder IR (70) T7 T7/70  (T12/68 initial) No pain 70         Elbow AROM/PROM Right Left Pain/Dysfunction with Movement Goal "   Elbow Flexion (150) 151 152  (146 initial)   150   Elbow Extension (0) 0 0  (-10 initial)   -5         STRENGTH:     U/E MMT Right Left Pain/Dysfunction with Movement Goal Right   10/28/2022  Left  10/28/2022    Shoulder Flexion 4/5 3+/5 Minimal pain in L shoulder 4+/5 B 4+ 4+   Shoulder Abduction 4+/5 3+/5 Tension in L shoulder, shifting noted within glenohumeral joint 4+/5 B 4+ 4   Shoulder IR 4/5 4-/5   4+/5 B 4+ 4+   Shoulder ER    4/5 3/5   4+/5 B 4 3   Elbow Flexion  4/5 4-/5 Pain in L shoulder 5/5 B 5 5   Elbow Extension 4+/5 4/5 Tension in L triceps tendon 5/5 B 5 5         MUSCLE LENGTH:      Muscle Tested  Right Left  Goal   Upper Trapezius  decreased decreased Normal B    Levator Scapulae  decreased decreased Normal B   Pectoralis Minor  decreased decreased Normal B   Pectoralis Major decreased decreased Normal B   **Although still decreased, muscle length has improved as compared to previous visits.     Joint Mobility:  Glenohumeral joint: hypermobile with shifting         Palpation: No tenderness to palpation. Tension noted upon palpation of bilateral upper trapezius (R>L), but decreased as compared to previous visits.      Posture:  Pt presents with postural abnormalities which include: forward head and rounded shoulders         Movement Analysis:   Movement Observations noted   L Side-lying ER Patient has very minimal range actively, but can passively go further. When PT brings patient into further ER, patient cannot actively maintain position.         FUNCTION:     TREATMENT     Ayesha received the treatments listed below:       MANUAL THERAPY TECHNIQUES were applied for (8) minutes, including:    Manual Intervention Performed Today    Soft Tissue Mobilization x left periscapular musculature, rhomboids , supraspinatus , infraspinatus , and teres major and minor  scapula release   Joint Mobilizations     Mobilization with movement          Functional Dry Needling        Plan for Next Visit: PRN        THERAPEUTIC EXERCISES to develop strength, endurance, ROM, flexibility, posture, and core stabilization for (30) minutes including:    Intervention Performed Today    UBE x 3 minutes forward /backward    Upper trap stretch  3 x 30 seconds    Levator scapula stretch  3 x 30 seconds    Wall walks      flexion X 10 5 second hold left only  Scaption X 10 5 second hold left only  Abduction x 10 5 second hold left only   Rica's  3 minutes external rotation    Scapula retraction x 3 x 10 green band    Shoulder extension x 3 x 10 green band   Shoulder Scaption  3x10, 2 pounds   Shoulder PROPRIOCEPTIVE NEUROMUSCULAR FACILITATION D2 x X 20 2 pounds standing resting as needed   Banded Wall Clocks  3x10 yellow band   Cookie tray        Windshield wipers         Bilateral Shoulder external rotation     2 x 10 standing  2 x 10 supine   Side lying shoulder external rotation   3 x 8 left only with assistance of red band   Wand active assistive range of motion  external rotation supine  2 x 10    Cabinet reach x 2 x 10 to top of cabinet   Scapula stability    X 2 ABC's supine, 2 pounds  X 30 seconds Perturbations   3 x 10 serratus anterior press, 2 pounds   Isometric shoulder external rotation   3 x 8 at 3 different angles standing  3 x 8 supine into manual resistance   PASSIVE RANGE OF MOTION to left shoulder all planes x 8 minutes   Standing shoulder external rotation stretch against corner of wall x 3 x 30 seconds   Progress Note x ROM and MMT re-assessment      Plan for Next Visit: progress scapula and rotator cuff strengthening as tolerated       PATIENT EDUCATION AND HOME EXERCISES     Home Exercises Provided and Patient Education Provided     Education provided: (during session) minutes  PURPOSE: Patient educated on the impairments noted above and the effects of physical therapy intervention to improve overall condition and QOL.   EXERCISE: Patient was educated on all the above exercise prior/during/after for proper  posture, positioning, and execution for safe performance with home exercise program.   STRENGTH: Patient educated on the importance of improved core and extremity strength in order to improve alignment of the spine and extremities with static positions and dynamic movement.   10/3/2022: issued isometric shoulder external rotation home exercise program      Written Home Exercises Provided: yes.  Exercises were reviewed and Ayesha was able to demonstrate them prior to the end of the session.  Ayesha demonstrated good  understanding of the education provided. See EMR under Patient Instructions for exercises provided during therapy sessions.      ASSESSMENT     Patient tolerated treatment well and has attended 16 total PT visits. She has made good overall progress with PT, demonstrating improvements in bilateral shoulder ROM, upper extremity strength, and postural stabilization. Her postural awareness as improved as well. Patient's pain levels have decreased, and she has returned to more functional activities with less pain and limitation. Patient's main remaining deficit is decreased strength in left shoulder external rotation, although left shoulder external rotation range of motion and strength have improved functionally as compared to initial visits. Patient is satisfied with he progress with PT, and she is independent with exercises. She should continue to see gains in overall strength if she remains compliant with HEP. Patient is appropriate for DC with HEP at this time.     Ayesha is progressing well towards her goals.   Patient prognosis is Fair.     Patient will continue to benefit from skilled outpatient physical therapy to address the deficits listed in the problem list box on initial evaluation, provide patient/family education and to maximize patient's level of independence in the home and community environment.     Patient's spiritual, cultural and educational needs considered and patient agreeable to  plan of care and goals.     Anticipated Barriers for therapy: co-morbidities, sedentary lifestyle and chronicity of condition       GOALS: 10/28/2022      Short Term Goals: 6 weeks  Patient will achieve 50% improvement towards ROM goals as shown in objective charts above, in order to improve overall functional capacity and QoL. MET 9/12/2022  Patient will achieve 50% improvement towards MMT goals as shown in objective charts above, in order to improve overall functional capacity and QoL. - MET 10/28/2022  Patient will report a peak pain value of 2/10 on NPRS, in order to improve overall functional capacity and QoL. - Met 9/26/2022  Patient will score a limitation value of 34 out of 100 on FOTO, in order to improve overall functional capacity and QoL. - MET 9/26/2022  Patient will demonstrate proficiency and independence in provided HEP, in order to improve function and maximize participation within the clinic. - Met 9/26/2022     Long Term Goals: 12 weeks   Patient will achieve ROM goals as shown in objective charts above, in order to facilitate return to prior level of function. - Partially met  Patient will achieve MMT goals as shown in objective charts above, in order to facilitate return to prior level of function. - Partially Met 10/28/2022  Patient will report a peak pain value of 0/10 on NPRS, in order to facilitate return to prior level of function. - MET 10/28/2022  Patient will score a limitation value of 31 out of 100 on FOTO, in order to facilitate return to prior level of function. - MET 9/26/2022  Patient will be able to participate in her community outreaches without any limitations. - MET 10/28/2022  Goals Key:  PC= progressing/continue; PM= partially met;        DC= discontinue      PLAN     10/28/2022: Patient is considered DC from PT with Hep at this time.     9/26/2022: It is my recommendation that patient continue with PT at her current frequency of 2 times per week for the remainder of her  approved visits. Her treatment plan will remain the same, and she will be progressed appropriately.     Serenity Higuera, PT

## 2022-10-28 NOTE — TELEPHONE ENCOUNTER
----- Message from Dieudonne Lane sent at 10/28/2022 12:14 PM CDT -----  Contact: [phone]  Pt is calling in regards to scheduling an appt for her shingles vaccine and flu vaccine. Please call pt back at [phone]. Thanks KB

## 2022-10-31 NOTE — PLAN OF CARE
"[facility]   Physical Therapy Treatment Note + Discharge Summary    Name: Ayesha Arcos  Clinic Number: [id number]    Therapy Diagnosis:   Encounter Diagnoses   Name Primary?    Chronic left shoulder pain Yes    Decreased range of motion of left shoulder     Decreased strength of upper extremity        Physician: Cade Orourke MD    Visit Date: 10/28/2022       Physician Orders: PT Eval and Treat  Medical Diagnosis from Referral: Injury of left rotator cuff, initial encounter  Evaluation Date: 8/25/2022  Authorization Period Expiration: 12/31/2022  Plan of Care Expiration: 11/23/2022  Progress Note Due: 10/26/2022  Visit # / Visits authorized: 15/20 (+ eval)  FOTO: 1/3      Precautions: Standard    PTA Visit #: 1/5     Time In: 0925  Time Out: 1003  Total Billable Time: 38 minutes     SUBJECTIVE     Patient reports: that she is feeling much better today. She is confident with her ability to keep up with HEP, and she is satisfied with her overall progress.     She was compliant with home exercise program.    Response to previous treatment: feeling looser after manual therapy    Functional change: lifting overhead is easier, decreased external rotation, "try not to" lay on her left side, driving is easy now, changed computer lay out resulting in decreased pain .     Pain: 0/10     Location: left anterior, lateral, and posterior joint line pain    OBJECTIVE     Objective Measures updated at progress report unless specified.     RANGE OF MOTION:     Shoulder AROM/PROM Right Left Pain/Dysfunction with Movement Goal   Shoulder Flexion (180) 180 180/180  (165/NT initial) No pain just minimal stiffness 180   Shoulder Abduction (180) 180 174/175  (145/NT initial) No pain just minimal stiffness 170   Shoulder ER (90) T5 T5/70  (T1/47 initial) No pain today 80   Shoulder IR (70) T7 T7/70  (T12/68 initial) No pain 70         Elbow AROM/PROM Right Left Pain/Dysfunction with Movement Goal "   Elbow Flexion (150) 151 152  (146 initial)   150   Elbow Extension (0) 0 0  (-10 initial)   -5         STRENGTH:     U/E MMT Right Left Pain/Dysfunction with Movement Goal Right   10/28/2022  Left  10/28/2022    Shoulder Flexion 4/5 3+/5 Minimal pain in L shoulder 4+/5 B 4+ 4+   Shoulder Abduction 4+/5 3+/5 Tension in L shoulder, shifting noted within glenohumeral joint 4+/5 B 4+ 4   Shoulder IR 4/5 4-/5   4+/5 B 4+ 4+   Shoulder ER    4/5 3/5   4+/5 B 4 3   Elbow Flexion  4/5 4-/5 Pain in L shoulder 5/5 B 5 5   Elbow Extension 4+/5 4/5 Tension in L triceps tendon 5/5 B 5 5         MUSCLE LENGTH:      Muscle Tested  Right Left  Goal   Upper Trapezius  decreased decreased Normal B    Levator Scapulae  decreased decreased Normal B   Pectoralis Minor  decreased decreased Normal B   Pectoralis Major decreased decreased Normal B   **Although still decreased, muscle length has improved as compared to previous visits.     Joint Mobility:  Glenohumeral joint: hypermobile with shifting         Palpation: No tenderness to palpation. Tension noted upon palpation of bilateral upper trapezius (R>L), but decreased as compared to previous visits.      Posture:  Pt presents with postural abnormalities which include: forward head and rounded shoulders         Movement Analysis:   Movement Observations noted   L Side-lying ER Patient has very minimal range actively, but can passively go further. When PT brings patient into further ER, patient cannot actively maintain position.         FUNCTION:     TREATMENT     Ayesha received the treatments listed below:       MANUAL THERAPY TECHNIQUES were applied for (8) minutes, including:    Manual Intervention Performed Today    Soft Tissue Mobilization x left periscapular musculature, rhomboids , supraspinatus , infraspinatus , and teres major and minor  scapula release   Joint Mobilizations     Mobilization with movement          Functional Dry Needling        Plan for Next Visit: PRN        THERAPEUTIC EXERCISES to develop strength, endurance, ROM, flexibility, posture, and core stabilization for (30) minutes including:    Intervention Performed Today    UBE x 3 minutes forward /backward    Upper trap stretch  3 x 30 seconds    Levator scapula stretch  3 x 30 seconds    Wall walks      flexion X 10 5 second hold left only  Scaption X 10 5 second hold left only  Abduction x 10 5 second hold left only   Rica's  3 minutes external rotation    Scapula retraction x 3 x 10 green band    Shoulder extension x 3 x 10 green band   Shoulder Scaption  3x10, 2 pounds   Shoulder PROPRIOCEPTIVE NEUROMUSCULAR FACILITATION D2 x X 20 2 pounds standing resting as needed   Banded Wall Clocks  3x10 yellow band   Cookie tray        Windshield wipers         Bilateral Shoulder external rotation     2 x 10 standing  2 x 10 supine   Side lying shoulder external rotation   3 x 8 left only with assistance of red band   Wand active assistive range of motion  external rotation supine  2 x 10    Cabinet reach x 2 x 10 to top of cabinet   Scapula stability    X 2 ABC's supine, 2 pounds  X 30 seconds Perturbations   3 x 10 serratus anterior press, 2 pounds   Isometric shoulder external rotation   3 x 8 at 3 different angles standing  3 x 8 supine into manual resistance   PASSIVE RANGE OF MOTION to left shoulder all planes x 8 minutes   Standing shoulder external rotation stretch against corner of wall x 3 x 30 seconds   Progress Note x ROM and MMT re-assessment      Plan for Next Visit: progress scapula and rotator cuff strengthening as tolerated       PATIENT EDUCATION AND HOME EXERCISES     Home Exercises Provided and Patient Education Provided     Education provided: (during session) minutes  PURPOSE: Patient educated on the impairments noted above and the effects of physical therapy intervention to improve overall condition and QOL.   EXERCISE: Patient was educated on all the above exercise prior/during/after for proper  posture, positioning, and execution for safe performance with home exercise program.   STRENGTH: Patient educated on the importance of improved core and extremity strength in order to improve alignment of the spine and extremities with static positions and dynamic movement.   10/3/2022: issued isometric shoulder external rotation home exercise program      Written Home Exercises Provided: yes.  Exercises were reviewed and Ayesha was able to demonstrate them prior to the end of the session.  Ayesha demonstrated good  understanding of the education provided. See EMR under Patient Instructions for exercises provided during therapy sessions.      ASSESSMENT     Patient tolerated treatment well and has attended 16 total PT visits. She has made good overall progress with PT, demonstrating improvements in bilateral shoulder ROM, upper extremity strength, and postural stabilization. Her postural awareness as improved as well. Patient's pain levels have decreased, and she has returned to more functional activities with less pain and limitation. Patient's main remaining deficit is decreased strength in left shoulder external rotation, although left shoulder external rotation range of motion and strength have improved functionally as compared to initial visits. Patient is satisfied with he progress with PT, and she is independent with exercises. She should continue to see gains in overall strength if she remains compliant with HEP. Patient is appropriate for DC with HEP at this time.     Ayesha is progressing well towards her goals.   Patient prognosis is Fair.     Patient will continue to benefit from skilled outpatient physical therapy to address the deficits listed in the problem list box on initial evaluation, provide patient/family education and to maximize patient's level of independence in the home and community environment.     Patient's spiritual, cultural and educational needs considered and patient agreeable to  plan of care and goals.     Anticipated Barriers for therapy: co-morbidities, sedentary lifestyle and chronicity of condition       GOALS: 10/28/2022      Short Term Goals: 6 weeks  Patient will achieve 50% improvement towards ROM goals as shown in objective charts above, in order to improve overall functional capacity and QoL. MET 9/12/2022  Patient will achieve 50% improvement towards MMT goals as shown in objective charts above, in order to improve overall functional capacity and QoL. - MET 10/28/2022  Patient will report a peak pain value of 2/10 on NPRS, in order to improve overall functional capacity and QoL. - Met 9/26/2022  Patient will score a limitation value of 34 out of 100 on FOTO, in order to improve overall functional capacity and QoL. - MET 9/26/2022  Patient will demonstrate proficiency and independence in provided HEP, in order to improve function and maximize participation within the clinic. - Met 9/26/2022     Long Term Goals: 12 weeks   Patient will achieve ROM goals as shown in objective charts above, in order to facilitate return to prior level of function. - Partially met  Patient will achieve MMT goals as shown in objective charts above, in order to facilitate return to prior level of function. - Partially Met 10/28/2022  Patient will report a peak pain value of 0/10 on NPRS, in order to facilitate return to prior level of function. - MET 10/28/2022  Patient will score a limitation value of 31 out of 100 on FOTO, in order to facilitate return to prior level of function. - MET 9/26/2022  Patient will be able to participate in her community outreaches without any limitations. - MET 10/28/2022  Goals Key:  PC= progressing/continue; PM= partially met;        DC= discontinue      PLAN     10/28/2022: Patient is considered DC from PT with Hep at this time.     9/26/2022: It is my recommendation that patient continue with PT at her current frequency of 2 times per week for the remainder of her  approved visits. Her treatment plan will remain the same, and she will be progressed appropriately.     Serenity Higuera, PT

## 2022-11-14 ENCOUNTER — IMMUNIZATION (OUTPATIENT)
Dept: PHARMACY | Facility: CLINIC | Age: 57
End: 2022-11-14
Payer: COMMERCIAL

## 2022-11-16 ENCOUNTER — SPECIALTY PHARMACY (OUTPATIENT)
Dept: PHARMACY | Facility: CLINIC | Age: 57
End: 2022-11-16
Payer: COMMERCIAL

## 2022-11-16 DIAGNOSIS — M05.9 SEROPOSITIVE RHEUMATOID ARTHRITIS: Primary | ICD-10-CM

## 2022-11-16 NOTE — TELEPHONE ENCOUNTER
Specialty Pharmacy - Refill Coordination    Specialty Medication Orders Linked to Encounter      Flowsheet Row Most Recent Value   Medication #1 upadacitinib (RINVOQ) 15 mg 24 hr tablet (Order#[id number], Rx#)            Refill Questions - Documented Responses      Flowsheet Row Most Recent Value   Patient Availability and HIPAA Verification    Does patient want to proceed with activity? Yes   HIPAA/medical authority confirmed? Yes   Relationship to patient of person spoken to? Self   Refill Screening Questions    Changes to allergies? No   Changes to medications? No   New conditions since last clinic visit? No   Unplanned office visit, urgent care, ED, or hospital admission in the last 4 weeks? No   How does patient/caregiver feel medication is working? Good   Financial problems or insurance changes? No   How many doses of your specialty medications were missed in the last 4 weeks? 0   Would patient like to speak to a pharmacist? No   When does the patient need to receive the medication? 11/22/22   Refill Delivery Questions    How will the patient receive the medication? [facility]   When does the patient need to receive the medication? 11/22/22   Shipping Address Home   Address in [facility] confirmed and updated if neccessary? Yes   Expected Copay ($) 5   Is the patient able to afford the medication copay? Yes   Payment Method CC on file   Days supply of Refill 30   Supplies needed? No supplies needed   Refill activity completed? Yes   Refill activity plan Refill scheduled   Shipment/Pickup Date: 11/21/22            Current Outpatient Medications   Medication Sig    ascorbic acid, vitamin C, (VITAMIN C) 100 MG tablet Take 100 mg by mouth once daily.    diclofenac sodium (VOLTAREN) 1 % Gel Apply 2 g topically 4 (four) times daily. for 14 days    FLAXSEED OIL ORAL Take by mouth.    folic acid 0.8 mg Cap folic acid Take No date recorded No form recorded No frequency recorded No route recorded No set duration  recorded No set duration amount recorded active No dosage strength recorded No dosage strength units of measure recorded    methotrexate 2.5 MG Tab Take 10 tablets (25 mg total) by mouth every 7 days. (Patient not taking: Reported on 10/5/2022)    methotrexate 2.5 mg/mL Soln methotrexate Take No date recorded No form recorded No frequency recorded No route recorded No set duration recorded No set duration amount recorded active No dosage strength recorded No dosage strength units of measure recorded    multivitamin capsule Take 1 capsule by mouth once daily.    multivitamin with minerals (HAIR,SKIN AND NAILS ORAL) Take by mouth once daily.    rosuvastatin (CRESTOR) 10 MG tablet Take 1 tablet (10 mg total) by mouth once daily.    TURMERIC ORAL Take by mouth.    upadacitinib (RINVOQ) 15 mg 24 hr tablet Take 1 tablet (15 mg total) by mouth once daily.    valACYclovir (VALTREX) 1000 MG tablet Take 1 tablet (1,000 mg total) by mouth 2 (two) times daily. for 10 days   Last reviewed on 10/5/2022  8:10 AM by Sho Tavares LPN    Review of patient's allergies indicates:  No Known Allergies Last reviewed on  10/5/2022 8:07 AM by Sho Tavares      Tasks added this encounter   12/15/2022 - Refill Call (Auto Added)   Tasks due within next 3 months   No tasks due.     [facility] - [facility]  [address] [place] [id number]  Phone: [phone]  Fax: [phone]

## 2022-12-08 ENCOUNTER — LAB VISIT (OUTPATIENT)
Dept: LAB | Facility: HOSPITAL | Age: 57
End: 2022-12-08
Attending: INTERNAL MEDICINE
Payer: COMMERCIAL

## 2022-12-08 DIAGNOSIS — Z79.899 HIGH RISK MEDICATION USE: ICD-10-CM

## 2022-12-08 DIAGNOSIS — M05.9 SEROPOSITIVE RHEUMATOID ARTHRITIS: ICD-10-CM

## 2022-12-08 LAB
ALBUMIN SERPL BCP-MCNC: 4.2 G/DL (ref 3.5–5.2)
ALP SERPL-CCNC: 82 U/L (ref 55–135)
ALT SERPL W/O P-5'-P-CCNC: 31 U/L (ref 10–44)
ANION GAP SERPL CALC-SCNC: 10 MMOL/L (ref 8–16)
AST SERPL-CCNC: 24 U/L (ref 10–40)
BASOPHILS # BLD AUTO: 0.02 K/UL (ref 0–0.2)
BASOPHILS NFR BLD: 0.3 % (ref 0–1.9)
BILIRUB SERPL-MCNC: 0.4 MG/DL (ref 0.1–1)
BUN SERPL-MCNC: 13 MG/DL (ref 6–20)
CALCIUM SERPL-MCNC: 9.5 MG/DL (ref 8.7–10.5)
CHLORIDE SERPL-SCNC: 105 MMOL/L (ref 95–110)
CO2 SERPL-SCNC: 25 MMOL/L (ref 23–29)
CREAT SERPL-MCNC: 1.1 MG/DL (ref 0.5–1.4)
CRP SERPL-MCNC: 0.2 MG/L (ref 0–8.2)
DIFFERENTIAL METHOD: ABNORMAL
EOSINOPHIL # BLD AUTO: 0 K/UL (ref 0–0.5)
EOSINOPHIL NFR BLD: 0.6 % (ref 0–8)
ERYTHROCYTE [DISTWIDTH] IN BLOOD BY AUTOMATED COUNT: 15.2 % (ref 11.5–14.5)
ERYTHROCYTE [SEDIMENTATION RATE] IN BLOOD BY PHOTOMETRIC METHOD: 7 MM/HR (ref 0–36)
EST. GFR  (NO RACE VARIABLE): 59 ML/MIN/1.73 M^2
GLUCOSE SERPL-MCNC: 106 MG/DL (ref 70–110)
HCT VFR BLD AUTO: 37.1 % (ref 37–48.5)
HGB BLD-MCNC: 12.3 G/DL (ref 12–16)
IMM GRANULOCYTES # BLD AUTO: 0.01 K/UL (ref 0–0.04)
IMM GRANULOCYTES NFR BLD AUTO: 0.1 % (ref 0–0.5)
LYMPHOCYTES # BLD AUTO: 1.8 K/UL (ref 1–4.8)
LYMPHOCYTES NFR BLD: 25.1 % (ref 18–48)
MCH RBC QN AUTO: 30.4 PG (ref 27–31)
MCHC RBC AUTO-ENTMCNC: 33.2 G/DL (ref 32–36)
MCV RBC AUTO: 92 FL (ref 82–98)
MONOCYTES # BLD AUTO: 0.5 K/UL (ref 0.3–1)
MONOCYTES NFR BLD: 6.3 % (ref 4–15)
NEUTROPHILS # BLD AUTO: 4.8 K/UL (ref 1.8–7.7)
NEUTROPHILS NFR BLD: 67.6 % (ref 38–73)
NRBC BLD-RTO: 0 /100 WBC
PLATELET # BLD AUTO: 317 K/UL (ref 150–450)
PMV BLD AUTO: 9.9 FL (ref 9.2–12.9)
POTASSIUM SERPL-SCNC: 3.8 MMOL/L (ref 3.5–5.1)
PROT SERPL-MCNC: 7.4 G/DL (ref 6–8.4)
RBC # BLD AUTO: 4.04 M/UL (ref 4–5.4)
SODIUM SERPL-SCNC: 140 MMOL/L (ref 136–145)
WBC # BLD AUTO: 7.14 K/UL (ref 3.9–12.7)

## 2022-12-08 PROCEDURE — 85025 COMPLETE CBC W/AUTO DIFF WBC: CPT | Performed by: INTERNAL MEDICINE

## 2022-12-08 PROCEDURE — 80053 COMPREHEN METABOLIC PANEL: CPT | Performed by: INTERNAL MEDICINE

## 2022-12-08 PROCEDURE — 85652 RBC SED RATE AUTOMATED: CPT | Performed by: INTERNAL MEDICINE

## 2022-12-08 PROCEDURE — 86140 C-REACTIVE PROTEIN: CPT | Performed by: INTERNAL MEDICINE

## 2022-12-08 PROCEDURE — 36415 COLL VENOUS BLD VENIPUNCTURE: CPT | Performed by: INTERNAL MEDICINE

## 2022-12-15 ENCOUNTER — OFFICE VISIT (OUTPATIENT)
Dept: INTERNAL MEDICINE | Facility: CLINIC | Age: 57
End: 2022-12-15
Payer: COMMERCIAL

## 2022-12-15 VITALS
SYSTOLIC BLOOD PRESSURE: 110 MMHG | WEIGHT: 156.06 LBS | HEIGHT: 65 IN | HEART RATE: 83 BPM | DIASTOLIC BLOOD PRESSURE: 80 MMHG | TEMPERATURE: 98 F | BODY MASS INDEX: 26 KG/M2

## 2022-12-15 DIAGNOSIS — E78.5 HYPERLIPIDEMIA, UNSPECIFIED HYPERLIPIDEMIA TYPE: Primary | ICD-10-CM

## 2022-12-15 DIAGNOSIS — D84.9 IMMUNOSUPPRESSED STATUS: ICD-10-CM

## 2022-12-15 DIAGNOSIS — Z00.00 ANNUAL PHYSICAL EXAM: ICD-10-CM

## 2022-12-15 PROCEDURE — 90471 IMMUNIZATION ADMIN: CPT | Mod: S$GLB,,, | Performed by: FAMILY MEDICINE

## 2022-12-15 PROCEDURE — 1159F MED LIST DOCD IN RCRD: CPT | Mod: CPTII,S$GLB,, | Performed by: FAMILY MEDICINE

## 2022-12-15 PROCEDURE — 3044F PR MOST RECENT HEMOGLOBIN A1C LEVEL <7.0%: ICD-10-PCS | Mod: CPTII,S$GLB,, | Performed by: FAMILY MEDICINE

## 2022-12-15 PROCEDURE — 90677 PNEUMOCOCCAL CONJUGATE VACCINE 20-VALENT: ICD-10-PCS | Mod: S$GLB,,, | Performed by: FAMILY MEDICINE

## 2022-12-15 PROCEDURE — 90677 PCV20 VACCINE IM: CPT | Mod: S$GLB,,, | Performed by: FAMILY MEDICINE

## 2022-12-15 PROCEDURE — 3008F PR BODY MASS INDEX (BMI) DOCUMENTED: ICD-10-PCS | Mod: CPTII,S$GLB,, | Performed by: FAMILY MEDICINE

## 2022-12-15 PROCEDURE — 3079F PR MOST RECENT DIASTOLIC BLOOD PRESSURE 80-89 MM HG: ICD-10-PCS | Mod: CPTII,S$GLB,, | Performed by: FAMILY MEDICINE

## 2022-12-15 PROCEDURE — 1159F PR MEDICATION LIST DOCUMENTED IN MEDICAL RECORD: ICD-10-PCS | Mod: CPTII,S$GLB,, | Performed by: FAMILY MEDICINE

## 2022-12-15 PROCEDURE — 90471 PNEUMOCOCCAL CONJUGATE VACCINE 20-VALENT: ICD-10-PCS | Mod: S$GLB,,, | Performed by: FAMILY MEDICINE

## 2022-12-15 PROCEDURE — 99999 PR PBB SHADOW E&M-EST. PATIENT-LVL IV: ICD-10-PCS | Mod: PBBFAC,,, | Performed by: FAMILY MEDICINE

## 2022-12-15 PROCEDURE — 99213 PR OFFICE/OUTPT VISIT, EST, LEVL III, 20-29 MIN: ICD-10-PCS | Mod: 25,S$GLB,, | Performed by: FAMILY MEDICINE

## 2022-12-15 PROCEDURE — 3079F DIAST BP 80-89 MM HG: CPT | Mod: CPTII,S$GLB,, | Performed by: FAMILY MEDICINE

## 2022-12-15 PROCEDURE — 3074F SYST BP LT 130 MM HG: CPT | Mod: CPTII,S$GLB,, | Performed by: FAMILY MEDICINE

## 2022-12-15 PROCEDURE — 3008F BODY MASS INDEX DOCD: CPT | Mod: CPTII,S$GLB,, | Performed by: FAMILY MEDICINE

## 2022-12-15 PROCEDURE — 3074F PR MOST RECENT SYSTOLIC BLOOD PRESSURE < 130 MM HG: ICD-10-PCS | Mod: CPTII,S$GLB,, | Performed by: FAMILY MEDICINE

## 2022-12-15 PROCEDURE — 3044F HG A1C LEVEL LT 7.0%: CPT | Mod: CPTII,S$GLB,, | Performed by: FAMILY MEDICINE

## 2022-12-15 PROCEDURE — 99999 PR PBB SHADOW E&M-EST. PATIENT-LVL IV: CPT | Mod: PBBFAC,,, | Performed by: FAMILY MEDICINE

## 2022-12-15 PROCEDURE — 1160F RVW MEDS BY RX/DR IN RCRD: CPT | Mod: CPTII,S$GLB,, | Performed by: FAMILY MEDICINE

## 2022-12-15 PROCEDURE — 1160F PR REVIEW ALL MEDS BY PRESCRIBER/CLIN PHARMACIST DOCUMENTED: ICD-10-PCS | Mod: CPTII,S$GLB,, | Performed by: FAMILY MEDICINE

## 2022-12-15 PROCEDURE — 99213 OFFICE O/P EST LOW 20 MIN: CPT | Mod: 25,S$GLB,, | Performed by: FAMILY MEDICINE

## 2022-12-15 RX ORDER — PILOCARPINE HYDROCHLORIDE 12.5 MG/ML
1 SOLUTION/ DROPS OPHTHALMIC DAILY PRN
COMMUNITY
Start: 2022-12-06

## 2022-12-15 RX ORDER — FOLIC ACID 1 MG/1
1000 TABLET ORAL
COMMUNITY
Start: 2022-12-04 | End: 2023-03-28

## 2022-12-15 NOTE — PROGRESS NOTES
"Subjective:      Patient ID: Ayesha Arcos is a 57 y.o. female.    Chief Complaint: Hyperlipidemia    HPI 57 y.o.   female patient with a PMHx of hypertension, rheumatoid arthritis, and bilateral carpal tunnel syndrome presents to clinic for hyperlipidemia. The patient was last seen on March 4, 2022      Today she reports she have been under a lot of stress lately with her mom being ill. Patient otherwise without complaints. Denies SOB, chest pain, and bowel changes.         Past Medical History:   Diagnosis Date    Carpal tunnel syndrome, bilateral     Hypertension     Rheumatoid arthritis      Family History   Problem Relation Age of Onset    Diabetes Mother     Heart disease Father     Hypertension Father     Early death Father         Early 50's    Stroke Paternal Uncle     Heart disease Paternal Uncle      Past Surgical History:   Procedure Laterality Date    CARPAL TUNNEL RELEASE      right    skin cancer removal      nose    TUBAL LIGATION       Social History     Tobacco Use    Smoking status: Never    Smokeless tobacco: Never   Substance Use Topics    Alcohol use: Yes     Comment: social     Drug use: No       /80   Pulse 83   Temp 97.7 °F (36.5 °C)   Ht 5' 5" (1.651 m)   Wt 70.8 kg (156 lb 1.4 oz)   LMP 07/14/2021 (Approximate)   BMI 25.97 kg/m²     Review of Systems   Constitutional:  Negative for activity change, appetite change, chills, diaphoresis, fatigue, fever and unexpected weight change.   HENT:  Negative for congestion, ear pain, hearing loss, postnasal drip, rhinorrhea, sinus pain and sore throat.    Eyes:  Negative for pain, discharge, itching and visual disturbance.   Respiratory:  Negative for cough, chest tightness, shortness of breath and wheezing.    Cardiovascular:  Negative for chest pain, palpitations and leg swelling.   Gastrointestinal:  Negative for abdominal pain, constipation, diarrhea, nausea and vomiting.   Endocrine: Negative for polydipsia and polyuria. "   Genitourinary:  Negative for difficulty urinating, dysuria, flank pain, frequency, menstrual problem, pelvic pain and urgency.   Musculoskeletal:  Negative for arthralgias, back pain, joint swelling, myalgias and neck pain.   Skin:  Negative for color change and rash.   Neurological:  Negative for dizziness, weakness, light-headedness and headaches.   Hematological:  Negative for adenopathy.   Psychiatric/Behavioral:  Negative for confusion, decreased concentration and dysphoric mood.      Objective:     Physical Exam  Vitals and nursing note reviewed.   Constitutional:       General: She is not in acute distress.  HENT:      Right Ear: External ear normal.      Left Ear: External ear normal.      Nose: Nose normal.   Eyes:      Conjunctiva/sclera: Conjunctivae normal.      Pupils: Pupils are equal, round, and reactive to light.   Neck:      Vascular: No carotid bruit.   Cardiovascular:      Rate and Rhythm: Normal rate and regular rhythm.      Heart sounds: Normal heart sounds.   Pulmonary:      Effort: Pulmonary effort is normal. No respiratory distress.      Breath sounds: Normal breath sounds. No wheezing or rales.   Abdominal:      General: Bowel sounds are normal. There is no distension.      Palpations: Abdomen is soft.      Tenderness: There is no abdominal tenderness. There is no guarding.   Musculoskeletal:      Cervical back: Normal range of motion and neck supple.      Right lower leg: No edema.      Left lower leg: No edema.   Skin:     General: Skin is warm and dry.      Findings: No rash.   Neurological:      Mental Status: She is alert and oriented to person, place, and time.   Psychiatric:         Behavior: Behavior normal.         Thought Content: Thought content normal.         Judgment: Judgment normal.       Lab Results   Component Value Date    WBC 7.14 12/08/2022    HGB 12.3 12/08/2022    HCT 37.1 12/08/2022     12/08/2022    CHOL 214 (H) 03/07/2022    TRIG 92 03/07/2022    HDL 72  03/07/2022    ALT 31 12/08/2022    AST 24 12/08/2022     12/08/2022    K 3.8 12/08/2022     12/08/2022    CREATININE 1.1 12/08/2022    BUN 13 12/08/2022    CO2 25 12/08/2022    TSH 0.871 03/07/2022    HGBA1C 5.7 (H) 03/07/2022       Assessment:     1. Hyperlipidemia, unspecified hyperlipidemia type    2. Immunosuppressed status         Plan:     Hyperlipidemia, unspecified hyperlipidemia type    Immunosuppressed status    Other orders  -     (In Office Administered) Pneumococcal Conjugate Vaccine (20 Valent) (IM)        Vitals reviewed. BP within normal range at 110/80  Cont statin  Labs are up-to-date   Pneumonia vaccine will be updated today.  Patient is advised her mammogram will be due in January.   She is followed by Dr. Thomas  Patient overall doing well.  Questions and concerns addressed.          Documentation entered by Todd Kebede, acting as scribe for Dr. Neel Valdez. 12/15/2022 11:14 AM.

## 2022-12-16 ENCOUNTER — OFFICE VISIT (OUTPATIENT)
Dept: RHEUMATOLOGY | Facility: CLINIC | Age: 57
End: 2022-12-16
Payer: COMMERCIAL

## 2022-12-16 VITALS
HEART RATE: 88 BPM | DIASTOLIC BLOOD PRESSURE: 84 MMHG | BODY MASS INDEX: 26.04 KG/M2 | WEIGHT: 156.31 LBS | HEIGHT: 65 IN | SYSTOLIC BLOOD PRESSURE: 136 MMHG

## 2022-12-16 DIAGNOSIS — Z51.81 MEDICATION MONITORING ENCOUNTER: ICD-10-CM

## 2022-12-16 DIAGNOSIS — M05.9 SEROPOSITIVE RHEUMATOID ARTHRITIS: Primary | ICD-10-CM

## 2022-12-16 DIAGNOSIS — M35.00 SICCA, UNSPECIFIED TYPE: ICD-10-CM

## 2022-12-16 DIAGNOSIS — Z79.899 HIGH RISK MEDICATION USE: ICD-10-CM

## 2022-12-16 DIAGNOSIS — R76.8 POSITIVE ANA (ANTINUCLEAR ANTIBODY): ICD-10-CM

## 2022-12-16 PROCEDURE — 3075F PR MOST RECENT SYSTOLIC BLOOD PRESS GE 130-139MM HG: ICD-10-PCS | Mod: CPTII,S$GLB,, | Performed by: INTERNAL MEDICINE

## 2022-12-16 PROCEDURE — 3044F PR MOST RECENT HEMOGLOBIN A1C LEVEL <7.0%: ICD-10-PCS | Mod: CPTII,S$GLB,, | Performed by: INTERNAL MEDICINE

## 2022-12-16 PROCEDURE — 1159F PR MEDICATION LIST DOCUMENTED IN MEDICAL RECORD: ICD-10-PCS | Mod: CPTII,S$GLB,, | Performed by: INTERNAL MEDICINE

## 2022-12-16 PROCEDURE — 3008F PR BODY MASS INDEX (BMI) DOCUMENTED: ICD-10-PCS | Mod: CPTII,S$GLB,, | Performed by: INTERNAL MEDICINE

## 2022-12-16 PROCEDURE — 3079F PR MOST RECENT DIASTOLIC BLOOD PRESSURE 80-89 MM HG: ICD-10-PCS | Mod: CPTII,S$GLB,, | Performed by: INTERNAL MEDICINE

## 2022-12-16 PROCEDURE — 3075F SYST BP GE 130 - 139MM HG: CPT | Mod: CPTII,S$GLB,, | Performed by: INTERNAL MEDICINE

## 2022-12-16 PROCEDURE — 99214 PR OFFICE/OUTPT VISIT, EST, LEVL IV, 30-39 MIN: ICD-10-PCS | Mod: S$GLB,,, | Performed by: INTERNAL MEDICINE

## 2022-12-16 PROCEDURE — 99214 OFFICE O/P EST MOD 30 MIN: CPT | Mod: S$GLB,,, | Performed by: INTERNAL MEDICINE

## 2022-12-16 PROCEDURE — 3008F BODY MASS INDEX DOCD: CPT | Mod: CPTII,S$GLB,, | Performed by: INTERNAL MEDICINE

## 2022-12-16 PROCEDURE — 99999 PR PBB SHADOW E&M-EST. PATIENT-LVL IV: ICD-10-PCS | Mod: PBBFAC,,, | Performed by: INTERNAL MEDICINE

## 2022-12-16 PROCEDURE — 99999 PR PBB SHADOW E&M-EST. PATIENT-LVL IV: CPT | Mod: PBBFAC,,, | Performed by: INTERNAL MEDICINE

## 2022-12-16 PROCEDURE — 3079F DIAST BP 80-89 MM HG: CPT | Mod: CPTII,S$GLB,, | Performed by: INTERNAL MEDICINE

## 2022-12-16 PROCEDURE — 1159F MED LIST DOCD IN RCRD: CPT | Mod: CPTII,S$GLB,, | Performed by: INTERNAL MEDICINE

## 2022-12-16 PROCEDURE — 3044F HG A1C LEVEL LT 7.0%: CPT | Mod: CPTII,S$GLB,, | Performed by: INTERNAL MEDICINE

## 2022-12-16 NOTE — PROGRESS NOTES
RHEUMATOLOGY OUTPATIENT CLINIC NOTE    12/16/2022    Attending Rheumatologist: Perez Thomas  Primary Care Provider/Physician Requesting Consultation: Neel Matos MD   Chief Complaint/Reason For Consultation:  Rheumatoid Arthritis and Osteoarthritis      Subjective:     Ayesha Arcos is a 57 y.o. Black or  female with SPRA for follow up.    No acute complaints.    Review of Systems   Constitutional:  Negative for fever.   HENT:          Mild xerophthalmia   Respiratory:  Negative for shortness of breath.    Gastrointestinal:  Negative for blood in stool and melena.   Genitourinary:  Negative for hematuria.   Musculoskeletal:  Negative for joint pain and neck pain.   Skin:  Negative for rash.        Denies RP   Neurological:  Negative for focal weakness.     Chronic comorbid conditions affecting medical decision making today:  Past Medical History:   Diagnosis Date    Carpal tunnel syndrome, bilateral     Hypertension     Rheumatoid arthritis      Past Surgical History:   Procedure Laterality Date    CARPAL TUNNEL RELEASE      right    skin cancer removal      nose    TUBAL LIGATION       Family History   Problem Relation Age of Onset    Diabetes Mother     Heart disease Father     Hypertension Father     Early death Father         Early 50's    Stroke Paternal Uncle     Heart disease Paternal Uncle      Social History     Tobacco Use   Smoking Status Never   Smokeless Tobacco Never       Current Outpatient Medications:     ascorbic acid, vitamin C, (VITAMIN C) 100 MG tablet, Take 100 mg by mouth once daily., Disp: , Rfl:     FLAXSEED OIL ORAL, Take by mouth., Disp: , Rfl:     folic acid (FOLVITE) 1 MG tablet, Take 1,000 mcg by mouth., Disp: , Rfl:     multivitamin capsule, Take 1 capsule by mouth once daily., Disp: , Rfl:     multivitamin with minerals (HAIR,SKIN AND NAILS ORAL), Take by mouth once daily., Disp: , Rfl:     rosuvastatin (CRESTOR) 10 MG tablet, Take 1 tablet (10 mg  total) by mouth once daily., Disp: 90 tablet, Rfl: 0    TURMERIC ORAL, Take by mouth., Disp: , Rfl:     upadacitinib (RINVOQ) 15 mg 24 hr tablet, Take 1 tablet (15 mg total) by mouth once daily., Disp: 90 tablet, Rfl: 2    VUITY 1.25 % Drop, Place 1 drop into both eyes daily as needed., Disp: , Rfl:     methotrexate 2.5 MG Tab, Take 10 tablets (25 mg total) by mouth every 7 days. (Patient not taking: Reported on 10/5/2022), Disp: 120 tablet, Rfl: 2    methotrexate 2.5 mg/mL Soln, methotrexate Take No date recorded No form recorded No frequency recorded No route recorded No set duration recorded No set duration amount recorded active No dosage strength recorded No dosage strength units of measure recorded, Disp: , Rfl:     valACYclovir (VALTREX) 1000 MG tablet, Take 1 tablet (1,000 mg total) by mouth 2 (two) times daily. for 10 days, Disp: 20 tablet, Rfl: 1     Objective:     Vitals:    12/16/22 0717   BP: 136/84   Pulse: 88     Physical Exam   Eyes: Conjunctivae are normal.   Pulmonary/Chest: Effort normal. No respiratory distress.   Musculoskeletal:         General: No tenderness. Normal range of motion.   Neurological: She displays no weakness.   Skin: No rash noted.     Reviewed available old and all outside pertinent medical records available.    All lab results personally reviewed and interpreted by me.       ASSESSMENT:     Seropositive RA    Sicca    High risk medication use    Medication monitoring encounter    Positive MOLLY    PLAN:     Clinically on remission.  Excellent response to Rinvoq addition (11/2021-).  Mild sicca on ROS, adequately controlled with topical therapy.  No squeeze tenderness.  No evidence of toxicity on labs. Monitor for RASHIDA in context of RDW elevation.  Decrease MTX to 20mg split dose starting 1/2023.  C/w daily FA and Rinvoq unchanged.  Repeat labs prior f/u visit, adding Myomarker panel to determine presence of 52kD SSA for prognosis.      Disclaimer: This note is prepared using  voice recognition software and as such is likely to have errors and has not been proof read. Please contact me for questions.         Perez Thomas M.D.

## 2022-12-19 ENCOUNTER — SPECIALTY PHARMACY (OUTPATIENT)
Dept: PHARMACY | Facility: CLINIC | Age: 57
End: 2022-12-19
Payer: COMMERCIAL

## 2022-12-19 NOTE — TELEPHONE ENCOUNTER
Specialty Pharmacy - Refill Coordination    Specialty Medication Orders Linked to Encounter      Flowsheet Row Most Recent Value   Medication #1 upadacitinib (RINVOQ) 15 mg 24 hr tablet (Order#[id number], Rx#)            Refill Questions - Documented Responses      Flowsheet Row Most Recent Value   Patient Availability and HIPAA Verification    Does patient want to proceed with activity? Yes   HIPAA/medical authority confirmed? Yes   Relationship to patient of person spoken to? Self   Refill Screening Questions    Changes to allergies? No   Changes to medications? No   New conditions since last clinic visit? No   Unplanned office visit, urgent care, ED, or hospital admission in the last 4 weeks? No   How does patient/caregiver feel medication is working? Good   Financial problems or insurance changes? No   How many doses of your specialty medications were missed in the last 4 weeks? 0   Would patient like to speak to a pharmacist? No   When does the patient need to receive the medication? 12/23/22   Refill Delivery Questions    How will the patient receive the medication? [facility]   When does the patient need to receive the medication? 12/23/22   Shipping Address Home   Address in [facility] confirmed and updated if neccessary? Yes   Expected Copay ($) 5   Is the patient able to afford the medication copay? Yes   Payment Method CC on file   Days supply of Refill 30   Supplies needed? No supplies needed   Refill activity completed? Yes   Refill activity plan Refill scheduled   Shipment/Pickup Date: 12/20/22            Current Outpatient Medications   Medication Sig    ascorbic acid, vitamin C, (VITAMIN C) 100 MG tablet Take 100 mg by mouth once daily.    FLAXSEED OIL ORAL Take by mouth.    folic acid (FOLVITE) 1 MG tablet Take 1,000 mcg by mouth.    methotrexate 2.5 MG Tab Take 10 tablets (25 mg total) by mouth every 7 days. (Patient not taking: Reported on 10/5/2022)    methotrexate 2.5 mg/mL Soln methotrexate  Take No date recorded No form recorded No frequency recorded No route recorded No set duration recorded No set duration amount recorded active No dosage strength recorded No dosage strength units of measure recorded    multivitamin capsule Take 1 capsule by mouth once daily.    multivitamin with minerals (HAIR,SKIN AND NAILS ORAL) Take by mouth once daily.    rosuvastatin (CRESTOR) 10 MG tablet Take 1 tablet (10 mg total) by mouth once daily.    TURMERIC ORAL Take by mouth.    upadacitinib (RINVOQ) 15 mg 24 hr tablet Take 1 tablet (15 mg total) by mouth once daily.    valACYclovir (VALTREX) 1000 MG tablet Take 1 tablet (1,000 mg total) by mouth 2 (two) times daily. for 10 days    VUITY 1.25 % Drop Place 1 drop into both eyes daily as needed.   Last reviewed on 12/16/2022  7:23 AM by Keyonna Kirk MA    Review of patient's allergies indicates:  No Known Allergies Last reviewed on  12/16/2022 7:22 AM by Keyonna Kirk      Tasks added this encounter   1/15/2023 - Refill Call (Auto Added)   Tasks due within next 3 months   3/8/2023 - Clinical - Follow Up Assesement (Annual)     [facility]  [address]  [facility] [address]  Phone: [phone]  Fax: [phone]

## 2023-01-05 ENCOUNTER — TELEPHONE (OUTPATIENT)
Dept: SPORTS MEDICINE | Facility: CLINIC | Age: 58
End: 2023-01-05
Payer: COMMERCIAL

## 2023-01-05 NOTE — TELEPHONE ENCOUNTER
LVM informing patient upcoming office visit needed to be r/s. Provided call back number [phone] and option to send message via my chart.

## 2023-01-15 ENCOUNTER — OFFICE VISIT (OUTPATIENT)
Dept: URGENT CARE | Facility: CLINIC | Age: 58
End: 2023-01-15
Payer: COMMERCIAL

## 2023-01-15 VITALS
RESPIRATION RATE: 18 BRPM | HEIGHT: 65 IN | TEMPERATURE: 99 F | HEART RATE: 88 BPM | OXYGEN SATURATION: 99 % | WEIGHT: 156 LBS | BODY MASS INDEX: 25.99 KG/M2 | SYSTOLIC BLOOD PRESSURE: 122 MMHG | DIASTOLIC BLOOD PRESSURE: 84 MMHG

## 2023-01-15 DIAGNOSIS — M72.2 PLANTAR FASCIITIS: Primary | ICD-10-CM

## 2023-01-15 PROCEDURE — 99214 PR OFFICE/OUTPT VISIT, EST, LEVL IV, 30-39 MIN: ICD-10-PCS | Mod: S$GLB,,, | Performed by: PHYSICIAN ASSISTANT

## 2023-01-15 PROCEDURE — 3008F PR BODY MASS INDEX (BMI) DOCUMENTED: ICD-10-PCS | Mod: CPTII,S$GLB,, | Performed by: PHYSICIAN ASSISTANT

## 2023-01-15 PROCEDURE — 3079F PR MOST RECENT DIASTOLIC BLOOD PRESSURE 80-89 MM HG: ICD-10-PCS | Mod: CPTII,S$GLB,, | Performed by: PHYSICIAN ASSISTANT

## 2023-01-15 PROCEDURE — 1160F RVW MEDS BY RX/DR IN RCRD: CPT | Mod: CPTII,S$GLB,, | Performed by: PHYSICIAN ASSISTANT

## 2023-01-15 PROCEDURE — 3074F PR MOST RECENT SYSTOLIC BLOOD PRESSURE < 130 MM HG: ICD-10-PCS | Mod: CPTII,S$GLB,, | Performed by: PHYSICIAN ASSISTANT

## 2023-01-15 PROCEDURE — 1160F PR REVIEW ALL MEDS BY PRESCRIBER/CLIN PHARMACIST DOCUMENTED: ICD-10-PCS | Mod: CPTII,S$GLB,, | Performed by: PHYSICIAN ASSISTANT

## 2023-01-15 PROCEDURE — 3079F DIAST BP 80-89 MM HG: CPT | Mod: CPTII,S$GLB,, | Performed by: PHYSICIAN ASSISTANT

## 2023-01-15 PROCEDURE — 3008F BODY MASS INDEX DOCD: CPT | Mod: CPTII,S$GLB,, | Performed by: PHYSICIAN ASSISTANT

## 2023-01-15 PROCEDURE — 3074F SYST BP LT 130 MM HG: CPT | Mod: CPTII,S$GLB,, | Performed by: PHYSICIAN ASSISTANT

## 2023-01-15 PROCEDURE — 1159F PR MEDICATION LIST DOCUMENTED IN MEDICAL RECORD: ICD-10-PCS | Mod: CPTII,S$GLB,, | Performed by: PHYSICIAN ASSISTANT

## 2023-01-15 PROCEDURE — 1159F MED LIST DOCD IN RCRD: CPT | Mod: CPTII,S$GLB,, | Performed by: PHYSICIAN ASSISTANT

## 2023-01-15 PROCEDURE — 99214 OFFICE O/P EST MOD 30 MIN: CPT | Mod: S$GLB,,, | Performed by: PHYSICIAN ASSISTANT

## 2023-01-15 RX ORDER — NAPROXEN 500 MG/1
500 TABLET ORAL 2 TIMES DAILY
Qty: 20 TABLET | Refills: 0 | Status: SHIPPED | OUTPATIENT
Start: 2023-01-15 | End: 2023-01-20 | Stop reason: ALTCHOICE

## 2023-01-15 NOTE — PROGRESS NOTES
"Subjective:       Patient ID: Ayesha Arcos is a 57 y.o. female.    Vitals:  height is 5' 5" (1.651 m) and weight is 70.8 kg (156 lb). Her temperature is 98.8 °F (37.1 °C). Her blood pressure is 122/84 and her pulse is 88. Her respiration is 18 and oxygen saturation is 99%.     Chief Complaint: Leg Pain    C/o left foot pain, lateral aspect, x 2 days, rates pain 6/10, pain is worse when walking no injuries, pt admits dx with rheumatoid arthritis, no meds taken for relief. She denies any traumatic event causing this and has no other symptoms associated with this complaint.    Leg Pain   The incident occurred 2 days ago. There was no injury mechanism. The pain is present in the left foot. The quality of the pain is described as aching. The pain is at a severity of 6/10. The pain is mild. The pain has been Constant since onset. Pertinent negatives include no inability to bear weight, loss of motion, loss of sensation, muscle weakness, numbness or tingling. She reports no foreign bodies present. She has tried nothing for the symptoms. The treatment provided no relief.     Neurological:  Negative for numbness.     Objective:      Physical Exam   Constitutional: She is oriented to person, place, and time. She appears well-developed. She is cooperative.  Non-toxic appearance. She does not appear ill. No distress.   HENT:   Head: Normocephalic and atraumatic.   Ears:   Right Ear: Hearing, tympanic membrane, external ear and ear canal normal.   Left Ear: Hearing, tympanic membrane, external ear and ear canal normal.   Nose: Nose normal. No mucosal edema, rhinorrhea or nasal deformity. No epistaxis. Right sinus exhibits no maxillary sinus tenderness and no frontal sinus tenderness. Left sinus exhibits no maxillary sinus tenderness and no frontal sinus tenderness.   Mouth/Throat: Uvula is midline, oropharynx is clear and moist and mucous membranes are normal. No trismus in the jaw. Normal dentition. No uvula swelling. " No posterior oropharyngeal erythema.   Eyes: Conjunctivae and lids are normal. Right eye exhibits no discharge. Left eye exhibits no discharge. No scleral icterus.   Neck: Trachea normal and phonation normal. Neck supple.   Cardiovascular: Normal rate, regular rhythm, normal heart sounds and normal pulses.   Pulmonary/Chest: Effort normal and breath sounds normal. No respiratory distress.   Abdominal: Normal appearance and bowel sounds are normal. She exhibits no distension and no mass. Soft. There is no abdominal tenderness.   Musculoskeletal: Normal range of motion.         General: No deformity. Normal range of motion.      Left foot: Tenderness (lateral aspect) present. No swelling or deformity.   Neurological: She is alert and oriented to person, place, and time. She exhibits normal muscle tone. Coordination normal.   Skin: Skin is warm, dry, intact, not diaphoretic and not pale.   Psychiatric: Her speech is normal and behavior is normal. Judgment and thought content normal.   Nursing note and vitals reviewed.      Assessment:       1. Plantar fasciitis          Plan:       Discussed medication being prescribed.  Provided patient with exercises that can relieve the pain. Advised patient to follow up with PCP as needed.  Patient verbalized understanding, agrees with the plan, and is comfortable with discharge.    Plantar fasciitis  -     naproxen (NAPROSYN) 500 MG tablet; Take 1 tablet (500 mg total) by mouth 2 (two) times daily. for 10 days  Dispense: 20 tablet; Refill: 0

## 2023-01-20 ENCOUNTER — SPECIALTY PHARMACY (OUTPATIENT)
Dept: PHARMACY | Facility: CLINIC | Age: 58
End: 2023-01-20
Payer: COMMERCIAL

## 2023-01-20 DIAGNOSIS — M05.9 SEROPOSITIVE RHEUMATOID ARTHRITIS: Primary | ICD-10-CM

## 2023-01-20 NOTE — TELEPHONE ENCOUNTER
Outgoing regarding Rinvoq refill pt stated had some pain in her leg and was prescribed Naproxen. pt  has finished medication. Transferred to assigned [facility]

## 2023-01-20 NOTE — TELEPHONE ENCOUNTER
Specialty Pharmacy - Refill Coordination    Specialty Medication Orders Linked to Encounter      Flowsheet Row Most Recent Value   Medication #1 upadacitinib (RINVOQ) 15 mg 24 hr tablet (Order#[id number], Rx#[id number])            Refill Questions - Documented Responses      Flowsheet Row Most Recent Value   Patient Availability and HIPAA Verification    Does patient want to proceed with activity? Yes   HIPAA/medical authority confirmed? Yes   Relationship to patient of person spoken to? Self   Refill Screening Questions    Changes to allergies? No   Changes to medications? No   New conditions since last clinic visit? No   Unplanned office visit, urgent care, ED, or hospital admission in the last 4 weeks? No   How does patient/caregiver feel medication is working? Good   Financial problems or insurance changes? No   How many doses of your specialty medications were missed in the last 4 weeks? 0   Would patient like to speak to a pharmacist? No   When does the patient need to receive the medication? 01/24/23   Refill Delivery Questions    How will the patient receive the medication? MEDRx   When does the patient need to receive the medication? 01/24/23   Shipping Address Home   Address in [facility] confirmed and updated if neccessary? Yes   Expected Copay ($) 5   Is the patient able to afford the medication copay? Yes   Payment Method CC on file   Days supply of Refill 30   Supplies needed? No supplies needed   Refill activity completed? Yes   Refill activity plan Refill scheduled   Shipment/Pickup Date: 01/23/23            Current Outpatient Medications   Medication Sig    ascorbic acid, vitamin C, (VITAMIN C) 100 MG tablet Take 100 mg by mouth once daily.    FLAXSEED OIL ORAL Take by mouth.    folic acid (FOLVITE) 1 MG tablet Take 1,000 mcg by mouth.    methotrexate 2.5 MG Tab Take 10 tablets (25 mg total) by mouth every 7 days. (Patient not taking: Reported on 10/5/2022)    methotrexate 2.5 mg/mL Soln  methotrexate Take No date recorded No form recorded No frequency recorded No route recorded No set duration recorded No set duration amount recorded active No dosage strength recorded No dosage strength units of measure recorded    multivitamin capsule Take 1 capsule by mouth once daily.    multivitamin with minerals (HAIR,SKIN AND NAILS ORAL) Take by mouth once daily.    rosuvastatin (CRESTOR) 10 MG tablet Take 1 tablet (10 mg total) by mouth once daily.    TURMERIC ORAL Take by mouth.    upadacitinib (RINVOQ) 15 mg 24 hr tablet Take 1 tablet (15 mg total) by mouth once daily.    valACYclovir (VALTREX) 1000 MG tablet Take 1 tablet (1,000 mg total) by mouth 2 (two) times daily. for 10 days    VUITY 1.25 % Drop Place 1 drop into both eyes daily as needed.   Last reviewed on 1/18/2023  1:28 PM by Steven Ramsey MD    Review of patient's allergies indicates:  No Known Allergies Last reviewed on  1/18/2023 1:28 PM by Steven Ramsey      Tasks added this encounter   2/16/2023 - Refill Call (Auto Added)   Tasks due within next 3 months   3/8/2023 - Clinical - Follow Up Assesement (Annual)     Nat Gant, SonnyD  [facility]  [address] [place], [place] [address]  [facility] [address]  Phone: [phone]  Fax: [phone]

## 2023-01-31 ENCOUNTER — OFFICE VISIT (OUTPATIENT)
Dept: INTERNAL MEDICINE | Facility: CLINIC | Age: 58
End: 2023-01-31
Payer: COMMERCIAL

## 2023-01-31 ENCOUNTER — HOSPITAL ENCOUNTER (OUTPATIENT)
Dept: RADIOLOGY | Facility: HOSPITAL | Age: 58
Discharge: HOME | End: 2023-01-31
Attending: NURSE PRACTITIONER
Payer: COMMERCIAL

## 2023-01-31 VITALS
WEIGHT: 156.31 LBS | HEART RATE: 101 BPM | TEMPERATURE: 98 F | SYSTOLIC BLOOD PRESSURE: 136 MMHG | HEIGHT: 65 IN | DIASTOLIC BLOOD PRESSURE: 88 MMHG | BODY MASS INDEX: 26.04 KG/M2

## 2023-01-31 DIAGNOSIS — E78.5 HYPERLIPIDEMIA, UNSPECIFIED HYPERLIPIDEMIA TYPE: ICD-10-CM

## 2023-01-31 DIAGNOSIS — R05.9 COUGH, UNSPECIFIED TYPE: ICD-10-CM

## 2023-01-31 DIAGNOSIS — D84.9 IMMUNOCOMPROMISED: ICD-10-CM

## 2023-01-31 DIAGNOSIS — M05.9 SEROPOSITIVE RHEUMATOID ARTHRITIS: ICD-10-CM

## 2023-01-31 DIAGNOSIS — J06.9 VIRAL URI WITH COUGH: Primary | ICD-10-CM

## 2023-01-31 DIAGNOSIS — M54.9 BACK PAIN, UNSPECIFIED BACK LOCATION, UNSPECIFIED BACK PAIN LATERALITY, UNSPECIFIED CHRONICITY: ICD-10-CM

## 2023-01-31 DIAGNOSIS — J06.9 VIRAL URI WITH COUGH: ICD-10-CM

## 2023-01-31 LAB
CTP QC/QA: YES
CTP QC/QA: YES
POC MOLECULAR INFLUENZA A AGN: NEGATIVE
POC MOLECULAR INFLUENZA B AGN: NEGATIVE
SARS-COV-2 RDRP RESP QL NAA+PROBE: NEGATIVE

## 2023-01-31 PROCEDURE — 3075F PR MOST RECENT SYSTOLIC BLOOD PRESS GE 130-139MM HG: ICD-10-PCS | Mod: CPTII,S$GLB,, | Performed by: NURSE PRACTITIONER

## 2023-01-31 PROCEDURE — 99999 PR PBB SHADOW E&M-EST. PATIENT-LVL IV: CPT | Mod: PBBFAC,,, | Performed by: NURSE PRACTITIONER

## 2023-01-31 PROCEDURE — 87635 SARS-COV-2 COVID-19 AMP PRB: CPT | Mod: QW,S$GLB,, | Performed by: NURSE PRACTITIONER

## 2023-01-31 PROCEDURE — 71046 XR CHEST PA AND LATERAL: ICD-10-PCS | Mod: 26,,, | Performed by: STUDENT IN AN ORGANIZED HEALTH CARE EDUCATION/TRAINING PROGRAM

## 2023-01-31 PROCEDURE — 87635: ICD-10-PCS | Mod: QW,S$GLB,, | Performed by: NURSE PRACTITIONER

## 2023-01-31 PROCEDURE — 1159F MED LIST DOCD IN RCRD: CPT | Mod: CPTII,S$GLB,, | Performed by: NURSE PRACTITIONER

## 2023-01-31 PROCEDURE — 87502 POCT INFLUENZA A/B MOLECULAR: ICD-10-PCS | Mod: QW,S$GLB,, | Performed by: NURSE PRACTITIONER

## 2023-01-31 PROCEDURE — 3075F SYST BP GE 130 - 139MM HG: CPT | Mod: CPTII,S$GLB,, | Performed by: NURSE PRACTITIONER

## 2023-01-31 PROCEDURE — 3079F PR MOST RECENT DIASTOLIC BLOOD PRESSURE 80-89 MM HG: ICD-10-PCS | Mod: CPTII,S$GLB,, | Performed by: NURSE PRACTITIONER

## 2023-01-31 PROCEDURE — 71046 X-RAY EXAM CHEST 2 VIEWS: CPT | Mod: 26,,, | Performed by: STUDENT IN AN ORGANIZED HEALTH CARE EDUCATION/TRAINING PROGRAM

## 2023-01-31 PROCEDURE — 99999 PR PBB SHADOW E&M-EST. PATIENT-LVL IV: ICD-10-PCS | Mod: PBBFAC,,, | Performed by: NURSE PRACTITIONER

## 2023-01-31 PROCEDURE — 3008F BODY MASS INDEX DOCD: CPT | Mod: CPTII,S$GLB,, | Performed by: NURSE PRACTITIONER

## 2023-01-31 PROCEDURE — 3079F DIAST BP 80-89 MM HG: CPT | Mod: CPTII,S$GLB,, | Performed by: NURSE PRACTITIONER

## 2023-01-31 PROCEDURE — 1160F PR REVIEW ALL MEDS BY PRESCRIBER/CLIN PHARMACIST DOCUMENTED: ICD-10-PCS | Mod: CPTII,S$GLB,, | Performed by: NURSE PRACTITIONER

## 2023-01-31 PROCEDURE — 87502 INFLUENZA DNA AMP PROBE: CPT | Mod: QW,S$GLB,, | Performed by: NURSE PRACTITIONER

## 2023-01-31 PROCEDURE — 1160F RVW MEDS BY RX/DR IN RCRD: CPT | Mod: CPTII,S$GLB,, | Performed by: NURSE PRACTITIONER

## 2023-01-31 PROCEDURE — 71046 X-RAY EXAM CHEST 2 VIEWS: CPT | Mod: TC,FY,PO

## 2023-01-31 PROCEDURE — 1159F PR MEDICATION LIST DOCUMENTED IN MEDICAL RECORD: ICD-10-PCS | Mod: CPTII,S$GLB,, | Performed by: NURSE PRACTITIONER

## 2023-01-31 PROCEDURE — 99214 OFFICE O/P EST MOD 30 MIN: CPT | Mod: S$GLB,,, | Performed by: NURSE PRACTITIONER

## 2023-01-31 PROCEDURE — 3008F PR BODY MASS INDEX (BMI) DOCUMENTED: ICD-10-PCS | Mod: CPTII,S$GLB,, | Performed by: NURSE PRACTITIONER

## 2023-01-31 PROCEDURE — 99214 PR OFFICE/OUTPT VISIT, EST, LEVL IV, 30-39 MIN: ICD-10-PCS | Mod: S$GLB,,, | Performed by: NURSE PRACTITIONER

## 2023-01-31 RX ORDER — PROMETHAZINE HYDROCHLORIDE AND DEXTROMETHORPHAN HYDROBROMIDE 6.25; 15 MG/5ML; MG/5ML
5 SYRUP ORAL NIGHTLY PRN
Qty: 120 ML | Refills: 0 | Status: SHIPPED | OUTPATIENT
Start: 2023-01-31

## 2023-01-31 RX ORDER — AMOXICILLIN AND CLAVULANATE POTASSIUM 875; 125 MG/1; MG/1
1 TABLET, FILM COATED ORAL 2 TIMES DAILY
Qty: 20 TABLET | Refills: 0 | Status: SHIPPED | OUTPATIENT
Start: 2023-01-31 | End: 2023-02-10

## 2023-01-31 RX ORDER — FLUTICASONE PROPIONATE 50 MCG
2 SPRAY, SUSPENSION (ML) NASAL DAILY
Qty: 16 G | Refills: 0 | Status: SHIPPED | OUTPATIENT
Start: 2023-01-31 | End: 2023-11-07

## 2023-01-31 NOTE — PROGRESS NOTES
"Subjective:       Patient ID: Ayesha Arcos is a 57 y.o. female.    Chief Complaint: Cough and Back Pain    Patient here with Upper Respiratory Infection  x 4 days  Has nasal congestion, cough, post nasal drip   Had lots of coughing on the way home yesterday that was causing her back to hurt  No fever, no body aches  She is taking left over rx syrup from previous visit    Cough  Pertinent negatives include no chest pain, chills, eye redness, fever, headaches, myalgias, rash, shortness of breath or wheezing. Her past medical history is significant for environmental allergies.   Back Pain  Pertinent negatives include no abdominal pain, chest pain, dysuria, fever or headaches.   /88   Pulse 101   Temp 98.3 °F (36.8 °C) (Oral)   Ht 5' 5" (1.651 m)   Wt 70.9 kg (156 lb 4.9 oz)   LMP 07/14/2021 (Approximate)   BMI 26.01 kg/m²     Review of Systems   Constitutional:  Negative for activity change, appetite change, chills, diaphoresis, fatigue, fever and unexpected weight change.   HENT:  Positive for congestion. Negative for dental problem, drooling, ear discharge, hearing loss, mouth sores, nosebleeds, tinnitus, trouble swallowing and voice change.    Eyes:  Negative for pain, discharge and redness.   Respiratory:  Positive for cough. Negative for choking, chest tightness, shortness of breath and wheezing.    Cardiovascular:  Negative for chest pain, palpitations and leg swelling.   Gastrointestinal: Negative.  Negative for abdominal pain, diarrhea, nausea and vomiting.   Endocrine: Negative for cold intolerance and heat intolerance.   Genitourinary: Negative.  Negative for dysuria.   Musculoskeletal:  Positive for back pain. Negative for arthralgias, joint swelling, myalgias and neck pain.   Skin:  Negative for color change, pallor, rash and wound.   Allergic/Immunologic: Positive for environmental allergies. Negative for food allergies and immunocompromised state.   Neurological: Negative.  Negative " for dizziness, syncope, facial asymmetry, light-headedness and headaches.   Hematological:  Negative for adenopathy. Does not bruise/bleed easily.   Psychiatric/Behavioral:  Negative for agitation, behavioral problems and confusion.      Objective:      Physical Exam  Vitals and nursing note reviewed.   Constitutional:       Appearance: She is well-developed. She is not diaphoretic.   HENT:      Head: Normocephalic and atraumatic.      Right Ear: Tympanic membrane, ear canal and external ear normal.      Left Ear: Tympanic membrane, ear canal and external ear normal.      Nose: Mucosal edema and rhinorrhea present.      Right Sinus: No maxillary sinus tenderness or frontal sinus tenderness.      Left Sinus: No maxillary sinus tenderness or frontal sinus tenderness.      Mouth/Throat:      Pharynx: Uvula midline. No oropharyngeal exudate or posterior oropharyngeal erythema.   Eyes:      General:         Right eye: No discharge.         Left eye: No discharge.      Conjunctiva/sclera: Conjunctivae normal.   Cardiovascular:      Rate and Rhythm: Normal rate and regular rhythm.      Heart sounds: Normal heart sounds. No murmur heard.  Pulmonary:      Effort: Pulmonary effort is normal. No accessory muscle usage or respiratory distress.      Breath sounds: Normal breath sounds. No decreased breath sounds, wheezing, rhonchi or rales.   Chest:      Chest wall: No tenderness.   Abdominal:      General: There is no distension.      Palpations: Abdomen is soft.   Musculoskeletal:         General: Normal range of motion.      Cervical back: Normal range of motion.   Skin:     General: Skin is warm and dry.   Neurological:      Mental Status: She is alert and oriented to person, place, and time.   Psychiatric:         Behavior: Behavior normal.       Assessment:       1. Viral URI with cough    2. Cough, unspecified type    3. Back pain, unspecified back location, unspecified back pain laterality, unspecified chronicity    4.  Immunocompromised    5. Seropositive rheumatoid arthritis    6. Hyperlipidemia, unspecified hyperlipidemia type          Plan:       Ayesha was seen today for cough and back pain.    Diagnoses and all orders for this visit:    Viral URI with cough  -     fluticasone propionate (FLONASE) 50 mcg/actuation nasal spray; 2 sprays (100 mcg total) by Each Nostril route once daily.  -     X-Ray Chest PA And Lateral; Future  Flu and covid Negative  Will get cxr due to back pain with cough  Refill cough syrup, add flonase, zyrtec  Supportive care discussed    Cough, unspecified type  -     POCT Influenza A/B Molecular  -     POCT COVID-19 Rapid Screening  -     promethazine-dextromethorphan (PROMETHAZINE-DM) 6.25-15 mg/5 mL Syrp; Take 5 mLs by mouth nightly as needed (Cough).    Back pain, unspecified back location, unspecified back pain laterality, unspecified chronicity  -     promethazine-dextromethorphan (PROMETHAZINE-DM) 6.25-15 mg/5 mL Syrp; Take 5 mLs by mouth nightly as needed (Cough).    Immunocompromised  -     amoxicillin-clavulanate 875-125mg (AUGMENTIN) 875-125 mg per tablet; Take 1 tablet by mouth 2 (two) times daily. for 10 days  Will treat with antibiotics due to immunocompromised status    Seropositive rheumatoid arthritis  Stable. Followed by rheum    Hyperlipidemia  Stable on statin

## 2023-02-01 ENCOUNTER — TELEPHONE (OUTPATIENT)
Dept: RHEUMATOLOGY | Facility: CLINIC | Age: 58
End: 2023-02-01
Payer: COMMERCIAL

## 2023-02-01 NOTE — TELEPHONE ENCOUNTER
"Dr. Thomas, patient saw CHANO Mancera yesterday. She was diagnosed with a URI and prescribed Amoxicillin. How long does she need to hold MTX and Rinvoq while taking Amoxicillin?        Returned patients phone call. Patient stated that she saw Rita Brooks PA-C yesterday, was diagnosed with upper respiratory infection, and prescribed Amoxicillin. Patient would like to know if she needs to hold any medications while she is taking Amoxicillin. Advised patient that I am going to route this message to Dr. Thomas for clarification and will call her back once I hear something. Patient thanked me for the return phone call. All questions answered.      Angelique Gibbs (Allye), [facility]  Rheumatology Department    "

## 2023-02-01 NOTE — TELEPHONE ENCOUNTER
----- Message from Rose Norton sent at 2/1/2023  6:29 AM CST -----  Contact: self/[phone]  Patient is calling to consult with nurse regarding her medication, please call her back at [phone]. Thanks/ar

## 2023-02-02 ENCOUNTER — PATIENT MESSAGE (OUTPATIENT)
Dept: RHEUMATOLOGY | Facility: CLINIC | Age: 58
End: 2023-02-02
Payer: COMMERCIAL

## 2023-02-02 ENCOUNTER — OFFICE VISIT (OUTPATIENT)
Dept: PODIATRY | Facility: CLINIC | Age: 58
End: 2023-02-02
Payer: COMMERCIAL

## 2023-02-02 DIAGNOSIS — M79.674 PAIN OF RIGHT GREAT TOE: ICD-10-CM

## 2023-02-02 DIAGNOSIS — L60.8 ONYCHOPTOSIS: ICD-10-CM

## 2023-02-02 DIAGNOSIS — B35.1 ONYCHOMYCOSIS: Primary | ICD-10-CM

## 2023-02-02 DIAGNOSIS — M79.675 PAIN OF LEFT GREAT TOE: ICD-10-CM

## 2023-02-02 PROCEDURE — 1159F MED LIST DOCD IN RCRD: CPT | Mod: CPTII,S$GLB,, | Performed by: PODIATRIST

## 2023-02-02 PROCEDURE — 1160F PR REVIEW ALL MEDS BY PRESCRIBER/CLIN PHARMACIST DOCUMENTED: ICD-10-PCS | Mod: CPTII,S$GLB,, | Performed by: PODIATRIST

## 2023-02-02 PROCEDURE — 1159F PR MEDICATION LIST DOCUMENTED IN MEDICAL RECORD: ICD-10-PCS | Mod: CPTII,S$GLB,, | Performed by: PODIATRIST

## 2023-02-02 PROCEDURE — 99204 PR OFFICE/OUTPT VISIT, NEW, LEVL IV, 45-59 MIN: ICD-10-PCS | Mod: S$GLB,,, | Performed by: PODIATRIST

## 2023-02-02 PROCEDURE — 1160F RVW MEDS BY RX/DR IN RCRD: CPT | Mod: CPTII,S$GLB,, | Performed by: PODIATRIST

## 2023-02-02 PROCEDURE — 99999 PR PBB SHADOW E&M-EST. PATIENT-LVL III: CPT | Mod: PBBFAC,,, | Performed by: PODIATRIST

## 2023-02-02 PROCEDURE — 99204 OFFICE O/P NEW MOD 45 MIN: CPT | Mod: S$GLB,,, | Performed by: PODIATRIST

## 2023-02-02 PROCEDURE — 99999 PR PBB SHADOW E&M-EST. PATIENT-LVL III: ICD-10-PCS | Mod: PBBFAC,,, | Performed by: PODIATRIST

## 2023-02-02 RX ORDER — TERBINAFINE HYDROCHLORIDE 250 MG/1
250 TABLET ORAL DAILY
Qty: 90 TABLET | Refills: 0 | Status: SHIPPED | OUTPATIENT
Start: 2023-02-02 | End: 2023-05-03

## 2023-02-02 NOTE — PROGRESS NOTES
Subjective:       Patient ID: Ayesha Arcos is a 57 y.o. female.    Chief Complaint: Nail Problem (Patient present with bilateral hallux nail lifting and discoloration. Patient denies pain at present and states she has been using tim drops to help with the discoloration. She denies injury/trauma to nails. )      HPI:  Patient presents to the office this afternoon, with complaint of elongated and thickened nail plates to the left and right.  Patient states using over-the-counter topical medications which have not been helpful curative.  The patient states slight discomfort due to the nail thickening and/or elongation. Patient is interested in the definitive medical diagnosis. Pains are approximately 5/10. This patient's PMD is Neel Matos MD.     Review of patient's allergies indicates:  No Known Allergies    Past Medical History:   Diagnosis Date    Carpal tunnel syndrome, bilateral     Hypertension     Rheumatoid arthritis        Family History   Problem Relation Age of Onset    Diabetes Mother     Heart disease Father     Hypertension Father     Early death Father         Early 50's    Stroke Paternal Uncle     Heart disease Paternal Uncle        Social History     Socioeconomic History    Marital status:     Number of children: 1   Tobacco Use    Smoking status: Never    Smokeless tobacco: Never   Substance and Sexual Activity    Alcohol use: Yes     Comment: social     Drug use: No    Sexual activity: Yes       Past Surgical History:   Procedure Laterality Date    CARPAL TUNNEL RELEASE      right    skin cancer removal      nose    TUBAL LIGATION         Review of Systems   Constitutional:  Negative for chills, fatigue and fever.   HENT:  Negative for hearing loss.    Eyes:  Negative for photophobia and visual disturbance.   Respiratory:  Negative for cough, chest tightness, shortness of breath and wheezing.    Cardiovascular:  Negative for chest pain and palpitations.    Gastrointestinal:  Negative for constipation, diarrhea, nausea and vomiting.   Endocrine: Negative for cold intolerance and heat intolerance.   Genitourinary:  Negative for flank pain.   Musculoskeletal:  Negative for neck pain and neck stiffness.   Neurological:  Negative for light-headedness and headaches.   Psychiatric/Behavioral:  Negative for sleep disturbance.         Objective:   LMP 07/14/2021 (Approximate)       Physical Exam    LOWER EXTREMITY PHYSICAL EXAMINATION    DERMATOLOGY:  Skin is supple, dry and intact. There are nail changes which are consistent with onychomycosis on the left and right foot. On the left foot, nail #1 is/are thickened, is/are dystrophic, with yellow discoloration, and with malodorous subungual debris. On the right foot, nail #1 is/are thickened, is/are dystrophic, with yellow discoloration, and with malodorous subungual debris.     VASCULAR: The right DP pulse is 2/4 and the left DP is 2/4. The right PT pulse is 2/4 and the left PT pulse is 2/4. Proximal to distal, warm to warm. No dependent rubor or elevation palor is noted. Capillary refill time is less than 3 seconds. Hair growth is appreciated to the dorsal foot and digits.    Assessment:     1. Onychomycosis    2. Pain of right great toe    3. Pain of left great toe    4. Onychoptosis        Plan:     Onychomycosis  -     terbinafine HCL (LAMISIL) 250 mg tablet; Take 1 tablet (250 mg total) by mouth once daily.  Dispense: 90 tablet; Refill: 0    Pain of right great toe    Pain of left great toe    Onychoptosis        Thorough discussion is had with the patient today, concerning the diagnosis, its etiology, and the treatment algorithm at present.     Most recent labs reviewed in detail with the patient. All questions and concerns regarding findings and its/their implications are outlined and discussed.    Discussed the various treatment options concerning onychomycosis, these being over-the-counter topicals (efficacy is low  in regards to cure), prescription strength topicals (better efficacy as compared to OTC versions, but only slightly so, and potentially costly), laser therapy (which is not covered by insurance companies), oral medications (patient is advised that there is a potential, though less than 5%, for the potential of adverse health and side effects; namely liver pathology) and doing nothing (frequent debridements) as onychomycosis is a cosmetic ailment, and has no potential for long-term deleterious effects on the health. Did discuss the sides effects of PO therapy and what to monitor for, namely, headache, diarrhea, itching and rash, indigestion, liver enzyme abnormalities, taste disturbance, nausea, abdominal pain, flatulence (gas), vomiting and upper respiratory tract infection. Rx. for 90 days course is provided.          Future Appointments   Date Time Provider Department Center   3/7/2023  8:20 AM LABORATORY, [facility]   3/15/2023  8:40 AM [facility]   6/9/2023  7:35 AM LABORATORY, [facility] [place] [facility]   6/16/2023  7:00 AM Perez Thomas MD HG RHEUM [facility]   1/26/2024  9:30 AM [facility]  MAMMO1 SCREEN [facility] MAMMO [place] [facility]   1/26/2024 10:30 AM Steven Ramsey MD [facility] OBGYN [place] [facility]

## 2023-02-20 ENCOUNTER — SPECIALTY PHARMACY (OUTPATIENT)
Dept: PHARMACY | Facility: CLINIC | Age: 58
End: 2023-02-20
Payer: COMMERCIAL

## 2023-02-20 NOTE — TELEPHONE ENCOUNTER
Specialty Pharmacy - Refill Coordination    Specialty Medication Orders Linked to Encounter      Flowsheet Row Most Recent Value   Medication #1 upadacitinib (RINVOQ) 15 mg 24 hr tablet (Order#[id number], Rx#[id number])            Refill Questions - Documented Responses      Flowsheet Row Most Recent Value   Patient Availability and HIPAA Verification    Does patient want to proceed with activity? Yes   HIPAA/medical authority confirmed? Yes   Relationship to patient of person spoken to? Self   Refill Screening Questions    Changes to allergies? No   Changes to medications? No   New conditions since last clinic visit? No   Unplanned office visit, urgent care, ED, or hospital admission in the last 4 weeks? Yes   How does patient/caregiver feel medication is working? Good   Financial problems or insurance changes? No   How many doses of your specialty medications were missed in the last 4 weeks? > 5   Why were doses missed? Felt ill or sick   Would patient like to speak to a pharmacist? No   When does the patient need to receive the medication? 02/24/23   Refill Delivery Questions    How will the patient receive the medication? MEDRx   When does the patient need to receive the medication? 02/24/23   Shipping Address Home   Address in [facility] confirmed and updated if neccessary? Yes   Expected Copay ($) 5   Is the patient able to afford the medication copay? Yes   Payment Method CC on file   Days supply of Refill 30   Supplies needed? No supplies needed   Refill activity completed? Yes   Refill activity plan Refill scheduled   Shipment/Pickup Date: 02/23/23            Current Outpatient Medications   Medication Sig    ascorbic acid, vitamin C, (VITAMIN C) 100 MG tablet Take 100 mg by mouth once daily.    FLAXSEED OIL ORAL Take by mouth.    fluticasone propionate (FLONASE) 50 mcg/actuation nasal spray 2 sprays (100 mcg total) by Each Nostril route once daily.    folic acid (FOLVITE) 1 MG tablet Take 1,000 mcg  by mouth.    methotrexate 2.5 MG Tab Take 10 tablets (25 mg total) by mouth every 7 days.    methotrexate 2.5 mg/mL Soln methotrexate Take No date recorded No form recorded No frequency recorded No route recorded No set duration recorded No set duration amount recorded active No dosage strength recorded No dosage strength units of measure recorded    multivitamin capsule Take 1 capsule by mouth once daily.    multivitamin with minerals (HAIR,SKIN AND NAILS ORAL) Take by mouth once daily.    promethazine-dextromethorphan (PROMETHAZINE-DM) 6.25-15 mg/5 mL Syrp Take 5 mLs by mouth nightly as needed (Cough).    rosuvastatin (CRESTOR) 10 MG tablet Take 1 tablet (10 mg total) by mouth once daily.    terbinafine HCL (LAMISIL) 250 mg tablet Take 1 tablet (250 mg total) by mouth once daily.    TURMERIC ORAL Take by mouth.    upadacitinib (RINVOQ) 15 mg 24 hr tablet Take 1 tablet (15 mg total) by mouth once daily.    valACYclovir (VALTREX) 1000 MG tablet Take 1 tablet (1,000 mg total) by mouth 2 (two) times daily. for 10 days    VUITY 1.25 % Drop Place 1 drop into both eyes daily as needed.   Last reviewed on 2/2/2023  8:56 AM by Chad Meza DPM    Review of patient's allergies indicates:  No Known Allergies Last reviewed on  2/2/2023 8:56 AM by Chad Meza      Tasks added this encounter   No tasks added.   Tasks due within next 3 months   3/8/2023 - Clinical - Follow Up Assesement (Annual)  2/16/2023 - Refill Call (Auto Added)     Lisandra Ivey, PharmD  [facility] - Special[facility] Pharm[facility]  [address] [place] [address]  Phone: [phone]  Fax: [phone]

## 2023-02-20 NOTE — TELEPHONE ENCOUNTER
Outgoing call regarding rinvoq refill; per pt, she has about 10 days on hand; pt reported ER/UC visit; routed to jean-claude Fry

## 2023-03-07 ENCOUNTER — LAB VISIT (OUTPATIENT)
Dept: LAB | Facility: HOSPITAL | Age: 58
End: 2023-03-07
Attending: FAMILY MEDICINE
Payer: COMMERCIAL

## 2023-03-07 DIAGNOSIS — Z00.00 ANNUAL PHYSICAL EXAM: ICD-10-CM

## 2023-03-07 LAB
BASOPHILS # BLD AUTO: 0.02 K/UL (ref 0–0.2)
BASOPHILS NFR BLD: 0.3 % (ref 0–1.9)
DIFFERENTIAL METHOD: ABNORMAL
EOSINOPHIL # BLD AUTO: 0.1 K/UL (ref 0–0.5)
EOSINOPHIL NFR BLD: 2 % (ref 0–8)
ERYTHROCYTE [DISTWIDTH] IN BLOOD BY AUTOMATED COUNT: 15.1 % (ref 11.5–14.5)
HCT VFR BLD AUTO: 40.9 % (ref 37–48.5)
HGB BLD-MCNC: 12.5 G/DL (ref 12–16)
IMM GRANULOCYTES # BLD AUTO: 0.01 K/UL (ref 0–0.04)
IMM GRANULOCYTES NFR BLD AUTO: 0.2 % (ref 0–0.5)
LYMPHOCYTES # BLD AUTO: 1.8 K/UL (ref 1–4.8)
LYMPHOCYTES NFR BLD: 27.8 % (ref 18–48)
MCH RBC QN AUTO: 28.5 PG (ref 27–31)
MCHC RBC AUTO-ENTMCNC: 30.6 G/DL (ref 32–36)
MCV RBC AUTO: 93 FL (ref 82–98)
MONOCYTES # BLD AUTO: 0.3 K/UL (ref 0.3–1)
MONOCYTES NFR BLD: 4.3 % (ref 4–15)
NEUTROPHILS # BLD AUTO: 4.3 K/UL (ref 1.8–7.7)
NEUTROPHILS NFR BLD: 65.4 % (ref 38–73)
NRBC BLD-RTO: 0 /100 WBC
PLATELET # BLD AUTO: 345 K/UL (ref 150–450)
PMV BLD AUTO: 10.4 FL (ref 9.2–12.9)
RBC # BLD AUTO: 4.38 M/UL (ref 4–5.4)
WBC # BLD AUTO: 6.58 K/UL (ref 3.9–12.7)

## 2023-03-07 PROCEDURE — 85025 COMPLETE CBC W/AUTO DIFF WBC: CPT | Performed by: FAMILY MEDICINE

## 2023-03-07 PROCEDURE — 80061 LIPID PANEL: CPT | Performed by: FAMILY MEDICINE

## 2023-03-07 PROCEDURE — 83036 HEMOGLOBIN GLYCOSYLATED A1C: CPT | Performed by: FAMILY MEDICINE

## 2023-03-07 PROCEDURE — 36415 COLL VENOUS BLD VENIPUNCTURE: CPT | Performed by: FAMILY MEDICINE

## 2023-03-07 PROCEDURE — 80053 COMPREHEN METABOLIC PANEL: CPT | Performed by: FAMILY MEDICINE

## 2023-03-07 PROCEDURE — 84443 ASSAY THYROID STIM HORMONE: CPT | Performed by: FAMILY MEDICINE

## 2023-03-08 LAB
ALBUMIN SERPL BCP-MCNC: 4.4 G/DL (ref 3.5–5.2)
ALP SERPL-CCNC: 104 U/L (ref 55–135)
ALT SERPL W/O P-5'-P-CCNC: 31 U/L (ref 10–44)
ANION GAP SERPL CALC-SCNC: 9 MMOL/L (ref 8–16)
AST SERPL-CCNC: 25 U/L (ref 10–40)
BILIRUB SERPL-MCNC: 0.5 MG/DL (ref 0.1–1)
BUN SERPL-MCNC: 10 MG/DL (ref 6–20)
CALCIUM SERPL-MCNC: 10 MG/DL (ref 8.7–10.5)
CHLORIDE SERPL-SCNC: 106 MMOL/L (ref 95–110)
CHOLEST SERPL-MCNC: 211 MG/DL (ref 120–199)
CHOLEST/HDLC SERPL: 3.5 {RATIO} (ref 2–5)
CO2 SERPL-SCNC: 28 MMOL/L (ref 23–29)
CREAT SERPL-MCNC: 0.8 MG/DL (ref 0.5–1.4)
EST. GFR  (NO RACE VARIABLE): >60 ML/MIN/1.73 M^2
ESTIMATED AVG GLUCOSE: 108 MG/DL (ref 68–131)
GLUCOSE SERPL-MCNC: 107 MG/DL (ref 70–110)
HBA1C MFR BLD: 5.4 % (ref 4–5.6)
HDLC SERPL-MCNC: 61 MG/DL (ref 40–75)
HDLC SERPL: 28.9 % (ref 20–50)
LDLC SERPL CALC-MCNC: 132.4 MG/DL (ref 63–159)
NONHDLC SERPL-MCNC: 150 MG/DL
POTASSIUM SERPL-SCNC: 3.8 MMOL/L (ref 3.5–5.1)
PROT SERPL-MCNC: 7.3 G/DL (ref 6–8.4)
SODIUM SERPL-SCNC: 143 MMOL/L (ref 136–145)
TRIGL SERPL-MCNC: 88 MG/DL (ref 30–150)
TSH SERPL DL<=0.005 MIU/L-ACNC: 0.9 UIU/ML (ref 0.4–4)

## 2023-03-20 ENCOUNTER — SPECIALTY PHARMACY (OUTPATIENT)
Dept: PHARMACY | Facility: CLINIC | Age: 58
End: 2023-03-20
Payer: COMMERCIAL

## 2023-03-20 DIAGNOSIS — M05.9 SEROPOSITIVE RHEUMATOID ARTHRITIS: Primary | ICD-10-CM

## 2023-03-20 NOTE — TELEPHONE ENCOUNTER
Specialty Pharmacy - Refill Coordination  Specialty Pharmacy - Clinical Reassessment    Specialty Medication Orders Linked to Encounter      Flowsheet Row Most Recent Value   Medication #1 upadacitinib (RINVOQ) 15 mg 24 hr tablet (Order#[id number], Rx#[id number])          Patient Diagnosis   M05.9 - Seropositive rheumatoid arthritis    Ayesha Arcos is a 57 y.o. female, who is followed by the specialty pharmacy service for management and education of her RA.  She has been on therapy with Rinvoq since 11/16/2020.  I have reviewed her electronic medical record and current medication list and determined that specialty medication adjustment Is not needed at this time.    Patient has not experienced adverse events.  She Is adherent reporting missed dose only from being sick. Treatment was held appropriately with advisement form her rheumatologist.  Adherence has been encouraged with the following mechanism(s): refill reminder calls.  She is meeting goals of therapy and will continue treatment.        2/20/2023 1/20/2023 12/19/2022 11/16/2022 10/19/2022 9/19/2022 8/15/2022   Follow Up Review   # of missed doses > 5    > 5    > 5 0 0 0 0 0 0   Reason Felt ill or sick    Felt ill or sick         New Medications? No    No    No No No No No No No   New Conditions? No    No    No No No No No No No   New Allergies? No    No    No No No No No No No   Med Effective? Good    Very good    Very good Good Good Good Good Good Good   Urgent Care? Yes    No    No No No No No No No   Requested Pharmacist? No    No    No    No No No No No No No       Multiple values from one day are sorted in reverse-chronological order         Therapy is appropriate to continue.    Therapy is effective: Yes  On scale of 1 to 10, how does patient rank quality of life? (10 - Best): 10  Recommendations: none at this time.  Review Method: Patient Contact    Tasks added this encounter   12/20/2023 - Clinical - Follow Up Assesement (Annual)   Tasks due  within next 3 months   3/19/2023 - Refill Call (Auto Added)     Nat Gant, PharmD  [facility]  [address]  [facility] [address]  Phone: [phone]  Fax: [phone]

## 2023-03-20 NOTE — TELEPHONE ENCOUNTER
Specialty Pharmacy - Refill Coordination  Specialty Pharmacy - Clinical Reassessment    Specialty Medication Orders Linked to Encounter      Flowsheet Row Most Recent Value   Medication #1 upadacitinib (RINVOQ) 15 mg 24 hr tablet (Order#[id number], Rx#[id number])            Refill Questions - Documented Responses      Flowsheet Row Most Recent Value   Patient Availability and HIPAA Verification    Does patient want to proceed with activity? Yes   HIPAA/medical authority confirmed? Yes   Relationship to patient of person spoken to? Self   Refill Screening Questions    Changes to allergies? No   Changes to medications? No   New conditions since last clinic visit? No   Unplanned office visit, urgent care, ED, or hospital admission in the last 4 weeks? No   How does patient/caregiver feel medication is working? Good   Financial problems or insurance changes? No   How many doses of your specialty medications were missed in the last 4 weeks? 0   Would patient like to speak to a pharmacist? No   When does the patient need to receive the medication? 03/30/23   Refill Delivery Questions    How will the patient receive the medication? MEDRx   When does the patient need to receive the medication? 03/30/23   Shipping Address Home   Address in [facility] confirmed and updated if neccessary? Yes   Expected Copay ($) 5   Is the patient able to afford the medication copay? Yes   Payment Method CC on file   Days supply of Refill 30   Supplies needed? No supplies needed   Refill activity completed? Yes   Refill activity plan Refill scheduled   Shipment/Pickup Date: 03/23/23            Current Outpatient Medications   Medication Sig    ascorbic acid, vitamin C, (VITAMIN C) 100 MG tablet Take 100 mg by mouth once daily.    FLAXSEED OIL ORAL Take by mouth.    fluticasone propionate (FLONASE) 50 mcg/actuation nasal spray 2 sprays (100 mcg total) by Each Nostril route once daily.    folic acid (FOLVITE) 1 MG tablet Take 1,000 mcg  by mouth.    methotrexate 2.5 MG Tab Take 10 tablets (25 mg total) by mouth every 7 days.    methotrexate 2.5 mg/mL Soln methotrexate Take No date recorded No form recorded No frequency recorded No route recorded No set duration recorded No set duration amount recorded active No dosage strength recorded No dosage strength units of measure recorded    multivitamin capsule Take 1 capsule by mouth once daily.    multivitamin with minerals (HAIR,SKIN AND NAILS ORAL) Take by mouth once daily.    promethazine-dextromethorphan (PROMETHAZINE-DM) 6.25-15 mg/5 mL Syrp Take 5 mLs by mouth nightly as needed (Cough).    rosuvastatin (CRESTOR) 10 MG tablet Take 1 tablet (10 mg total) by mouth once daily.    terbinafine HCL (LAMISIL) 250 mg tablet Take 1 tablet (250 mg total) by mouth once daily.    TURMERIC ORAL Take by mouth.    upadacitinib (RINVOQ) 15 mg 24 hr tablet Take 1 tablet (15 mg total) by mouth once daily.    valACYclovir (VALTREX) 1000 MG tablet Take 1 tablet (1,000 mg total) by mouth 2 (two) times daily. for 10 days    VUITY 1.25 % Drop Place 1 drop into both eyes daily as needed.   Last reviewed on 2/2/2023  8:56 AM by Chad Meza DPM    Review of patient's allergies indicates:  No Known Allergies Last reviewed on  2/2/2023 8:56 AM by Chad Meza      Tasks added this encounter   12/20/2023 - Clinical - Follow Up Assesement (Annual)  4/22/2023 - Refill Call (Auto Added)   Tasks due within next 3 months   No tasks due.     Nat Gant, PharmD  [facility] Pharm[facility]  [address] [place] [id number]  Phone: [phone]  Fax: [phone]

## 2023-03-21 ENCOUNTER — TELEPHONE (OUTPATIENT)
Dept: INTERNAL MEDICINE | Facility: CLINIC | Age: 58
End: 2023-03-21
Payer: COMMERCIAL

## 2023-03-21 NOTE — TELEPHONE ENCOUNTER
----- Message from Kelley Cardona sent at 3/21/2023  2:21 PM CDT -----  Contact: abhya  Patient is calling to speak with the nurse regarding appointment. Reports needing a sooner appointment than may, stats missing her appointment got the dates confused. Please give the patient a call back at .[phone]   Thanks chema

## 2023-03-21 NOTE — TELEPHONE ENCOUNTER
Pt said, she missed her appt today. The next appt is 5/1/23. Informed her that there are appt times that opens daily, but it's first come, first serve. Advised her to check her portal early morning Thursday. If she can't schedule a sooner appt with Dr. Matos, we can schedule a sooner appt with Rita. She verbalized understanding.

## 2023-04-21 DIAGNOSIS — M05.9 SEROPOSITIVE RHEUMATOID ARTHRITIS: ICD-10-CM

## 2023-04-25 ENCOUNTER — OFFICE VISIT (OUTPATIENT)
Dept: INTERNAL MEDICINE | Facility: CLINIC | Age: 58
End: 2023-04-25
Payer: COMMERCIAL

## 2023-04-25 VITALS
SYSTOLIC BLOOD PRESSURE: 122 MMHG | HEART RATE: 79 BPM | WEIGHT: 155.44 LBS | BODY MASS INDEX: 25.9 KG/M2 | HEIGHT: 65 IN | TEMPERATURE: 98 F | DIASTOLIC BLOOD PRESSURE: 80 MMHG

## 2023-04-25 DIAGNOSIS — F43.21 GRIEF: ICD-10-CM

## 2023-04-25 DIAGNOSIS — Z00.00 ANNUAL PHYSICAL EXAM: Primary | ICD-10-CM

## 2023-04-25 PROCEDURE — 1159F MED LIST DOCD IN RCRD: CPT | Mod: CPTII,S$GLB,, | Performed by: FAMILY MEDICINE

## 2023-04-25 PROCEDURE — 3074F SYST BP LT 130 MM HG: CPT | Mod: CPTII,S$GLB,, | Performed by: FAMILY MEDICINE

## 2023-04-25 PROCEDURE — 1160F PR REVIEW ALL MEDS BY PRESCRIBER/CLIN PHARMACIST DOCUMENTED: ICD-10-PCS | Mod: CPTII,S$GLB,, | Performed by: FAMILY MEDICINE

## 2023-04-25 PROCEDURE — 3008F PR BODY MASS INDEX (BMI) DOCUMENTED: ICD-10-PCS | Mod: CPTII,S$GLB,, | Performed by: FAMILY MEDICINE

## 2023-04-25 PROCEDURE — 99396 PREV VISIT EST AGE 40-64: CPT | Mod: S$GLB,,, | Performed by: FAMILY MEDICINE

## 2023-04-25 PROCEDURE — 3044F PR MOST RECENT HEMOGLOBIN A1C LEVEL <7.0%: ICD-10-PCS | Mod: CPTII,S$GLB,, | Performed by: FAMILY MEDICINE

## 2023-04-25 PROCEDURE — 3079F PR MOST RECENT DIASTOLIC BLOOD PRESSURE 80-89 MM HG: ICD-10-PCS | Mod: CPTII,S$GLB,, | Performed by: FAMILY MEDICINE

## 2023-04-25 PROCEDURE — 99999 PR PBB SHADOW E&M-EST. PATIENT-LVL V: ICD-10-PCS | Mod: PBBFAC,,, | Performed by: FAMILY MEDICINE

## 2023-04-25 PROCEDURE — 99396 PR PREVENTIVE VISIT,EST,40-64: ICD-10-PCS | Mod: S$GLB,,, | Performed by: FAMILY MEDICINE

## 2023-04-25 PROCEDURE — 3079F DIAST BP 80-89 MM HG: CPT | Mod: CPTII,S$GLB,, | Performed by: FAMILY MEDICINE

## 2023-04-25 PROCEDURE — 99999 PR PBB SHADOW E&M-EST. PATIENT-LVL V: CPT | Mod: PBBFAC,,, | Performed by: FAMILY MEDICINE

## 2023-04-25 PROCEDURE — 1159F PR MEDICATION LIST DOCUMENTED IN MEDICAL RECORD: ICD-10-PCS | Mod: CPTII,S$GLB,, | Performed by: FAMILY MEDICINE

## 2023-04-25 PROCEDURE — 3008F BODY MASS INDEX DOCD: CPT | Mod: CPTII,S$GLB,, | Performed by: FAMILY MEDICINE

## 2023-04-25 PROCEDURE — 3074F PR MOST RECENT SYSTOLIC BLOOD PRESSURE < 130 MM HG: ICD-10-PCS | Mod: CPTII,S$GLB,, | Performed by: FAMILY MEDICINE

## 2023-04-25 PROCEDURE — 3044F HG A1C LEVEL LT 7.0%: CPT | Mod: CPTII,S$GLB,, | Performed by: FAMILY MEDICINE

## 2023-04-25 PROCEDURE — 1160F RVW MEDS BY RX/DR IN RCRD: CPT | Mod: CPTII,S$GLB,, | Performed by: FAMILY MEDICINE

## 2023-04-25 NOTE — PROGRESS NOTES
"Subjective:      Patient ID: Ayesha Arcos is a 57 y.o. female.    Chief Complaint:  Annual    Follow-up  Pertinent negatives include no chest pain, chills, coughing, diaphoresis, fatigue, fever, headaches, joint swelling or weakness.   58 yo with RA here for annual.  Lost her mother in Dec//not really grieved/dealt with it.  Is open to seeing a therapist.    Past Medical History:   Diagnosis Date    Carpal tunnel syndrome, bilateral     Hypertension     Rheumatoid arthritis      Family History   Problem Relation Age of Onset    Diabetes Mother     Heart disease Father     Hypertension Father     Early death Father         Early 50's    Stroke Paternal Uncle     Heart disease Paternal Uncle      Past Surgical History:   Procedure Laterality Date    CARPAL TUNNEL RELEASE      right    skin cancer removal      nose    TUBAL LIGATION       Social History     Tobacco Use    Smoking status: Never    Smokeless tobacco: Never   Substance Use Topics    Alcohol use: Yes     Comment: social     Drug use: No       /80   Pulse 79   Temp 97.9 °F (36.6 °C)   Ht 5' 5" (1.651 m)   Wt 70.5 kg (155 lb 6.8 oz)   LMP 07/14/2021 (Approximate)   BMI 25.86 kg/m²     Review of Systems   Constitutional:  Negative for activity change, appetite change, chills, diaphoresis, fatigue, fever and unexpected weight change.   HENT:  Negative for ear pain, hearing loss, postnasal drip, rhinorrhea and tinnitus.    Eyes:  Negative for visual disturbance.   Respiratory:  Negative for cough, shortness of breath and wheezing.    Cardiovascular:  Negative for chest pain, palpitations and leg swelling.   Gastrointestinal:  Negative for abdominal distention.   Genitourinary:  Negative for dysuria, frequency, hematuria and urgency.   Musculoskeletal:  Negative for back pain and joint swelling.   Neurological:  Negative for weakness and headaches.   Hematological:  Negative for adenopathy.   Psychiatric/Behavioral:  Positive for dysphoric " mood. Negative for confusion and decreased concentration.      Objective:     Physical Exam  Vitals and nursing note reviewed.   Constitutional:       General: She is not in acute distress.     Appearance: She is well-developed.   HENT:      Right Ear: External ear normal.      Left Ear: External ear normal.      Nose: Nose normal.   Eyes:      Conjunctiva/sclera: Conjunctivae normal.      Pupils: Pupils are equal, round, and reactive to light.   Neck:      Thyroid: No thyromegaly.   Cardiovascular:      Rate and Rhythm: Normal rate and regular rhythm.      Heart sounds: Normal heart sounds. No murmur heard.    No gallop.   Pulmonary:      Effort: Pulmonary effort is normal. No respiratory distress.      Breath sounds: Normal breath sounds. No wheezing or rales.   Abdominal:      General: Bowel sounds are normal. There is no distension.      Palpations: Abdomen is soft.      Tenderness: There is no abdominal tenderness. There is no guarding.   Musculoskeletal:         General: Normal range of motion.      Cervical back: Normal range of motion and neck supple.      Right lower leg: No edema.      Left lower leg: No edema.   Skin:     General: Skin is warm and dry.      Findings: No rash.   Neurological:      Mental Status: She is alert and oriented to person, place, and time.      Cranial Nerves: No cranial nerve deficit.   Psychiatric:         Behavior: Behavior normal.         Thought Content: Thought content normal.         Judgment: Judgment normal.       Lab Results   Component Value Date    WBC 6.58 03/07/2023    HGB 12.5 03/07/2023    HCT 40.9 03/07/2023     03/07/2023    CHOL 211 (H) 03/07/2023    TRIG 88 03/07/2023    HDL 61 03/07/2023    ALT 31 03/07/2023    AST 25 03/07/2023     03/07/2023    K 3.8 03/07/2023     03/07/2023    CREATININE 0.8 03/07/2023    BUN 10 03/07/2023    CO2 28 03/07/2023    TSH 0.900 03/07/2023    HGBA1C 5.4 03/07/2023       Assessment:     1. Annual physical exam     2. Grief         Plan:     Annual physical exam    Grief  -     Ambulatory referral/consult to Psychiatry; Future; Expected date: 05/02/2023      Reviewed labs//all stable and look good  BP stable  Cont statin  Cont MTX per Rheum  Will place referral for therapist  F/u annually//PRN

## 2023-04-26 ENCOUNTER — TELEPHONE (OUTPATIENT)
Dept: INTERNAL MEDICINE | Facility: CLINIC | Age: 58
End: 2023-04-26
Payer: COMMERCIAL

## 2023-04-26 NOTE — TELEPHONE ENCOUNTER
----- Message from Carmen Cadet sent at 4/26/2023  3:38 PM CDT -----  Pt request a call back at [phone].thanks

## 2023-04-26 NOTE — TELEPHONE ENCOUNTER
----- Message from Miriam Christine sent at 4/26/2023  3:53 PM CDT -----  Regarding: pt called  Type:  Patient Returning Call        Who Called:        Who Left Message for Patient:  Casandra       Does the patient know what this is regarding?  The pt has a follow up form her appt yesterday     Best Call Back Number: [phone] (home)           Additional Information: [phone] ([place])

## 2023-04-27 RX ORDER — UPADACITINIB 15 MG/1
15 TABLET, EXTENDED RELEASE ORAL DAILY
Qty: 90 TABLET | Refills: 2 | Status: ACTIVE | OUTPATIENT
Start: 2023-04-27 | End: 2023-11-11

## 2023-04-28 ENCOUNTER — SPECIALTY PHARMACY (OUTPATIENT)
Dept: PHARMACY | Facility: CLINIC | Age: 58
End: 2023-04-28
Payer: COMMERCIAL

## 2023-04-28 NOTE — TELEPHONE ENCOUNTER
Specialty Pharmacy - Refill Coordination    Specialty Medication Orders Linked to Encounter      Flowsheet Row Most Recent Value   Medication #1 upadacitinib (RINVOQ) 15 mg 24 hr tablet (Order#[id number], Rx#[id number])            Refill Questions - Documented Responses      Flowsheet Row Most Recent Value   Patient Availability and HIPAA Verification    Does patient want to proceed with activity? Yes   HIPAA/medical authority confirmed? Yes   Relationship to patient of person spoken to? Self   Refill Screening Questions    Changes to allergies? No   Changes to medications? No   New conditions since last clinic visit? No   Unplanned office visit, urgent care, ED, or hospital admission in the last 4 weeks? No   How does patient/caregiver feel medication is working? Good   Financial problems or insurance changes? No   How many doses of your specialty medications were missed in the last 4 weeks? 0   Would patient like to speak to a pharmacist? No   When does the patient need to receive the medication? 05/08/23   Refill Delivery Questions    How will the patient receive the medication? MEDRx   When does the patient need to receive the medication? 05/08/23   Shipping Address Home   Address in [facility] confirmed and updated if neccessary? Yes   Expected Copay ($) 5   Is the patient able to afford the medication copay? Yes   Payment Method CC on file   Days supply of Refill 30   Supplies needed? No supplies needed   Refill activity completed? Yes   Refill activity plan Refill scheduled   Shipment/Pickup Date: 05/04/23            Current Outpatient Medications   Medication Sig    ascorbic acid, vitamin C, (VITAMIN C) 100 MG tablet Take 100 mg by mouth once daily.    FLAXSEED OIL ORAL Take by mouth.    fluticasone propionate (FLONASE) 50 mcg/actuation nasal spray 2 sprays (100 mcg total) by Each Nostril route once daily.    folic acid (FOLVITE) 1 MG tablet Take 1 tablet (1 mg total) by mouth once daily.     methotrexate 2.5 MG Tab TAKE 10 TABLETS BY MOUTH EVERY 7 DAYS    methotrexate 2.5 mg/mL Soln methotrexate Take No date recorded No form recorded No frequency recorded No route recorded No set duration recorded No set duration amount recorded active No dosage strength recorded No dosage strength units of measure recorded    multivitamin capsule Take 1 capsule by mouth once daily.    multivitamin with minerals (HAIR,SKIN AND NAILS ORAL) Take by mouth once daily.    promethazine-dextromethorphan (PROMETHAZINE-DM) 6.25-15 mg/5 mL Syrp Take 5 mLs by mouth nightly as needed (Cough).    rosuvastatin (CRESTOR) 10 MG tablet TAKE 1 TABLET(10 MG) BY MOUTH EVERY DAY    terbinafine HCL (LAMISIL) 250 mg tablet Take 1 tablet (250 mg total) by mouth once daily.    TURMERIC ORAL Take by mouth.    upadacitinib (RINVOQ) 15 mg 24 hr tablet Take 1 tablet (15 mg total) by mouth once daily.    valACYclovir (VALTREX) 1000 MG tablet Take 1 tablet (1,000 mg total) by mouth 2 (two) times daily. for 10 days    VUITY 1.25 % Drop Place 1 drop into both eyes daily as needed.   Last reviewed on 4/25/2023 12:59 PM by Neel Matos MD    Review of patient's allergies indicates:  No Known Allergies Last reviewed on  4/25/2023 12:59 PM by Neel Valdez      Tasks added this encounter   No tasks added.   Tasks due within next 3 months   5/3/2023 - Refill Coordination Outreach (1 time occurrence)     Ginger [facility] - Specialty Pharmacy  [address] [place] [id number]  Phone: [phone]  Fax: [phone]

## 2023-05-09 ENCOUNTER — PATIENT MESSAGE (OUTPATIENT)
Dept: RESEARCH | Facility: HOSPITAL | Age: 58
End: 2023-05-09
Payer: COMMERCIAL

## 2023-06-01 ENCOUNTER — SPECIALTY PHARMACY (OUTPATIENT)
Dept: PHARMACY | Facility: CLINIC | Age: 58
End: 2023-06-01
Payer: COMMERCIAL

## 2023-06-01 NOTE — TELEPHONE ENCOUNTER
Specialty Pharmacy - Refill Coordination    Specialty Medication Orders Linked to Encounter      Flowsheet Row Most Recent Value   Medication #1 upadacitinib (RINVOQ) 15 mg 24 hr tablet (Order#[id number], Rx#[id number])            Refill Questions - Documented Responses      Flowsheet Row Most Recent Value   Patient Availability and HIPAA Verification    Does patient want to proceed with activity? Yes   HIPAA/medical authority confirmed? Yes   Relationship to patient of person spoken to? Self   Refill Screening Questions    Changes to allergies? No   Changes to medications? No   New conditions since last clinic visit? No   Unplanned office visit, urgent care, ED, or hospital admission in the last 4 weeks? No   How does patient/caregiver feel medication is working? Good   Financial problems or insurance changes? No   How many doses of your specialty medications were missed in the last 4 weeks? 0   Would patient like to speak to a pharmacist? No   When does the patient need to receive the medication? 06/11/23   Refill Delivery Questions    How will the patient receive the medication? MEDRx   When does the patient need to receive the medication? 06/11/23   Shipping Address Home   Address in [facility] confirmed and updated if neccessary? Yes   Expected Copay ($) 5   Is the patient able to afford the medication copay? Yes   Payment Method CC on file   Days supply of Refill 30   Supplies needed? No supplies needed   Refill activity completed? Yes   Refill activity plan Refill scheduled   Shipment/Pickup Date: 06/07/23            Current Outpatient Medications   Medication Sig    ascorbic acid, vitamin C, (VITAMIN C) 100 MG tablet Take 100 mg by mouth once daily.    FLAXSEED OIL ORAL Take by mouth.    fluticasone propionate (FLONASE) 50 mcg/actuation nasal spray 2 sprays (100 mcg total) by Each Nostril route once daily.    folic acid (FOLVITE) 1 MG tablet Take 1 tablet (1 mg total) by mouth once daily.     methotrexate 2.5 MG Tab TAKE 10 TABLETS BY MOUTH EVERY 7 DAYS    methotrexate 2.5 mg/mL Soln methotrexate Take No date recorded No form recorded No frequency recorded No route recorded No set duration recorded No set duration amount recorded active No dosage strength recorded No dosage strength units of measure recorded    multivitamin capsule Take 1 capsule by mouth once daily.    multivitamin with minerals (HAIR,SKIN AND NAILS ORAL) Take by mouth once daily.    promethazine-dextromethorphan (PROMETHAZINE-DM) 6.25-15 mg/5 mL Syrp Take 5 mLs by mouth nightly as needed (Cough).    rosuvastatin (CRESTOR) 10 MG tablet TAKE 1 TABLET(10 MG) BY MOUTH EVERY DAY    TURMERIC ORAL Take by mouth.    upadacitinib (RINVOQ) 15 mg 24 hr tablet Take 1 tablet (15 mg total) by mouth once daily.    valACYclovir (VALTREX) 1000 MG tablet Take 1 tablet (1,000 mg total) by mouth 2 (two) times daily. for 10 days    VUITY 1.25 % Drop Place 1 drop into both eyes daily as needed.   Last reviewed on 4/25/2023 12:59 PM by Neel Matos MD    Review of patient's allergies indicates:  No Known Allergies Last reviewed on  4/25/2023 12:59 PM by Neel Valdez      Tasks added this encounter   No tasks added.   Tasks due within next 3 months   No tasks due.     Nancy Ocampo  [facility] - Specialty Pharmacy  [address] [place] [id number]  Phone: [phone]  Fax: [phone]

## 2023-06-09 ENCOUNTER — LAB VISIT (OUTPATIENT)
Dept: LAB | Facility: HOSPITAL | Age: 58
End: 2023-06-09
Attending: FAMILY MEDICINE
Payer: COMMERCIAL

## 2023-06-09 DIAGNOSIS — M35.00 SICCA, UNSPECIFIED TYPE: ICD-10-CM

## 2023-06-09 DIAGNOSIS — R76.8 POSITIVE ANA (ANTINUCLEAR ANTIBODY): ICD-10-CM

## 2023-06-09 DIAGNOSIS — M05.9 SEROPOSITIVE RHEUMATOID ARTHRITIS: ICD-10-CM

## 2023-06-09 DIAGNOSIS — Z79.899 HIGH RISK MEDICATION USE: ICD-10-CM

## 2023-06-09 DIAGNOSIS — Z51.81 MEDICATION MONITORING ENCOUNTER: ICD-10-CM

## 2023-06-09 LAB
ALBUMIN SERPL BCP-MCNC: 4.9 G/DL (ref 3.5–5.2)
ALP SERPL-CCNC: 108 U/L (ref 55–135)
ALT SERPL W/O P-5'-P-CCNC: 34 U/L (ref 10–44)
ANION GAP SERPL CALC-SCNC: 11 MMOL/L (ref 8–16)
AST SERPL-CCNC: 24 U/L (ref 10–40)
BASOPHILS # BLD AUTO: 0.02 K/UL (ref 0–0.2)
BASOPHILS NFR BLD: 0.3 % (ref 0–1.9)
BILIRUB SERPL-MCNC: 0.5 MG/DL (ref 0.1–1)
BUN SERPL-MCNC: 14 MG/DL (ref 6–20)
CALCIUM SERPL-MCNC: 9.7 MG/DL (ref 8.7–10.5)
CHLORIDE SERPL-SCNC: 104 MMOL/L (ref 95–110)
CO2 SERPL-SCNC: 24 MMOL/L (ref 23–29)
CREAT SERPL-MCNC: 0.9 MG/DL (ref 0.5–1.4)
CREAT UR-MCNC: 129 MG/DL (ref 15–325)
DIFFERENTIAL METHOD: ABNORMAL
EOSINOPHIL # BLD AUTO: 0.1 K/UL (ref 0–0.5)
EOSINOPHIL NFR BLD: 0.8 % (ref 0–8)
ERYTHROCYTE [DISTWIDTH] IN BLOOD BY AUTOMATED COUNT: 16.1 % (ref 11.5–14.5)
EST. GFR  (NO RACE VARIABLE): >60 ML/MIN/1.73 M^2
GLUCOSE SERPL-MCNC: 94 MG/DL (ref 70–110)
HBV CORE AB SERPL QL IA: NORMAL
HBV SURFACE AG SERPL QL IA: NORMAL
HCT VFR BLD AUTO: 42.8 % (ref 37–48.5)
HGB BLD-MCNC: 14.1 G/DL (ref 12–16)
IMM GRANULOCYTES # BLD AUTO: 0.01 K/UL (ref 0–0.04)
IMM GRANULOCYTES NFR BLD AUTO: 0.2 % (ref 0–0.5)
LYMPHOCYTES # BLD AUTO: 1.5 K/UL (ref 1–4.8)
LYMPHOCYTES NFR BLD: 24.2 % (ref 18–48)
MCH RBC QN AUTO: 29.6 PG (ref 27–31)
MCHC RBC AUTO-ENTMCNC: 32.9 G/DL (ref 32–36)
MCV RBC AUTO: 90 FL (ref 82–98)
MONOCYTES # BLD AUTO: 0.5 K/UL (ref 0.3–1)
MONOCYTES NFR BLD: 8.4 % (ref 4–15)
NEUTROPHILS # BLD AUTO: 4.2 K/UL (ref 1.8–7.7)
NEUTROPHILS NFR BLD: 66.1 % (ref 38–73)
NRBC BLD-RTO: 0 /100 WBC
PLATELET # BLD AUTO: 364 K/UL (ref 150–450)
PMV BLD AUTO: 9.7 FL (ref 9.2–12.9)
POTASSIUM SERPL-SCNC: 3.7 MMOL/L (ref 3.5–5.1)
PROT SERPL-MCNC: 8.6 G/DL (ref 6–8.4)
PROT UR-MCNC: 10 MG/DL (ref 0–15)
PROT/CREAT UR: 0.08 MG/G{CREAT} (ref 0–0.2)
RBC # BLD AUTO: 4.77 M/UL (ref 4–5.4)
SODIUM SERPL-SCNC: 139 MMOL/L (ref 136–145)
WBC # BLD AUTO: 6.28 K/UL (ref 3.9–12.7)

## 2023-06-09 PROCEDURE — 86704 HEP B CORE ANTIBODY TOTAL: CPT | Performed by: INTERNAL MEDICINE

## 2023-06-09 PROCEDURE — 84156 ASSAY OF PROTEIN URINE: CPT | Performed by: INTERNAL MEDICINE

## 2023-06-09 PROCEDURE — 85025 COMPLETE CBC W/AUTO DIFF WBC: CPT | Performed by: INTERNAL MEDICINE

## 2023-06-09 PROCEDURE — 86235 NUCLEAR ANTIGEN ANTIBODY: CPT | Mod: 59 | Performed by: INTERNAL MEDICINE

## 2023-06-09 PROCEDURE — 83516 IMMUNOASSAY NONANTIBODY: CPT | Mod: 59 | Performed by: INTERNAL MEDICINE

## 2023-06-09 PROCEDURE — 80053 COMPREHEN METABOLIC PANEL: CPT | Performed by: INTERNAL MEDICINE

## 2023-06-09 PROCEDURE — 87340 HEPATITIS B SURFACE AG IA: CPT | Performed by: INTERNAL MEDICINE

## 2023-06-09 PROCEDURE — 86480 TB TEST CELL IMMUN MEASURE: CPT | Performed by: INTERNAL MEDICINE

## 2023-06-09 PROCEDURE — 36415 COLL VENOUS BLD VENIPUNCTURE: CPT | Performed by: INTERNAL MEDICINE

## 2023-06-12 LAB
GAMMA INTERFERON BACKGROUND BLD IA-ACNC: 0.07 IU/ML
M TB IFN-G CD4+ BCKGRND COR BLD-ACNC: -0.01 IU/ML
MITOGEN IGNF BCKGRD COR BLD-ACNC: 2.78 IU/ML
TB GOLD PLUS: NEGATIVE
TB2 - NIL: 0.01 IU/ML

## 2023-06-16 ENCOUNTER — OFFICE VISIT (OUTPATIENT)
Dept: RHEUMATOLOGY | Facility: CLINIC | Age: 58
End: 2023-06-16
Payer: COMMERCIAL

## 2023-06-16 VITALS
WEIGHT: 153.69 LBS | SYSTOLIC BLOOD PRESSURE: 140 MMHG | HEART RATE: 83 BPM | BODY MASS INDEX: 25.61 KG/M2 | HEIGHT: 65 IN | DIASTOLIC BLOOD PRESSURE: 87 MMHG

## 2023-06-16 DIAGNOSIS — Z79.899 HIGH RISK MEDICATION USE: ICD-10-CM

## 2023-06-16 DIAGNOSIS — D84.9 IMMUNOSUPPRESSED STATUS: ICD-10-CM

## 2023-06-16 DIAGNOSIS — M19.90 OSTEOARTHRITIS, UNSPECIFIED OSTEOARTHRITIS TYPE, UNSPECIFIED SITE: ICD-10-CM

## 2023-06-16 DIAGNOSIS — M05.9 SEROPOSITIVE RHEUMATOID ARTHRITIS: Primary | ICD-10-CM

## 2023-06-16 DIAGNOSIS — R76.8 POSITIVE ANA (ANTINUCLEAR ANTIBODY): ICD-10-CM

## 2023-06-16 DIAGNOSIS — Z51.81 MEDICATION MONITORING ENCOUNTER: ICD-10-CM

## 2023-06-16 PROCEDURE — 3008F PR BODY MASS INDEX (BMI) DOCUMENTED: ICD-10-PCS | Mod: CPTII,S$GLB,, | Performed by: INTERNAL MEDICINE

## 2023-06-16 PROCEDURE — 3044F PR MOST RECENT HEMOGLOBIN A1C LEVEL <7.0%: ICD-10-PCS | Mod: CPTII,S$GLB,, | Performed by: INTERNAL MEDICINE

## 2023-06-16 PROCEDURE — 3079F PR MOST RECENT DIASTOLIC BLOOD PRESSURE 80-89 MM HG: ICD-10-PCS | Mod: CPTII,S$GLB,, | Performed by: INTERNAL MEDICINE

## 2023-06-16 PROCEDURE — 99214 OFFICE O/P EST MOD 30 MIN: CPT | Mod: S$GLB,,, | Performed by: INTERNAL MEDICINE

## 2023-06-16 PROCEDURE — 3079F DIAST BP 80-89 MM HG: CPT | Mod: CPTII,S$GLB,, | Performed by: INTERNAL MEDICINE

## 2023-06-16 PROCEDURE — 3077F SYST BP >= 140 MM HG: CPT | Mod: CPTII,S$GLB,, | Performed by: INTERNAL MEDICINE

## 2023-06-16 PROCEDURE — 3044F HG A1C LEVEL LT 7.0%: CPT | Mod: CPTII,S$GLB,, | Performed by: INTERNAL MEDICINE

## 2023-06-16 PROCEDURE — 1159F MED LIST DOCD IN RCRD: CPT | Mod: CPTII,S$GLB,, | Performed by: INTERNAL MEDICINE

## 2023-06-16 PROCEDURE — 1159F PR MEDICATION LIST DOCUMENTED IN MEDICAL RECORD: ICD-10-PCS | Mod: CPTII,S$GLB,, | Performed by: INTERNAL MEDICINE

## 2023-06-16 PROCEDURE — 3077F PR MOST RECENT SYSTOLIC BLOOD PRESSURE >= 140 MM HG: ICD-10-PCS | Mod: CPTII,S$GLB,, | Performed by: INTERNAL MEDICINE

## 2023-06-16 PROCEDURE — 3008F BODY MASS INDEX DOCD: CPT | Mod: CPTII,S$GLB,, | Performed by: INTERNAL MEDICINE

## 2023-06-16 PROCEDURE — 99214 PR OFFICE/OUTPT VISIT, EST, LEVL IV, 30-39 MIN: ICD-10-PCS | Mod: S$GLB,,, | Performed by: INTERNAL MEDICINE

## 2023-06-16 PROCEDURE — 99999 PR PBB SHADOW E&M-EST. PATIENT-LVL IV: CPT | Mod: PBBFAC,,, | Performed by: INTERNAL MEDICINE

## 2023-06-16 PROCEDURE — 99999 PR PBB SHADOW E&M-EST. PATIENT-LVL IV: ICD-10-PCS | Mod: PBBFAC,,, | Performed by: INTERNAL MEDICINE

## 2023-06-16 RX ORDER — FOLIC ACID 1 MG/1
1 TABLET ORAL DAILY
Qty: 90 TABLET | Refills: 1 | Status: SHIPPED | OUTPATIENT
Start: 2023-06-16 | End: 2024-03-15 | Stop reason: SDUPTHER

## 2023-06-16 RX ORDER — METHOTREXATE 2.5 MG/1
20 TABLET ORAL
Qty: 120 TABLET | Refills: 1 | Status: SHIPPED | OUTPATIENT
Start: 2023-06-16 | End: 2023-08-14

## 2023-06-16 NOTE — PROGRESS NOTES
RHEUMATOLOGY OUTPATIENT CLINIC NOTE    6/16/2023    Attending Rheumatologist: Perez Thomas  Primary Care Provider/Physician Requesting Consultation: Neel Matos MD   Chief Complaint/Reason For Consultation:  Rheumatoid Arthritis      Subjective:     Ayesha Arcos is a 57 y.o. Black or  female with SPRA for f/u    No acute complaints.      Review of Systems   Constitutional:  Negative for fever.   Respiratory:  Negative for cough and shortness of breath.    Genitourinary:  Negative for hematuria.   Musculoskeletal:  Negative for joint pain and neck pain.   Skin:  Negative for rash.   Neurological:  Negative for focal weakness and weakness.     Chronic comorbid conditions affecting medical decision making today:  Past Medical History:   Diagnosis Date    Carpal tunnel syndrome, bilateral     Hypertension     Rheumatoid arthritis      Past Surgical History:   Procedure Laterality Date    CARPAL TUNNEL RELEASE      right    skin cancer removal      nose    TUBAL LIGATION       Family History   Problem Relation Age of Onset    Diabetes Mother     Heart disease Father     Hypertension Father     Early death Father         Early 50's    Stroke Paternal Uncle     Heart disease Paternal Uncle      Social History     Tobacco Use   Smoking Status Never   Smokeless Tobacco Never       Current Outpatient Medications:     ascorbic acid, vitamin C, (VITAMIN C) 100 MG tablet, Take 100 mg by mouth once daily., Disp: , Rfl:     FLAXSEED OIL ORAL, Take by mouth., Disp: , Rfl:     fluticasone propionate (FLONASE) 50 mcg/actuation nasal spray, 2 sprays (100 mcg total) by Each Nostril route once daily., Disp: 16 g, Rfl: 0    folic acid (FOLVITE) 1 MG tablet, Take 1 tablet (1 mg total) by mouth once daily., Disp: 90 tablet, Rfl: 11    methotrexate 2.5 MG Tab, TAKE 10 TABLETS BY MOUTH EVERY 7 DAYS, Disp: 120 tablet, Rfl: 0    methotrexate 2.5 mg/mL Soln, methotrexate Take No date recorded No form  recorded No frequency recorded No route recorded No set duration recorded No set duration amount recorded active No dosage strength recorded No dosage strength units of measure recorded, Disp: , Rfl:     multivitamin capsule, Take 1 capsule by mouth once daily., Disp: , Rfl:     multivitamin with minerals (HAIR,SKIN AND NAILS ORAL), Take by mouth once daily., Disp: , Rfl:     promethazine-dextromethorphan (PROMETHAZINE-DM) 6.25-15 mg/5 mL Syrp, Take 5 mLs by mouth nightly as needed (Cough)., Disp: 120 mL, Rfl: 0    rosuvastatin (CRESTOR) 10 MG tablet, TAKE 1 TABLET(10 MG) BY MOUTH EVERY DAY, Disp: 90 tablet, Rfl: 2    TURMERIC ORAL, Take by mouth., Disp: , Rfl:     upadacitinib (RINVOQ) 15 mg 24 hr tablet, Take 1 tablet (15 mg total) by mouth once daily., Disp: 90 tablet, Rfl: 2    VUITY 1.25 % Drop, Place 1 drop into both eyes daily as needed., Disp: , Rfl:     valACYclovir (VALTREX) 1000 MG tablet, Take 1 tablet (1,000 mg total) by mouth 2 (two) times daily. for 10 days, Disp: 20 tablet, Rfl: 1     Objective:     Vitals:    06/16/23 0708   BP: (!) 140/87   Pulse: 83     Physical Exam   Eyes: Conjunctivae are normal.   Pulmonary/Chest: Effort normal. No respiratory distress.   Musculoskeletal:         General: Deformity present. No swelling or tenderness. Normal range of motion.   Neurological: She displays no weakness.   Skin: No rash noted.     Reviewed available old and all outside pertinent medical records available.    All lab results personally reviewed and interpreted by me.       ASSESSMENT      Encounter Diagnoses   Name Primary?    Seropositive rheumatoid arthritis Yes    Immunosuppressed status     Osteoarthritis, unspecified osteoarthritis type, unspecified site     Positive MOLLY (antinuclear antibody)     High risk medication use     Medication monitoring encounter       PLAN     CDAI: low disease activity.  Adherence w/ MTX 20mg split dose w/ daily FA and Rinvoq (11/2021-).  No squeeze tenderness,  chronic synovitis.  No rashes or weakness.  Labs w/o concerns of toxicity from therapy.  Last CXR clear of infiltrates.  Plan to c/w current regimen.  OK to take NSAIDs or PDN <2w for refractory pain.  Repeat labs and XR close to f/u visit.      Perez Thomas M.D.

## 2023-07-03 ENCOUNTER — SPECIALTY PHARMACY (OUTPATIENT)
Dept: PHARMACY | Facility: CLINIC | Age: 58
End: 2023-07-03
Payer: COMMERCIAL

## 2023-07-03 NOTE — TELEPHONE ENCOUNTER
Specialty Pharmacy - Refill Coordination    Specialty Medication Orders Linked to Encounter      Flowsheet Row Most Recent Value   Medication #1 upadacitinib (RINVOQ) 15 mg 24 hr tablet (Order#[id number], Rx#[id number])            Refill Questions - Documented Responses      Flowsheet Row Most Recent Value   Patient Availability and HIPAA Verification    Does patient want to proceed with activity? Yes   HIPAA/medical authority confirmed? Yes   Relationship to patient of person spoken to? Self   Refill Screening Questions    Changes to allergies? No   Changes to medications? No   New conditions since last clinic visit? No   Unplanned office visit, urgent care, ED, or hospital admission in the last 4 weeks? No   How does patient/caregiver feel medication is working? Good   Financial problems or insurance changes? No   How many doses of your specialty medications were missed in the last 4 weeks? 0   Would patient like to speak to a pharmacist? No   When does the patient need to receive the medication? 07/13/23   Refill Delivery Questions    How will the patient receive the medication? MEDRx   When does the patient need to receive the medication? 07/13/23   Shipping Address Home   Address in [facility] confirmed and updated if neccessary? Yes   Expected Copay ($) 5   Is the patient able to afford the medication copay? Yes   Payment Method zero copay   Days supply of Refill 30   Supplies needed? No supplies needed   Refill activity completed? Yes   Refill activity plan Refill scheduled   Shipment/Pickup Date: 07/10/23            Current Outpatient Medications   Medication Sig    ascorbic acid, vitamin C, (VITAMIN C) 100 MG tablet Take 100 mg by mouth once daily.    FLAXSEED OIL ORAL Take by mouth.    fluticasone propionate (FLONASE) 50 mcg/actuation nasal spray 2 sprays (100 mcg total) by Each Nostril route once daily.    folic acid (FOLVITE) 1 MG tablet Take 1 tablet (1 mg total) by mouth once daily.     methotrexate 2.5 MG Tab Take 8 tablets (20 mg total) by mouth every 7 days.    multivitamin capsule Take 1 capsule by mouth once daily.    multivitamin with minerals (HAIR,SKIN AND NAILS ORAL) Take by mouth once daily.    promethazine-dextromethorphan (PROMETHAZINE-DM) 6.25-15 mg/5 mL Syrp Take 5 mLs by mouth nightly as needed (Cough).    rosuvastatin (CRESTOR) 10 MG tablet TAKE 1 TABLET(10 MG) BY MOUTH EVERY DAY    TURMERIC ORAL Take by mouth.    upadacitinib (RINVOQ) 15 mg 24 hr tablet Take 1 tablet (15 mg total) by mouth once daily.    valACYclovir (VALTREX) 1000 MG tablet Take 1 tablet (1,000 mg total) by mouth 2 (two) times daily. for 10 days    VUITY 1.25 % Drop Place 1 drop into both eyes daily as needed.   Last reviewed on 6/16/2023  7:14 AM by Keyonna Kirk MA    Review of patient's allergies indicates:  No Known Allergies Last reviewed on  6/16/2023 7:14 AM by Keyonna Kirk      Tasks added this encounter   No tasks added.   Tasks due within next 3 months   7/3/2023 - Refill Coordination Outreach (1 time occurrence)     [facility] - Specialty Pharmacy  [facility] [address]  [facility] [address]  Phone: [phone]  Fax: [phone]

## 2023-07-04 LAB
ANTI-PM/SCL AB: <20 UNITS
ANTI-SS-A 52 KD AB, IGG: 50 UNITS
EJ AB SER QL: NEGATIVE
ENA JO1 AB SER IA-ACNC: <20 UNITS
ENA SM+RNP AB SER IA-ACNC: <20 UNITS
FIBRILLARIN (U3 RNP): NEGATIVE
KU AB SER QL: NEGATIVE
MDA-5 (P140): <20 UNITS
MI2 AB SER QL: NEGATIVE
NXP-2 (P140): <20 UNITS
OJ AB SER QL: NEGATIVE
PL12 AB SER QL: NEGATIVE
PL7 AB SER QL: NEGATIVE
SRP AB SERPL QL: NEGATIVE
TIF1 GAMMA (P155/140): <20 UNITS
U2 SNRNP: NEGATIVE

## 2023-07-10 NOTE — TELEPHONE ENCOUNTER
Ginger TAYLOR for patient to see if she had another form of payment. No call back. AR'ed  this fill.

## 2023-08-02 ENCOUNTER — SPECIALTY PHARMACY (OUTPATIENT)
Dept: PHARMACY | Facility: CLINIC | Age: 58
End: 2023-08-02
Payer: COMMERCIAL

## 2023-08-02 NOTE — TELEPHONE ENCOUNTER
Specialty Pharmacy - Refill Coordination    Specialty Medication Orders Linked to Encounter      Flowsheet Row Most Recent Value   Medication #1 upadacitinib (RINVOQ) 15 mg 24 hr tablet (Order#[id number], Rx#[id number])            Refill Questions - Documented Responses      Flowsheet Row Most Recent Value   Patient Availability and HIPAA Verification    Does patient want to proceed with activity? Yes   HIPAA/medical authority confirmed? Yes   Relationship to patient of person spoken to? Self   Refill Screening Questions    Changes to allergies? No   Changes to medications? No   New conditions since last clinic visit? No   Unplanned office visit, urgent care, ED, or hospital admission in the last 4 weeks? No   How does patient/caregiver feel medication is working? Good   Financial problems or insurance changes? No   How many doses of your specialty medications were missed in the last 4 weeks? 0   Would patient like to speak to a pharmacist? No   When does the patient need to receive the medication? 08/12/23   Refill Delivery Questions    How will the patient receive the medication? MEDRx   When does the patient need to receive the medication? 08/12/23   Shipping Address Home   Address in [facility] confirmed and updated if neccessary? Yes   Expected Copay ($) 5   Is the patient able to afford the medication copay? Yes   Payment Method CC on file   Days supply of Refill 30   Supplies needed? No supplies needed   Refill activity completed? Yes   Refill activity plan Refill scheduled   Shipment/Pickup Date: 08/07/23            Current Outpatient Medications   Medication Sig    ascorbic acid, vitamin C, (VITAMIN C) 100 MG tablet Take 100 mg by mouth once daily.    FLAXSEED OIL ORAL Take by mouth.    fluticasone propionate (FLONASE) 50 mcg/actuation nasal spray 2 sprays (100 mcg total) by Each Nostril route once daily.    folic acid (FOLVITE) 1 MG tablet Take 1 tablet (1 mg total) by mouth once daily.     methotrexate 2.5 MG Tab Take 8 tablets (20 mg total) by mouth every 7 days.    multivitamin capsule Take 1 capsule by mouth once daily.    multivitamin with minerals (HAIR,SKIN AND NAILS ORAL) Take by mouth once daily.    promethazine-dextromethorphan (PROMETHAZINE-DM) 6.25-15 mg/5 mL Syrp Take 5 mLs by mouth nightly as needed (Cough).    rosuvastatin (CRESTOR) 10 MG tablet TAKE 1 TABLET(10 MG) BY MOUTH EVERY DAY    TURMERIC ORAL Take by mouth.    upadacitinib (RINVOQ) 15 mg 24 hr tablet Take 1 tablet (15 mg total) by mouth once daily.    valACYclovir (VALTREX) 1000 MG tablet Take 1 tablet (1,000 mg total) by mouth 2 (two) times daily. for 10 days    VUITY 1.25 % Drop Place 1 drop into both eyes daily as needed.   Last reviewed on 6/16/2023  7:14 AM by Keyonna Kirk MA    Review of patient's allergies indicates:  No Known Allergies Last reviewed on  6/16/2023 7:14 AM by Keyonna Kirk      Tasks added this encounter   No tasks added.   Tasks due within next 3 months   8/2/2023 - Refill Coordination Outreach (1 time occurrence)     [facility] - Specialty Pharmacy  [address]  [facility] [address]  Phone: [phone]  Fax: [phone]

## 2023-08-03 ENCOUNTER — TELEPHONE (OUTPATIENT)
Dept: INTERNAL MEDICINE | Facility: CLINIC | Age: 58
End: 2023-08-03
Payer: COMMERCIAL

## 2023-08-03 NOTE — TELEPHONE ENCOUNTER
Pt complains of nausea and vomiting and headache that started earlier today. The headache and vomiting has subsided for now. She didn't know what she can take otc because of the arthritis medicine, methotrexate. She was told to always contact her doctor before taking anything otc. Pt denies fever or any other symptoms.

## 2023-08-03 NOTE — TELEPHONE ENCOUNTER
----- Message from Nat Carlson sent at 8/3/2023  2:26 PM CDT -----  Pt is having stomach issues, nauseous and now a headache. She doesn't know what to take because of medication she is on. She would like a call back at .[phone]. x. EL

## 2023-08-14 DIAGNOSIS — M05.9 SEROPOSITIVE RHEUMATOID ARTHRITIS: ICD-10-CM

## 2023-08-14 RX ORDER — METHOTREXATE 2.5 MG/1
TABLET ORAL
Qty: 120 TABLET | Refills: 0 | Status: SHIPPED | OUTPATIENT
Start: 2023-08-14 | End: 2024-03-15 | Stop reason: SDUPTHER

## 2023-09-25 ENCOUNTER — TELEPHONE (OUTPATIENT)
Dept: RHEUMATOLOGY | Facility: CLINIC | Age: 58
End: 2023-09-25
Payer: COMMERCIAL

## 2023-09-25 NOTE — TELEPHONE ENCOUNTER
"Keyonna, did you receive the plan of care for this patient?    Angelique Gibbs (Allye), [facility]  Rheumatology Department   "

## 2023-09-25 NOTE — TELEPHONE ENCOUNTER
----- Message from Dieudonne Lane sent at 9/25/2023  9:51 AM CDT -----  Contact: [facility]   Miah is requesting a call in regards to patient RA and medication. They are needing plan of care and last visit summary. Please fax it over to [phone] and call back number is [phone]. Thanks KB

## 2023-10-07 ENCOUNTER — OFFICE VISIT (OUTPATIENT)
Dept: URGENT CARE | Facility: CLINIC | Age: 58
End: 2023-10-07
Payer: COMMERCIAL

## 2023-10-07 VITALS
WEIGHT: 153 LBS | BODY MASS INDEX: 25.49 KG/M2 | OXYGEN SATURATION: 98 % | RESPIRATION RATE: 18 BRPM | HEIGHT: 65 IN | SYSTOLIC BLOOD PRESSURE: 143 MMHG | DIASTOLIC BLOOD PRESSURE: 97 MMHG | TEMPERATURE: 98 F

## 2023-10-07 DIAGNOSIS — U07.1 SARS-COV-2 POSITIVE: ICD-10-CM

## 2023-10-07 DIAGNOSIS — R05.9 COUGH, UNSPECIFIED TYPE: Primary | ICD-10-CM

## 2023-10-07 LAB
CTP QC/QA: YES
SARS-COV-2 AG RESP QL IA.RAPID: POSITIVE

## 2023-10-07 PROCEDURE — 87811 SARS-COV-2 COVID19 W/OPTIC: CPT | Mod: QW,S$GLB,, | Performed by: FAMILY MEDICINE

## 2023-10-07 PROCEDURE — 87811 SARS CORONAVIRUS 2 ANTIGEN POCT, MANUAL READ: ICD-10-PCS | Mod: QW,S$GLB,, | Performed by: FAMILY MEDICINE

## 2023-10-07 PROCEDURE — 99214 PR OFFICE/OUTPT VISIT, EST, LEVL IV, 30-39 MIN: ICD-10-PCS | Mod: S$GLB,,, | Performed by: FAMILY MEDICINE

## 2023-10-07 PROCEDURE — 99214 OFFICE O/P EST MOD 30 MIN: CPT | Mod: S$GLB,,, | Performed by: FAMILY MEDICINE

## 2023-10-07 RX ORDER — NIRMATRELVIR AND RITONAVIR 300-100 MG
KIT ORAL
Qty: 30 TABLET | Refills: 0 | Status: SHIPPED | OUTPATIENT
Start: 2023-10-07 | End: 2023-10-12

## 2023-10-07 NOTE — PATIENT INSTRUCTIONS
You tested positive for covid. Please isolate yourself for 5 days, today is day 0. Mask around people for 5 additional days after isolation  Rx paxlovid for 5 days. Take with food. Please hold your cholesterol medicine (crestor) until you finish paxlovid  Rest, hydration, over the counter medicine as needed: guaifenesin +/-dextromethorphan  for wet cough, tylenol or ibuprofen for fever, aches and pains, claritin/zyrtec/allegra/flonase for running nose, lozenges and warm water gaggles for sore throat. Saline nasal drop/spray for stuffy nose.  Red flag signs and indication  to ER visit discussed  Follow up with PCP for further concerns

## 2023-10-07 NOTE — LETTER
October 7, 2023      [facility] [address] [place] [address]  Phone: [phone]  Fax: [phone]       Patient: Ayesha Arcos   YOB: 1965  Date of Visit: 10/07/2023    To Whom It May Concern:    Nickie Arcos  was at [facility] on 10/07/2023. The patient may return to work/school on 10/12/2023 with masking additional 5 days. If you have any questions or concerns, or if I can be of further assistance, please do not hesitate to contact me.    Sincerely,    Jennifer Avendaño MD

## 2023-10-07 NOTE — PROGRESS NOTES
"Subjective:      Patient ID: Ayesha Arcos is a 58 y.o. female.    Vitals:  height is 5' 5" (1.651 m) and weight is 69.4 kg (153 lb). Her temperature is 98.1 °F (36.7 °C). Her blood pressure is 143/97 (abnormal). Her respiration is 18 and oxygen saturation is 98%.     Chief Complaint: Cough    Patient in today for cough and congestion, since yesterday. Patient stated she went to [place] and came back feeling congested and legs hurt  Hx RA    Cough  This is a new problem. The current episode started in the past 7 days. The problem has been unchanged. The cough is Non-productive. Associated symptoms include headaches. Treatments tried: Patient stated she took some cough medicine and claritin. The treatment provided mild relief.       Respiratory:  Positive for cough.    Neurological:  Positive for headaches.      Objective:     Physical Exam   Constitutional:  Non-toxic appearance. No distress.   HENT:   Mouth/Throat: No oropharyngeal exudate or posterior oropharyngeal erythema.   Eyes: Conjunctivae are normal.   Cardiovascular: Normal rate and regular rhythm.   Pulmonary/Chest: Effort normal and breath sounds normal.   Lymphadenopathy:     She has no cervical adenopathy.   Neurological: no focal deficit. She is alert.   Skin: Skin is warm. Capillary refill takes less than 2 seconds.   Nursing note and vitals reviewed.      Assessment:     1. Cough, unspecified type    2. SARS-CoV-2 positive - mild symptoms. + comorbidity. Paxlovid discussed. Pt states she had before        Results for orders placed or performed in visit on 10/07/23   SARS Coronavirus 2 Antigen, POCT Manual Read   Result Value Ref Range    SARS Coronavirus 2 Antigen Positive (A) Negative     Acceptable Yes         Plan:       Cough, unspecified type  -     SARS Coronavirus 2 Antigen, POCT Manual Read  -     nirmatrelvir-ritonavir (PAXLOVID) 300 mg (150 mg x 2)-100 mg copackaged tablets (EUA); Take 3 tablets by mouth 2 (two) times " daily. Each dose contains 2 nirmatrelvir (pink tablets) and 1 ritonavir (white tablet). Take all 3 tablets together  Dispense: 30 tablet; Refill: 0    SARS-CoV-2 positive  -     nirmatrelvir-ritonavir (PAXLOVID) 300 mg (150 mg x 2)-100 mg copackaged tablets (EUA); Take 3 tablets by mouth 2 (two) times daily. Each dose contains 2 nirmatrelvir (pink tablets) and 1 ritonavir (white tablet). Take all 3 tablets together  Dispense: 30 tablet; Refill: 0      You tested positive for covid. Please isolate yourself for 5 days, today is day 0. Mask around people for 5 additional days after isolation  Rx paxlovid for 5 days. Take with food. Please hold your cholesterol medicine (crestor) until you finish paxlovid  Rest, hydration, over the counter medicine as needed: guaifenesin +/-dextromethorphan  for wet cough, tylenol or ibuprofen for fever, aches and pains, claritin/zyrtec/allegra/flonase for running nose, lozenges and warm water gaggles for sore throat. Saline nasal drop/spray for stuffy nose.  Red flag signs and indication  to ER visit discussed  Follow up with PCP for further concerns

## 2023-10-30 ENCOUNTER — HOSPITAL ENCOUNTER (OUTPATIENT)
Dept: RADIOLOGY | Facility: HOSPITAL | Age: 58
Discharge: HOME | End: 2023-10-30
Attending: INTERNAL MEDICINE
Payer: COMMERCIAL

## 2023-10-30 DIAGNOSIS — M05.9 SEROPOSITIVE RHEUMATOID ARTHRITIS: ICD-10-CM

## 2023-10-30 PROCEDURE — 73130 X-RAY EXAM OF HAND: CPT | Mod: 26,,, | Performed by: RADIOLOGY

## 2023-10-30 PROCEDURE — 73130 X-RAY EXAM OF HAND: CPT | Mod: TC,50

## 2023-10-30 PROCEDURE — 73130 XR HAND COMPLETE 3 VIEWS BILATERAL: ICD-10-PCS | Mod: 26,,, | Performed by: RADIOLOGY

## 2023-11-02 NOTE — PROGRESS NOTES
Called patient with reminder to schedule pap smear. Patient not available, left voicemail.       Ketoconazole Counseling:   Patient counseled regarding improving absorption with orange juice.  Adverse effects include but are not limited to breast enlargement, headache, diarrhea, nausea, upset stomach, liver function test abnormalities, taste disturbance, and stomach pain.  There is a rare possibility of liver failure that can occur when taking ketoconazole. The patient understands that monitoring of LFTs may be required, especially at baseline. The patient verbalized understanding of the proper use and possible adverse effects of ketoconazole.  All of the patient's questions and concerns were addressed.

## 2023-11-07 ENCOUNTER — TELEPHONE (OUTPATIENT)
Dept: RHEUMATOLOGY | Facility: CLINIC | Age: 58
End: 2023-11-07
Payer: COMMERCIAL

## 2023-11-07 ENCOUNTER — OFFICE VISIT (OUTPATIENT)
Dept: RHEUMATOLOGY | Facility: CLINIC | Age: 58
End: 2023-11-07
Payer: COMMERCIAL

## 2023-11-07 ENCOUNTER — PATIENT MESSAGE (OUTPATIENT)
Dept: RHEUMATOLOGY | Facility: CLINIC | Age: 58
End: 2023-11-07

## 2023-11-07 VITALS
HEART RATE: 80 BPM | BODY MASS INDEX: 25.68 KG/M2 | SYSTOLIC BLOOD PRESSURE: 139 MMHG | WEIGHT: 154.31 LBS | DIASTOLIC BLOOD PRESSURE: 86 MMHG

## 2023-11-07 DIAGNOSIS — D84.821 IMMUNOSUPPRESSION DUE TO DRUG THERAPY: ICD-10-CM

## 2023-11-07 DIAGNOSIS — Z79.899 IMMUNOSUPPRESSION DUE TO DRUG THERAPY: ICD-10-CM

## 2023-11-07 DIAGNOSIS — G47.00 INSOMNIA, UNSPECIFIED TYPE: ICD-10-CM

## 2023-11-07 DIAGNOSIS — Z79.899 HIGH RISK MEDICATION USE: ICD-10-CM

## 2023-11-07 DIAGNOSIS — M05.9 SEROPOSITIVE RHEUMATOID ARTHRITIS: Primary | ICD-10-CM

## 2023-11-07 DIAGNOSIS — R76.8 ANA POSITIVE: ICD-10-CM

## 2023-11-07 PROCEDURE — 3044F HG A1C LEVEL LT 7.0%: CPT | Mod: CPTII,S$GLB,, | Performed by: INTERNAL MEDICINE

## 2023-11-07 PROCEDURE — 99215 PR OFFICE/OUTPT VISIT, EST, LEVL V, 40-54 MIN: ICD-10-PCS | Mod: S$GLB,,, | Performed by: INTERNAL MEDICINE

## 2023-11-07 PROCEDURE — 3008F BODY MASS INDEX DOCD: CPT | Mod: CPTII,S$GLB,, | Performed by: INTERNAL MEDICINE

## 2023-11-07 PROCEDURE — 1159F MED LIST DOCD IN RCRD: CPT | Mod: CPTII,S$GLB,, | Performed by: INTERNAL MEDICINE

## 2023-11-07 PROCEDURE — 3075F SYST BP GE 130 - 139MM HG: CPT | Mod: CPTII,S$GLB,, | Performed by: INTERNAL MEDICINE

## 2023-11-07 PROCEDURE — 3079F DIAST BP 80-89 MM HG: CPT | Mod: CPTII,S$GLB,, | Performed by: INTERNAL MEDICINE

## 2023-11-07 PROCEDURE — 1159F PR MEDICATION LIST DOCUMENTED IN MEDICAL RECORD: ICD-10-PCS | Mod: CPTII,S$GLB,, | Performed by: INTERNAL MEDICINE

## 2023-11-07 PROCEDURE — 99999 PR PBB SHADOW E&M-EST. PATIENT-LVL III: CPT | Mod: PBBFAC,,, | Performed by: INTERNAL MEDICINE

## 2023-11-07 PROCEDURE — 99999 PR PBB SHADOW E&M-EST. PATIENT-LVL III: ICD-10-PCS | Mod: PBBFAC,,, | Performed by: INTERNAL MEDICINE

## 2023-11-07 PROCEDURE — 3008F PR BODY MASS INDEX (BMI) DOCUMENTED: ICD-10-PCS | Mod: CPTII,S$GLB,, | Performed by: INTERNAL MEDICINE

## 2023-11-07 PROCEDURE — 99215 OFFICE O/P EST HI 40 MIN: CPT | Mod: S$GLB,,, | Performed by: INTERNAL MEDICINE

## 2023-11-07 PROCEDURE — 3079F PR MOST RECENT DIASTOLIC BLOOD PRESSURE 80-89 MM HG: ICD-10-PCS | Mod: CPTII,S$GLB,, | Performed by: INTERNAL MEDICINE

## 2023-11-07 PROCEDURE — 3075F PR MOST RECENT SYSTOLIC BLOOD PRESS GE 130-139MM HG: ICD-10-PCS | Mod: CPTII,S$GLB,, | Performed by: INTERNAL MEDICINE

## 2023-11-07 PROCEDURE — 3044F PR MOST RECENT HEMOGLOBIN A1C LEVEL <7.0%: ICD-10-PCS | Mod: CPTII,S$GLB,, | Performed by: INTERNAL MEDICINE

## 2023-11-07 RX ORDER — EPINEPHRINE 0.3 MG/.3ML
0.3 INJECTION SUBCUTANEOUS ONCE AS NEEDED
Status: CANCELLED | OUTPATIENT
Start: 2023-11-07

## 2023-11-07 RX ORDER — DIPHENHYDRAMINE HYDROCHLORIDE 50 MG/ML
50 INJECTION INTRAMUSCULAR; INTRAVENOUS
Status: CANCELLED | OUTPATIENT
Start: 2023-11-07

## 2023-11-07 RX ORDER — SODIUM CHLORIDE 0.9 % (FLUSH) 0.9 %
10 SYRINGE (ML) INJECTION
Status: CANCELLED | OUTPATIENT
Start: 2023-11-07

## 2023-11-07 RX ORDER — METHYLPREDNISOLONE SOD SUCC 125 MG
100 VIAL (EA) INJECTION
Status: CANCELLED | OUTPATIENT
Start: 2023-11-07

## 2023-11-07 RX ORDER — DIPHENHYDRAMINE HYDROCHLORIDE 50 MG/ML
50 INJECTION INTRAMUSCULAR; INTRAVENOUS ONCE AS NEEDED
Status: CANCELLED | OUTPATIENT
Start: 2023-11-07

## 2023-11-07 RX ORDER — ACETAMINOPHEN 325 MG/1
650 TABLET ORAL
Status: CANCELLED | OUTPATIENT
Start: 2023-11-07

## 2023-11-07 RX ORDER — HEPARIN 100 UNIT/ML
500 SYRINGE INTRAVENOUS
Status: CANCELLED | OUTPATIENT
Start: 2023-11-07

## 2023-11-07 NOTE — PROGRESS NOTES
RHEUMATOLOGY OUTPATIENT CLINIC NOTE    11/7/2023    Attending Rheumatologist: Perez Thomas  Primary Care Provider/Physician Requesting Consultation: Neel Krishnamurthy MD   Chief Complaint/Reason For Consultation:  Rheumatoid Arthritis and Osteoarthritis      Subjective:     Ayesha Arcos is a 58 y.o. Black or  female with SPRA    Adherence w/ Rinvoq and MTX 20mg per week w/o side effects.  Denies RA flares since last visit.    Addendum 11/16: msg received regarding TNFi infusion denied, implying patient was documented as not having active RA flares. Patient remains without active symptoms, however rapid radiographic progression continues despite current regimen consistent with high disease activity.    Recommend to provide images and report of the first (7/26/2018) and the last hand XR report 10/30/23 and ask to reconsider TNFi infusion.  Suggest to schedule a peer to peer if needed to discuss with medical reviewer.    BRIANA Thurston, JAME Munoz, HE Ariana, PM MELISSA Ragland & DEONNA Shen (2022) Early radiological progression remains associated with long-term joint damage in real-world rheumatoid arthritis patients treated to the target of remission, [place] Journal of Rheumatology, 51:2, 87-96, DOI: 10.1080/99193664.2021.6971106    Jimmy A, Angely JS, Jaimie CF, Van Venus R, Ramirez P, Monroe N, Vitaly S, Ryanne K, Aletajean paul D, Combe B, Fajeyson B. An updated matrix to predict rapid radiographic progression of early rheumatoid arthritis patients: pooled analyses from several databases. Rheumatology ([place]). 2020 Aug 1;59(8):7971-0884. doi: 10.1093/rheumatology/ldt186. PMID: 43353819.    van lele Colbert M, Rhys L, dana MORINK, Capo KOCH, Abrahan DELGADOM, Geraldo ALCANTARA, et al. Rapid radiological progression in the first year of early rheumatoid arthritis is predictive of disability and joint damage progression during 8  years of follow-up. [place] Rheum Dis. 2012;71:1530-3    Tariq BROWN. Radiological progression in established rheumatoid arthritis. J Rheumatol Suppl. 2004 Mar;69:55-65. PMID: 76167983.    Addendum #2: discussed with medical reviewer, TNFi has been approved.    Review of Systems   Constitutional:  Negative for fever.   Eyes:  Negative for photophobia and pain.   Respiratory:  Negative for cough and shortness of breath.    Cardiovascular:  Negative for chest pain.   Gastrointestinal:  Negative for blood in stool and melena.   Genitourinary:  Negative for hematuria.   Musculoskeletal:  Negative for joint pain.   Skin:  Negative for rash.   Neurological:  Negative for focal weakness and weakness.   Psychiatric/Behavioral:  Negative for depression. The patient has insomnia.        Chronic comorbid conditions affecting medical decision making today:  Past Medical History:   Diagnosis Date    Carpal tunnel syndrome, bilateral     Hypertension     Rheumatoid arthritis      Past Surgical History:   Procedure Laterality Date    CARPAL TUNNEL RELEASE      right    skin cancer removal      nose    TUBAL LIGATION       Family History   Problem Relation Age of Onset    Diabetes Mother     Heart disease Father     Hypertension Father     Early death Father         Early 50's    Stroke Paternal Uncle     Heart disease Paternal Uncle      Social History     Tobacco Use   Smoking Status Never   Smokeless Tobacco Never       Current Outpatient Medications:     ascorbic acid, vitamin C, (VITAMIN C) 100 MG tablet, Take 100 mg by mouth once daily., Disp: , Rfl:     FLAXSEED OIL ORAL, Take by mouth., Disp: , Rfl:     folic acid (FOLVITE) 1 MG tablet, Take 1 tablet (1 mg total) by mouth once daily., Disp: 90 tablet, Rfl: 1    methotrexate 2.5 MG Tab, TAKE 10 TABLETS BY MOUTH EVERY 7 DAYS, Disp: 120 tablet, Rfl: 0    multivitamin capsule, Take 1 capsule by mouth once daily., Disp: , Rfl:     multivitamin with minerals (HAIR,SKIN AND NAILS ORAL),  Take by mouth once daily., Disp: , Rfl:     promethazine-dextromethorphan (PROMETHAZINE-DM) 6.25-15 mg/5 mL Syrp, Take 5 mLs by mouth nightly as needed (Cough)., Disp: 120 mL, Rfl: 0    rosuvastatin (CRESTOR) 10 MG tablet, TAKE 1 TABLET(10 MG) BY MOUTH EVERY DAY, Disp: 90 tablet, Rfl: 2    TURMERIC ORAL, Take by mouth., Disp: , Rfl:     upadacitinib (RINVOQ) 15 mg 24 hr tablet, Take 1 tablet (15 mg total) by mouth once daily., Disp: 90 tablet, Rfl: 2    valACYclovir (VALTREX) 1000 MG tablet, Take 1 tablet (1,000 mg total) by mouth 2 (two) times daily. for 10 days, Disp: 20 tablet, Rfl: 1    VUITY 1.25 % Drop, Place 1 drop into both eyes daily as needed., Disp: , Rfl:      Objective:     Vitals:    11/07/23 1545   BP: 139/86   Pulse: 80     Physical Exam   Eyes: Conjunctivae are normal.   Pulmonary/Chest: No respiratory distress.   Musculoskeletal:         General: Swelling and deformity present. No tenderness. Normal range of motion.   Neurological: She displays no weakness.   Skin: No rash noted.       Reviewed available old and all outside pertinent medical records available.    All lab results personally reviewed and interpreted by me.       ASSESSMENT      Encounter Diagnoses   Name Primary?    Seropositive rheumatoid arthritis Yes    Insomnia, unspecified type     High risk medication use     Immunosuppression due to drug therapy     MOLLY positive       PLAN     CDAI; low disease activity.  Denies squeeze tenderness on exam.  Chronic synovitis present, including DIP involvement.  No psoriasiform rashes or nail pitting present.  No concerning cytopenias.  Liver and kidney function stable.  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 Consider DDx SpA in view of pencil in cup deformities and DIP involvement (not expected in RA).  Suggest trial of replacing rinvoq (11/201-) by Robert goodson.  Side effects  discussed.  Plan to repeat XR in 1 year if clinically in remission.  Labs 2 months after new biologic, repeat close to f/u.  Sleep medicine referral provided.    Perez Thomas M.D.

## 2023-11-07 NOTE — TELEPHONE ENCOUNTER
----- Message from Rose Norton sent at 11/7/2023  3:31 PM CST -----  Contact: self  Patient call and she states she will be 10 min late.

## 2023-11-08 ENCOUNTER — PATIENT MESSAGE (OUTPATIENT)
Dept: RHEUMATOLOGY | Facility: CLINIC | Age: 58
End: 2023-11-08
Payer: COMMERCIAL

## 2023-11-10 ENCOUNTER — PATIENT MESSAGE (OUTPATIENT)
Dept: RHEUMATOLOGY | Facility: CLINIC | Age: 58
End: 2023-11-10
Payer: COMMERCIAL

## 2023-11-15 ENCOUNTER — PATIENT MESSAGE (OUTPATIENT)
Dept: PULMONOLOGY | Facility: CLINIC | Age: 58
End: 2023-11-15
Payer: COMMERCIAL

## 2023-11-20 ENCOUNTER — TELEPHONE (OUTPATIENT)
Dept: INFUSION THERAPY | Facility: HOSPITAL | Age: 58
End: 2023-11-20
Payer: COMMERCIAL

## 2023-11-24 ENCOUNTER — PATIENT MESSAGE (OUTPATIENT)
Dept: RHEUMATOLOGY | Facility: CLINIC | Age: 58
End: 2023-11-24
Payer: COMMERCIAL

## 2023-11-28 ENCOUNTER — TELEPHONE (OUTPATIENT)
Dept: RHEUMATOLOGY | Facility: CLINIC | Age: 58
End: 2023-11-28
Payer: COMMERCIAL

## 2023-11-28 NOTE — TELEPHONE ENCOUNTER
----- Message from Rose Norton sent at 11/28/2023 10:17 AM CST -----  Contact: self/ [phone]  Patient is calling to consult with nurse regarding her infusion, she have some concerns. Please call her back at [phone]. Thanks/ar

## 2023-11-28 NOTE — TELEPHONE ENCOUNTER
"Returned patients phone call. Patient stated that she is scheduled to have Simponi Aria infusion tomorrow 11/29/2023. Due to the insurance taking so long to give a final approval for the infusion she just stopped taking Rinvoq this past Friday 11/24/2023. She would like to know if Dr. Thomas is ok with this or if she needs to have more time for the Rinvoq to get out of her system prior to getting Simponi infusion? Advised patient that I will send this message to Dr. Thomas for further clarification and let her know as soon as I hear something. Patient verbalized understanding. All questions answered.       Angelique Gibbs (Allye), [facility]  Rheumatology Department    "

## 2023-11-29 ENCOUNTER — LAB VISIT (OUTPATIENT)
Dept: LAB | Facility: HOSPITAL | Age: 58
End: 2023-11-29
Attending: INTERNAL MEDICINE
Payer: COMMERCIAL

## 2023-11-29 ENCOUNTER — INFUSION (OUTPATIENT)
Dept: INFUSION THERAPY | Facility: HOSPITAL | Age: 58
End: 2023-11-29
Attending: INTERNAL MEDICINE
Payer: COMMERCIAL

## 2023-11-29 VITALS
TEMPERATURE: 98 F | DIASTOLIC BLOOD PRESSURE: 82 MMHG | BODY MASS INDEX: 25.3 KG/M2 | RESPIRATION RATE: 18 BRPM | HEART RATE: 85 BPM | HEIGHT: 65 IN | SYSTOLIC BLOOD PRESSURE: 130 MMHG | OXYGEN SATURATION: 99 % | WEIGHT: 151.88 LBS

## 2023-11-29 DIAGNOSIS — M05.9 SEROPOSITIVE RHEUMATOID ARTHRITIS: Primary | ICD-10-CM

## 2023-11-29 DIAGNOSIS — M05.9 SEROPOSITIVE RHEUMATOID ARTHRITIS: ICD-10-CM

## 2023-11-29 LAB
ALBUMIN SERPL BCP-MCNC: 4.3 G/DL (ref 3.5–5.2)
ALP SERPL-CCNC: 105 U/L (ref 55–135)
ALT SERPL W/O P-5'-P-CCNC: 39 U/L (ref 10–44)
ANION GAP SERPL CALC-SCNC: 8 MMOL/L (ref 8–16)
AST SERPL-CCNC: 23 U/L (ref 10–40)
BASOPHILS # BLD AUTO: 0.02 K/UL (ref 0–0.2)
BASOPHILS NFR BLD: 0.2 % (ref 0–1.9)
BILIRUB SERPL-MCNC: 0.6 MG/DL (ref 0.1–1)
BUN SERPL-MCNC: 11 MG/DL (ref 6–20)
CALCIUM SERPL-MCNC: 9.5 MG/DL (ref 8.7–10.5)
CHLORIDE SERPL-SCNC: 105 MMOL/L (ref 95–110)
CO2 SERPL-SCNC: 28 MMOL/L (ref 23–29)
CREAT SERPL-MCNC: 0.9 MG/DL (ref 0.5–1.4)
DIFFERENTIAL METHOD: ABNORMAL
EOSINOPHIL # BLD AUTO: 0.1 K/UL (ref 0–0.5)
EOSINOPHIL NFR BLD: 0.6 % (ref 0–8)
ERYTHROCYTE [DISTWIDTH] IN BLOOD BY AUTOMATED COUNT: 15.8 % (ref 11.5–14.5)
EST. GFR  (NO RACE VARIABLE): >60 ML/MIN/1.73 M^2
GLUCOSE SERPL-MCNC: 106 MG/DL (ref 70–110)
HCT VFR BLD AUTO: 39.8 % (ref 37–48.5)
HGB BLD-MCNC: 12.8 G/DL (ref 12–16)
IMM GRANULOCYTES # BLD AUTO: 0.02 K/UL (ref 0–0.04)
IMM GRANULOCYTES NFR BLD AUTO: 0.2 % (ref 0–0.5)
LYMPHOCYTES # BLD AUTO: 1.3 K/UL (ref 1–4.8)
LYMPHOCYTES NFR BLD: 15.9 % (ref 18–48)
MCH RBC QN AUTO: 28.8 PG (ref 27–31)
MCHC RBC AUTO-ENTMCNC: 32.2 G/DL (ref 32–36)
MCV RBC AUTO: 90 FL (ref 82–98)
MONOCYTES # BLD AUTO: 0.5 K/UL (ref 0.3–1)
MONOCYTES NFR BLD: 5.9 % (ref 4–15)
NEUTROPHILS # BLD AUTO: 6.4 K/UL (ref 1.8–7.7)
NEUTROPHILS NFR BLD: 77.2 % (ref 38–73)
NRBC BLD-RTO: 0 /100 WBC
PLATELET # BLD AUTO: 305 K/UL (ref 150–450)
PMV BLD AUTO: 9.9 FL (ref 9.2–12.9)
POTASSIUM SERPL-SCNC: 3.9 MMOL/L (ref 3.5–5.1)
PROT SERPL-MCNC: 8 G/DL (ref 6–8.4)
RBC # BLD AUTO: 4.44 M/UL (ref 4–5.4)
SODIUM SERPL-SCNC: 141 MMOL/L (ref 136–145)
WBC # BLD AUTO: 8.35 K/UL (ref 3.9–12.7)

## 2023-11-29 PROCEDURE — 63600175 PHARM REV CODE 636 W HCPCS: Performed by: INTERNAL MEDICINE

## 2023-11-29 PROCEDURE — 25000003 PHARM REV CODE 250: Performed by: INTERNAL MEDICINE

## 2023-11-29 PROCEDURE — 80053 COMPREHEN METABOLIC PANEL: CPT | Performed by: INTERNAL MEDICINE

## 2023-11-29 PROCEDURE — 96375 TX/PRO/DX INJ NEW DRUG ADDON: CPT

## 2023-11-29 PROCEDURE — 96365 THER/PROPH/DIAG IV INF INIT: CPT

## 2023-11-29 PROCEDURE — 36415 COLL VENOUS BLD VENIPUNCTURE: CPT | Performed by: INTERNAL MEDICINE

## 2023-11-29 PROCEDURE — 85025 COMPLETE CBC W/AUTO DIFF WBC: CPT | Performed by: INTERNAL MEDICINE

## 2023-11-29 RX ORDER — DIPHENHYDRAMINE HYDROCHLORIDE 50 MG/ML
50 INJECTION INTRAMUSCULAR; INTRAVENOUS ONCE AS NEEDED
Status: CANCELLED | OUTPATIENT
Start: 2023-12-27

## 2023-11-29 RX ORDER — EPINEPHRINE 0.3 MG/.3ML
0.3 INJECTION SUBCUTANEOUS ONCE AS NEEDED
Status: CANCELLED | OUTPATIENT
Start: 2023-12-27

## 2023-11-29 RX ORDER — DIPHENHYDRAMINE HYDROCHLORIDE 50 MG/ML
50 INJECTION INTRAMUSCULAR; INTRAVENOUS
Status: CANCELLED | OUTPATIENT
Start: 2023-12-27

## 2023-11-29 RX ORDER — HEPARIN 100 UNIT/ML
500 SYRINGE INTRAVENOUS
Status: CANCELLED | OUTPATIENT
Start: 2023-12-27

## 2023-11-29 RX ORDER — METHYLPREDNISOLONE SOD SUCC 125 MG
100 VIAL (EA) INJECTION
Status: CANCELLED | OUTPATIENT
Start: 2023-12-27

## 2023-11-29 RX ORDER — SODIUM CHLORIDE 0.9 % (FLUSH) 0.9 %
10 SYRINGE (ML) INJECTION
Status: CANCELLED | OUTPATIENT
Start: 2023-12-27

## 2023-11-29 RX ORDER — ACETAMINOPHEN 325 MG/1
650 TABLET ORAL
Status: CANCELLED | OUTPATIENT
Start: 2023-12-27

## 2023-11-29 RX ADMIN — GOLIMUMAB 137.5 MG: 50 SOLUTION INTRAVENOUS at 09:11

## 2023-11-29 RX ADMIN — METHYLPREDNISOLONE SODIUM SUCCINATE 20 MG: 40 INJECTION, POWDER, FOR SOLUTION INTRAMUSCULAR; INTRAVENOUS at 09:11

## 2023-11-29 NOTE — NURSING
Infusion # 1 of Simponi Aria - 137.5 mg 0,4, then every 8 wks    Any:  -recent illness, infection, or antibiotic use in past week- denies  -open wounds or mouth sores- deneis  -invasive procedures or surgeries in past 4 weeks or in upcoming 4 weeks- denies  -vaccinations in past week- denies  -any new symptoms/change in symptoms-denies  -chance you may be pregnant- n/a      Recent labs? 10/30/2023  Last Rheumatology provider visit- Seen by Dr. Thomas on 11/7/2023     Premeds-Solumedrol      Simponi Aria 137.5 mg administered IV at a 30 minute rate per orders; see MAR and vitals for more details. Tolerated well without adverse events, discharged and ambulatory out of clinic.

## 2023-11-29 NOTE — PLAN OF CARE
Problem: Adult Inpatient Plan of Care  Goal: Plan of Care Review  Outcome: Ongoing, Progressing  Flowsheets (Taken 11/29/2023 1001)  Plan of Care Reviewed With: patient  Goal: Patient-Specific Goal (Individualized)  Outcome: Ongoing, Progressing  Flowsheets (Taken 11/29/2023 1001)  Anxieties, Fears or Concerns: nervous about first treatment and side effects  Individualized Care Needs:   feet elevated   warm blanket   snack/drink provided   educational phamplet given  Goal: Optimal Comfort and Wellbeing  Outcome: Ongoing, Progressing  Intervention: Monitor Pain and Promote Comfort  Flowsheets (Taken 11/29/2023 1001)  Pain Management Interventions:   relaxation techniques promoted   quiet environment facilitated   warm blanket provided  Intervention: Provide Person-Centered Care  Flowsheets (Taken 11/29/2023 1001)  Trust Relationship/Rapport:   care explained   questions encouraged   reassurance provided   choices provided   emotional support provided   thoughts/feelings acknowledged   empathic listening provided   questions answered

## 2023-11-30 ENCOUNTER — NURSE TRIAGE (OUTPATIENT)
Dept: ADMINISTRATIVE | Facility: CLINIC | Age: 58
End: 2023-11-30
Payer: COMMERCIAL

## 2023-12-01 ENCOUNTER — OFFICE VISIT (OUTPATIENT)
Dept: RHEUMATOLOGY | Facility: CLINIC | Age: 58
End: 2023-12-01
Payer: COMMERCIAL

## 2023-12-01 ENCOUNTER — OFFICE VISIT (OUTPATIENT)
Dept: URGENT CARE | Facility: CLINIC | Age: 58
End: 2023-12-01
Payer: COMMERCIAL

## 2023-12-01 ENCOUNTER — TELEPHONE (OUTPATIENT)
Dept: RHEUMATOLOGY | Facility: CLINIC | Age: 58
End: 2023-12-01
Payer: COMMERCIAL

## 2023-12-01 ENCOUNTER — TELEPHONE (OUTPATIENT)
Dept: RHEUMATOLOGY | Facility: CLINIC | Age: 58
End: 2023-12-01

## 2023-12-01 ENCOUNTER — HOSPITAL ENCOUNTER (OUTPATIENT)
Dept: RADIOLOGY | Facility: CLINIC | Age: 58
Discharge: HOME | End: 2023-12-01
Attending: INTERNAL MEDICINE
Payer: COMMERCIAL

## 2023-12-01 VITALS
HEART RATE: 94 BPM | SYSTOLIC BLOOD PRESSURE: 132 MMHG | DIASTOLIC BLOOD PRESSURE: 92 MMHG | BODY MASS INDEX: 25.28 KG/M2 | HEIGHT: 65 IN

## 2023-12-01 VITALS
WEIGHT: 152.13 LBS | DIASTOLIC BLOOD PRESSURE: 90 MMHG | TEMPERATURE: 98 F | OXYGEN SATURATION: 98 % | HEART RATE: 96 BPM | BODY MASS INDEX: 25.34 KG/M2 | RESPIRATION RATE: 18 BRPM | HEIGHT: 65 IN | SYSTOLIC BLOOD PRESSURE: 140 MMHG

## 2023-12-01 DIAGNOSIS — M35.05 SJOGREN SYNDROME WITH INFLAMMATORY ARTHRITIS: ICD-10-CM

## 2023-12-01 DIAGNOSIS — D84.9 IMMUNOSUPPRESSED STATUS: ICD-10-CM

## 2023-12-01 DIAGNOSIS — J00 ACUTE RHINITIS: Primary | ICD-10-CM

## 2023-12-01 DIAGNOSIS — R09.81 NASAL CONGESTION: ICD-10-CM

## 2023-12-01 DIAGNOSIS — R05.9 COUGH, UNSPECIFIED TYPE: ICD-10-CM

## 2023-12-01 DIAGNOSIS — M05.9 SEROPOSITIVE RHEUMATOID ARTHRITIS: Primary | ICD-10-CM

## 2023-12-01 DIAGNOSIS — Z51.81 MEDICATION MONITORING ENCOUNTER: ICD-10-CM

## 2023-12-01 DIAGNOSIS — R09.81 CONGESTION OF NASAL SINUS: ICD-10-CM

## 2023-12-01 LAB
CTP QC/QA: YES
CTP QC/QA: YES
POC MOLECULAR INFLUENZA A AGN: NEGATIVE
POC MOLECULAR INFLUENZA B AGN: NEGATIVE
SARS-COV-2 AG RESP QL IA.RAPID: NEGATIVE

## 2023-12-01 PROCEDURE — 71046 XR CHEST PA AND LATERAL: ICD-10-PCS | Mod: S$GLB,,, | Performed by: RADIOLOGY

## 2023-12-01 PROCEDURE — 87502 INFLUENZA DNA AMP PROBE: CPT | Mod: QW,S$GLB,, | Performed by: EMERGENCY MEDICINE

## 2023-12-01 PROCEDURE — 3080F PR MOST RECENT DIASTOLIC BLOOD PRESSURE >= 90 MM HG: ICD-10-PCS | Mod: CPTII,S$GLB,, | Performed by: INTERNAL MEDICINE

## 2023-12-01 PROCEDURE — 99999 PR PBB SHADOW E&M-EST. PATIENT-LVL III: ICD-10-PCS | Mod: PBBFAC,,, | Performed by: INTERNAL MEDICINE

## 2023-12-01 PROCEDURE — 3075F SYST BP GE 130 - 139MM HG: CPT | Mod: CPTII,S$GLB,, | Performed by: INTERNAL MEDICINE

## 2023-12-01 PROCEDURE — 1159F PR MEDICATION LIST DOCUMENTED IN MEDICAL RECORD: ICD-10-PCS | Mod: CPTII,S$GLB,, | Performed by: INTERNAL MEDICINE

## 2023-12-01 PROCEDURE — 87811 SARS CORONAVIRUS 2 ANTIGEN POCT, MANUAL READ: ICD-10-PCS | Mod: QW,S$GLB,, | Performed by: EMERGENCY MEDICINE

## 2023-12-01 PROCEDURE — 99213 PR OFFICE/OUTPT VISIT, EST, LEVL III, 20-29 MIN: ICD-10-PCS | Mod: S$GLB,,, | Performed by: EMERGENCY MEDICINE

## 2023-12-01 PROCEDURE — 87502 POCT INFLUENZA A/B MOLECULAR: ICD-10-PCS | Mod: QW,S$GLB,, | Performed by: EMERGENCY MEDICINE

## 2023-12-01 PROCEDURE — 99213 OFFICE O/P EST LOW 20 MIN: CPT | Mod: S$GLB,,, | Performed by: EMERGENCY MEDICINE

## 2023-12-01 PROCEDURE — 3080F DIAST BP >= 90 MM HG: CPT | Mod: CPTII,S$GLB,, | Performed by: INTERNAL MEDICINE

## 2023-12-01 PROCEDURE — 99214 PR OFFICE/OUTPT VISIT, EST, LEVL IV, 30-39 MIN: ICD-10-PCS | Mod: S$GLB,,, | Performed by: INTERNAL MEDICINE

## 2023-12-01 PROCEDURE — 1159F MED LIST DOCD IN RCRD: CPT | Mod: CPTII,S$GLB,, | Performed by: INTERNAL MEDICINE

## 2023-12-01 PROCEDURE — 3044F HG A1C LEVEL LT 7.0%: CPT | Mod: CPTII,S$GLB,, | Performed by: INTERNAL MEDICINE

## 2023-12-01 PROCEDURE — 3044F PR MOST RECENT HEMOGLOBIN A1C LEVEL <7.0%: ICD-10-PCS | Mod: CPTII,S$GLB,, | Performed by: INTERNAL MEDICINE

## 2023-12-01 PROCEDURE — 87811 SARS-COV-2 COVID19 W/OPTIC: CPT | Mod: QW,S$GLB,, | Performed by: EMERGENCY MEDICINE

## 2023-12-01 PROCEDURE — 3008F BODY MASS INDEX DOCD: CPT | Mod: CPTII,S$GLB,, | Performed by: INTERNAL MEDICINE

## 2023-12-01 PROCEDURE — 99214 OFFICE O/P EST MOD 30 MIN: CPT | Mod: S$GLB,,, | Performed by: INTERNAL MEDICINE

## 2023-12-01 PROCEDURE — 71046 X-RAY EXAM CHEST 2 VIEWS: CPT | Mod: S$GLB,,, | Performed by: RADIOLOGY

## 2023-12-01 PROCEDURE — 3008F PR BODY MASS INDEX (BMI) DOCUMENTED: ICD-10-PCS | Mod: CPTII,S$GLB,, | Performed by: INTERNAL MEDICINE

## 2023-12-01 PROCEDURE — 3075F PR MOST RECENT SYSTOLIC BLOOD PRESS GE 130-139MM HG: ICD-10-PCS | Mod: CPTII,S$GLB,, | Performed by: INTERNAL MEDICINE

## 2023-12-01 PROCEDURE — 99999 PR PBB SHADOW E&M-EST. PATIENT-LVL III: CPT | Mod: PBBFAC,,, | Performed by: INTERNAL MEDICINE

## 2023-12-01 RX ORDER — EPINEPHRINE 0.3 MG/.3ML
0.3 INJECTION SUBCUTANEOUS ONCE AS NEEDED
Status: CANCELLED | OUTPATIENT
Start: 2024-01-24

## 2023-12-01 RX ORDER — HEPARIN 100 UNIT/ML
500 SYRINGE INTRAVENOUS
Status: CANCELLED | OUTPATIENT
Start: 2024-01-24

## 2023-12-01 RX ORDER — DIPHENHYDRAMINE HCL 25 MG
25 CAPSULE ORAL EVERY 6 HOURS PRN
Qty: 20 CAPSULE | Refills: 0 | Status: SHIPPED | OUTPATIENT
Start: 2023-12-01 | End: 2023-12-06

## 2023-12-01 RX ORDER — DIPHENHYDRAMINE HYDROCHLORIDE 50 MG/ML
25 INJECTION INTRAMUSCULAR; INTRAVENOUS
Status: CANCELLED | OUTPATIENT
Start: 2024-01-24

## 2023-12-01 RX ORDER — SODIUM CHLORIDE 0.9 % (FLUSH) 0.9 %
10 SYRINGE (ML) INJECTION
Status: CANCELLED | OUTPATIENT
Start: 2024-01-24

## 2023-12-01 RX ORDER — DIPHENHYDRAMINE HYDROCHLORIDE 50 MG/ML
50 INJECTION INTRAMUSCULAR; INTRAVENOUS ONCE AS NEEDED
Status: CANCELLED | OUTPATIENT
Start: 2024-01-24

## 2023-12-01 RX ORDER — ACETAMINOPHEN 325 MG/1
650 TABLET ORAL
Status: CANCELLED | OUTPATIENT
Start: 2024-01-24

## 2023-12-01 RX ORDER — METHYLPREDNISOLONE SOD SUCC 125 MG
40 VIAL (EA) INJECTION
Status: CANCELLED | OUTPATIENT
Start: 2024-01-24

## 2023-12-01 NOTE — PROGRESS NOTES
"Subjective:      Patient ID: Ayesha Arcos is a 58 y.o. female.    Vitals:  height is 5' 5.35" (1.66 m) and weight is 69 kg (152 lb 1.9 oz). Her oral temperature is 98.1 °F (36.7 °C). Her blood pressure is 140/90 (abnormal) and her pulse is 96. Her respiration is 18 and oxygen saturation is 98%.     Chief Complaint: Nasal Congestion (Started 2 days ago following infusion that day)    Patient presents today with sinus congestion, runny nose and body aches that started on Wednesday November 29 following an infusion for rhumatoid arthritis that day. She states that she spoke with the Dr because her nose started running nonstop that afternoon. He stated that that was a side effect. He also sent her here to be tested for other illnesses and to have a chest xray to rule out infection before giving her steroids.    Sinusitis  This is a new problem. The current episode started in the past 7 days. The problem is unchanged. There has been no fever. Her pain is at a severity of 0/10. She is experiencing no pain. Associated symptoms include congestion and coughing. Pertinent negatives include no sinus pressure. Treatments tried: dayquil. The treatment provided mild relief.       Constitution: Positive for activity change. Negative for fever.   HENT:  Positive for congestion. Negative for sinus pain and sinus pressure.    Respiratory:  Positive for cough.    Musculoskeletal:  Positive for muscle ache.      Objective:     Physical Exam   Constitutional: She is oriented to person, place, and time. She appears well-developed. She is cooperative.  Non-toxic appearance. She does not appear ill. No distress.   HENT:   Head: Normocephalic and atraumatic.   Ears:   Right Ear: Hearing and external ear normal.   Left Ear: Hearing and external ear normal.   Nose: Congestion present. No mucosal edema, rhinorrhea or nasal deformity. No epistaxis. Right sinus exhibits no maxillary sinus tenderness and no frontal sinus tenderness. Left " sinus exhibits no maxillary sinus tenderness and no frontal sinus tenderness.   Mouth/Throat: Uvula is midline and mucous membranes are normal. No trismus in the jaw. Normal dentition. No uvula swelling. Posterior oropharyngeal erythema present. No oropharyngeal exudate or posterior oropharyngeal edema.   Eyes: Conjunctivae and lids are normal. No scleral icterus. Extraocular movement intact   Neck: Trachea normal and phonation normal. Neck supple. No edema present. No erythema present. No neck rigidity present.   Cardiovascular: Normal rate, regular rhythm, normal heart sounds and normal pulses.   Pulmonary/Chest: Effort normal and breath sounds normal. No respiratory distress. She has no decreased breath sounds. She has no rhonchi.   Abdominal: Normal appearance.   Musculoskeletal: Normal range of motion.         General: No deformity. Normal range of motion.   Neurological: She is alert and oriented to person, place, and time. She exhibits normal muscle tone. Coordination normal.   Skin: Skin is warm, dry, intact, not diaphoretic and not pale.   Psychiatric: Her speech is normal and behavior is normal. Judgment and thought content normal.   Nursing note and vitals reviewed.      Assessment:     1. Acute rhinitis    2. Congestion of nasal sinus        Plan:       Acute rhinitis    Congestion of nasal sinus  -     SARS Coronavirus 2 Antigen, POCT Manual Read  -     POCT Influenza A/B Molecular          Medical Decision Making:   History:   Old Records Summarized: records from clinic visits.       <> Summary of Records: Reviewed her appointment With her rheumatologist today.  Initial Assessment:   Runny nose  Differential Diagnosis:   COVID, flu, medication side effect.  Clinical Tests:   Lab Tests: Ordered and Reviewed       <> Summary of Lab: Negative COVID and flu    Radiological Study: Reviewed  Urgent Care Management:  X-ray reviewed by me.  No definite infiltrate.  Advised patient that Radiology will read  this.  This was ordered by her rheumatologist.  Advised her to reach out to him for further recommendations.  Patient given recommendations for symptomatic relief of her acute rhinitis.

## 2023-12-01 NOTE — PATIENT INSTRUCTIONS
Follow-up with Dr. Thomas for further treatment.     Use over the counter(OTC) antihistamine like claritin or zyrtec daily.  Flonase: 2 sprays per nostril 1-2 times a day.   Nasal saline drops: 2-4 drops per nostril 10-15 times a day.     Tylenol or Motrin for fever or pain per label instructions.  Consider use of OTC cough medicine like Robitussin DM.  Warm saltwater gargles to 3 times a day.    Rest and drink plenty of fluids.  Avoid OTC decongestants if you have high blood pressure. This includes multi-cold symptom preparations.    Consider permissive fever to allow your immune system to work.  However, if fever is not tolerable or is disrupting sleep, use Tylenol or Motrin per label instructions.    You are considered contagious until your systemic, or whole body, symptoms have resolved fully for 24 hours.  This includes fever, body aches, fatigue.    Follow up in 2-3 days with PCP if no improvement or any worsening.       Viral Upper Respiratory Infection Discharge Instructions, Adult   About this topic   You have an upper respiratory infection or URI. A URI can affect your nose, throat, ears, and sinuses. A virus is the cause of almost all URIs and antibiotics will not help you feel better more quickly. The common cold is an example of a viral URI.  URIs are easy to spread from person to person, most often through coughing or sneezing. A URI will almost always get better in a week or two without any treatment.         What care is needed at home?   Ask your doctor what you need to do when you go home. Make sure you ask questions if you do not understand what the doctor says.  If you smoke, try to quit. Your doctor or nurse can help.  Drink lots of fluids like water, juice, or broth. This will help replace any fluids lost if you have a runny nose or fever. Warm tea or soup can help soothe a sore throat.  If the air in your home feels dry, use a cool mist humidifier. This can help a stuffy nose and make it  easier to breathe.  You can also use saline nose drops to relieve stuffiness.  If you decide to take over-the-counter cough or cold medicines, follow the directions on the label carefully. Be sure you do not take more than 1 medicine that contains acetaminophen. Also, if you have a heart problem or high blood pressure, check with your doctor before you take any of these medicines.  Wash your hands often. Cough or sneeze into a tissue or your elbow instead of your hands. This will help keep others healthy.  What follow-up care is needed?   Your doctor may ask you to make visits to the office to check on your progress. Be sure to keep these visits.  What drugs may be needed?   The doctor may order drugs to:  Open up the tubes of your lungs  Treat viral infection  Relieve or stop coughing  Help with pain from a sore throat  Relieve runny and stuffy nose  Provide oxygen  Will physical activity be limited?   You need to rest for a few days to let your body recover from the infection.  What changes to diet are needed?   Eat soft foods like soup if swallowing is too painful.  What problems could happen?   Asthma attack  Sinus infections  Lung problems like pneumonia and bronchitis  Severe fluid loss. This is dehydration.  What can be done to prevent this health problem?   Wash your hands often with soap and water for at least 20 seconds, especially after coughing or sneezing. Alcohol-based hand sanitizers also work to kill the virus.  If you are sick, cover your mouth and nose with tissue when you cough or sneeze. You can also cough into your elbow. Throw away tissues in the trash and wash your hands after touching used tissues.  Do not get too close (kissing, hugging) to people who are sick.  Do not share towels or hankies with anyone who is sick. Clean commonly handled things like door handles, remotes, toys, and phones. Wipe them with a disinfectant.  Stay away from crowded places.  Cover your nose and mouth when you  sneeze or cough.  Take vitamin C to help build up your body's ability to fight disease.  Get a flu shot each year.  When do I need to call the doctor?   You have trouble breathing when talking or sitting still.  You have a fever of 100.4°F (38°C) or higher for several days, chills, a very bad sore throat, or ear or sinus pain.  You develop a new fever after several days of feeling the same or improving.  You develop chest pain when you cough.  You have a cough that lasts more than 10 days.  You cough up blood, or the color of the mucus you cough up changes.  Teach Back: Helping You Understand   The Teach Back Method helps you understand the information we are giving you. After you talk with the staff, tell them in your own words what you learned. This helps to make sure the staff has described each thing clearly. It also helps to explain things that may have been confusing. Before going home, make sure you can do these:  I can tell you about my condition.  I can tell you what may help ease my signs.  I can tell you what I will do if I have a fever, chills, breathing very fast, or trouble breathing.  Where can I learn more?   American Lung Association  https://www.lung.org/blog/can-you-exercise-with-a-cold   American Lung Association  https://www.lung.org/lung-health-diseases/lung-disease-lookup/influenza/facts-about-the-common-cold   NHS Choices  https://www.nhs.uk/conditions/respiratory-tract-infection/   UpToDate  https://www.uptodate.com/contents/the-common-cold-in-adults-beyond-the-basics   Last Reviewed Date   2021-06-08  Consumer Information Use and Disclaimer   This information is not specific medical advice and does not replace information you receive from your health care provider. This is only a brief summary of general information. It does NOT include all information about conditions, illnesses, injuries, tests, procedures, treatments, therapies, discharge instructions or life-style choices that may apply  to you. You must talk with your health care provider for complete information about your health and treatment options. This information should not be used to decide whether or not to accept your health care providers advice, instructions or recommendations. Only your health care provider has the knowledge and training to provide advice that is right for you.  Copyright   Copyright © 2021 UpToDate, Inc. and its affiliates and/or licensors. All rights reserved.

## 2023-12-01 NOTE — Clinical Note
Questionable allergic reaction to Simponi aria vs upper respiratory syndrome, shortly after Simponi aria.  Suggest replacing by Orencia, with OK to receive at least 2 months since last TNFi infusion.

## 2023-12-01 NOTE — TELEPHONE ENCOUNTER
"Pt reports yesterday had an infusion for Rheumatoid arthritis, having a runny nose and body aches, pain in Rt shoulder, Pt advised to see a MD within 24 hours per protocol, pt plans to call office first thing in the morning. Pt encouraged to call back with any worsening symptoms or questions. She verbalized understanding.    Reason for Disposition   Taking a medicine that could cause weakness (e.g., blood pressure medications, diuretics)    Additional Information   Negative: SEVERE difficulty breathing (e.g., struggling for each breath, speaks in single words)   Negative: Shock suspected (e.g., cold/pale/clammy skin, too weak to stand, low BP, rapid pulse)   Negative: Difficult to awaken or acting confused (e.g., disoriented, slurred speech)   Negative: [1] Fainted > 15 minutes ago AND [2] still feels too weak or dizzy to stand   Negative: [1] SEVERE weakness (i.e., unable to walk or barely able to walk, requires support) AND [2] new-onset or worsening   Negative: Sounds like a life-threatening emergency to the triager   Negative: Difficulty breathing   Negative: Heart beating < 50 beats per minute OR > 140 beats per minute   Negative: Extra heartbeats, irregular heart beating, or heart is beating very fast  (i.e., "palpitations")   Negative: Follows large amount of bleeding (e.g., from vomiting, rectum, vagina  (Exception: Small brief weakness from sight of a small amount blood.)   Negative: Black or tarry bowel movements   Negative: [1] Drinking very little AND [2] dehydration suspected (e.g., no urine > 12 hours, very dry mouth, very lightheaded)   Negative: Patient sounds very sick or weak to the triager   Negative: [1] MODERATE weakness (i.e., interferes with work, school, normal activities) AND [2] cause unknown  (Exceptions: Weakness from acute minor illness or poor fluid intake; weakness is chronic and not worse.)   Negative: [1] MODERATE weakness or fatigue AND [2] from poor fluid intake AND [3] no " improvement after 2 hours of rest and fluids   Negative: [1] Fever > 103 F (39.4 C) AND [2] not able to get the fever down using Fever Care Advice   Negative: [1] Fever > 101 F (38.3 C) AND [2] age > 60 years   Negative: [1] Fever > 100.0 F (37.8 C) AND [2] bedridden (e.g., CVA, chronic illness, recovering from surgery)   Negative: [1] Fever > 100.0 F (37.8 C) AND [2] diabetes mellitus or weak immune system (e.g., HIV positive, cancer chemo, splenectomy, organ transplant, chronic steroids)   Negative: Pale skin (pallor)   Negative: [1] MODERATE weakness (i.e., interferes with work, school, normal activities) AND [2] persists > 3 days    Protocols used: Weakness (Generalized) and Fatigue-A-AH

## 2023-12-01 NOTE — TELEPHONE ENCOUNTER
Spoke with patient . She wanted to let us know that she was seen in Urgent care and has tested Negative for Flu and Covid . She has picked up the Benadryl and will start that .

## 2023-12-01 NOTE — PROGRESS NOTES
RHEUMATOLOGY OUTPATIENT CLINIC NOTE    12/1/2023    Attending Rheumatologist: Perez Thomas  Primary Care Provider/Physician Requesting Consultation: Neel Krishnamurthy MD   Chief Complaint/Reason For Consultation:  Rheumatoid Arthritis      Subjective:     Ayesha Arcos is a 58 y.o. Black or  female with SPRA     Refers reaction of runny nose, myalgias, cough shortly after first Simponi aria infusion 3 days ago.  Still congested with nasal congestions.  Refers clear sputum and no fever.    Review of Systems   Constitutional:  Negative for fever.   HENT:  Positive for congestion. Negative for nosebleeds and sinus pain.    Eyes:  Negative for photophobia and pain.   Respiratory:  Positive for cough and sputum production. Negative for shortness of breath.    Cardiovascular:  Negative for chest pain.   Gastrointestinal:  Negative for blood in stool and melena.   Genitourinary:  Negative for dysuria, hematuria and urgency.   Musculoskeletal:  Positive for myalgias. Negative for joint pain.   Skin:  Negative for rash.   Neurological:  Negative for focal weakness, weakness and headaches (headache has resolved).       Chronic comorbid conditions affecting medical decision making today:  Past Medical History:   Diagnosis Date    Carpal tunnel syndrome, bilateral     Hypertension     Rheumatoid arthritis      Past Surgical History:   Procedure Laterality Date    CARPAL TUNNEL RELEASE      right    skin cancer removal      nose    TUBAL LIGATION       Family History   Problem Relation Age of Onset    Diabetes Mother     Heart disease Father     Hypertension Father     Early death Father         Early 50's    Stroke Paternal Uncle     Heart disease Paternal Uncle      Social History     Tobacco Use   Smoking Status Never   Smokeless Tobacco Never       Current Outpatient Medications:     ascorbic acid, vitamin C, (VITAMIN C) 100 MG tablet, Take 100 mg by mouth once daily., Disp: , Rfl:     FLAXSEED  OIL ORAL, Take by mouth., Disp: , Rfl:     folic acid (FOLVITE) 1 MG tablet, Take 1 tablet (1 mg total) by mouth once daily., Disp: 90 tablet, Rfl: 1    methotrexate 2.5 MG Tab, TAKE 10 TABLETS BY MOUTH EVERY 7 DAYS, Disp: 120 tablet, Rfl: 0    multivitamin capsule, Take 1 capsule by mouth once daily., Disp: , Rfl:     multivitamin with minerals (HAIR,SKIN AND NAILS ORAL), Take by mouth once daily., Disp: , Rfl:     promethazine-dextromethorphan (PROMETHAZINE-DM) 6.25-15 mg/5 mL Syrp, Take 5 mLs by mouth nightly as needed (Cough)., Disp: 120 mL, Rfl: 0    rosuvastatin (CRESTOR) 10 MG tablet, TAKE 1 TABLET(10 MG) BY MOUTH EVERY DAY, Disp: 90 tablet, Rfl: 2    TURMERIC ORAL, Take by mouth., Disp: , Rfl:     VUITY 1.25 % Drop, Place 1 drop into both eyes daily as needed., Disp: , Rfl:     valACYclovir (VALTREX) 1000 MG tablet, Take 1 tablet (1,000 mg total) by mouth 2 (two) times daily. for 10 days, Disp: 20 tablet, Rfl: 1     Objective:     Vitals:    12/01/23 1132   BP: (!) 132/92   Pulse: 94     Physical Exam   Eyes: Conjunctivae are normal.   Pulmonary/Chest: Effort normal.   Musculoskeletal:         General: Swelling and deformity present. No tenderness.   Neurological: She displays no weakness.   Skin: No rash noted.     Reviewed available old and all outside pertinent medical records available.    All lab results personally reviewed and interpreted by me.       ASSESSMENT      Encounter Diagnoses   Name Primary?    Nasal congestion     Cough, unspecified type     Immunosuppressed status     Seropositive rheumatoid arthritis Yes    Medication monitoring encounter     Sjogren syndrome with inflammatory arthritis       PLAN     URI symptoms shortly after first Simponi aria infusion 3 days ago (HA, nasal congestion, rhinorrhea, productive cough of clear sputum).  No fevers.  Refers interval resolution of HA, nasal congestion and cough persists, but improving.  Denies squeeze tenderness on exam, chronic synovitis  present.  No labs post infusion available.  Impression of viral URI, less likely allergic reaction to TNFi.  Will DC TNFi infusion, recommend symptomatic management for symptom relief, and suggest for patient to rule out viral infection for which she could receive antivirals for.  5 day course of Benadryl, suggest f/u with primary care to consider antibiotic therapy if negative viral testing and symptoms worsen.  Hold MTX this week, may resume in 10 days.  In view of rapid radiographic progression, will proceed with Orencia as alternative biologic therapy, at least 2 months after TNFi last received.  CXR, APR and pro-calcitonin today.  CBC, CMP close to f/u.    Perez Thomas M.D.

## 2023-12-01 NOTE — TELEPHONE ENCOUNTER
----- Message from Jaclyn Anderson sent at 12/1/2023  3:45 PM CST -----  Name of Who is Calling:Patient           What is the request in detail:requesting to speak with nurse regarding a follow up           Can the clinic reply by [facility]:no           What Number to Call Back if not in [facility]: [phone]

## 2023-12-01 NOTE — TELEPHONE ENCOUNTER
----- Message from Ethel Gallardo MA sent at 12/1/2023 10:18 AM CST -----    ----- Message -----  From: Raisa Christian MA  Sent: 12/1/2023  10:14 AM CST  To: Myrna Benitez, RN; Ethel Gallardo MA    I thinks these questions need to be answered by MD  or Rheumatology Medical staff.  She is scheduled to be seen by Dr Thomas 12/1 for side effects.  Thanks for you help  ----- Message -----  From: Ethel Gallardo MA  Sent: 11/30/2023   4:10 PM CST  To: [facility] Staff      ----- Message -----  From: Carmen Cadet  Sent: 11/30/2023   9:35 AM CST  To: [facility] Staff    Pt is requesting a call back in regards to how infusion will be moving forward ,or how often.Please call back at  [phone].thanks

## 2023-12-04 ENCOUNTER — TELEPHONE (OUTPATIENT)
Dept: INFUSION THERAPY | Facility: HOSPITAL | Age: 58
End: 2023-12-04
Payer: COMMERCIAL

## 2023-12-04 ENCOUNTER — PATIENT MESSAGE (OUTPATIENT)
Dept: INFUSION THERAPY | Facility: HOSPITAL | Age: 58
End: 2023-12-04
Payer: COMMERCIAL

## 2023-12-04 NOTE — TELEPHONE ENCOUNTER
----- Message from Perez Thomas MD sent at 12/1/2023 12:42 PM CST -----  Questionable allergic reaction to Simponi aria vs upper respiratory syndrome, shortly after Simponi aria.  Suggest replacing by Orencia, with OK to receive at least 2 months since last TNFi infusion.   Normal

## 2023-12-04 NOTE — TELEPHONE ENCOUNTER
----- Message from Raisa Christian MA sent at 12/1/2023 10:09 AM CST -----  I thinks these questions need to be answered by MD  or Rheumatology Medical staff.  She is scheduled to be seen by Dr Thomas 12/1 for side effects.  Thanks for you help  ----- Message -----  From: Ethel Gallardo MA  Sent: 11/30/2023   4:10 PM CST  To: [facility] Support Staff      ----- Message -----  From: Carmen Cadet  Sent: 11/30/2023   9:35 AM CST  To: [facility] Staff    Pt is requesting a call back in regards to how infusion will be moving forward ,or how often.Please call back at  [phone].thanks

## 2023-12-05 ENCOUNTER — TELEPHONE (OUTPATIENT)
Dept: INFUSION THERAPY | Facility: HOSPITAL | Age: 58
End: 2023-12-05
Payer: COMMERCIAL

## 2023-12-05 NOTE — TELEPHONE ENCOUNTER
"Perez Thomas MD McDonald, Melanie C., RN  Patient may continue biologic infusion at her preferred independent infusion room location, approved by insurance.          Previous Messages       ----- Message -----  From: Myrna Benitez RN  Sent: 11/28/2023   1:42 PM CST  To: Perez Thomas MD  Subject: RE: SIMPONI ARIA DENIED.                        Pt is approved to receive one infusion here at the [facility] then we will refer her out to an independent infusion room. Either [facility] or [facility] if approved by Dr Thomas.  ----- Message -----  From: Perez Thomas MD  Sent: 11/27/2023   8:59 AM CST  To: Myrna Benitez, LISSETTE  Subject: RE: SIMPONI ARIA DENIED.                        Please let me know if I have to place different orders for the patient to continue to receive infusion therapy, wherever it was approved for her to receive it.  ----- Message -----  From: Myrna Benitez RN  Sent: 11/20/2023  12:08 PM CST  To: Rama Hooks RN; *  Subject: RE: SIMPONI ARIA DENIED.                        Antonia Ontiveros.  ----- Message -----  From: Rama Hooks RN  Sent: 11/20/2023  11:58 AM CST  To: Myrna Benitez, RN; *  Subject: RE: SIMPONI ARIA DENIED.                        Good afternoon, Ms. Arcos' insurance only approved her turnover decision for one visit.  The subsequent visits must be at an off campus setting.  Site of care stipulation. I have sent an in-basket to the medication access team regarding this. ThanksRama  ----- Message -----  From: Angelique Gibbs CMA  Sent: 11/20/2023   9:56 AM CST  To: Rama Hooks RN; *  Subject: RE: SIMPONI ARIA DENIED.                        Good morning everyone, Dr. Thomas actually did the peer to peer for her on Thursday evening 11/16/2023 and it was approved. They are supposed to be faxing over the final determination by fax. I have not seen it yet and am working in Sports Medicine today.    Angelique Owens" " "JULISSA Gibbs  Rheumatology Department    ----- Message -----  From: Myrna Benitez RN  Sent: 11/20/2023   8:48 AM CST  To: Rama Hooks RN; *  Subject: RE: SIMPONI ARIA DENIED.                        I called and set it up for last Friday 11/17. I haven't heard back from Dr Thomas re: the outcome  ----- Message -----  From: Simona Varela LPN  Sent: 11/20/2023   8:06 AM CST  To: Rama Hooks RN; *  Subject: RE: SIMPONI ARIA DENIED.                        Good Morning,    Just checking in to see if it was decided on if Dr. Thomas would like to complete the peer to peer for Ms. Arcos' denied Simponi?    ThanksSimona  ----- Message -----  From: Rama Hooks RN  Sent: 11/16/2023  10:38 AM CST  To: Simona Varela LPN; Perez Thomas MD  Subject: SIMPONI ARIA DENIED.                            Good morning,         Mr. Raymond insurance has denied the Simponi Aria request because, as stated by the nurse reviewer, "As per MD's progress notes on 11/07/2023, it was stated that no flares while on Rinvoq & methotrexate.  Therefore, patient has not had a failure."  A peer to peer may be performed by calling [phone], Ref case: [id number], [id number].  I have attached a copy of denial letter.    ThanksRama   "

## 2024-01-19 ENCOUNTER — OFFICE VISIT (OUTPATIENT)
Dept: URGENT CARE | Facility: CLINIC | Age: 59
End: 2024-01-19
Payer: COMMERCIAL

## 2024-01-19 VITALS
TEMPERATURE: 99 F | HEART RATE: 100 BPM | BODY MASS INDEX: 24.94 KG/M2 | DIASTOLIC BLOOD PRESSURE: 84 MMHG | RESPIRATION RATE: 20 BRPM | WEIGHT: 155.19 LBS | SYSTOLIC BLOOD PRESSURE: 147 MMHG | HEIGHT: 66 IN | OXYGEN SATURATION: 99 %

## 2024-01-19 DIAGNOSIS — U07.1 COVID-19 VIRUS INFECTION: Primary | ICD-10-CM

## 2024-01-19 DIAGNOSIS — J00 ACUTE RHINITIS: ICD-10-CM

## 2024-01-19 LAB
CTP QC/QA: YES
CTP QC/QA: YES
POC MOLECULAR INFLUENZA A AGN: NEGATIVE
POC MOLECULAR INFLUENZA B AGN: NEGATIVE
SARS-COV-2 AG RESP QL IA.RAPID: POSITIVE

## 2024-01-19 PROCEDURE — 87811 SARS-COV-2 COVID19 W/OPTIC: CPT | Mod: QW,S$GLB,, | Performed by: PHYSICIAN ASSISTANT

## 2024-01-19 PROCEDURE — 87502 INFLUENZA DNA AMP PROBE: CPT | Mod: QW,S$GLB,, | Performed by: PHYSICIAN ASSISTANT

## 2024-01-19 PROCEDURE — 99214 OFFICE O/P EST MOD 30 MIN: CPT | Mod: S$GLB,,, | Performed by: PHYSICIAN ASSISTANT

## 2024-01-19 RX ORDER — FLUTICASONE PROPIONATE 50 MCG
1 SPRAY, SUSPENSION (ML) NASAL DAILY
Qty: 16 ML | Refills: 0 | Status: SHIPPED | OUTPATIENT
Start: 2024-01-19

## 2024-01-19 NOTE — LETTER
[address] ? [place], [address] ? Phone [phone] ? Fax             Return to Work/School    Patient: Ayesha Arcos  YOB: 1965   Date: 01/19/2024      To Whom It May Concern:     Ayesha Arcos was in contact with/seen in my office on 01/19/2024. COVID-19 is present in our communities across the [facility]. Not all patients are eligible or appropriate to be tested. In this situation, she meets the following criteria:     Ayesha Arcos has met the criteria for COVID-19 testing and has a POSITIVE result. She can return to work/school once they are asymptomatic for 24 hours without the use of fever reducing medications AND at least 5 days from the start of symptoms- May return on 01/22/24.  Patient does NOT need to be re-tested to return to work/school.        If you have any questions or concerns, or if I can be of further assistance, please do not hesitate to contact me.     Sincerely,    Chantal Baugh PA-C

## 2024-01-19 NOTE — PATIENT INSTRUCTIONS
Discontinue taking Rosuvastatin while taking Paxlovid.     Use over the counter(OTC) antihistamine like claritin or zyrtec daily.  Flonase: 2 sprays per nostril 1-2 times a day.   Nasal saline drops: 2-4 drops per nostril 10-15 times a day.      Tylenol or Motrin for fever or pain per label instructions.  Consider use of OTC cough medicine like Robitussin DM.  Warm saltwater gargles to 3 times a day.     Rest and drink plenty of fluids.  Avoid OTC decongestants if you have high blood pressure. This includes multi-cold symptom preparations.     Consider permissive fever to allow your immune system to work.  However, if fever is not tolerable or is disrupting sleep, use Tylenol or Motrin per label instructions.     You are considered contagious until your systemic, or whole body, symptoms have resolved fully for 24 hours.  This includes fever, body aches, fatigue.     Follow up in 2-3 days with PCP if no improvement or any worsening.

## 2024-01-19 NOTE — PROGRESS NOTES
"Subjective:      Patient ID: Ayesha Arcos is a 58 y.o. female.    Vitals:  height is 5' 6.02" (1.677 m) and weight is 70.4 kg (155 lb 2.6 oz). Her tympanic temperature is 99.3 °F (37.4 °C). Her blood pressure is 147/84 (abnormal) and her pulse is 100. Her respiration is 20 and oxygen saturation is 99%.     Chief Complaint: Sinus Problem    Pt presented here today with sneezing and runny nose x 2 days. Has taken 1 dose of otc Dayquil. Pt receives infusions for RA. States she had similar symptoms last month after given infusion. She came to  at that time to r/o viral illness. States she has another infusion coming up in a few days so she wanted to be evaluated prior. No fevers.     Sinus Problem  This is a new problem. The current episode started yesterday. The problem is unchanged. There has been no fever. Her pain is at a severity of 0/10. She is experiencing no pain. Associated symptoms include congestion and sneezing. Pertinent negatives include no chills, coughing, diaphoresis, ear pain, headaches, hoarse voice, neck pain, shortness of breath, sinus pressure, sore throat or swollen glands. Past treatments include oral decongestants.       Constitution: Negative for chills, sweating and fever.   HENT:  Positive for congestion. Negative for ear pain, sinus pain, sinus pressure and sore throat.    Neck: Negative for neck pain.   Cardiovascular: Negative.    Respiratory:  Negative for cough and shortness of breath.    Gastrointestinal: Negative.    Allergic/Immunologic: Positive for sneezing.   Neurological:  Negative for headaches.      Objective:     Physical Exam   Constitutional: She appears well-developed.  Non-toxic appearance. She does not appear ill. No distress.   HENT:   Head: Normocephalic and atraumatic.   Ears:   Right Ear: External ear normal.   Left Ear: External ear normal.   Nose: Congestion present.   Eyes: Conjunctivae and EOM are normal.   Neck: Neck supple.   Pulmonary/Chest: Effort " normal and breath sounds normal.   Abdominal: Normal appearance.   Musculoskeletal: Normal range of motion.         General: Normal range of motion.   Neurological: no focal deficit. She is alert. She displays no weakness. Gait normal.   Skin: Skin is warm, dry, not diaphoretic, not pale and no rash.   Psychiatric: Her behavior is normal.     Results for orders placed or performed in visit on 01/19/24   POCT Influenza A/B Molecular   Result Value Ref Range    POC Molecular Influenza A Ag Negative Negative, Not Reported    POC Molecular Influenza B Ag Negative Negative, Not Reported     Acceptable Yes    SARS Coronavirus 2 Antigen, POCT Manual Read   Result Value Ref Range    SARS Coronavirus 2 Antigen Positive (A) Negative     Acceptable Yes          Assessment:     1. COVID-19 virus infection    2. Acute rhinitis        Plan:     - Rapid COVID-19 positive    - Covid Risk Score:  1  Pt considered moderate risk w/hx of RA and therefor qualifies for Paxlovid.  Pt was advised to discontinue Rosuvastatin while taking Paxlovid due to drug-to-drug interactions.   - Advised patient to stay home and self quarantine for 5 days from onset of symptoms. Advised must be fever free for at least 24 hours to discontinue isolation.  - Tylenol and/or NSAIDs as needed for fever/pain control.   - OTC medications prn for cold symptoms- Recommended daily antihistamine and flonase.   - Rest and drink plenty of fluids.  - Strict ED precautions given for any emergent symptoms.    COVID-19 virus infection  -     nirmatrelvir-ritonavir 300 mg (150 mg x 2)-100 mg copackaged tablets (EUA); Take 3 tablets by mouth 2 (two) times daily for 5 days. Each dose contains 2 nirmatrelvir (pink tablets) and 1 ritonavir (white tablet). Take all 3 tablets together  Dispense: 30 tablet; Refill: 0    Acute rhinitis  -     POCT Influenza A/B Molecular  -     SARS Coronavirus 2 Antigen, POCT Manual Read  -     fluticasone  propionate (FLONASE) 50 mcg/actuation nasal spray; 1 spray (50 mcg total) by Each Nostril route once daily.  Dispense: 16 mL; Refill: 0

## 2024-01-24 ENCOUNTER — INFUSION (OUTPATIENT)
Dept: INFUSION THERAPY | Facility: HOSPITAL | Age: 59
End: 2024-01-24
Attending: INTERNAL MEDICINE
Payer: COMMERCIAL

## 2024-01-24 ENCOUNTER — TELEPHONE (OUTPATIENT)
Dept: INFUSION THERAPY | Facility: HOSPITAL | Age: 59
End: 2024-01-24
Payer: COMMERCIAL

## 2024-01-24 VITALS
SYSTOLIC BLOOD PRESSURE: 143 MMHG | HEIGHT: 66 IN | RESPIRATION RATE: 18 BRPM | BODY MASS INDEX: 24.94 KG/M2 | OXYGEN SATURATION: 99 % | WEIGHT: 155.19 LBS | HEART RATE: 84 BPM | TEMPERATURE: 98 F | DIASTOLIC BLOOD PRESSURE: 88 MMHG

## 2024-01-24 DIAGNOSIS — M05.9 SEROPOSITIVE RHEUMATOID ARTHRITIS: Primary | ICD-10-CM

## 2024-01-24 PROCEDURE — 25000003 PHARM REV CODE 250: Performed by: INTERNAL MEDICINE

## 2024-01-24 PROCEDURE — 63600175 PHARM REV CODE 636 W HCPCS: Mod: JZ,JA,JG | Performed by: INTERNAL MEDICINE

## 2024-01-24 PROCEDURE — 96365 THER/PROPH/DIAG IV INF INIT: CPT

## 2024-01-24 RX ORDER — SODIUM CHLORIDE 0.9 % (FLUSH) 0.9 %
10 SYRINGE (ML) INJECTION
Status: CANCELLED | OUTPATIENT
Start: 2024-02-07

## 2024-01-24 RX ORDER — HEPARIN 100 UNIT/ML
500 SYRINGE INTRAVENOUS
Status: CANCELLED | OUTPATIENT
Start: 2024-02-07

## 2024-01-24 RX ORDER — EPINEPHRINE 0.3 MG/.3ML
0.3 INJECTION SUBCUTANEOUS ONCE AS NEEDED
Status: CANCELLED | OUTPATIENT
Start: 2024-02-07

## 2024-01-24 RX ORDER — ACETAMINOPHEN 325 MG/1
650 TABLET ORAL
Status: CANCELLED | OUTPATIENT
Start: 2024-02-07

## 2024-01-24 RX ORDER — DIPHENHYDRAMINE HYDROCHLORIDE 50 MG/ML
50 INJECTION INTRAMUSCULAR; INTRAVENOUS ONCE AS NEEDED
Status: CANCELLED | OUTPATIENT
Start: 2024-02-07

## 2024-01-24 RX ORDER — DIPHENHYDRAMINE HYDROCHLORIDE 50 MG/ML
25 INJECTION INTRAMUSCULAR; INTRAVENOUS
Status: CANCELLED | OUTPATIENT
Start: 2024-02-07

## 2024-01-24 RX ORDER — METHYLPREDNISOLONE SOD SUCC 125 MG
40 VIAL (EA) INJECTION
Status: CANCELLED | OUTPATIENT
Start: 2024-02-07

## 2024-01-24 RX ADMIN — SODIUM CHLORIDE 750 MG: 9 INJECTION, SOLUTION INTRAVENOUS at 10:01

## 2024-01-24 NOTE — PLAN OF CARE
"Pt is stable. Pt administered Orencia today. Pt voiced she was doing "good," today. POC discussed and no concerns noted. Pt will be referred out to out pt infuison.      Problem: Adult Inpatient Plan of Care  Goal: Plan of Care Review  Outcome: Ongoing, Progressing  Goal: Patient-Specific Goal (Individualized)  Outcome: Ongoing, Progressing  Flowsheets (Taken 1/24/2024 1057)  Anxieties, Fears or Concerns: Pt denies.  Individualized Care Needs:   Pt provided pillow   legs elevated in reclined position. Refreshments provided.  Goal: Optimal Comfort and Wellbeing  Outcome: Ongoing, Progressing     Problem: Infection  Goal: Absence of Infection Signs and Symptoms  Outcome: Ongoing, Progressing     Problem: Fall Injury Risk  Goal: Absence of Fall and Fall-Related Injury  Outcome: Ongoing, Progressing     "

## 2024-01-24 NOTE — TELEPHONE ENCOUNTER
Informed pt that her insurance has given authorization for the first dose of Orencia then must be referred out to a free standing out patient infusion room. Referral sent to our out pt infusion on [facility]. They will begin the auth process and contact the pt.   Ms. Arcos verbalized understanding.

## 2024-01-24 NOTE — DISCHARGE INSTRUCTIONS
Thank you for allowing me to care for you today,  LUDWIG TeixeiraN, RN    [facility]  [address] [address]  [phone] phone     [phone] fax  Hours of Operation: Monday- Friday 7:00am- 5:30pm  After hours phone  [phone]  Hematology / Oncology Physicians on call      [address] Dr., Dr., ADALGISA Lopez, ADALGISA Ramirez, ADALGISA    Please call with any concerns regarding your appointment today.   FALL PREVENTION   Falls often occur due to slipping, tripping or losing your balance. Here are ways to reduce your risk of falling again.   Was there anything that caused your fall that can be fixed, removed or replaced?   Make your home safe by keeping walkways clear of objects you may trip over.   Use non-slip pads under rugs.   Do not walk in poorly lit areas.   Do not stand on chairs or wobbly ladders.   Use caution when reaching overhead or looking upward. This position can cause a loss of balance.   Be sure your shoes fit properly, have non-slip bottoms and are in good condition.   Be cautious when going up and down stairs, curbs, and when walking on uneven sidewalks.   If your balance is poor, consider using a cane or walker.   If your fall was related to alcohol use, stop or limit alcohol intake.   If your fall was related to use of sleeping medicines, talk to your doctor about this. You may need to reduce your dosage at bedtime if you awaken during the night to go to the bathroom.   To reduce the need for nighttime bathroom trips:   Avoid drinking fluids for several hours before going to bed   Empty your bladder before going to bed   Men can keep a urinal at the bedside   © 0307-2805 [employer], [address] [place] [address], [place] [address]. All rights reserved. This information is not intended as a substitute for professional medical care. Always follow your  healthcare professional's instructions.

## 2024-02-07 ENCOUNTER — PATIENT MESSAGE (OUTPATIENT)
Dept: PSYCHIATRY | Facility: CLINIC | Age: 59
End: 2024-02-07
Payer: COMMERCIAL

## 2024-02-07 ENCOUNTER — TELEPHONE (OUTPATIENT)
Dept: PSYCHIATRY | Facility: CLINIC | Age: 59
End: 2024-02-07
Payer: COMMERCIAL

## 2024-02-07 NOTE — TELEPHONE ENCOUNTER
----- Message from Aldo Mcclure sent at 2/7/2024  3:21 PM CST -----  Contact: Ayesha Martin is calling in regards to  not hearing back about location of therapy session. Please call back at [phone]                          Thanks  KT

## 2024-02-20 ENCOUNTER — TELEPHONE (OUTPATIENT)
Dept: RHEUMATOLOGY | Facility: CLINIC | Age: 59
End: 2024-02-20
Payer: COMMERCIAL

## 2024-02-20 ENCOUNTER — OFFICE VISIT (OUTPATIENT)
Dept: RHEUMATOLOGY | Facility: CLINIC | Age: 59
End: 2024-02-20
Payer: COMMERCIAL

## 2024-02-20 VITALS
HEART RATE: 84 BPM | DIASTOLIC BLOOD PRESSURE: 95 MMHG | BODY MASS INDEX: 24.73 KG/M2 | WEIGHT: 153.88 LBS | SYSTOLIC BLOOD PRESSURE: 145 MMHG | HEIGHT: 66 IN

## 2024-02-20 DIAGNOSIS — D84.821 IMMUNOSUPPRESSION DUE TO DRUG THERAPY: ICD-10-CM

## 2024-02-20 DIAGNOSIS — R76.8 POSITIVE ANA (ANTINUCLEAR ANTIBODY): ICD-10-CM

## 2024-02-20 DIAGNOSIS — M35.05 SJOGREN SYNDROME WITH INFLAMMATORY ARTHRITIS: ICD-10-CM

## 2024-02-20 DIAGNOSIS — M05.9 SEROPOSITIVE RHEUMATOID ARTHRITIS: Primary | ICD-10-CM

## 2024-02-20 DIAGNOSIS — Z79.899 IMMUNOSUPPRESSION DUE TO DRUG THERAPY: ICD-10-CM

## 2024-02-20 DIAGNOSIS — Z51.81 MEDICATION MONITORING ENCOUNTER: ICD-10-CM

## 2024-02-20 PROCEDURE — 3008F BODY MASS INDEX DOCD: CPT | Mod: CPTII,S$GLB,, | Performed by: INTERNAL MEDICINE

## 2024-02-20 PROCEDURE — 3080F DIAST BP >= 90 MM HG: CPT | Mod: CPTII,S$GLB,, | Performed by: INTERNAL MEDICINE

## 2024-02-20 PROCEDURE — 99214 OFFICE O/P EST MOD 30 MIN: CPT | Mod: S$GLB,,, | Performed by: INTERNAL MEDICINE

## 2024-02-20 PROCEDURE — 99999 PR PBB SHADOW E&M-EST. PATIENT-LVL III: CPT | Mod: PBBFAC,,, | Performed by: INTERNAL MEDICINE

## 2024-02-20 PROCEDURE — 3077F SYST BP >= 140 MM HG: CPT | Mod: CPTII,S$GLB,, | Performed by: INTERNAL MEDICINE

## 2024-02-20 PROCEDURE — 1159F MED LIST DOCD IN RCRD: CPT | Mod: CPTII,S$GLB,, | Performed by: INTERNAL MEDICINE

## 2024-02-20 RX ORDER — TRIAMCINOLONE ACETONIDE 1 MG/G
OINTMENT TOPICAL 2 TIMES DAILY PRN
COMMUNITY
Start: 2024-01-24

## 2024-02-20 NOTE — TELEPHONE ENCOUNTER
----- Message from Alberta Benitez sent at 2/20/2024 10:12 AM CST -----  Regarding: RE: ORCENIA REFERRAL  Myrna    Tyson yes 02/07 it was a typo on my end. Once we are able to review benefits we can get her Set up ASAP. If she could return our call to [phone] option 2.    Thanks  Alberta   ----- Message -----  From: Myrna Benitez RN  Sent: 2/20/2024  10:06 AM CST  To: Perez Thomas MD; Luann Sweeney RN; #  Subject: RE: ORCENIA REFERRAL                             I will try to contact the pt today and let her know you were trying to contact her.   Her next due was due 2/7.  ----- Message -----  From: Alberta Benitez  Sent: 2/20/2024  10:01 AM CST  To: Myrna Bneitez RN; Perez Thomas MD; #  Subject: ORCENIA REFERRAL                                 Her insurance has approved Orcenia infusions at our infusion suites. We have tried reaching out to her to review insurance benefits and her confirmation of acceptance. We have her NDD as 03/07/2024.    Thanks,  Alberta

## 2024-02-20 NOTE — Clinical Note
Good morning!  Could you help me establishing infusion out of site for Ms. Arcos? Thank you in advance.

## 2024-02-20 NOTE — TELEPHONE ENCOUNTER
Alberta Benitez Melanie C., RN  Her authorization is still pending. I have asked the team to deep as STAT. Will keep you posted.    Alberta Knight          Previous Messages       ----- Message -----  From: Myrna Benitez RN  Sent: 2/6/2024   1:59 PM CST  To: Alberta Emmanuel  Subject: RE: ORENCIA ONLY ONE VISIT.                      Yes, she was only approved for 1 visit here. And her loading doses are every 2 weeks x 3 doses. She got her first one on 1/24, next is due 2/07.  ----- Message -----  From: Alberta Benitez  Sent: 2/6/2024   1:52 PM CST  To: Myrna Benitez RN  Subject: RE: ORENCIA ONLY ONE VISIT.                      Libra Norris,    I have her down for her next infusion due on 03/07/2024. Is this incorrect?    Alberta Knight  ----- Message -----  From: Myrna Benitez RN  Sent: 2/6/2024  11:17 AM CST  To: Alberta Emmanuel  Subject: RE: ORENCIA ONLY ONE VISIT.                      Good Morning Alberta,   Any word on authorization for this pt to get Orencia at the [facility] infusion room? She is actually due tomorrow 2/07. thanks  ----- Message -----  From: Alberta Benitez  Sent: 12/20/2023   3:58 PM CST  To: Myrna Benitez RN  Subject: RE: ORENCIA ONLY ONE VISIT.                      Antonia Norris    ----- Message -----  From: Myrna Benitez RN  Sent: 12/20/2023  11:57 AM CST  To: Alberta Benitez  Subject: FW: ORENCIA ONLY ONE VISIT.                      This pt will get her first dose of Orencia on 1/24 at the [facility]. THEN  we will refer to out pt infusion. Myrna Knight  ----- Message -----  From: Rama Hooks RN  Sent: 12/19/2023   9:56 AM CST  To: Myrna Benitez RN; Perez Thomas MD; *  Subject: ORENCIA ONLY ONE VISIT.                          Good morning,  Nurse reviewer just called me to let me know that patient was ONLY approved ONE visit for Orencia in the outpatient hospital setting from 12/19/2023 - 01/19/2024.  After which, she will need to  receive elsewhere that is less costly.  Thanks.

## 2024-02-20 NOTE — PROGRESS NOTES
RHEUMATOLOGY OUTPATIENT CLINIC NOTE    2/20/2024    Attending Rheumatologist: Perez Thomas  Primary Care Provider/Physician Requesting Consultation: Tatyana Rivera MD   Chief Complaint/Reason For Consultation:  Rheumatoid Arthritis and Sjogrens      Subjective:     Ayesha Arcos is a 58 y.o. Black or  female with SPRA, SjS    No acute complaints.  Tolerating first Orencia infusion late January w/o side effects    Addendum 3/14: I am asked to sign a document to verify Cimzia was prescribed for this patient by me.  Patient has tries DMARD, escalated to Pablo (notable needle phobia).  In view of rapid radiographic progression and pencil in cup deformities, Pablo replaced by simponi IV.  Developed URI sx shortly after infusion, proceeded to change to Orencia (1/2024-).  Cimzia was not prescribed by me for this patient.  Document not signed, [facility] and staff informed.    Review of Systems   Constitutional:  Negative for fever.   Eyes:  Negative for photophobia and pain.   Respiratory:  Negative for cough and shortness of breath.    Cardiovascular:  Negative for chest pain.   Gastrointestinal:  Negative for blood in stool and melena.   Genitourinary:  Negative for dysuria and urgency.   Musculoskeletal:  Negative for joint pain.   Skin:  Negative for rash.   Neurological:  Negative for focal weakness and weakness.       Chronic comorbid conditions affecting medical decision making today:  Past Medical History:   Diagnosis Date    Carpal tunnel syndrome, bilateral     Hypertension     Rheumatoid arthritis      Past Surgical History:   Procedure Laterality Date    CARPAL TUNNEL RELEASE      right    skin cancer removal      nose    TUBAL LIGATION       Family History   Problem Relation Age of Onset    Diabetes Mother     Heart disease Father     Hypertension Father     Early death Father         Early 50's    Stroke Paternal Uncle     Heart disease Paternal Uncle      Social History      Tobacco Use   Smoking Status Never   Smokeless Tobacco Never       Current Outpatient Medications:     ascorbic acid, vitamin C, (VITAMIN C) 100 MG tablet, Take 100 mg by mouth once daily., Disp: , Rfl:     FLAXSEED OIL ORAL, Take by mouth., Disp: , Rfl:     fluticasone propionate (FLONASE) 50 mcg/actuation nasal spray, 1 spray (50 mcg total) by Each Nostril route once daily., Disp: 16 mL, Rfl: 0    folic acid (FOLVITE) 1 MG tablet, Take 1 tablet (1 mg total) by mouth once daily., Disp: 90 tablet, Rfl: 1    methotrexate 2.5 MG Tab, TAKE 10 TABLETS BY MOUTH EVERY 7 DAYS, Disp: 120 tablet, Rfl: 0    multivitamin capsule, Take 1 capsule by mouth once daily., Disp: , Rfl:     multivitamin with minerals (HAIR,SKIN AND NAILS ORAL), Take by mouth once daily., Disp: , Rfl:     promethazine-dextromethorphan (PROMETHAZINE-DM) 6.25-15 mg/5 mL Syrp, Take 5 mLs by mouth nightly as needed (Cough)., Disp: 120 mL, Rfl: 0    rosuvastatin (CRESTOR) 10 MG tablet, TAKE 1 TABLET(10 MG) BY MOUTH EVERY DAY, Disp: 90 tablet, Rfl: 2    triamcinolone acetonide 0.1% (KENALOG) 0.1 % ointment, Apply topically 2 (two) times daily as needed., Disp: , Rfl:     TURMERIC ORAL, Take by mouth., Disp: , Rfl:     valACYclovir (VALTREX) 1000 MG tablet, Take 1 tablet (1,000 mg total) by mouth 2 (two) times daily. for 10 days, Disp: 20 tablet, Rfl: 1    VUITY 1.25 % Drop, Place 1 drop into both eyes daily as needed., Disp: , Rfl:      Objective:     Vitals:    02/20/24 0751   BP: (!) 145/95   Pulse: 84     Physical Exam   Pulmonary/Chest: No respiratory distress.   Musculoskeletal:         General: Swelling and deformity present. No tenderness.   Skin: No rash noted.       Reviewed available old and all outside pertinent medical records available.    All lab results personally reviewed and interpreted by me.       ASSESSMENT      Encounter Diagnoses   Name Primary?    Seropositive rheumatoid arthritis Yes    Sjogren syndrome with inflammatory arthritis      Positive MOLLY (antinuclear antibody)     Immunosuppression due to drug therapy     Medication monitoring encounter       PLAN     - CDAI; moderate disease activity.  Rapid radiographic progression, unresponsive to DMARD and Pablo.  URI w/ TNFi IV (simponi iv).  Started Orencia IV 1/2024-  - chronic synovitis present, no squeeze tenderness reported.  - blood work w/o toxicity from biologic or CI to continue therapy  - CXR w/o evidence of ILD  - monitor response to Orencia IV at least 3 months after continuous therapy.  Reminder to hold biologic in setting of active infections.  Required to receive infusion out of site due to insurance.  - blood work to monitor for toxicity / CI to rx  - plan to update XR hands after 10/2024    Perez Thomas M.D.

## 2024-02-20 NOTE — TELEPHONE ENCOUNTER
----- Message from Alberta Benitez sent at 2/20/2024 10:12 AM CST -----  Regarding: RE: ORCENIA REFERRAL  Myrna    Tyson yes 02/07 it was a typo on my end. Once we are able to review benefits we can get her Set up ASAP. If she could return our call to [phone] option 2.    Thanks  Alberta   ----- Message -----  From: Myrna Benitez RN  Sent: 2/20/2024  10:06 AM CST  To: Perez Thomas MD; Luann Sweeney RN; #  Subject: RE: ORCENIA REFERRAL                             I will try to contact the pt today and let her know you were trying to contact her.   Her next due was due 2/7.  ----- Message -----  From: Alberta Benitez  Sent: 2/20/2024  10:01 AM CST  To: Myrna Benitez RN; Perez Thomas MD; #  Subject: ORCENIA REFERRAL                                 Her insurance has approved Orcenia infusions at our infusion suites. We have tried reaching out to her to review insurance benefits and her confirmation of acceptance. We have her NDD as 03/07/2024.    Thanks,  Alberta

## 2024-02-27 ENCOUNTER — TELEPHONE (OUTPATIENT)
Dept: RHEUMATOLOGY | Facility: CLINIC | Age: 59
End: 2024-02-27
Payer: COMMERCIAL

## 2024-02-27 NOTE — TELEPHONE ENCOUNTER
----- Message from Myrna Benitez RN sent at 2/22/2024  1:12 PM CST -----  Hi Dr Thomas, so sorry for the confusion.    Insurance approved 1 dose for the [place]. Then we referred her to the [facility] infusion center on [facility]. They have attempted to contact the pt several times. I called Ms Arcos and she said that she did not answer the phone because she did not recognize the phone number. She is now communicating them but does not want to start treatment until she has been enrolled in [employer] assistance. You signed the application yesterday and it was faxed then. As soon as pt is enrolled, she will resume her loading doses with the out pt infusion center. I hope this helps. Thanks  ----- Message -----  From: Perez Thomas MD  Sent: 2/22/2024   1:08 PM CST  To: Myrna Benitez RN; William Todd Staff    I received documentation of infusion been approved, but I am not sure.  Adding my staff to this message so the help me with the answer.    ----- Message -----  From: Myrna Benitez RN  Sent: 2/20/2024   9:23 AM CST  To: Perez Thomas MD; Alberta Emmanuel    Is she still pending?    ----- Message -----  From: Perez Thomas MD  Sent: 2/20/2024   8:48 AM CST  To: Myrna Benitez RN    Good morning!  Could you help me establishing infusion out of site for Ms. Arcos? Thank you in advance.

## 2024-03-15 ENCOUNTER — OFFICE VISIT (OUTPATIENT)
Dept: RHEUMATOLOGY | Facility: CLINIC | Age: 59
End: 2024-03-15
Payer: COMMERCIAL

## 2024-03-15 ENCOUNTER — LAB VISIT (OUTPATIENT)
Dept: LAB | Facility: HOSPITAL | Age: 59
End: 2024-03-15
Attending: INTERNAL MEDICINE
Payer: COMMERCIAL

## 2024-03-15 VITALS
HEART RATE: 81 BPM | SYSTOLIC BLOOD PRESSURE: 127 MMHG | DIASTOLIC BLOOD PRESSURE: 87 MMHG | WEIGHT: 151 LBS | BODY MASS INDEX: 24.27 KG/M2 | HEIGHT: 66 IN

## 2024-03-15 DIAGNOSIS — M05.9 SEROPOSITIVE RHEUMATOID ARTHRITIS: ICD-10-CM

## 2024-03-15 DIAGNOSIS — F40.298 NEEDLE PHOBIA: ICD-10-CM

## 2024-03-15 DIAGNOSIS — R76.8 POSITIVE ANA (ANTINUCLEAR ANTIBODY): ICD-10-CM

## 2024-03-15 DIAGNOSIS — Z51.81 MEDICATION MONITORING ENCOUNTER: ICD-10-CM

## 2024-03-15 DIAGNOSIS — M35.05 SJOGREN SYNDROME WITH INFLAMMATORY ARTHRITIS: ICD-10-CM

## 2024-03-15 DIAGNOSIS — M05.9 SEROPOSITIVE RHEUMATOID ARTHRITIS: Primary | ICD-10-CM

## 2024-03-15 DIAGNOSIS — Z79.899 IMMUNOSUPPRESSION DUE TO DRUG THERAPY: ICD-10-CM

## 2024-03-15 DIAGNOSIS — D84.821 IMMUNOSUPPRESSION DUE TO DRUG THERAPY: ICD-10-CM

## 2024-03-15 LAB
ALBUMIN SERPL BCP-MCNC: 3.8 G/DL (ref 3.5–5.2)
ALP SERPL-CCNC: 176 U/L (ref 55–135)
ALT SERPL W/O P-5'-P-CCNC: 22 U/L (ref 10–44)
ANION GAP SERPL CALC-SCNC: 9 MMOL/L (ref 8–16)
AST SERPL-CCNC: 23 U/L (ref 10–40)
BASOPHILS # BLD AUTO: 0.03 K/UL (ref 0–0.2)
BASOPHILS NFR BLD: 0.3 % (ref 0–1.9)
BILIRUB SERPL-MCNC: 0.5 MG/DL (ref 0.1–1)
BUN SERPL-MCNC: 12 MG/DL (ref 6–20)
CALCIUM SERPL-MCNC: 9.8 MG/DL (ref 8.7–10.5)
CHLORIDE SERPL-SCNC: 107 MMOL/L (ref 95–110)
CO2 SERPL-SCNC: 25 MMOL/L (ref 23–29)
CREAT SERPL-MCNC: 0.8 MG/DL (ref 0.5–1.4)
CRP SERPL-MCNC: 13.4 MG/L (ref 0–8.2)
DIFFERENTIAL METHOD BLD: ABNORMAL
EOSINOPHIL # BLD AUTO: 1.3 K/UL (ref 0–0.5)
EOSINOPHIL NFR BLD: 14.8 % (ref 0–8)
ERYTHROCYTE [DISTWIDTH] IN BLOOD BY AUTOMATED COUNT: 14.1 % (ref 11.5–14.5)
EST. GFR  (NO RACE VARIABLE): >60 ML/MIN/1.73 M^2
GLUCOSE SERPL-MCNC: 94 MG/DL (ref 70–110)
HCT VFR BLD AUTO: 41.8 % (ref 37–48.5)
HGB BLD-MCNC: 13.7 G/DL (ref 12–16)
IMM GRANULOCYTES # BLD AUTO: 0.01 K/UL (ref 0–0.04)
IMM GRANULOCYTES NFR BLD AUTO: 0.1 % (ref 0–0.5)
LYMPHOCYTES # BLD AUTO: 2.2 K/UL (ref 1–4.8)
LYMPHOCYTES NFR BLD: 24 % (ref 18–48)
MCH RBC QN AUTO: 27.3 PG (ref 27–31)
MCHC RBC AUTO-ENTMCNC: 32.8 G/DL (ref 32–36)
MCV RBC AUTO: 83 FL (ref 82–98)
MONOCYTES # BLD AUTO: 0.7 K/UL (ref 0.3–1)
MONOCYTES NFR BLD: 7.4 % (ref 4–15)
NEUTROPHILS # BLD AUTO: 4.8 K/UL (ref 1.8–7.7)
NEUTROPHILS NFR BLD: 53.4 % (ref 38–73)
NRBC BLD-RTO: 0 /100 WBC
PLATELET # BLD AUTO: 260 K/UL (ref 150–450)
PMV BLD AUTO: 10.1 FL (ref 9.2–12.9)
POTASSIUM SERPL-SCNC: 4.2 MMOL/L (ref 3.5–5.1)
PROT SERPL-MCNC: 7.6 G/DL (ref 6–8.4)
RBC # BLD AUTO: 5.02 M/UL (ref 4–5.4)
SODIUM SERPL-SCNC: 141 MMOL/L (ref 136–145)
WBC # BLD AUTO: 9 K/UL (ref 3.9–12.7)

## 2024-03-15 PROCEDURE — 99214 OFFICE O/P EST MOD 30 MIN: CPT | Mod: S$GLB,,, | Performed by: INTERNAL MEDICINE

## 2024-03-15 PROCEDURE — 99999 PR PBB SHADOW E&M-EST. PATIENT-LVL III: CPT | Mod: PBBFAC,,, | Performed by: INTERNAL MEDICINE

## 2024-03-15 PROCEDURE — 85025 COMPLETE CBC W/AUTO DIFF WBC: CPT | Performed by: INTERNAL MEDICINE

## 2024-03-15 PROCEDURE — 1159F MED LIST DOCD IN RCRD: CPT | Mod: CPTII,S$GLB,, | Performed by: INTERNAL MEDICINE

## 2024-03-15 PROCEDURE — 3079F DIAST BP 80-89 MM HG: CPT | Mod: CPTII,S$GLB,, | Performed by: INTERNAL MEDICINE

## 2024-03-15 PROCEDURE — 3074F SYST BP LT 130 MM HG: CPT | Mod: CPTII,S$GLB,, | Performed by: INTERNAL MEDICINE

## 2024-03-15 PROCEDURE — 36415 COLL VENOUS BLD VENIPUNCTURE: CPT | Performed by: INTERNAL MEDICINE

## 2024-03-15 PROCEDURE — 80053 COMPREHEN METABOLIC PANEL: CPT | Performed by: INTERNAL MEDICINE

## 2024-03-15 PROCEDURE — 3008F BODY MASS INDEX DOCD: CPT | Mod: CPTII,S$GLB,, | Performed by: INTERNAL MEDICINE

## 2024-03-15 PROCEDURE — 86140 C-REACTIVE PROTEIN: CPT | Performed by: INTERNAL MEDICINE

## 2024-03-15 RX ORDER — FOLIC ACID 1 MG/1
1 TABLET ORAL DAILY
Qty: 90 TABLET | Refills: 1 | Status: SHIPPED | OUTPATIENT
Start: 2024-03-15 | End: 2024-09-11

## 2024-03-15 RX ORDER — METHOTREXATE 2.5 MG/1
20 TABLET ORAL
Qty: 96 TABLET | Refills: 1 | Status: SHIPPED | OUTPATIENT
Start: 2024-03-15 | End: 2024-09-11

## 2024-03-15 NOTE — PROGRESS NOTES
RHEUMATOLOGY OUTPATIENT CLINIC NOTE    3/15/2024    Attending Rheumatologist: Peerz Thomas  Primary Care Provider/Physician Requesting Consultation: Tatyana Rivera MD   Chief Complaint/Reason For Consultation:  Rheumatoid Arthritis, sjogrens, and Positive MOLLY      Subjective:     Ayesha Arcos is a 58 y.o. Black or  female with SPRA    Tolerating orencia (1/24/24-) w/o side effects.  Refers self limited stiffness, but overall feels better than w lauryn farrella.    Addendum: I am asked to sign a document attesting validity of rinvoq script prescribed 2/2021    Review of Systems   Constitutional:  Negative for fever.   Eyes:  Negative for photophobia and pain.   Respiratory:  Negative for cough and shortness of breath.    Cardiovascular:  Negative for chest pain.   Gastrointestinal:  Negative for blood in stool and melena.   Genitourinary:  Negative for dysuria and urgency.   Musculoskeletal:  Negative for joint pain.   Skin:  Negative for rash.   Neurological:  Negative for focal weakness and weakness.       Chronic comorbid conditions affecting medical decision making today:  Past Medical History:   Diagnosis Date    Carpal tunnel syndrome, bilateral     Hypertension     Rheumatoid arthritis      Past Surgical History:   Procedure Laterality Date    CARPAL TUNNEL RELEASE      right    skin cancer removal      nose    TUBAL LIGATION       Family History   Problem Relation Age of Onset    Diabetes Mother     Heart disease Father     Hypertension Father     Early death Father         Early 50's    Stroke Paternal Uncle     Heart disease Paternal Uncle      Social History     Tobacco Use   Smoking Status Never   Smokeless Tobacco Never       Current Outpatient Medications:     ascorbic acid, vitamin C, (VITAMIN C) 100 MG tablet, Take 100 mg by mouth once daily., Disp: , Rfl:     FLAXSEED OIL ORAL, Take by mouth., Disp: , Rfl:     fluticasone propionate (FLONASE) 50 mcg/actuation  nasal spray, 1 spray (50 mcg total) by Each Nostril route once daily., Disp: 16 mL, Rfl: 0    methotrexate 2.5 MG Tab, TAKE 10 TABLETS BY MOUTH EVERY 7 DAYS, Disp: 120 tablet, Rfl: 0    multivitamin capsule, Take 1 capsule by mouth once daily., Disp: , Rfl:     multivitamin with minerals (HAIR,SKIN AND NAILS ORAL), Take by mouth once daily., Disp: , Rfl:     promethazine-dextromethorphan (PROMETHAZINE-DM) 6.25-15 mg/5 mL Syrp, Take 5 mLs by mouth nightly as needed (Cough)., Disp: 120 mL, Rfl: 0    rosuvastatin (CRESTOR) 10 MG tablet, TAKE 1 TABLET(10 MG) BY MOUTH EVERY DAY, Disp: 90 tablet, Rfl: 2    triamcinolone acetonide 0.1% (KENALOG) 0.1 % ointment, Apply topically 2 (two) times daily as needed., Disp: , Rfl:     TURMERIC ORAL, Take by mouth., Disp: , Rfl:     VUITY 1.25 % Drop, Place 1 drop into both eyes daily as needed., Disp: , Rfl:     folic acid (FOLVITE) 1 MG tablet, Take 1 tablet (1 mg total) by mouth once daily., Disp: 90 tablet, Rfl: 1    valACYclovir (VALTREX) 1000 MG tablet, Take 1 tablet (1,000 mg total) by mouth 2 (two) times daily. for 10 days, Disp: 20 tablet, Rfl: 1     Objective:     Vitals:    03/15/24 0820   BP: 127/87   Pulse: 81     Physical Exam   Eyes: Conjunctivae are normal.   Pulmonary/Chest: Effort normal. No respiratory distress.   Musculoskeletal:         General: Swelling, tenderness and deformity present. Normal range of motion.   Skin: No rash noted.       Reviewed available old and all outside pertinent medical records available.    All lab results personally reviewed and interpreted by me.       ASSESSMENT      Encounter Diagnoses   Name Primary?    Seropositive rheumatoid arthritis Yes    Immunosuppression due to drug therapy     Medication monitoring encounter     Needle phobia     Sjogren syndrome with inflammatory arthritis     Positive MOLLY (antinuclear antibody)       PLAN     - CDAI; moderate disease activity.  Tolerating Orencia IV (1/2024-).  Refractory hand stiffness,  palindromic pattern.  Currently off MTX  - Squeeze tenderness w/ acute on chronic synovitis Rt 5th PIPj. No psoriasiform rashes or nail pitting  - no pre visit labs performed  - elevated MOLLY, negative ALMA.  SSA on myomarker panel.  High titer CCP and RF  - Hand XR with rapid progression despite biologic therapy, pencil in cup appearance.  No ILD, LAD, serositis.  - suggest to resume MTX.  Escalate to dose of 20mg split dose same day, daily FA  - c/w Orencia IV unchanged  - blood work to monitor for toxicity / CI to rx, repeat close to f/u      Perez Thomas M.D.

## 2024-04-15 ENCOUNTER — LAB VISIT (OUTPATIENT)
Dept: LAB | Facility: HOSPITAL | Age: 59
End: 2024-04-15
Attending: INTERNAL MEDICINE
Payer: COMMERCIAL

## 2024-04-15 DIAGNOSIS — M05.9 SEROPOSITIVE RHEUMATOID ARTHRITIS: ICD-10-CM

## 2024-04-15 LAB
ALBUMIN SERPL BCP-MCNC: 3.9 G/DL (ref 3.5–5.2)
ALP SERPL-CCNC: 135 U/L (ref 55–135)
ALT SERPL W/O P-5'-P-CCNC: 24 U/L (ref 10–44)
ANION GAP SERPL CALC-SCNC: 10 MMOL/L (ref 8–16)
AST SERPL-CCNC: 15 U/L (ref 10–40)
BASOPHILS # BLD AUTO: 0.02 K/UL (ref 0–0.2)
BASOPHILS NFR BLD: 0.3 % (ref 0–1.9)
BILIRUB SERPL-MCNC: 0.6 MG/DL (ref 0.1–1)
BUN SERPL-MCNC: 11 MG/DL (ref 6–20)
CALCIUM SERPL-MCNC: 9.7 MG/DL (ref 8.7–10.5)
CHLORIDE SERPL-SCNC: 105 MMOL/L (ref 95–110)
CO2 SERPL-SCNC: 26 MMOL/L (ref 23–29)
CREAT SERPL-MCNC: 0.9 MG/DL (ref 0.5–1.4)
DIFFERENTIAL METHOD BLD: ABNORMAL
EOSINOPHIL # BLD AUTO: 0.3 K/UL (ref 0–0.5)
EOSINOPHIL NFR BLD: 4.3 % (ref 0–8)
ERYTHROCYTE [DISTWIDTH] IN BLOOD BY AUTOMATED COUNT: 14.7 % (ref 11.5–14.5)
EST. GFR  (NO RACE VARIABLE): >60 ML/MIN/1.73 M^2
GLUCOSE SERPL-MCNC: 111 MG/DL (ref 70–110)
HCT VFR BLD AUTO: 43.3 % (ref 37–48.5)
HGB BLD-MCNC: 13.9 G/DL (ref 12–16)
IMM GRANULOCYTES # BLD AUTO: 0.01 K/UL (ref 0–0.04)
IMM GRANULOCYTES NFR BLD AUTO: 0.1 % (ref 0–0.5)
LYMPHOCYTES # BLD AUTO: 2.1 K/UL (ref 1–4.8)
LYMPHOCYTES NFR BLD: 30.8 % (ref 18–48)
MCH RBC QN AUTO: 27.1 PG (ref 27–31)
MCHC RBC AUTO-ENTMCNC: 32.1 G/DL (ref 32–36)
MCV RBC AUTO: 85 FL (ref 82–98)
MONOCYTES # BLD AUTO: 0.4 K/UL (ref 0.3–1)
MONOCYTES NFR BLD: 5.5 % (ref 4–15)
NEUTROPHILS # BLD AUTO: 4 K/UL (ref 1.8–7.7)
NEUTROPHILS NFR BLD: 59 % (ref 38–73)
NRBC BLD-RTO: 0 /100 WBC
PLATELET # BLD AUTO: 291 K/UL (ref 150–450)
PMV BLD AUTO: 9.7 FL (ref 9.2–12.9)
POTASSIUM SERPL-SCNC: 4.1 MMOL/L (ref 3.5–5.1)
PROT SERPL-MCNC: 7.9 G/DL (ref 6–8.4)
RBC # BLD AUTO: 5.12 M/UL (ref 4–5.4)
SODIUM SERPL-SCNC: 141 MMOL/L (ref 136–145)
WBC # BLD AUTO: 6.71 K/UL (ref 3.9–12.7)

## 2024-04-15 PROCEDURE — 36415 COLL VENOUS BLD VENIPUNCTURE: CPT | Performed by: INTERNAL MEDICINE

## 2024-04-15 PROCEDURE — 80053 COMPREHEN METABOLIC PANEL: CPT | Performed by: INTERNAL MEDICINE

## 2024-04-15 PROCEDURE — 85025 COMPLETE CBC W/AUTO DIFF WBC: CPT | Performed by: INTERNAL MEDICINE

## 2024-04-26 ENCOUNTER — OFFICE VISIT (OUTPATIENT)
Dept: INTERNAL MEDICINE | Facility: CLINIC | Age: 59
End: 2024-04-26
Payer: COMMERCIAL

## 2024-04-26 VITALS
HEART RATE: 98 BPM | SYSTOLIC BLOOD PRESSURE: 132 MMHG | HEIGHT: 66 IN | DIASTOLIC BLOOD PRESSURE: 80 MMHG | OXYGEN SATURATION: 97 % | BODY MASS INDEX: 24.8 KG/M2 | WEIGHT: 154.31 LBS | TEMPERATURE: 97 F

## 2024-04-26 DIAGNOSIS — E78.5 HYPERLIPIDEMIA, UNSPECIFIED HYPERLIPIDEMIA TYPE: Primary | ICD-10-CM

## 2024-04-26 DIAGNOSIS — J30.9 ALLERGIC RHINITIS, UNSPECIFIED SEASONALITY, UNSPECIFIED TRIGGER: ICD-10-CM

## 2024-04-26 DIAGNOSIS — Z00.00 ROUTINE GENERAL MEDICAL EXAMINATION AT A HEALTH CARE FACILITY: ICD-10-CM

## 2024-04-26 DIAGNOSIS — M05.9 SEROPOSITIVE RHEUMATOID ARTHRITIS: ICD-10-CM

## 2024-04-26 DIAGNOSIS — Z13.1 DIABETES MELLITUS SCREENING: ICD-10-CM

## 2024-04-26 PROCEDURE — 3075F SYST BP GE 130 - 139MM HG: CPT | Mod: CPTII,S$GLB,, | Performed by: FAMILY MEDICINE

## 2024-04-26 PROCEDURE — 3079F DIAST BP 80-89 MM HG: CPT | Mod: CPTII,S$GLB,, | Performed by: FAMILY MEDICINE

## 2024-04-26 PROCEDURE — 1159F MED LIST DOCD IN RCRD: CPT | Mod: CPTII,S$GLB,, | Performed by: FAMILY MEDICINE

## 2024-04-26 PROCEDURE — 1160F RVW MEDS BY RX/DR IN RCRD: CPT | Mod: CPTII,S$GLB,, | Performed by: FAMILY MEDICINE

## 2024-04-26 PROCEDURE — 3008F BODY MASS INDEX DOCD: CPT | Mod: CPTII,S$GLB,, | Performed by: FAMILY MEDICINE

## 2024-04-26 PROCEDURE — 99396 PREV VISIT EST AGE 40-64: CPT | Mod: S$GLB,,, | Performed by: FAMILY MEDICINE

## 2024-04-26 PROCEDURE — 99999 PR PBB SHADOW E&M-EST. PATIENT-LVL IV: CPT | Mod: PBBFAC,,, | Performed by: FAMILY MEDICINE

## 2024-04-26 RX ORDER — MULTIVIT WITH MINERALS/HERBS
1 TABLET ORAL DAILY
COMMUNITY

## 2024-04-26 NOTE — PROGRESS NOTES
Subjective     Patient ID: Ayesha Arcos is a 58 y.o. female.    Chief Complaint: Establish Care    History of Present Illness    Ayesha presents today for annual follow-up visit.    Ayesha is currently under the regular care of a Rheumatologist, Dr. Thomas. She receives monthly infusions for her arthritis. She initially faced mental unease with the treatment process due to its resemblance to chemotherapy, but now feels more comfortable.    Ayesha has a history of COVID-19 from a presumed exposure during a plane trip to [place] in September. Since then, she has experienced sporadic episodes of coughing. Coughing can be triggered by strong perfumes or being in a car.    Ayesha reports poor sleep hygiene with frequent nighttime television watching. No additional information about sleep quality or duration is provided.    Ayesha consistently conducts labs prior to her arthritis infusion treatments. Last colonoscopy was approximately 10 years ago, with the next due in 2027. She had her last mammogram in February and regularly has Pap smears. A bone density scan was also done. Previously, cholesterol levels were slightly high with total cholesterol at 214, this will be monitored. Vitamin D levels were checked three years ago and were reported normal.    She discontinued her cholesterol medication, [place], under the advice of Dr. Thomas and faced no issues. She continues her folic acid supplement without any complications or side effects.    Ayesha has received a pneumonia vaccine, with the next dose due in 2027. She confirms completion of the regular series of the COVID-19 vaccination.    She experiences allergies predominately triggered by strong perfumes or in closed spaces such as cars resulting in intermittent cough. She uses Flonase regularly and occasionally takes Claritin for additional symptom control during potential exposure events. She also performs nasal lavage preemptively during social  gatherings.    Ayesha denies any current concerns regarding her general health, including issues with bowel movements, urinary frequency, urinary leakage, chest pain, or shortness of breath.  ROS:  General: -fever, -chills, -fatigue, -weight gain, -weight loss  Eyes: -vision changes, -redness, -discharge  ENT: -ear pain, -nasal congestion, -sore throat  Cardiovascular: -chest pain, -palpitations, -lower extremity edema  Respiratory: +cough, -shortness of breath  Gastrointestinal: -abdominal pain, -nausea, -vomiting, -diarrhea, -constipation, -blood in stool, -change in bowel habits  Genitourinary: -dysuria, -hematuria, -frequency, -urinary incontinence  Musculoskeletal: -joint pain, -muscle pain  Skin: -rash, -lesion  Neurological: -headache, -dizziness, -numbness, -tingling  Psychiatric: -anxiety, -depression, -sleep difficulty            Objective     Physical Exam  Vitals and nursing note reviewed.   Constitutional:       Appearance: Normal appearance. She is normal weight.   HENT:      Head: Normocephalic and atraumatic.   Eyes:      Extraocular Movements: Extraocular movements intact.   Cardiovascular:      Rate and Rhythm: Normal rate and regular rhythm.      Pulses: Normal pulses.      Heart sounds: Normal heart sounds.   Pulmonary:      Effort: Pulmonary effort is normal.      Breath sounds: Normal breath sounds.   Abdominal:      General: Abdomen is flat. Bowel sounds are normal.      Palpations: Abdomen is soft.   Skin:     General: Skin is warm and dry.   Neurological:      General: No focal deficit present.      Mental Status: She is alert and oriented to person, place, and time.   Psychiatric:         Mood and Affect: Mood normal.         Behavior: Behavior normal.                      Assessment and Plan     1. Hyperlipidemia, unspecified hyperlipidemia type  -     LIPID PANEL; Future; Expected date: 07/31/2024    2. Seropositive rheumatoid arthritis  -     TSH; Future; Expected date: 07/31/2024    3.  Diabetes mellitus screening  -     HEMOGLOBIN A1C; Future; Expected date: 07/31/2024    4. Routine general medical examination at a health care facility  Comments:  reviewed age appropriate screenings and immunizations    5. Allergic rhinitis, unspecified seasonality, unspecified trigger        Assessment & Plan    ARTHRITIS:  - Continue the patient's current treatment plan, including monthly infusions for arthritis.  - Discontinued the patient's cholesterol medication due to potential interaction with ongoing infusions.  - Recommend the patient consult with Dr. Thomas regarding the COVID vaccine and zinc intake due to ongoing infusions.  CHOLESTEROL MANAGEMENT:  - Monitored the patient's cholesterol levels closely, with plans to consult with Dr. Casper for a new treatment plan if levels remain elevated.  - Ordered labs for the patient to check cholesterol levels, thyroid, lipid, A1C, CBC, and CMP.  - Suggested the patient complete these labs in July, coinciding with labs scheduled for Dr. Thomas.  ALLERGIES AND RHINORRHEA:  - Advised the patient to consider taking Claritin before exposure to potential allergens that could trigger rhinorrhea and subsequent coughing.  - Explained the potential effects of certain triggers on allergies and the benefits of taking Claritin in such situations.  COVID-19 AND IMMUNOCOMPROMISED PATIENTS:  - Educated the patient about the potential lingering effects of COVID-19, particularly for immunocompromised individuals.  FOLLOW-UP AND MONITORING:  - Scheduled a one-year follow-up visit.              Follow up in about 1 year (around 4/26/2025) for Annual Exam.    This note was generated with the assistance of ambient listening technology. Verbal consent was obtained by the patient and accompanying visitor(s) for the recording of patient appointment to facilitate this note. I attest to having reviewed and edited the generated note for accuracy, though some syntax or spelling errors may  persist. Please contact the author of this note for any clarification.

## 2024-06-03 ENCOUNTER — OFFICE VISIT (OUTPATIENT)
Dept: URGENT CARE | Facility: CLINIC | Age: 59
End: 2024-06-03
Payer: COMMERCIAL

## 2024-06-03 ENCOUNTER — HOSPITAL ENCOUNTER (OUTPATIENT)
Dept: RADIOLOGY | Facility: CLINIC | Age: 59
Discharge: HOME | End: 2024-06-03
Attending: PHYSICIAN ASSISTANT
Payer: COMMERCIAL

## 2024-06-03 VITALS
WEIGHT: 149 LBS | OXYGEN SATURATION: 97 % | HEART RATE: 90 BPM | BODY MASS INDEX: 24.83 KG/M2 | TEMPERATURE: 98 F | DIASTOLIC BLOOD PRESSURE: 73 MMHG | SYSTOLIC BLOOD PRESSURE: 144 MMHG | HEIGHT: 65 IN | RESPIRATION RATE: 18 BRPM

## 2024-06-03 DIAGNOSIS — M75.81 ROTATOR CUFF TENDONITIS, RIGHT: Primary | ICD-10-CM

## 2024-06-03 DIAGNOSIS — M06.9 RHEUMATOID ARTHRITIS, INVOLVING UNSPECIFIED SITE, UNSPECIFIED WHETHER RHEUMATOID FACTOR PRESENT: ICD-10-CM

## 2024-06-03 DIAGNOSIS — M25.511 ACUTE PAIN OF RIGHT SHOULDER: ICD-10-CM

## 2024-06-03 DIAGNOSIS — M25.611 DECREASED ROM OF RIGHT SHOULDER: ICD-10-CM

## 2024-06-03 PROCEDURE — 96372 THER/PROPH/DIAG INJ SC/IM: CPT | Mod: S$GLB,,, | Performed by: PHYSICIAN ASSISTANT

## 2024-06-03 PROCEDURE — 99214 OFFICE O/P EST MOD 30 MIN: CPT | Mod: 25,S$GLB,, | Performed by: PHYSICIAN ASSISTANT

## 2024-06-03 PROCEDURE — 73030 X-RAY EXAM OF SHOULDER: CPT | Mod: RT,S$GLB,, | Performed by: RADIOLOGY

## 2024-06-03 RX ORDER — KETOROLAC TROMETHAMINE 30 MG/ML
30 INJECTION, SOLUTION INTRAMUSCULAR; INTRAVENOUS
Status: COMPLETED | OUTPATIENT
Start: 2024-06-03 | End: 2024-06-03

## 2024-06-03 RX ORDER — DICLOFENAC SODIUM 10 MG/G
2 GEL TOPICAL 3 TIMES DAILY
Qty: 50 G | Refills: 0 | Status: SHIPPED | OUTPATIENT
Start: 2024-06-03

## 2024-06-03 RX ORDER — METHOCARBAMOL 750 MG/1
750 TABLET, FILM COATED ORAL 3 TIMES DAILY
Qty: 15 TABLET | Refills: 0 | Status: SHIPPED | OUTPATIENT
Start: 2024-06-03 | End: 2024-06-08

## 2024-06-03 RX ADMIN — KETOROLAC TROMETHAMINE 30 MG: 30 INJECTION, SOLUTION INTRAMUSCULAR; INTRAVENOUS at 01:06

## 2024-06-03 NOTE — PROGRESS NOTES
"Subjective:      Patient ID: Ayesha Arcos is a 58 y.o. female.    Vitals:  height is 5' 5" (1.651 m) and weight is 67.6 kg (149 lb). Her oral temperature is 98.1 °F (36.7 °C). Her blood pressure is 144/73 (abnormal) and her pulse is 90. Her respiration is 18 and oxygen saturation is 97%.     Chief Complaint: Shoulder Pain    Pt with hx of RA presents with right shoulder pain that started Saturday.  Denies injury or change in recent activities.  Patient reports that pain has been keeping her up at night.  Pain is worse with movement of the right arm.  She reports that her range of motion is very limited since pain started.  Pain radiates down lateral upper arm with movement.  Denies joint swelling, skin color change, numbness or tingling, neck pain, history of neck or shoulder surgery, chest pain or shortness of breath.  She has been taking Ibuprofen and using Bio freeze roll on with little relief.  States that she does monthly infusions for her rheumatoid arthritis and next appointment is in about 2 weeks.    Shoulder Pain   The pain is present in the right shoulder. This is a new problem. The current episode started in the past 7 days. There has been no history of extremity trauma. The problem occurs constantly. The problem has been gradually worsening. The quality of the pain is described as sharp. The pain is at a severity of 10/10. The pain is severe. Associated symptoms include a limited range of motion and stiffness. Pertinent negatives include no fever, headaches, inability to bear weight, itching, joint locking, joint swelling, numbness, tingling or visual symptoms. The symptoms are aggravated by activity. She has tried cold and NSAIDS for the symptoms. The treatment provided no relief. Family history does not include arthritis. There is no history of diabetes, Injuries to Extremity or migraines.       Constitution: Negative for chills, sweating and fever.   Cardiovascular: Negative.    Respiratory: " Negative.     Musculoskeletal:  Positive for joint pain, abnormal ROM of joint and arthritis. Negative for trauma and joint swelling.   Skin:  Negative for erythema.   Neurological:  Negative for headaches and numbness.      Objective:     Physical Exam   Constitutional: She appears well-developed.  Non-toxic appearance. She does not appear ill. No distress.   HENT:   Head: Normocephalic and atraumatic.   Ears:   Right Ear: External ear normal.   Left Ear: External ear normal.   Nose: Nose normal.   Eyes: Conjunctivae and EOM are normal.   Neck: Neck supple.   Pulmonary/Chest: Effort normal.   Abdominal: Normal appearance.   Musculoskeletal:      Right shoulder: She exhibits decreased range of motion (severely pain limited), tenderness (lateral joint space; proximal humerus) and decreased strength. She exhibits no swelling, no crepitus and no deformity.      Right elbow: Normal.     Right wrist: Normal.      Right hand: Normal.      Comments: Right bicep/tricep strength 4/5   Neurological: no focal deficit. She is alert. She displays no weakness. No sensory deficit. Gait normal.   Skin: Skin is warm, dry, not diaphoretic, not pale and no rash. Capillary refill takes less than 2 seconds. No bruising, No erythema and No ecchymosis   Psychiatric: Her behavior is normal.     XR SHOULDER COMPLETE 2 OR MORE VIEWS RIGHT    Result Date: 6/3/2024  EXAMINATION: XR SHOULDER COMPLETE 2 OR MORE VIEWS RIGHT CLINICAL HISTORY: Pain in right shoulder TECHNIQUE: Two or three views of the right shoulder were performed. COMPARISON: None FINDINGS: No acute osseous abnormality.  Rotator cuff calcific tendinosis noted.  Lung parenchyma clear.     As above Electronically signed by: Addison Little MD Date:    06/03/2024 Time:    13:49     Assessment:     1. Rotator cuff tendonitis, right    2. Acute pain of right shoulder    3. Rheumatoid arthritis, involving unspecified site, unspecified whether rheumatoid factor present    4. Decreased  ROM of right shoulder        Plan:       Rotator cuff tendonitis, right  -     ketorolac injection 30 mg  -     methocarbamoL (ROBAXIN) 750 MG Tab; Take 1 tablet (750 mg total) by mouth 3 (three) times daily. for 5 days  Dispense: 15 tablet; Refill: 0  -     diclofenac sodium (VOLTAREN ARTHRITIS PAIN) 1 % Gel; Apply 2 g topically 3 (three) times daily.  Dispense: 50 g; Refill: 0    Acute pain of right shoulder  -     ketorolac injection 30 mg  -     XR SHOULDER COMPLETE 2 OR MORE VIEWS RIGHT; Future; Expected date: 06/03/2024  -     methocarbamoL (ROBAXIN) 750 MG Tab; Take 1 tablet (750 mg total) by mouth 3 (three) times daily. for 5 days  Dispense: 15 tablet; Refill: 0  -     diclofenac sodium (VOLTAREN ARTHRITIS PAIN) 1 % Gel; Apply 2 g topically 3 (three) times daily.  Dispense: 50 g; Refill: 0    Rheumatoid arthritis, involving unspecified site, unspecified whether rheumatoid factor present  -     ketorolac injection 30 mg  -     XR SHOULDER COMPLETE 2 OR MORE VIEWS RIGHT; Future; Expected date: 06/03/2024  -     diclofenac sodium (VOLTAREN ARTHRITIS PAIN) 1 % Gel; Apply 2 g topically 3 (three) times daily.  Dispense: 50 g; Refill: 0    Decreased ROM of right shoulder  -     XR SHOULDER COMPLETE 2 OR MORE VIEWS RIGHT; Future; Expected date: 06/03/2024  -     methocarbamoL (ROBAXIN) 750 MG Tab; Take 1 tablet (750 mg total) by mouth 3 (three) times daily. for 5 days  Dispense: 15 tablet; Refill: 0  -     diclofenac sodium (VOLTAREN ARTHRITIS PAIN) 1 % Gel; Apply 2 g topically 3 (three) times daily.  Dispense: 50 g; Refill: 0          Medical Decision Making:   Differential Diagnosis:   RA flare, tendonitis, impingement syndrome, cervical radiculopathy  Urgent Care Management:  Calcifications noted in x-ray consistent with tendonitis.  Patient given Toradol injection in clinic.  Advised to avoid oral NSAIDs for at least 12 hours after injection.  Muscle relaxer as prescribed.  Discussed this may cause drowsiness.   Can use topical Voltaren to the shoulder as needed.  Follow-up with Rheumatology versus ortho if symptoms persist.

## 2024-06-03 NOTE — PATIENT INSTRUCTIONS
Please follow up with your Primary Care provider vs Rheumatologist within 5 days if your signs and symptoms have not resolved. If your condition worsens, or you develop new symptoms, we recommend that you receive another evaluation at the Emergency Room immediately or contact your primary medical clinic to discuss your concerns.    You must understand that you have received an Urgent Care treatment only and that you may be released before all of your medical problems are known or treated. You, the patient, will arrange for follow up care as instructed.     RED FLAGS/WARNING SYMPTOMS DISCUSSED WITH PATIENT THAT WOULD WARRANT EMERGENT MEDICAL ATTENTION. PATIENT VERBALIZED UNDERSTANDING.

## 2024-06-04 ENCOUNTER — TELEPHONE (OUTPATIENT)
Dept: RHEUMATOLOGY | Facility: CLINIC | Age: 59
End: 2024-06-04
Payer: COMMERCIAL

## 2024-06-04 NOTE — TELEPHONE ENCOUNTER
Perez Thomas MD          No contraindication to proceed with infusion, unless active infections or upcoming surgery.   Patient informed

## 2024-06-04 NOTE — TELEPHONE ENCOUNTER
Rt shoulder pain .over week end . Went to urgent care . She had Xray ( in Chart ) and was given Robaxin  3 times a day and a topical . Also given injection for pain . Meds are helping . Wanted  to know if it is okay to have her infusion on the 6-14.

## 2024-06-14 PROCEDURE — 86704 HEP B CORE ANTIBODY TOTAL: CPT | Performed by: INTERNAL MEDICINE

## 2024-06-14 PROCEDURE — 86480 TB TEST CELL IMMUN MEASURE: CPT | Performed by: INTERNAL MEDICINE

## 2024-06-14 PROCEDURE — 87340 HEPATITIS B SURFACE AG IA: CPT | Performed by: INTERNAL MEDICINE

## 2024-07-08 ENCOUNTER — LAB VISIT (OUTPATIENT)
Dept: LAB | Facility: HOSPITAL | Age: 59
End: 2024-07-08
Attending: INTERNAL MEDICINE
Payer: COMMERCIAL

## 2024-07-08 DIAGNOSIS — Z13.1 DIABETES MELLITUS SCREENING: ICD-10-CM

## 2024-07-08 DIAGNOSIS — M05.9 SEROPOSITIVE RHEUMATOID ARTHRITIS: ICD-10-CM

## 2024-07-08 DIAGNOSIS — E78.5 HYPERLIPIDEMIA, UNSPECIFIED HYPERLIPIDEMIA TYPE: ICD-10-CM

## 2024-07-08 LAB
ALBUMIN SERPL BCP-MCNC: 3.8 G/DL (ref 3.5–5.2)
ALP SERPL-CCNC: 109 U/L (ref 55–135)
ALT SERPL W/O P-5'-P-CCNC: 23 U/L (ref 10–44)
ANION GAP SERPL CALC-SCNC: 11 MMOL/L (ref 8–16)
AST SERPL-CCNC: 18 U/L (ref 10–40)
BASOPHILS # BLD AUTO: 0.03 K/UL (ref 0–0.2)
BASOPHILS NFR BLD: 0.5 % (ref 0–1.9)
BILIRUB SERPL-MCNC: 0.6 MG/DL (ref 0.1–1)
BUN SERPL-MCNC: 9 MG/DL (ref 6–20)
CALCIUM SERPL-MCNC: 9.6 MG/DL (ref 8.7–10.5)
CHLORIDE SERPL-SCNC: 108 MMOL/L (ref 95–110)
CHOLEST SERPL-MCNC: 301 MG/DL (ref 120–199)
CHOLEST/HDLC SERPL: 6.5 {RATIO} (ref 2–5)
CO2 SERPL-SCNC: 22 MMOL/L (ref 23–29)
CREAT SERPL-MCNC: 0.9 MG/DL (ref 0.5–1.4)
CREAT UR-MCNC: 129 MG/DL (ref 15–325)
CRP SERPL-MCNC: 1.2 MG/L (ref 0–8.2)
DIFFERENTIAL METHOD BLD: ABNORMAL
EOSINOPHIL # BLD AUTO: 0.3 K/UL (ref 0–0.5)
EOSINOPHIL NFR BLD: 5 % (ref 0–8)
ERYTHROCYTE [DISTWIDTH] IN BLOOD BY AUTOMATED COUNT: 15.9 % (ref 11.5–14.5)
EST. GFR  (NO RACE VARIABLE): >60 ML/MIN/1.73 M^2
ESTIMATED AVG GLUCOSE: 111 MG/DL (ref 68–131)
GLUCOSE SERPL-MCNC: 117 MG/DL (ref 70–110)
HBA1C MFR BLD: 5.5 % (ref 4–5.6)
HCT VFR BLD AUTO: 40.1 % (ref 37–48.5)
HDLC SERPL-MCNC: 46 MG/DL (ref 40–75)
HDLC SERPL: 15.3 % (ref 20–50)
HGB BLD-MCNC: 13.1 G/DL (ref 12–16)
IMM GRANULOCYTES # BLD AUTO: 0.01 K/UL (ref 0–0.04)
IMM GRANULOCYTES NFR BLD AUTO: 0.2 % (ref 0–0.5)
LDLC SERPL CALC-MCNC: 220.2 MG/DL (ref 63–159)
LYMPHOCYTES # BLD AUTO: 1.7 K/UL (ref 1–4.8)
LYMPHOCYTES NFR BLD: 28.9 % (ref 18–48)
MCH RBC QN AUTO: 28.5 PG (ref 27–31)
MCHC RBC AUTO-ENTMCNC: 32.7 G/DL (ref 32–36)
MCV RBC AUTO: 87 FL (ref 82–98)
MONOCYTES # BLD AUTO: 0.3 K/UL (ref 0.3–1)
MONOCYTES NFR BLD: 4.9 % (ref 4–15)
NEUTROPHILS # BLD AUTO: 3.5 K/UL (ref 1.8–7.7)
NEUTROPHILS NFR BLD: 60.5 % (ref 38–73)
NONHDLC SERPL-MCNC: 255 MG/DL
NRBC BLD-RTO: 0 /100 WBC
PLATELET # BLD AUTO: 328 K/UL (ref 150–450)
PMV BLD AUTO: 9.6 FL (ref 9.2–12.9)
POTASSIUM SERPL-SCNC: 3.6 MMOL/L (ref 3.5–5.1)
PROT SERPL-MCNC: 7.7 G/DL (ref 6–8.4)
PROT UR-MCNC: 12 MG/DL (ref 0–15)
PROT/CREAT UR: 0.09 MG/G{CREAT} (ref 0–0.2)
RBC # BLD AUTO: 4.59 M/UL (ref 4–5.4)
SODIUM SERPL-SCNC: 141 MMOL/L (ref 136–145)
TRIGL SERPL-MCNC: 174 MG/DL (ref 30–150)
TSH SERPL DL<=0.005 MIU/L-ACNC: 1.08 UIU/ML (ref 0.4–4)
WBC # BLD AUTO: 5.77 K/UL (ref 3.9–12.7)

## 2024-07-08 PROCEDURE — 83036 HEMOGLOBIN GLYCOSYLATED A1C: CPT | Performed by: FAMILY MEDICINE

## 2024-07-08 PROCEDURE — 82570 ASSAY OF URINE CREATININE: CPT | Performed by: INTERNAL MEDICINE

## 2024-07-08 PROCEDURE — 85025 COMPLETE CBC W/AUTO DIFF WBC: CPT | Performed by: INTERNAL MEDICINE

## 2024-07-08 PROCEDURE — 80053 COMPREHEN METABOLIC PANEL: CPT | Performed by: INTERNAL MEDICINE

## 2024-07-08 PROCEDURE — 84443 ASSAY THYROID STIM HORMONE: CPT | Performed by: FAMILY MEDICINE

## 2024-07-08 PROCEDURE — 80061 LIPID PANEL: CPT | Performed by: FAMILY MEDICINE

## 2024-07-08 PROCEDURE — 86140 C-REACTIVE PROTEIN: CPT | Performed by: INTERNAL MEDICINE

## 2024-07-12 ENCOUNTER — OFFICE VISIT (OUTPATIENT)
Dept: RHEUMATOLOGY | Facility: CLINIC | Age: 59
End: 2024-07-12
Payer: COMMERCIAL

## 2024-07-12 ENCOUNTER — TELEPHONE (OUTPATIENT)
Dept: RHEUMATOLOGY | Facility: CLINIC | Age: 59
End: 2024-07-12
Payer: COMMERCIAL

## 2024-07-12 VITALS
WEIGHT: 154.31 LBS | DIASTOLIC BLOOD PRESSURE: 87 MMHG | HEIGHT: 65 IN | HEART RATE: 90 BPM | SYSTOLIC BLOOD PRESSURE: 144 MMHG | BODY MASS INDEX: 25.71 KG/M2

## 2024-07-12 DIAGNOSIS — R76.8 POSITIVE ANA (ANTINUCLEAR ANTIBODY): ICD-10-CM

## 2024-07-12 DIAGNOSIS — D84.9 IMMUNOSUPPRESSED STATUS: ICD-10-CM

## 2024-07-12 DIAGNOSIS — R76.8 AUTOANTIBODY TITER POSITIVE: ICD-10-CM

## 2024-07-12 DIAGNOSIS — E55.9 VITAMIN D INSUFFICIENCY: ICD-10-CM

## 2024-07-12 DIAGNOSIS — M05.9 SEROPOSITIVE RHEUMATOID ARTHRITIS: Primary | ICD-10-CM

## 2024-07-12 DIAGNOSIS — Z51.81 MEDICATION MONITORING ENCOUNTER: ICD-10-CM

## 2024-07-12 PROCEDURE — 99999 PR PBB SHADOW E&M-EST. PATIENT-LVL III: CPT | Mod: PBBFAC,,, | Performed by: INTERNAL MEDICINE

## 2024-07-12 RX ORDER — FOLIC ACID 1 MG/1
1 TABLET ORAL DAILY
Qty: 90 TABLET | Refills: 1 | Status: SHIPPED | OUTPATIENT
Start: 2024-07-12 | End: 2025-01-08

## 2024-07-12 RX ORDER — METHOTREXATE 2.5 MG/1
20 TABLET ORAL
Qty: 96 TABLET | Refills: 1 | Status: SHIPPED | OUTPATIENT
Start: 2024-07-12 | End: 2025-01-08

## 2024-07-12 NOTE — PROGRESS NOTES
RHEUMATOLOGY OUTPATIENT CLINIC NOTE    7/12/2024    Attending Rheumatologist: Perez Thomas  Primary Care Provider/Physician Requesting Consultation: Tatyana Rivera MD   Chief Complaint/Reason For Consultation:  Rheumatoid Arthritis, Positive MOLLY, and Sjogrens      Subjective:     Ayesha Arcos is a 59 y.o. Black or  female with SPRA    Tolerating MTX addition, currently 20mg split dose, once per week.  Refers less stiffness with Orencia IV compared to Rinvoq.  No acute complaints.     Review of Systems   Constitutional:  Negative for fever.   Eyes:  Negative for photophobia and pain.   Respiratory:  Negative for cough and shortness of breath.    Cardiovascular:  Negative for chest pain.   Gastrointestinal:  Negative for blood in stool and melena.   Genitourinary:  Negative for dysuria and hematuria.   Musculoskeletal:  Negative for joint pain.   Skin:  Negative for rash.   Neurological:  Negative for focal weakness and weakness.       Chronic comorbid conditions affecting medical decision making today:  Past Medical History:   Diagnosis Date    Carpal tunnel syndrome, bilateral     Hypertension     Rheumatoid arthritis      Past Surgical History:   Procedure Laterality Date    CARPAL TUNNEL RELEASE      right    skin cancer removal      nose    TUBAL LIGATION       Family History   Problem Relation Name Age of Onset    Diabetes Mother      Heart disease Father      Hypertension Father      Early death Father          Early 50's    Stroke Paternal Uncle      Heart disease Paternal Uncle       Social History     Tobacco Use   Smoking Status Never   Smokeless Tobacco Never       Current Outpatient Medications:     ascorbic acid, vitamin C, (VITAMIN C) 100 MG tablet, Take 100 mg by mouth once daily., Disp: , Rfl:     b complex vitamins tablet, Take 1 tablet by mouth once daily., Disp: , Rfl:     diclofenac sodium (VOLTAREN ARTHRITIS PAIN) 1 % Gel, Apply 2 g topically 3 (three)  times daily., Disp: 50 g, Rfl: 0    fluticasone propionate (FLONASE) 50 mcg/actuation nasal spray, 1 spray (50 mcg total) by Each Nostril route once daily., Disp: 16 mL, Rfl: 0    folic acid (FOLVITE) 1 MG tablet, Take 1 tablet (1 mg total) by mouth once daily., Disp: 90 tablet, Rfl: 1    methotrexate 2.5 MG Tab, Take 8 tablets (20 mg total) by mouth every 7 days. Week1: 5 tablets.  Week 2: 6 tablets.  Week 3 and onwards: 4 tablets in the AM, 4 tablets in the PM, Disp: 96 tablet, Rfl: 1    multivitamin capsule, Take 1 capsule by mouth once daily., Disp: , Rfl:     multivitamin with minerals (HAIR,SKIN AND NAILS ORAL), Take by mouth once daily., Disp: , Rfl:     promethazine-dextromethorphan (PROMETHAZINE-DM) 6.25-15 mg/5 mL Syrp, Take 5 mLs by mouth nightly as needed (Cough)., Disp: 120 mL, Rfl: 0    triamcinolone acetonide 0.1% (KENALOG) 0.1 % ointment, Apply topically 2 (two) times daily as needed., Disp: , Rfl:     TURMERIC ORAL, Take by mouth., Disp: , Rfl:     VUITY 1.25 % Drop, Place 1 drop into both eyes daily as needed., Disp: , Rfl:     valACYclovir (VALTREX) 1000 MG tablet, Take 1 tablet (1,000 mg total) by mouth 2 (two) times daily. for 10 days, Disp: 20 tablet, Rfl: 1  No current facility-administered medications for this visit.     Objective:     Vitals:    07/12/24 0757   BP: (!) 144/87   Pulse: 90     Physical Exam   Eyes: Conjunctivae are normal.   Pulmonary/Chest: Effort normal.   Musculoskeletal:         General: Swelling and deformity present. No tenderness. Normal range of motion.   Neurological: She displays no weakness.   Skin: No rash noted.       Reviewed available old and all outside pertinent medical records available.    All lab results personally reviewed and interpreted by me.       ASSESSMENT / PLAN     1. Seropositive rheumatoid arthritis  CDAI: low disease activity.  Tolerating MTX w/o side effects, curretnly 20mg split dose.  Continue along with Orencia IV.  Rapid radiographic  progression while on past biologic, wonder utility of PrismRA.  methotrexate 2.5 MG Tab    folic acid (FOLVITE) 1 MG tablet    Cyclic Citrullinated Peptide Antibody, IgG    Rheumatoid Factor    Sedimentation rate      2. Positive MOLLY (antinuclear antibody)  High titer MOLLY, negative ALMA.  No associated end organ damage.  Protein/Creatinine Ratio, Urine      3. Autoantibody titer positive  Elevated 52kD SSA on myomarker.  Asymptomatic pertinent to SjS sx at this time.      4. Vitamin D insufficiency  Vitamin D level      5. Immunosuppressed status  Hold immunosuppression in event of active infections or surgery  CBC Auto Differential      6. Medication monitoring encounter  Comprehensive Metabolic Panel                Perez Thomas M.D.

## 2024-07-25 ENCOUNTER — LAB VISIT (OUTPATIENT)
Dept: LAB | Facility: HOSPITAL | Age: 59
End: 2024-07-25
Attending: INTERNAL MEDICINE
Payer: COMMERCIAL

## 2024-07-25 DIAGNOSIS — M05.9 SEROPOSITIVE RHEUMATOID ARTHRITIS: ICD-10-CM

## 2024-07-25 LAB — MISCELLANEOUS TEST NAME: NORMAL

## 2024-07-25 PROCEDURE — 36415 COLL VENOUS BLD VENIPUNCTURE: CPT | Performed by: INTERNAL MEDICINE

## 2024-07-26 ENCOUNTER — PATIENT MESSAGE (OUTPATIENT)
Dept: RHEUMATOLOGY | Facility: CLINIC | Age: 59
End: 2024-07-26
Payer: COMMERCIAL

## 2024-08-13 ENCOUNTER — TELEPHONE (OUTPATIENT)
Dept: RHEUMATOLOGY | Facility: CLINIC | Age: 59
End: 2024-08-13
Payer: COMMERCIAL

## 2024-08-13 NOTE — TELEPHONE ENCOUNTER
Pt states that since starting treatments her middle finger has been locking up and wanted to know if there should be any concerns regarding this

## 2024-08-13 NOTE — TELEPHONE ENCOUNTER
----- Message from Tamara Salgado sent at 8/13/2024  8:33 AM CDT -----  Contact: Ayesha Devine is calling to receive  a call back at .[phone]. Reports since treatment she's experiencing numbness in fingers. Fingers are numb and lock up.

## 2024-08-14 ENCOUNTER — OFFICE VISIT (OUTPATIENT)
Dept: RHEUMATOLOGY | Facility: CLINIC | Age: 59
End: 2024-08-14
Payer: COMMERCIAL

## 2024-08-14 DIAGNOSIS — Z51.81 MEDICATION MONITORING ENCOUNTER: ICD-10-CM

## 2024-08-14 DIAGNOSIS — M35.01 SJOGREN SYNDROME WITH KERATOCONJUNCTIVITIS: ICD-10-CM

## 2024-08-14 DIAGNOSIS — M65.331 TRIGGER MIDDLE FINGER OF RIGHT HAND: ICD-10-CM

## 2024-08-14 DIAGNOSIS — M05.9 SEROPOSITIVE RHEUMATOID ARTHRITIS: Primary | ICD-10-CM

## 2024-08-14 DIAGNOSIS — D84.9 IMMUNOSUPPRESSED STATUS: ICD-10-CM

## 2024-08-14 PROCEDURE — 3044F HG A1C LEVEL LT 7.0%: CPT | Mod: CPTII,95,, | Performed by: INTERNAL MEDICINE

## 2024-08-14 PROCEDURE — 99214 OFFICE O/P EST MOD 30 MIN: CPT | Mod: 95,,, | Performed by: INTERNAL MEDICINE

## 2024-08-14 NOTE — PROGRESS NOTES
The patient location is: [place]  The chief complaint leading to consultation is: SPRA    Visit type: audiovisual    Face to Face time with patient: 30  30 minutes of total time spent on the encounter, which includes face to face time and non-face to face time preparing to see the patient (eg, review of tests), Obtaining and/or reviewing separately obtained history, Documenting clinical information in the electronic or other health record, Independently interpreting results (not separately reported) and communicating results to the patient/family/caregiver, or Care coordination (not separately reported).         Each patient to whom he or she provides medical services by telemedicine is:  (1) informed of the relationship between the physician and patient and the respective role of any other health care provider with respect to management of the patient; and (2) notified that he or she may decline to receive medical services by telemedicine and may withdraw from such care at any time.    Notes:      RHEUMATOLOGY OUTPATIENT CLINIC NOTE    8/14/2024    Attending Rheumatologist: Perez Thomas  Primary Care Provider/Physician Requesting Consultation: Tatyana Rivera MD   Chief Complaint/Reason For Consultation:  No chief complaint on file.      Subjective:     Ayesha Arcos is a 59 y.o. Black or  female with SPRA    New onset triggering of Rt 3rd finger.  Denies associated pain, tenderness.  New onset, non tender swelling DIP same finger.  No complaints. Otherwise.  Adherence w/ MTX po 20mg split q7d and Orencia IV (1/2024-) w/o side effects.    Review of Systems   Constitutional:  Negative for fever.   Eyes:  Negative for photophobia and pain.   Respiratory:  Negative for cough and shortness of breath.    Cardiovascular:  Negative for chest pain.   Gastrointestinal:  Negative for blood in stool.   Genitourinary:  Negative for dysuria, hematuria and urgency.   Musculoskeletal:  Negative for  joint pain.   Skin:  Negative for rash.   Neurological:  Negative for focal weakness.       Chronic comorbid conditions affecting medical decision making today:  Past Medical History:   Diagnosis Date    Carpal tunnel syndrome, bilateral     Hypertension     Rheumatoid arthritis      Past Surgical History:   Procedure Laterality Date    CARPAL TUNNEL RELEASE      right    skin cancer removal      nose    TUBAL LIGATION       Family History   Problem Relation Name Age of Onset    Diabetes Mother      Heart disease Father      Hypertension Father      Early death Father          Early 50's    Stroke Paternal Uncle      Heart disease Paternal Uncle       Social History     Tobacco Use   Smoking Status Never   Smokeless Tobacco Never       Current Outpatient Medications:     ascorbic acid, vitamin C, (VITAMIN C) 100 MG tablet, Take 100 mg by mouth once daily., Disp: , Rfl:     b complex vitamins tablet, Take 1 tablet by mouth once daily., Disp: , Rfl:     diclofenac sodium (VOLTAREN ARTHRITIS PAIN) 1 % Gel, Apply 2 g topically 3 (three) times daily., Disp: 50 g, Rfl: 0    fluticasone propionate (FLONASE) 50 mcg/actuation nasal spray, 1 spray (50 mcg total) by Each Nostril route once daily., Disp: 16 mL, Rfl: 0    folic acid (FOLVITE) 1 MG tablet, Take 1 tablet (1 mg total) by mouth once daily., Disp: 90 tablet, Rfl: 1    methotrexate 2.5 MG Tab, Take 8 tablets (20 mg total) by mouth every 7 days., Disp: 96 tablet, Rfl: 1    multivitamin capsule, Take 1 capsule by mouth once daily., Disp: , Rfl:     multivitamin with minerals (HAIR,SKIN AND NAILS ORAL), Take by mouth once daily., Disp: , Rfl:     promethazine-dextromethorphan (PROMETHAZINE-DM) 6.25-15 mg/5 mL Syrp, Take 5 mLs by mouth nightly as needed (Cough)., Disp: 120 mL, Rfl: 0    triamcinolone acetonide 0.1% (KENALOG) 0.1 % ointment, Apply topically 2 (two) times daily as needed., Disp: , Rfl:     TURMERIC ORAL, Take by mouth., Disp: , Rfl:     valACYclovir  (VALTREX) 1000 MG tablet, Take 1 tablet (1,000 mg total) by mouth 2 (two) times daily. for 10 days, Disp: 20 tablet, Rfl: 1    VUITY 1.25 % Drop, Place 1 drop into both eyes daily as needed., Disp: , Rfl:      Objective:     There were no vitals filed for this visit.  Physical Exam   Eyes: Conjunctivae are normal.   Pulmonary/Chest: Effort normal. No respiratory distress.   Musculoskeletal:         General: Swelling and deformity present. No tenderness. Normal range of motion.   Skin: No rash noted.       Reviewed available old and all outside pertinent medical records available.    All lab results personally reviewed and interpreted by me.       ASSESSMENT / PLAN     1. Seropositive rheumatoid arthritis  CDAI: low disease activity.  Tolerating Orencia IV and MTX split dose 20mg q7d.  New onset swelling DIP joint, monitor.  Consider biologic switch or overlap rx w Otezla (DIP involvement unlikely on RA more PsA presentation); in event of refractory DIP swelling, radiographic progression, or psoriasiform rashes.       2. Sjogren syndrome with keratoconjunctivitis  OTC natural tears and saliva substitutes PRN      3. Immunosuppressed status  Hold immunosuppression in event of active infections or need for surgery      4. Medication monitoring encounter  Monitor toxicity labs close to f/u.      5. Trigger middle finger of right hand  Voltaren gel and split use PRN  Consider local CSI by Ortho if refractory  Ambulatory referral/consult to Orthopedics              Clinical source  Curr Rheumatol Rev. 2019;15(3):234-237    Perez Thomas M.D.

## 2024-10-18 DIAGNOSIS — M65.30 TRIGGER FINGER OF RIGHT HAND, UNSPECIFIED FINGER: Primary | ICD-10-CM

## 2024-10-21 ENCOUNTER — HOSPITAL ENCOUNTER (OUTPATIENT)
Dept: RADIOLOGY | Facility: HOSPITAL | Age: 59
Discharge: HOME | End: 2024-10-21
Attending: STUDENT IN AN ORGANIZED HEALTH CARE EDUCATION/TRAINING PROGRAM
Payer: COMMERCIAL

## 2024-10-21 ENCOUNTER — OFFICE VISIT (OUTPATIENT)
Dept: SPORTS MEDICINE | Facility: CLINIC | Age: 59
End: 2024-10-21
Payer: COMMERCIAL

## 2024-10-21 DIAGNOSIS — M65.331 TRIGGER MIDDLE FINGER OF RIGHT HAND: Primary | ICD-10-CM

## 2024-10-21 DIAGNOSIS — M05.9 SEROPOSITIVE RHEUMATOID ARTHRITIS: ICD-10-CM

## 2024-10-21 DIAGNOSIS — M65.30 TRIGGER FINGER OF RIGHT HAND, UNSPECIFIED FINGER: ICD-10-CM

## 2024-10-21 PROCEDURE — 1159F MED LIST DOCD IN RCRD: CPT | Mod: CPTII,S$GLB,, | Performed by: STUDENT IN AN ORGANIZED HEALTH CARE EDUCATION/TRAINING PROGRAM

## 2024-10-21 PROCEDURE — 20550 NJX 1 TENDON SHEATH/LIGAMENT: CPT | Mod: RT,S$GLB,, | Performed by: STUDENT IN AN ORGANIZED HEALTH CARE EDUCATION/TRAINING PROGRAM

## 2024-10-21 PROCEDURE — 3044F HG A1C LEVEL LT 7.0%: CPT | Mod: CPTII,S$GLB,, | Performed by: STUDENT IN AN ORGANIZED HEALTH CARE EDUCATION/TRAINING PROGRAM

## 2024-10-21 PROCEDURE — 76942 ECHO GUIDE FOR BIOPSY: CPT | Mod: S$GLB,,, | Performed by: STUDENT IN AN ORGANIZED HEALTH CARE EDUCATION/TRAINING PROGRAM

## 2024-10-21 PROCEDURE — 73130 X-RAY EXAM OF HAND: CPT | Mod: 26,RT,, | Performed by: RADIOLOGY

## 2024-10-21 PROCEDURE — 99999 PR PBB SHADOW E&M-EST. PATIENT-LVL III: CPT | Mod: PBBFAC,,, | Performed by: STUDENT IN AN ORGANIZED HEALTH CARE EDUCATION/TRAINING PROGRAM

## 2024-10-21 PROCEDURE — 99214 OFFICE O/P EST MOD 30 MIN: CPT | Mod: 25,S$GLB,, | Performed by: STUDENT IN AN ORGANIZED HEALTH CARE EDUCATION/TRAINING PROGRAM

## 2024-10-21 PROCEDURE — 73130 X-RAY EXAM OF HAND: CPT | Mod: TC,RT

## 2024-10-21 RX ORDER — BETAMETHASONE SODIUM PHOSPHATE AND BETAMETHASONE ACETATE 3; 3 MG/ML; MG/ML
3 INJECTION, SUSPENSION INTRA-ARTICULAR; INTRALESIONAL; INTRAMUSCULAR; SOFT TISSUE
Status: DISCONTINUED | OUTPATIENT
Start: 2024-10-21 | End: 2024-10-21 | Stop reason: HOSPADM

## 2024-10-21 RX ORDER — LIDOCAINE HYDROCHLORIDE 10 MG/ML
1 INJECTION, SOLUTION INFILTRATION; PERINEURAL
Status: DISCONTINUED | OUTPATIENT
Start: 2024-10-21 | End: 2024-10-21 | Stop reason: HOSPADM

## 2024-10-21 RX ADMIN — LIDOCAINE HYDROCHLORIDE 1 ML: 10 INJECTION, SOLUTION INFILTRATION; PERINEURAL at 07:10

## 2024-10-21 RX ADMIN — BETAMETHASONE SODIUM PHOSPHATE AND BETAMETHASONE ACETATE 3 MG: 3; 3 INJECTION, SUSPENSION INTRA-ARTICULAR; INTRALESIONAL; INTRAMUSCULAR; SOFT TISSUE at 07:10

## 2024-10-21 NOTE — PROGRESS NOTES
Patient ID: Ayesha Arcos  YOB: 1965  MRN: [id number]    Chief Complaint: Finger Pain of the Right Hand      Referred By:  Rheumatology for right hand triggering    History of Present Illness: Ayesha Arcos is a right-hand dominant 59 y.o. female who presents today with right middle finger trigger finger.     59-year-old female with a history of rheumatoid arthritis presenting today for right middle finger trigger finger.  It has been going on about 3 months denies injury fall trauma.  Worse with full flexion of the middle finger.  Denies distal numbness and tingling.  Of note she did have degenerative changes throughout her D IP joints in her hand consistent with underlying arthritis and is currently receiving infusions for her rheumatoid.  Also history of right PIP pinky finger dislocation about 3 years ago with recurrent degenerative changes without any significant dysfunction or pain today.    Past Medical History:   Past Medical History:   Diagnosis Date    Carpal tunnel syndrome, bilateral     Hypertension     Rheumatoid arthritis      Past Surgical History:   Procedure Laterality Date    CARPAL TUNNEL RELEASE      right    skin cancer removal      nose    TUBAL LIGATION       Family History   Problem Relation Name Age of Onset    Diabetes Mother      Heart disease Father      Hypertension Father      Early death Father          Early 50's    Stroke Paternal Uncle      Heart disease Paternal Uncle       Social History     Socioeconomic History    Marital status:     Number of children: 1   Tobacco Use    Smoking status: Never    Smokeless tobacco: Never   Substance and Sexual Activity    Alcohol use: Yes     Alcohol/week: 1.0 standard drink of alcohol     Types: 1 Glasses of wine per week     Comment: occasional    Drug use: No    Sexual activity: Yes     Partners: Male     Birth control/protection: None     Medication List with Changes/Refills   Current  Medications    ASCORBIC ACID, VITAMIN C, (VITAMIN C) 100 MG TABLET    Take 100 mg by mouth once daily.    B COMPLEX VITAMINS TABLET    Take 1 tablet by mouth once daily.    DICLOFENAC SODIUM (VOLTAREN ARTHRITIS PAIN) 1 % GEL    Apply 2 g topically 3 (three) times daily.    FLUTICASONE PROPIONATE (FLONASE) 50 MCG/ACTUATION NASAL SPRAY    1 spray (50 mcg total) by Each Nostril route once daily.    FOLIC ACID (FOLVITE) 1 MG TABLET    Take 1 tablet (1 mg total) by mouth once daily.    METHOTREXATE 2.5 MG TAB    Take 8 tablets (20 mg total) by mouth every 7 days.    MULTIVITAMIN CAPSULE    Take 1 capsule by mouth once daily.    MULTIVITAMIN WITH MINERALS (HAIR,SKIN AND NAILS ORAL)    Take by mouth once daily.    PROMETHAZINE-DEXTROMETHORPHAN (PROMETHAZINE-DM) 6.25-15 MG/5 ML SYRP    Take 5 mLs by mouth nightly as needed (Cough).    TRIAMCINOLONE ACETONIDE 0.1% (KENALOG) 0.1 % OINTMENT    Apply topically 2 (two) times daily as needed.    TURMERIC ORAL    Take by mouth.    VALACYCLOVIR (VALTREX) 1000 MG TABLET    Take 1 tablet (1,000 mg total) by mouth 2 (two) times daily. for 10 days    VUITY 1.25 % DROP    Place 1 drop into both eyes daily as needed.     Review of patient's allergies indicates:  No Known Allergies    Physical Exam:   There is no height or weight on file to calculate BMI.    GENERAL: Well appearing, in no acute distress.  HEAD: Normocephalic and atraumatic.  ENT: External ears and nose grossly normal.  EYES: EOMI bilaterally  PULMONARY: Respirations are grossly even and non-labored.  NEURO: Awake, alert, and oriented x 3.  SKIN: No obvious rashes appreciated.  PSYCH: Mood & affect are appropriate.    Detailed MSK exam:   Right hand  Degenerative changes noted at the D IP joints as well as pinky finger PIP joint with active triggering of the middle finger and tenderness at the A1 pulley.  Neurovascular intact distally.    Imaging:  Per my review degenerative changes of the PIP of the pinky finger with  ulnar subluxation of the joint as well as D IP joint changes with significant severe osteoarthritis.    Relevant imaging results were reviewed and interpreted by me and per my read as above.  This was discussed with the patient and / or family today.     Assessment:  Ayesha Arcos is a 59 y.o. female presents today for right middle finger trigger finger.  We discussed the diagnosis prognosis as well as conservative treatment options moving forward.  Discussed splinting at night with oval 8 as well as topicals potential occupational therapy and corticosteroid injection.  She is interested in more aggressive treatment I discussed moving forward with a corticosteroid injection to the tendon sheath.  Please refer to procedure note for the details.  Oval 8 splint the rest the day today and at night for the next 4-6 weeks.  Discussed if not seeing clinical improvement would consider repeat injection depending on timing versus referral to hand surgery.  Continue treatment for rheumatoid arthritis of the hand with rheumatology    Trigger middle finger of right hand  -     Sports Medicine US - Guidance for Needle Placement    Seropositive rheumatoid arthritis         A copy of today's visit note has been sent to the referring provider.       Zia Hope MD    Disclaimer: This note was prepared using a voice recognition system and is likely to have sound alike errors within the text.

## 2024-10-21 NOTE — PATIENT INSTRUCTIONS
Assessment:  Ayesha Arcos is a 59 y.o. female   Chief Complaint   Patient presents with    Right Hand - Finger Pain       Encounter Diagnoses   Name Primary?    Trigger middle finger of right hand Yes    Seropositive rheumatoid arthritis         Plan:  Apply topical diclofenac (Voltaren) up to 4 times a day to the affected area.  It can be bought over the counter at any local pharmacy.    corticosteroid injection to tendon sheath today. We discussed the proper protocols after the injection such as no submerging pools, baths tubs, or hot tubs for 24 hr.  Showering is okay today.  We also discussed that blood sugars can be elevated after an injection and asked patient to properly checked her sugars over the next few days and contact their PCP if there are any concerns.  We discussed red flags such as fevers, chills, red, warm, tender joint at the area of injection to please seek medical care immediately.    Oval 8 rest of the day and tonight. Only at night after today.          Follow-up: As needed or sooner if there are any problems between now and then.    Thank you for choosing [facility] and Dr. Zia Hope for your orthopedic & sports medicine care. It is our goal to provide you with exceptional care that will help keep you healthy, active, and get you back in the game.    Please do not hesitate to reach out to us via email, phone, or MyChart with any questions, concerns, or feedback.    If you felt that you received exemplary care today, please consider leaving us feedback on Healthgrades at:  [email or web address]    If you are experiencing pain/discomfort ,or have questions and would like to be connected to the [facility] on-call team, please call this number and specify which Sports Medicine provider is treating you: [phone]

## 2024-10-21 NOTE — PROCEDURES
Tendon Sheath    Date/Time: 10/21/2024 7:40 AM    Performed by: Zia Hope MD  Authorized by: Zia Hope MD    Consent Done?:  Yes (Verbal)  Indications:  Pain  Site marked: the procedure site was marked    Timeout: prior to procedure the correct patient, procedure, and site was verified    Prep: patient was prepped and draped in usual sterile fashion      Local anesthesia used?: Yes    Local anesthetic:  Topical anesthetic  Location:  Long finger  Site:  R long flexor tendon sheath (A1 pulley)  Ultrasonic guidance for needle placement?: Yes    Needle size:  27 G  Approach:  Volar  Medications:  3 mg betamethasone acetate-betamethasone sodium phosphate 6 mg/mL; 1 mL LIDOcaine HCL 10 mg/ml (1%) 10 mg/mL (1 %)  Patient tolerance:  Patient tolerated the procedure well with no immediate complications    Additional Comments: Ultrasound guidance was used for needle localization. Images were saved and stored for documentation. The appropriate structures were visualized. Dynamic visualization of the needle was continuous throughout the procedures and maintained good position.     We discussed the proper protocols after the injection such as no submerging pools, baths tubs, or hot tubs for 24 hr.  Showering is okay today.  We also discussed that blood sugars can be elevated after an injection and asked patient to properly checked her sugars over the next few days and contact their PCP if there are any concerns.  We discussed red flags such as fevers, chills, red, warm, tender joint at the area of injection to please seek medical care immediately.

## 2024-11-01 ENCOUNTER — TELEPHONE (OUTPATIENT)
Dept: INTERNAL MEDICINE | Facility: CLINIC | Age: 59
End: 2024-11-01
Payer: COMMERCIAL

## 2024-11-01 ENCOUNTER — OFFICE VISIT (OUTPATIENT)
Dept: URGENT CARE | Facility: CLINIC | Age: 59
End: 2024-11-01
Payer: COMMERCIAL

## 2024-11-01 VITALS
DIASTOLIC BLOOD PRESSURE: 87 MMHG | BODY MASS INDEX: 25.05 KG/M2 | WEIGHT: 150.38 LBS | HEART RATE: 80 BPM | TEMPERATURE: 99 F | HEIGHT: 65 IN | RESPIRATION RATE: 20 BRPM | SYSTOLIC BLOOD PRESSURE: 148 MMHG | OXYGEN SATURATION: 100 %

## 2024-11-01 DIAGNOSIS — R51.9 BAD HEADACHE: ICD-10-CM

## 2024-11-01 DIAGNOSIS — B00.2 ORAL HERPES SIMPLEX INFECTION: Primary | ICD-10-CM

## 2024-11-01 DIAGNOSIS — D84.9 IMMUNOSUPPRESSED STATUS: ICD-10-CM

## 2024-11-01 DIAGNOSIS — B00.89 HERPETIC DERMATITIS: ICD-10-CM

## 2024-11-01 DIAGNOSIS — Z86.59 HISTORY OF RECENT STRESSFUL LIFE EVENT: ICD-10-CM

## 2024-11-01 DIAGNOSIS — B00.1 RECURRENT COLD SORES: ICD-10-CM

## 2024-11-01 RX ORDER — VALACYCLOVIR HYDROCHLORIDE 1 G/1
1000 TABLET, FILM COATED ORAL EVERY 12 HOURS
Qty: 20 TABLET | Refills: 0 | Status: SHIPPED | OUTPATIENT
Start: 2024-11-01 | End: 2024-11-11

## 2024-11-01 RX ORDER — VALACYCLOVIR HYDROCHLORIDE 1 G/1
1000 TABLET, FILM COATED ORAL 2 TIMES DAILY
Qty: 20 TABLET | Refills: 1 | Status: SHIPPED | OUTPATIENT
Start: 2024-11-01 | End: 2024-11-11

## 2024-11-20 ENCOUNTER — TELEPHONE (OUTPATIENT)
Dept: RHEUMATOLOGY | Facility: CLINIC | Age: 59
End: 2024-11-20
Payer: COMMERCIAL

## 2024-11-26 ENCOUNTER — TELEPHONE (OUTPATIENT)
Dept: RHEUMATOLOGY | Facility: CLINIC | Age: 59
End: 2024-11-26
Payer: COMMERCIAL

## 2024-11-26 NOTE — TELEPHONE ENCOUNTER
----- Message from Dinorah sent at 11/26/2024  3:06 PM CST -----  Contact: patient  Ayesha Urbina Josselyn would like a call back at [phone],in regards to her upcoming appt.

## 2024-12-04 ENCOUNTER — TELEPHONE (OUTPATIENT)
Dept: RHEUMATOLOGY | Facility: CLINIC | Age: 59
End: 2024-12-04
Payer: COMMERCIAL

## 2024-12-04 NOTE — TELEPHONE ENCOUNTER
----- Message from Kamla sent at 12/4/2024  8:53 AM CST -----  Contact: Ayesha  .Patient is calling to speak with the nurse regarding appt . Reports wanting to reschedule appt . Please give patient a call back at   [phone]

## 2024-12-05 ENCOUNTER — LAB VISIT (OUTPATIENT)
Dept: LAB | Facility: HOSPITAL | Age: 59
End: 2024-12-05
Attending: INTERNAL MEDICINE
Payer: COMMERCIAL

## 2024-12-05 DIAGNOSIS — D84.9 IMMUNOSUPPRESSED STATUS: ICD-10-CM

## 2024-12-05 DIAGNOSIS — M05.9 SEROPOSITIVE RHEUMATOID ARTHRITIS: ICD-10-CM

## 2024-12-05 DIAGNOSIS — E55.9 VITAMIN D INSUFFICIENCY: ICD-10-CM

## 2024-12-05 DIAGNOSIS — Z51.81 MEDICATION MONITORING ENCOUNTER: ICD-10-CM

## 2024-12-05 LAB
25(OH)D3+25(OH)D2 SERPL-MCNC: 76 NG/ML (ref 30–96)
ALBUMIN SERPL BCP-MCNC: 3.9 G/DL (ref 3.5–5.2)
ALP SERPL-CCNC: 107 U/L (ref 40–150)
ALT SERPL W/O P-5'-P-CCNC: 23 U/L (ref 10–44)
ANION GAP SERPL CALC-SCNC: 11 MMOL/L (ref 8–16)
AST SERPL-CCNC: 15 U/L (ref 10–40)
BASOPHILS # BLD AUTO: 0.03 K/UL (ref 0–0.2)
BASOPHILS NFR BLD: 0.5 % (ref 0–1.9)
BILIRUB SERPL-MCNC: 0.4 MG/DL (ref 0.1–1)
BUN SERPL-MCNC: 11 MG/DL (ref 6–20)
CALCIUM SERPL-MCNC: 9.6 MG/DL (ref 8.7–10.5)
CCP AB SER IA-ACNC: 38.5 U/ML
CHLORIDE SERPL-SCNC: 106 MMOL/L (ref 95–110)
CO2 SERPL-SCNC: 23 MMOL/L (ref 23–29)
CREAT SERPL-MCNC: 0.9 MG/DL (ref 0.5–1.4)
DIFFERENTIAL METHOD BLD: ABNORMAL
EOSINOPHIL # BLD AUTO: 0.3 K/UL (ref 0–0.5)
EOSINOPHIL NFR BLD: 4.4 % (ref 0–8)
ERYTHROCYTE [DISTWIDTH] IN BLOOD BY AUTOMATED COUNT: 15.9 % (ref 11.5–14.5)
ERYTHROCYTE [SEDIMENTATION RATE] IN BLOOD BY WESTERGREN METHOD: 47 MM/HR (ref 0–36)
EST. GFR  (NO RACE VARIABLE): >60 ML/MIN/1.73 M^2
GLUCOSE SERPL-MCNC: 136 MG/DL (ref 70–110)
HCT VFR BLD AUTO: 43.3 % (ref 37–48.5)
HGB BLD-MCNC: 13.4 G/DL (ref 12–16)
IMM GRANULOCYTES # BLD AUTO: 0.01 K/UL (ref 0–0.04)
IMM GRANULOCYTES NFR BLD AUTO: 0.2 % (ref 0–0.5)
LYMPHOCYTES # BLD AUTO: 1.7 K/UL (ref 1–4.8)
LYMPHOCYTES NFR BLD: 27.1 % (ref 18–48)
MCH RBC QN AUTO: 27.4 PG (ref 27–31)
MCHC RBC AUTO-ENTMCNC: 30.9 G/DL (ref 32–36)
MCV RBC AUTO: 89 FL (ref 82–98)
MONOCYTES # BLD AUTO: 0.3 K/UL (ref 0.3–1)
MONOCYTES NFR BLD: 4.4 % (ref 4–15)
NEUTROPHILS # BLD AUTO: 4.1 K/UL (ref 1.8–7.7)
NEUTROPHILS NFR BLD: 63.4 % (ref 38–73)
NRBC BLD-RTO: 0 /100 WBC
PLATELET # BLD AUTO: 316 K/UL (ref 150–450)
PMV BLD AUTO: 10.1 FL (ref 9.2–12.9)
POTASSIUM SERPL-SCNC: 3.6 MMOL/L (ref 3.5–5.1)
PROT SERPL-MCNC: 7.6 G/DL (ref 6–8.4)
RBC # BLD AUTO: 4.89 M/UL (ref 4–5.4)
RHEUMATOID FACT SERPL-ACNC: 68 IU/ML (ref 0–15)
SODIUM SERPL-SCNC: 140 MMOL/L (ref 136–145)
WBC # BLD AUTO: 6.38 K/UL (ref 3.9–12.7)

## 2024-12-05 PROCEDURE — 36415 COLL VENOUS BLD VENIPUNCTURE: CPT | Performed by: INTERNAL MEDICINE

## 2024-12-05 PROCEDURE — 82306 VITAMIN D 25 HYDROXY: CPT | Performed by: INTERNAL MEDICINE

## 2024-12-05 PROCEDURE — 86200 CCP ANTIBODY: CPT | Performed by: INTERNAL MEDICINE

## 2024-12-05 PROCEDURE — 85025 COMPLETE CBC W/AUTO DIFF WBC: CPT | Performed by: INTERNAL MEDICINE

## 2024-12-05 PROCEDURE — 80053 COMPREHEN METABOLIC PANEL: CPT | Performed by: INTERNAL MEDICINE

## 2024-12-05 PROCEDURE — 86431 RHEUMATOID FACTOR QUANT: CPT | Performed by: INTERNAL MEDICINE

## 2024-12-05 PROCEDURE — 85651 RBC SED RATE NONAUTOMATED: CPT | Performed by: INTERNAL MEDICINE

## 2025-01-27 ENCOUNTER — TELEPHONE (OUTPATIENT)
Dept: RHEUMATOLOGY | Facility: CLINIC | Age: 60
End: 2025-01-27
Payer: COMMERCIAL

## 2025-01-31 ENCOUNTER — TELEPHONE (OUTPATIENT)
Dept: RHEUMATOLOGY | Facility: CLINIC | Age: 60
End: 2025-01-31
Payer: COMMERCIAL

## 2025-01-31 NOTE — TELEPHONE ENCOUNTER
----- Message from Madi sent at 1/31/2025  8:20 AM CST -----  Contact: Ayesha  .Type:  Needs Medical Advice    Who Called:  Ayesha     Would the patient rather a call back or a response via [facility]? Call back   Best Call Back Number:  [phone] (home)    Additional Information:  Pt is calling in regard to getting a call back to discuss concerns and to discuss appt availability to been seen sooner if possible     Thanks

## 2025-01-31 NOTE — TELEPHONE ENCOUNTER
Pt stated that she would not be in town on Tuesday and wanted to reschedule her appointment. Got pt scheduled

## 2025-02-06 ENCOUNTER — TELEPHONE (OUTPATIENT)
Dept: INFUSION THERAPY | Facility: HOSPITAL | Age: 60
End: 2025-02-06
Payer: COMMERCIAL

## 2025-02-06 ENCOUNTER — OFFICE VISIT (OUTPATIENT)
Dept: RHEUMATOLOGY | Facility: CLINIC | Age: 60
End: 2025-02-06
Payer: COMMERCIAL

## 2025-02-06 DIAGNOSIS — D84.9 IMMUNOSUPPRESSED STATUS: ICD-10-CM

## 2025-02-06 DIAGNOSIS — Z79.52 CURRENT USE OF STEROID MEDICATION: ICD-10-CM

## 2025-02-06 DIAGNOSIS — F40.298 NEEDLE PHOBIA: ICD-10-CM

## 2025-02-06 DIAGNOSIS — Z51.81 MEDICATION MONITORING ENCOUNTER: ICD-10-CM

## 2025-02-06 DIAGNOSIS — M35.01 SJOGREN SYNDROME WITH KERATOCONJUNCTIVITIS: ICD-10-CM

## 2025-02-06 DIAGNOSIS — M06.9 RHEUMATOID ARTHRITIS FLARE: ICD-10-CM

## 2025-02-06 DIAGNOSIS — M05.9 SEROPOSITIVE RHEUMATOID ARTHRITIS: Primary | ICD-10-CM

## 2025-02-06 PROBLEM — M62.81 PROXIMAL MUSCLE WEAKNESS: Status: RESOLVED | Noted: 2022-02-18 | Resolved: 2025-02-06

## 2025-02-06 PROCEDURE — G2211 COMPLEX E/M VISIT ADD ON: HCPCS | Mod: 95,,, | Performed by: INTERNAL MEDICINE

## 2025-02-06 PROCEDURE — 98007 SYNCH AUDIO-VIDEO EST HI 40: CPT | Mod: 95,,, | Performed by: INTERNAL MEDICINE

## 2025-02-06 RX ORDER — METHYLPREDNISOLONE SOD SUCC 125 MG
40 VIAL (EA) INJECTION ONCE
Status: CANCELLED | OUTPATIENT
Start: 2025-02-06

## 2025-02-06 RX ORDER — METHYLPREDNISOLONE SOD SUCC 125 MG
40 VIAL (EA) INJECTION ONCE
Status: CANCELLED | OUTPATIENT
Start: 2025-02-07

## 2025-02-06 RX ORDER — METHOTREXATE 2.5 MG/1
25 TABLET ORAL
Qty: 120 TABLET | Refills: 1 | Status: SHIPPED | OUTPATIENT
Start: 2025-02-06 | End: 2025-08-05

## 2025-02-06 RX ORDER — PREDNISONE 10 MG/1
10 TABLET ORAL DAILY PRN
Qty: 14 TABLET | Refills: 1 | Status: SHIPPED | OUTPATIENT
Start: 2025-02-06 | End: 2025-02-20

## 2025-02-06 RX ORDER — FOLIC ACID 1 MG/1
1 TABLET ORAL DAILY
Qty: 90 TABLET | Refills: 1 | Status: SHIPPED | OUTPATIENT
Start: 2025-02-06 | End: 2025-08-05

## 2025-02-06 NOTE — TELEPHONE ENCOUNTER
----- Message from Perez Thomas MD sent at 2/6/2025  7:45 AM CST -----  High disease activity.  Failure to current dose of methotrexate and Orencia.  Recommending increased Orencia dose to 1000mg.  Placed order for solumedrol 40mg once with next infusion.

## 2025-02-06 NOTE — Clinical Note
High disease activity.  Failure to current dose of methotrexate and Orencia.  Recommending increased Orencia dose to 1000mg.  Placed order for solumedrol 40mg once with next infusion.

## 2025-02-13 ENCOUNTER — OFFICE VISIT (OUTPATIENT)
Dept: INTERNAL MEDICINE | Facility: CLINIC | Age: 60
End: 2025-02-13
Payer: COMMERCIAL

## 2025-02-13 ENCOUNTER — LAB VISIT (OUTPATIENT)
Dept: LAB | Facility: HOSPITAL | Age: 60
End: 2025-02-13
Attending: FAMILY MEDICINE
Payer: COMMERCIAL

## 2025-02-13 VITALS
BODY MASS INDEX: 25.57 KG/M2 | TEMPERATURE: 99 F | WEIGHT: 153.69 LBS | DIASTOLIC BLOOD PRESSURE: 88 MMHG | HEART RATE: 106 BPM | SYSTOLIC BLOOD PRESSURE: 120 MMHG | OXYGEN SATURATION: 99 %

## 2025-02-13 DIAGNOSIS — R05.9 COUGH, UNSPECIFIED TYPE: ICD-10-CM

## 2025-02-13 DIAGNOSIS — Z79.899 ENCOUNTER FOR LONG-TERM (CURRENT) USE OF MEDICATIONS: ICD-10-CM

## 2025-02-13 DIAGNOSIS — R13.10 DYSPHAGIA, UNSPECIFIED TYPE: ICD-10-CM

## 2025-02-13 DIAGNOSIS — R25.2 LEG CRAMPS: Chronic | ICD-10-CM

## 2025-02-13 DIAGNOSIS — M05.9 SEROPOSITIVE RHEUMATOID ARTHRITIS: Primary | Chronic | ICD-10-CM

## 2025-02-13 DIAGNOSIS — G47.00 INSOMNIA, UNSPECIFIED TYPE: Chronic | ICD-10-CM

## 2025-02-13 DIAGNOSIS — E78.5 HYPERLIPIDEMIA, UNSPECIFIED HYPERLIPIDEMIA TYPE: ICD-10-CM

## 2025-02-13 DIAGNOSIS — L65.9 HAIR LOSS: ICD-10-CM

## 2025-02-13 DIAGNOSIS — E78.2 MIXED HYPERLIPIDEMIA: Chronic | ICD-10-CM

## 2025-02-13 LAB
ALBUMIN SERPL BCP-MCNC: 4.1 G/DL (ref 3.5–5.2)
ALP SERPL-CCNC: 117 U/L (ref 40–150)
ALT SERPL W/O P-5'-P-CCNC: 19 U/L (ref 10–44)
ANION GAP SERPL CALC-SCNC: 13 MMOL/L (ref 8–16)
AST SERPL-CCNC: 41 U/L (ref 10–40)
BILIRUB SERPL-MCNC: 0.4 MG/DL (ref 0.1–1)
BUN SERPL-MCNC: 9 MG/DL (ref 6–20)
CALCIUM SERPL-MCNC: 10.2 MG/DL (ref 8.7–10.5)
CHLORIDE SERPL-SCNC: 104 MMOL/L (ref 95–110)
CHOLEST SERPL-MCNC: 261 MG/DL (ref 120–199)
CHOLEST/HDLC SERPL: 5.9 {RATIO} (ref 2–5)
CO2 SERPL-SCNC: 23 MMOL/L (ref 23–29)
CREAT SERPL-MCNC: 0.8 MG/DL (ref 0.5–1.4)
EST. GFR  (NO RACE VARIABLE): >60 ML/MIN/1.73 M^2
FERRITIN SERPL-MCNC: 115 NG/ML (ref 20–300)
FOLATE SERPL-MCNC: >40 NG/ML (ref 4–24)
GLUCOSE SERPL-MCNC: 81 MG/DL (ref 70–110)
HDLC SERPL-MCNC: 44 MG/DL (ref 40–75)
HDLC SERPL: 16.9 % (ref 20–50)
IRON SERPL-MCNC: 62 UG/DL (ref 30–160)
LDLC SERPL CALC-MCNC: 189.2 MG/DL (ref 63–159)
MAGNESIUM SERPL-MCNC: 2.5 MG/DL (ref 1.6–2.6)
NONHDLC SERPL-MCNC: 217 MG/DL
POTASSIUM SERPL-SCNC: 3.9 MMOL/L (ref 3.5–5.1)
PROT SERPL-MCNC: 7.7 G/DL (ref 6–8.4)
SATURATED IRON: 18 % (ref 20–50)
SODIUM SERPL-SCNC: 140 MMOL/L (ref 136–145)
TOTAL IRON BINDING CAPACITY: 346 UG/DL (ref 250–450)
TRANSFERRIN SERPL-MCNC: 234 MG/DL (ref 200–375)
TRIGL SERPL-MCNC: 139 MG/DL (ref 30–150)
VIT B12 SERPL-MCNC: 894 PG/ML (ref 210–950)

## 2025-02-13 PROCEDURE — 1160F RVW MEDS BY RX/DR IN RCRD: CPT | Mod: CPTII,S$GLB,, | Performed by: FAMILY MEDICINE

## 2025-02-13 PROCEDURE — 3079F DIAST BP 80-89 MM HG: CPT | Mod: CPTII,S$GLB,, | Performed by: FAMILY MEDICINE

## 2025-02-13 PROCEDURE — 99214 OFFICE O/P EST MOD 30 MIN: CPT | Mod: S$GLB,,, | Performed by: FAMILY MEDICINE

## 2025-02-13 PROCEDURE — 82607 VITAMIN B-12: CPT | Performed by: FAMILY MEDICINE

## 2025-02-13 PROCEDURE — 82728 ASSAY OF FERRITIN: CPT | Performed by: FAMILY MEDICINE

## 2025-02-13 PROCEDURE — 84425 ASSAY OF VITAMIN B-1: CPT | Performed by: FAMILY MEDICINE

## 2025-02-13 PROCEDURE — 1159F MED LIST DOCD IN RCRD: CPT | Mod: CPTII,S$GLB,, | Performed by: FAMILY MEDICINE

## 2025-02-13 PROCEDURE — G2211 COMPLEX E/M VISIT ADD ON: HCPCS | Mod: S$GLB,,, | Performed by: FAMILY MEDICINE

## 2025-02-13 PROCEDURE — 82746 ASSAY OF FOLIC ACID SERUM: CPT | Performed by: FAMILY MEDICINE

## 2025-02-13 PROCEDURE — 83540 ASSAY OF IRON: CPT | Performed by: FAMILY MEDICINE

## 2025-02-13 PROCEDURE — 3074F SYST BP LT 130 MM HG: CPT | Mod: CPTII,S$GLB,, | Performed by: FAMILY MEDICINE

## 2025-02-13 PROCEDURE — 80061 LIPID PANEL: CPT | Performed by: FAMILY MEDICINE

## 2025-02-13 PROCEDURE — 83735 ASSAY OF MAGNESIUM: CPT | Performed by: FAMILY MEDICINE

## 2025-02-13 PROCEDURE — 99999 PR PBB SHADOW E&M-EST. PATIENT-LVL IV: CPT | Mod: PBBFAC,,, | Performed by: FAMILY MEDICINE

## 2025-02-13 PROCEDURE — 80053 COMPREHEN METABOLIC PANEL: CPT | Performed by: FAMILY MEDICINE

## 2025-02-13 PROCEDURE — 3008F BODY MASS INDEX DOCD: CPT | Mod: CPTII,S$GLB,, | Performed by: FAMILY MEDICINE

## 2025-02-13 RX ORDER — PROMETHAZINE HYDROCHLORIDE AND DEXTROMETHORPHAN HYDROBROMIDE 6.25; 15 MG/5ML; MG/5ML
5 SYRUP ORAL NIGHTLY PRN
Qty: 120 ML | Refills: 0 | Status: SHIPPED | OUTPATIENT
Start: 2025-02-13

## 2025-02-13 RX ORDER — DICYCLOMINE HYDROCHLORIDE 20 MG/1
20 TABLET ORAL EVERY 6 HOURS PRN
Qty: 30 TABLET | Refills: 0 | Status: SHIPPED | OUTPATIENT
Start: 2025-02-13

## 2025-02-19 LAB — VIT B1 BLD-MCNC: 53 UG/L (ref 38–122)

## 2025-02-25 ENCOUNTER — RESULTS FOLLOW-UP (OUTPATIENT)
Dept: INTERNAL MEDICINE | Facility: CLINIC | Age: 60
End: 2025-02-25

## 2025-02-25 ENCOUNTER — TELEPHONE (OUTPATIENT)
Dept: INTERNAL MEDICINE | Facility: CLINIC | Age: 60
End: 2025-02-25
Payer: COMMERCIAL

## 2025-02-25 PROBLEM — E78.2 MIXED HYPERLIPIDEMIA: Chronic | Status: ACTIVE | Noted: 2025-02-25

## 2025-02-25 PROBLEM — G47.00 INSOMNIA: Chronic | Status: ACTIVE | Noted: 2025-02-25

## 2025-02-25 PROBLEM — R25.2 LEG CRAMPS: Status: ACTIVE | Noted: 2025-02-25

## 2025-02-25 NOTE — PROGRESS NOTES
Subjective     Patient ID: Ayesha Arcos is a 59 y.o. female.    Chief Complaint: Follow-up    History of Present Illness    Ayesha presents with dysphagia and concerns about alopecia.      Ayesha reports dysphagia for approximately 2 weeks, occurring intermittently, particularly when consuming certain foods like rice or large meals. The sensation is localized to the mid-chest region, not in her throat. She denies any associated pain or issues with liquids or medication.    She mentions increased pain in her fingers due to rheumatoid arthritis. Last week, Dr. Thomas prescribed prednisone (1 tablet as needed daily) and increased her methotrexate dosage from 4-5 to address this pain.    She reports nausea last week after her Orencia injection, which she attributes to using a premade injection for the first time. She typically eats a small amount before her injections but did not on this occasion.    She mentions alopecia, particularly in the frontal region of her scalp. Her beautician has noted that the follicles are growing back, suggesting the hair broke off rather than being permanently lost. She has been taking hair, skin, and nail supplements, using black seed oil, and an unspecified tea to address this issue.    She reports occasional leg cramps, which she manages by eating a banana daily for potassium. She also mentions having an intermittent cough, which Dr. Xie suggested could be related to her history of COVID-19. She keeps cough drops in her purse to manage this symptom.    She reports difficulty sleeping but has not tried any interventions for this issue yet.    She denies burning sensation, acid taste in throat, constant nausea, weakness, or fatigue.    MEDICATIONS:  Ayesha is on Orencia injections and Methotrexate for rheumatoid arthritis, with the latter recently increased from 4-5 tablets. She has been recently prescribed Prednisone, 1 tablet as needed daily, for rheumatoid arthritis pain.  She takes daily Folic acid to supplement due to methotrexate use. Ayesha discontinued her cholesterol medication years ago, as stopped by Dr. Matos.    MEDICAL HISTORY:  Ayesha has a history of rheumatoid arthritis and a past COVID-19 infection. She had iron deficiency years ago. She also had high cholesterol years ago, which resolved after 6 months of medication. Ayesha is up to date with vaccinations, including the shingles vaccine. She has not received the flu vaccine.    TEST RESULTS:  Ayesha's Vitamin D levels were normal in December. Her CBC was completed and found to be normal. Her cholesterol levels were previously elevated but normalized after 6 months of medication.    SOCIAL HISTORY:  Occupation: Employed      ROS:  General: -fever, -chills, -fatigue, -weight gain, -weight loss  Eyes: -vision changes, -redness, -discharge  ENT: -ear pain, -nasal congestion, -sore throat, +difficulty swallowing  Cardiovascular: -chest pain, -palpitations, -lower extremity edema  Respiratory: +cough, -shortness of breath  Gastrointestinal: -abdominal pain, +nausea, -vomiting, -diarrhea, -constipation, -blood in stool  Genitourinary: -dysuria, -hematuria, -frequency  Musculoskeletal: +joint pain, -muscle pain, +muscle cramps  Skin: -rash, -lesion  Neurological: -headache, -dizziness, -numbness, -tingling, -weakness  Psychiatric: -anxiety, -depression, +sleep difficulty            Objective     Physical Exam  Vitals and nursing note reviewed.   Constitutional:       Appearance: Normal appearance. She is normal weight.   HENT:      Head: Normocephalic and atraumatic.   Eyes:      Extraocular Movements: Extraocular movements intact.      Conjunctiva/sclera: Conjunctivae normal.   Cardiovascular:      Rate and Rhythm: Normal rate and regular rhythm.      Pulses: Normal pulses.      Heart sounds: Normal heart sounds.   Pulmonary:      Effort: Pulmonary effort is normal.      Breath sounds: Normal breath sounds.    Musculoskeletal:      Cervical back: Normal range of motion and neck supple.   Skin:     General: Skin is warm and dry.   Neurological:      General: No focal deficit present.      Mental Status: She is alert and oriented to person, place, and time.   Psychiatric:         Mood and Affect: Mood normal.         Behavior: Behavior normal.                Assessment and Plan     1. Seropositive rheumatoid arthritis  Comments:  stable treatment per rheumatology    2. Encounter for long-term (current) use of medications  -     VITAMIN B12; Future; Expected date: 02/13/2025  -     Folate; Future; Expected date: 02/13/2025  -     VITAMIN B1; Future; Expected date: 02/13/2025  -     Magnesium; Future; Expected date: 02/13/2025  -     COMPREHENSIVE METABOLIC PANEL; Future; Expected date: 02/13/2025  -     IRON AND TIBC; Future; Expected date: 02/13/2025  -     FERRITIN; Future; Expected date: 02/13/2025    3. Mixed hyperlipidemia  -     LIPID PANEL; Future; Expected date: 02/13/2025    4. Cough, unspecified type  -     promethazine-dextromethorphan (PROMETHAZINE-DM) 6.25-15 mg/5 mL Syrp; Take 5 mLs by mouth nightly as needed (Cough).  Dispense: 120 mL; Refill: 0    5. Hair loss  Comments:  improving continue with vitamin supplemenation    6. Leg cramps  Comments:  stable cont dietary potassium supplementation  Overview:  stable cont dietary potassium supplementation      7. Dysphagia, unspecified type  Comments:  will try bentyl to see if symptoms improve    8. Insomnia, unspecified type    Other orders  -     dicyclomine (BENTYL) 20 mg tablet; Take 1 tablet (20 mg total) by mouth every 6 (six) hours as needed (esophageal spasm).  Dispense: 30 tablet; Refill: 0        Assessment & Plan    Assessed patient's complaint of food getting stuck in chest; suspected esophageal spasms or acid reflux, potentially exacerbated by recent prednisone prescription  Ruled out thyroid involvement based on symptom location  Evaluated recent  medication changes, including increased methotrexate dosage for rheumatoid arthritis pain  Determined need for smooth muscle relaxant and antacid to address chest discomfort  Assessed hair loss, considering potential nutritional deficiencies  Reviewed recent lab results and determined need for additional tests to evaluate vitamin levels and cholesterol    PATIENT EDUCATION:   Explained potential causes of food getting stuck sensation, including esophageal spasms and acid reflux   Discussed the possibility of prednisone causing indigestion flare-ups   Explained the relaxing effect of dicyclamine on the food pipe to help food pass   Educated on the importance of adequate nutrition and vitamin levels for hair health    ACTION ITEMS/LIFESTYLE:   Ayesha to continue taking daily banana for potassium intake   Recommend trying magnesium glycinate for relaxation and sleep improvement    MEDICATIONS:   Started dicyclamine, 1 tablet every 6 hours as needed for chest spasms   Refilled cough medicine   Approved use of OTC melatonin for sleep aid    ORDERS:   CBC, Vitamin B12 level, Folic acid level, Magnesium level, Potassium level, Iron level, and Cholesterol panel ordered    FOLLOW UP:   Follow up in 2 weeks to assess resolution of food sticking sensation   Follow up in 6 weeks              Follow up in about 6 weeks (around 3/27/2025).    This note was generated with the assistance of ambient listening technology. Verbal consent was obtained by the patient and accompanying visitor(s) for the recording of patient appointment to facilitate this note. I attest to having reviewed and edited the generated note for accuracy, though some syntax or spelling errors may persist. Please contact the author of this note for any clarification.

## 2025-03-04 ENCOUNTER — TELEPHONE (OUTPATIENT)
Dept: INTERNAL MEDICINE | Facility: CLINIC | Age: 60
End: 2025-03-04
Payer: COMMERCIAL

## 2025-03-04 DIAGNOSIS — E78.2 MIXED HYPERLIPIDEMIA: Primary | ICD-10-CM

## 2025-03-04 NOTE — TELEPHONE ENCOUNTER
----- Message from Casandra sent at 3/4/2025  2:58 PM CST -----  Type:  Patient Returning CallWho Called:Hemanth Payne Message for Patient:Sandy Paul LPNDoes the patient know what this is regarding?:LabsWould the patient rather a call back or a response via [facility]? callbackBe Call Back Number:[phone] Information: Missed call

## 2025-03-04 NOTE — TELEPHONE ENCOUNTER
Pt is just getting message about abn cholesterol. She does agree to start back on the statins . Look at her appt. She has an appt on 03/27 for a follow up on hair loss, and then again on 06/7/2025 at for another follow up . Does she need both of these appt?

## 2025-03-05 ENCOUNTER — TELEPHONE (OUTPATIENT)
Dept: INTERNAL MEDICINE | Facility: CLINIC | Age: 60
End: 2025-03-05
Payer: COMMERCIAL

## 2025-03-05 RX ORDER — ROSUVASTATIN CALCIUM 10 MG/1
10 TABLET, COATED ORAL DAILY
Qty: 90 TABLET | Refills: 3 | Status: SHIPPED | OUTPATIENT
Start: 2025-03-05 | End: 2026-03-05

## 2025-03-05 NOTE — TELEPHONE ENCOUNTER
----- Message from Marj sent at 3/5/2025  4:05 PM CST -----  Contact: MAGALIS  .TYPE: Patient Call BackWho called:PATIENTWhat is the request in detail:  PATIENT CALL WITH CONCERNS ABOUT PRESCRIPTION BEING CALLED IN TO PHARMACY FOR HIGH CHOLESTEROL PLEASE GIVE CALL BACK Can the clinic reply by [facility]?   Would the patient rather a call back or a response via [facility] call back number:.[phone] (home)

## 2025-03-05 NOTE — TELEPHONE ENCOUNTER
Pt went to  rx at [facility] last nite and it wasn't there. I informed her that Dr Paul sent it today at 130pm

## 2025-03-26 ENCOUNTER — TELEPHONE (OUTPATIENT)
Dept: RHEUMATOLOGY | Facility: CLINIC | Age: 60
End: 2025-03-26
Payer: COMMERCIAL

## 2025-03-26 NOTE — TELEPHONE ENCOUNTER
----- Message from Med Assistant Dilan sent at 3/26/2025  9:22 AM CDT -----  Contact: Miah/[facility]    ----- Message -----  From: Ethel Gallardo MA  Sent: 3/25/2025   4:33 PM CDT  To: Dilan Shah MA      ----- Message -----  From: Jaja Aldridge  Sent: 3/25/2025   1:13 PM CDT  To: William Todd Staff    Miah from [facility] [employer] is calling in regards to get some information about medical condition of Mrs Elliott call him back at [phone]!

## 2025-03-31 ENCOUNTER — OFFICE VISIT (OUTPATIENT)
Dept: INTERNAL MEDICINE | Facility: CLINIC | Age: 60
End: 2025-03-31
Payer: COMMERCIAL

## 2025-03-31 VITALS
TEMPERATURE: 98 F | OXYGEN SATURATION: 100 % | DIASTOLIC BLOOD PRESSURE: 84 MMHG | WEIGHT: 152.13 LBS | BODY MASS INDEX: 25.34 KG/M2 | HEART RATE: 72 BPM | SYSTOLIC BLOOD PRESSURE: 124 MMHG | HEIGHT: 65 IN

## 2025-03-31 DIAGNOSIS — E61.1 IRON DEFICIENCY: ICD-10-CM

## 2025-03-31 DIAGNOSIS — R25.2 LEG CRAMPS: ICD-10-CM

## 2025-03-31 DIAGNOSIS — Z00.00 ROUTINE GENERAL MEDICAL EXAMINATION AT A HEALTH CARE FACILITY: Primary | ICD-10-CM

## 2025-03-31 DIAGNOSIS — E78.2 MIXED HYPERLIPIDEMIA: Primary | Chronic | ICD-10-CM

## 2025-03-31 DIAGNOSIS — M05.9 SEROPOSITIVE RHEUMATOID ARTHRITIS: Chronic | ICD-10-CM

## 2025-03-31 DIAGNOSIS — E78.2 MIXED HYPERLIPIDEMIA: ICD-10-CM

## 2025-03-31 DIAGNOSIS — J30.9 ALLERGIC RHINITIS, UNSPECIFIED SEASONALITY, UNSPECIFIED TRIGGER: Chronic | ICD-10-CM

## 2025-03-31 PROCEDURE — 3074F SYST BP LT 130 MM HG: CPT | Mod: CPTII,S$GLB,, | Performed by: FAMILY MEDICINE

## 2025-03-31 PROCEDURE — 3008F BODY MASS INDEX DOCD: CPT | Mod: CPTII,S$GLB,, | Performed by: FAMILY MEDICINE

## 2025-03-31 PROCEDURE — 1159F MED LIST DOCD IN RCRD: CPT | Mod: CPTII,S$GLB,, | Performed by: FAMILY MEDICINE

## 2025-03-31 PROCEDURE — 3079F DIAST BP 80-89 MM HG: CPT | Mod: CPTII,S$GLB,, | Performed by: FAMILY MEDICINE

## 2025-03-31 PROCEDURE — 99214 OFFICE O/P EST MOD 30 MIN: CPT | Mod: S$GLB,,, | Performed by: FAMILY MEDICINE

## 2025-03-31 PROCEDURE — 1160F RVW MEDS BY RX/DR IN RCRD: CPT | Mod: CPTII,S$GLB,, | Performed by: FAMILY MEDICINE

## 2025-03-31 PROCEDURE — G2211 COMPLEX E/M VISIT ADD ON: HCPCS | Mod: S$GLB,,, | Performed by: FAMILY MEDICINE

## 2025-03-31 PROCEDURE — 99999 PR PBB SHADOW E&M-EST. PATIENT-LVL IV: CPT | Mod: PBBFAC,,, | Performed by: FAMILY MEDICINE

## 2025-03-31 RX ORDER — FLUTICASONE PROPIONATE 50 MCG
1 SPRAY, SUSPENSION (ML) NASAL DAILY
Qty: 16 ML | Refills: 6 | Status: SHIPPED | OUTPATIENT
Start: 2025-03-31

## 2025-03-31 NOTE — PROGRESS NOTES
Subjective     Patient ID: Ayesha Arcos is a 59 y.o. female.    Chief Complaint: Hyperlipidemia, leg cramp, Insomnia, and Follow-up (6 weeks)    History of Present Illness    Ayesha presents for a follow-up visit to discuss recent lab results and ongoing health management.    HPI:  Ayesha reports improvement in cholesterol levels, with total cholesterol decreasing from 301 to 261, and LDL cholesterol reducing from 220 to 189. She attributes this to reduced fried food intake and increased physical activity, including walking and cycling.    Leg cramps have decreased in frequency. She consumes a banana daily to help with this issue. Foot massages or pedicures can still trigger leg cramps.    She reports poor sleep quality, typically falling asleep with the television on and having difficulty sleeping without it. She has challenges sleeping when staying with friends who do not allow the TV on.    A previous issue with swallowing discomfort has now improved. She is uncertain about the cause of the improvement but no longer has the discomfort.    She takes Orencia once a month for rheumatoid arthritis, which remains stable. She also uses a nasal spray and requests a refill for this medication.    She denies needing additional medication for leg cramps or sleep issues.    MEDICATIONS:  Ayesha is on Orencia once a month for rheumatoid arthritis. She is also using a nasal spray.    MEDICAL HISTORY:  Ayesha has a history of hypercholesterolemia, rheumatoid arthritis, kidney dysfunction, and iron deficiency.    TEST RESULTS:  Ayesha's total cholesterol has decreased from 301 to 261. Her triglycerides have improved from 174 to 139 (normal range <150). LDL cholesterol has reduced from 220 to 189. Iron saturation has increased from 11% to 18% (normal is 20%), and total iron has improved. Her creatinine level is 2.44. Kidney function, liver function, sodium, potassium, blood sugar, magnesium, and B12 levels are all  normal. B1 (Thiamine) is at 53, and uric acid is slightly elevated.      ROS:  General: -fever, -chills, -fatigue, -weight gain, -weight loss  Eyes: -vision changes, -redness, -discharge  ENT: -ear pain, -nasal congestion, -sore throat  Cardiovascular: -chest pain, -palpitations, -lower extremity edema  Respiratory: -cough, -shortness of breath  Gastrointestinal: -abdominal pain, -nausea, -vomiting, -diarrhea, -constipation, -blood in stool  Genitourinary: -dysuria, -hematuria, -frequency  Musculoskeletal: -joint pain, -muscle pain, +leg cramping, +muscle cramps  Skin: -rash, -lesion  Neurological: -headache, -dizziness, -numbness, -tingling  Psychiatric: -anxiety, -depression, +sleep difficulty            Objective     Physical Exam  Vitals and nursing note reviewed.   Constitutional:       Appearance: Normal appearance. She is normal weight.   HENT:      Head: Normocephalic and atraumatic.   Eyes:      Extraocular Movements: Extraocular movements intact.      Conjunctiva/sclera: Conjunctivae normal.   Cardiovascular:      Rate and Rhythm: Normal rate and regular rhythm.      Pulses: Normal pulses.      Heart sounds: Normal heart sounds.   Pulmonary:      Effort: Pulmonary effort is normal.      Breath sounds: Normal breath sounds.   Abdominal:      General: Abdomen is flat. Bowel sounds are normal.      Palpations: Abdomen is soft.   Musculoskeletal:      Cervical back: Normal range of motion and neck supple.   Skin:     General: Skin is warm and dry.   Neurological:      General: No focal deficit present.      Mental Status: She is alert and oriented to person, place, and time.   Psychiatric:         Mood and Affect: Mood normal.         Behavior: Behavior normal.                Assessment and Plan     1. Mixed hyperlipidemia  Comments:  improving, cont rosuvastatin and healthy diet    2. Leg cramps  Overview:  stable cont dietary potassium supplementation      3. Seropositive rheumatoid  arthritis  Comments:  stable cont orencia and methotrexate per rheumatology    4. Allergic rhinitis, unspecified seasonality, unspecified trigger  Comments:  stable, nasal spray prn  Orders:  -     fluticasone propionate (FLONASE) 50 mcg/actuation nasal spray; 1 spray (50 mcg total) by Each Nostril route once daily.  Dispense: 16 mL; Refill: 6        Assessment & Plan    Total cholesterol decreased from 301 to 261 and LDL from 220 to 189.  Triglycerides now WNL at 139.  Creatinine stable at 2.44.  Iron saturation improved from 11% to 18% (normal being 20%).  Kidney, liver, sodium, potassium levels normal.  Blood sugar, potassium, liver and renal function normal.  Magnesium, B1 thiamine, and B12 levels WNL.  Slightly elevated uric acid, no changes necessary.  Leg cramps less frequent, likely due to dietary changes.  Sleep issues can be managed without medication.  Swallowing discomfort improved.  Rheumatoid condition managed with monthly Orencia.    ACTION ITEMS/LIFESTYLE:   Ayesha to continue efforts to lower cholesterol through diet and exercise.    MEDICATIONS:   Continued nasal spray with 1 bottle and refills.    ORDERS:   Ordered cholesterol recheck in 6 months.              Follow up in about 6 months (around 9/30/2025) for Annual Exam.    This note was generated with the assistance of ambient listening technology. Verbal consent was obtained by the patient and accompanying visitor(s) for the recording of patient appointment to facilitate this note. I attest to having reviewed and edited the generated note for accuracy, though some syntax or spelling errors may persist. Please contact the author of this note for any clarification.

## 2025-04-07 ENCOUNTER — TELEPHONE (OUTPATIENT)
Dept: RHEUMATOLOGY | Facility: CLINIC | Age: 60
End: 2025-04-07
Payer: COMMERCIAL

## 2025-04-07 NOTE — TELEPHONE ENCOUNTER
----- Message from Med Assistant Kong sent at 4/4/2025  1:32 PM CDT -----  Contact: [facility] [employer]    ----- Message -----  From: Sheri Wong  Sent: 4/4/2025  10:22 AM CDT  To: William Todd Staff    Type: Staff Message Who called: [facility]Call back number: [phone] for the call: need clinical infoAdditional information: n/a

## 2025-05-02 ENCOUNTER — TELEPHONE (OUTPATIENT)
Dept: RHEUMATOLOGY | Facility: CLINIC | Age: 60
End: 2025-05-02
Payer: COMMERCIAL

## 2025-05-02 NOTE — TELEPHONE ENCOUNTER
Patient called to ask about the doctor opinion  on getting a small tattoo, I told her I will give her a call back with his response

## 2025-05-02 NOTE — TELEPHONE ENCOUNTER
Called patient to follow up on message about wanting to get tattoo, I left a voicemail with dr.vargas mason

## 2025-05-02 NOTE — TELEPHONE ENCOUNTER
----- Message from Sara sent at 5/2/2025 12:26 PM CDT -----  Contact: Ayesha  Type:  Patient Requesting a call back Who Called:Ayesha What is the call back request regarding?:would like to speak to MD or nurse Would the patient rather a call back or a response via [facility] Call Back Number:[phone]  Additional Information:

## 2025-06-14 ENCOUNTER — LAB VISIT (OUTPATIENT)
Dept: LAB | Facility: HOSPITAL | Age: 60
End: 2025-06-14
Attending: FAMILY MEDICINE
Payer: COMMERCIAL

## 2025-06-14 DIAGNOSIS — E78.2 MIXED HYPERLIPIDEMIA: ICD-10-CM

## 2025-06-14 DIAGNOSIS — Z00.00 ROUTINE GENERAL MEDICAL EXAMINATION AT A HEALTH CARE FACILITY: ICD-10-CM

## 2025-06-14 DIAGNOSIS — E61.1 IRON DEFICIENCY: ICD-10-CM

## 2025-06-14 LAB
ABSOLUTE EOSINOPHIL (OHS): 0.23 K/UL
ABSOLUTE MONOCYTE (OHS): 0.65 K/UL (ref 0.3–1)
ABSOLUTE NEUTROPHIL COUNT (OHS): 4.74 K/UL (ref 1.8–7.7)
ALBUMIN SERPL BCP-MCNC: 4.4 G/DL (ref 3.5–5.2)
ALP SERPL-CCNC: 107 UNIT/L (ref 40–150)
ALT SERPL W/O P-5'-P-CCNC: 26 UNIT/L (ref 10–44)
ANION GAP (OHS): 8 MMOL/L (ref 8–16)
AST SERPL-CCNC: 22 UNIT/L (ref 11–45)
BASOPHILS # BLD AUTO: 0.04 K/UL
BASOPHILS NFR BLD AUTO: 0.5 %
BILIRUB SERPL-MCNC: 0.4 MG/DL (ref 0.1–1)
BUN SERPL-MCNC: 9 MG/DL (ref 6–20)
CALCIUM SERPL-MCNC: 9.3 MG/DL (ref 8.7–10.5)
CHLORIDE SERPL-SCNC: 105 MMOL/L (ref 95–110)
CHOLEST SERPL-MCNC: 189 MG/DL (ref 120–199)
CHOLEST/HDLC SERPL: 4.3 {RATIO} (ref 2–5)
CO2 SERPL-SCNC: 27 MMOL/L (ref 23–29)
CREAT SERPL-MCNC: 0.9 MG/DL (ref 0.5–1.4)
EAG (OHS): 117 MG/DL (ref 68–131)
ERYTHROCYTE [DISTWIDTH] IN BLOOD BY AUTOMATED COUNT: 15.5 % (ref 11.5–14.5)
FERRITIN SERPL-MCNC: 123 NG/ML (ref 20–300)
GFR SERPLBLD CREATININE-BSD FMLA CKD-EPI: >60 ML/MIN/1.73/M2
GLUCOSE SERPL-MCNC: 110 MG/DL (ref 70–110)
HBA1C MFR BLD: 5.7 % (ref 4–5.6)
HCT VFR BLD AUTO: 43 % (ref 37–48.5)
HDLC SERPL-MCNC: 44 MG/DL (ref 40–75)
HDLC SERPL: 23.3 % (ref 20–50)
HGB BLD-MCNC: 13.5 GM/DL (ref 12–16)
IMM GRANULOCYTES # BLD AUTO: 0.02 K/UL (ref 0–0.04)
IMM GRANULOCYTES NFR BLD AUTO: 0.3 % (ref 0–0.5)
IRON SATN MFR SERPL: 40 % (ref 20–50)
IRON SERPL-MCNC: 132 UG/DL (ref 30–160)
LDLC SERPL CALC-MCNC: 119.4 MG/DL (ref 63–159)
LYMPHOCYTES # BLD AUTO: 2.03 K/UL (ref 1–4.8)
MCH RBC QN AUTO: 28.1 PG (ref 27–31)
MCHC RBC AUTO-ENTMCNC: 31.4 G/DL (ref 32–36)
MCV RBC AUTO: 90 FL (ref 82–98)
NONHDLC SERPL-MCNC: 145 MG/DL
NUCLEATED RBC (/100WBC) (OHS): 0 /100 WBC
PLATELET # BLD AUTO: 326 K/UL (ref 150–450)
PMV BLD AUTO: 10.3 FL (ref 9.2–12.9)
POTASSIUM SERPL-SCNC: 3.5 MMOL/L (ref 3.5–5.1)
PROT SERPL-MCNC: 7.3 GM/DL (ref 6–8.4)
RBC # BLD AUTO: 4.8 M/UL (ref 4–5.4)
RELATIVE EOSINOPHIL (OHS): 3 %
RELATIVE LYMPHOCYTE (OHS): 26.3 % (ref 18–48)
RELATIVE MONOCYTE (OHS): 8.4 % (ref 4–15)
RELATIVE NEUTROPHIL (OHS): 61.5 % (ref 38–73)
SODIUM SERPL-SCNC: 140 MMOL/L (ref 136–145)
T4 FREE SERPL-MCNC: 0.87 NG/DL (ref 0.71–1.51)
TIBC SERPL-MCNC: 329 UG/DL (ref 250–450)
TRANSFERRIN SERPL-MCNC: 222 MG/DL (ref 200–375)
TRIGL SERPL-MCNC: 128 MG/DL (ref 30–150)
TSH SERPL-ACNC: 0.83 UIU/ML (ref 0.4–4)
WBC # BLD AUTO: 7.71 K/UL (ref 3.9–12.7)

## 2025-06-14 PROCEDURE — 83036 HEMOGLOBIN GLYCOSYLATED A1C: CPT

## 2025-06-14 PROCEDURE — 84439 ASSAY OF FREE THYROXINE: CPT

## 2025-06-14 PROCEDURE — 80053 COMPREHEN METABOLIC PANEL: CPT

## 2025-06-14 PROCEDURE — 85025 COMPLETE CBC W/AUTO DIFF WBC: CPT

## 2025-06-14 PROCEDURE — 82728 ASSAY OF FERRITIN: CPT

## 2025-06-14 PROCEDURE — 80061 LIPID PANEL: CPT

## 2025-06-14 PROCEDURE — 84466 ASSAY OF TRANSFERRIN: CPT

## 2025-06-14 PROCEDURE — 36415 COLL VENOUS BLD VENIPUNCTURE: CPT

## 2025-06-14 PROCEDURE — 84443 ASSAY THYROID STIM HORMONE: CPT

## 2025-06-20 ENCOUNTER — OFFICE VISIT (OUTPATIENT)
Dept: RHEUMATOLOGY | Facility: CLINIC | Age: 60
End: 2025-06-20
Payer: COMMERCIAL

## 2025-06-20 DIAGNOSIS — R76.8 POSITIVE ANA (ANTINUCLEAR ANTIBODY): ICD-10-CM

## 2025-06-20 DIAGNOSIS — R52 BREAKTHROUGH PAIN: ICD-10-CM

## 2025-06-20 DIAGNOSIS — R76.8 AUTOANTIBODY TITER POSITIVE: ICD-10-CM

## 2025-06-20 DIAGNOSIS — Z51.81 MEDICATION MONITORING ENCOUNTER: ICD-10-CM

## 2025-06-20 DIAGNOSIS — D84.9 IMMUNOSUPPRESSED STATUS: ICD-10-CM

## 2025-06-20 DIAGNOSIS — M05.9 SEROPOSITIVE RHEUMATOID ARTHRITIS: Primary | ICD-10-CM

## 2025-06-20 DIAGNOSIS — M15.0 PRIMARY OSTEOARTHRITIS INVOLVING MULTIPLE JOINTS: ICD-10-CM

## 2025-06-20 DIAGNOSIS — M35.01 SJOGREN SYNDROME WITH KERATOCONJUNCTIVITIS: ICD-10-CM

## 2025-06-20 RX ORDER — FOLIC ACID 1 MG/1
1 TABLET ORAL DAILY
Qty: 90 TABLET | Refills: 1 | Status: SHIPPED | OUTPATIENT
Start: 2025-06-20 | End: 2025-12-17

## 2025-06-20 RX ORDER — DULOXETIN HYDROCHLORIDE 20 MG/1
20 CAPSULE, DELAYED RELEASE ORAL DAILY
Qty: 30 CAPSULE | Refills: 3 | Status: SHIPPED | OUTPATIENT
Start: 2025-06-20 | End: 2025-07-20

## 2025-06-20 RX ORDER — METHOTREXATE 2.5 MG/1
25 TABLET ORAL
Qty: 120 TABLET | Refills: 1 | Status: SHIPPED | OUTPATIENT
Start: 2025-06-20 | End: 2025-12-17

## 2025-06-20 RX ORDER — CELECOXIB 100 MG/1
100 CAPSULE ORAL 2 TIMES DAILY PRN
Qty: 28 CAPSULE | Refills: 1 | Status: SHIPPED | OUTPATIENT
Start: 2025-06-20 | End: 2025-07-04

## 2025-06-20 NOTE — PROGRESS NOTES
The patient location is: [place]  The chief complaint leading to consultation is: SPRA    Visit type: audiovisual    Face to Face time with patient: 30  30 minutes of total time spent on the encounter, which includes face to face time and non-face to face time preparing to see the patient (eg, review of tests), Obtaining and/or reviewing separately obtained history, Documenting clinical information in the electronic or other health record, Independently interpreting results (not separately reported) and communicating results to the patient/family/caregiver, or Care coordination (not separately reported).         Each patient to whom he or she provides medical services by telemedicine is:  (1) informed of the relationship between the physician and patient and the respective role of any other health care provider with respect to management of the patient; and (2) notified that he or she may decline to receive medical services by telemedicine and may withdraw from such care at any time.    Notes:      RHEUMATOLOGY OUTPATIENT CLINIC NOTE    6/20/2025    Attending Rheumatologist: Perez Thomas  Primary Care Provider/Physician Requesting Consultation: Tatyana Rivera MD   Chief Complaint/Reason For Consultation:  No chief complaint on file.      Subjective:     Ayesha Arcos is a 60 y.o. Black or  female with SPRA    MTX PO and Orencia IV maximized since last visit.  Benefit from dose escalation reported.  Self limited hand arthralgias.  No acute complaints.  Taking prednisone a few times per week.     Review of Systems   Constitutional:  Negative for fever.   Eyes:  Negative for pain.   Respiratory:  Negative for cough and shortness of breath.    Cardiovascular:  Negative for chest pain.   Gastrointestinal:  Negative for blood in stool and melena.   Genitourinary:  Negative for dysuria, hematuria and urgency.   Musculoskeletal:  Negative for joint pain.   Skin:  Negative for rash.    Neurological:  Negative for focal weakness.       Chronic comorbid conditions affecting medical decision making today:  Past Medical History:   Diagnosis Date    Carpal tunnel syndrome, bilateral     Hypertension     Rheumatoid arthritis      Past Surgical History:   Procedure Laterality Date    CARPAL TUNNEL RELEASE      right     SECTION      skin cancer removal      nose    TUBAL LIGATION       Family History   Problem Relation Name Age of Onset    Diabetes Mother      Heart disease Father      Hypertension Father      Early death Father          Early 50's    Stroke Paternal Uncle      Heart disease Paternal Uncle       Tobacco Use History[1]  Current Medications[2]     Objective:     There were no vitals filed for this visit.  Physical Exam   Pulmonary/Chest: No respiratory distress.   Musculoskeletal:         General: Swelling and deformity present. No tenderness.   Neurological: She displays no weakness.       Reviewed available old and all outside pertinent medical records available.    All lab results personally reviewed and interpreted by me.       ASSESSMENT / PLAN     1. Seropositive rheumatoid arthritis  Sedimentation rate    Interleukin-6    folic acid (FOLVITE) 1 MG tablet    methotrexate 2.5 MG Tab      2. Sjogren syndrome with keratoconjunctivitis  OTC systane and biotene supplements PRN      3. Positive MOLLY (antinuclear antibody)  MOLLY Screen w/Reflex    Protein/Creatinine Ratio, Urine    Anti-DNA Ab, Double-Stranded      4. Immunosuppressed status  Hold immunosuppression in event of active infections or need for surgery.  CBC Auto Differential    Hepatitis B Surface Antigen    Hepatitis B Core Antibody, Total    Quantiferon Gold TB      5. Medication monitoring encounter  Comprehensive Metabolic Panel      6. Autoantibody titer positive  Negative ALMA.  No related end organ damage.      7. Primary osteoarthritis involving multiple joints  DULoxetine (CYMBALTA) 20 MG capsule      8.  Breakthrough pain  celecoxib (CELEBREX) 100 MG capsule              Visit today included increased complexity associated with the care of the episodic problem Seropositive rheumatoid arthritis addressed and managing the longitudinal care of the patient due to the serious and/or complex managed problem(s) immunosuppressed status.    Perez Thomas M.D.         [1]   Social History  Tobacco Use   Smoking Status Never    Passive exposure: Never   Smokeless Tobacco Never   [2]   Current Outpatient Medications:     ascorbic acid, vitamin C, (VITAMIN C) 100 MG tablet, Take 100 mg by mouth once daily., Disp: , Rfl:     b complex vitamins tablet, Take 1 tablet by mouth once daily., Disp: , Rfl:     diclofenac sodium (VOLTAREN ARTHRITIS PAIN) 1 % Gel, Apply 2 g topically 3 (three) times daily., Disp: 50 g, Rfl: 0    dicyclomine (BENTYL) 20 mg tablet, Take 1 tablet (20 mg total) by mouth every 6 (six) hours as needed (esophageal spasm)., Disp: 30 tablet, Rfl: 0    fluticasone propionate (FLONASE) 50 mcg/actuation nasal spray, 1 spray (50 mcg total) by Each Nostril route once daily., Disp: 16 mL, Rfl: 6    folic acid (FOLVITE) 1 MG tablet, Take 1 tablet (1 mg total) by mouth once daily., Disp: 90 tablet, Rfl: 1    methotrexate 2.5 MG Tab, Take 10 tablets (25 mg total) by mouth every 7 days., Disp: 120 tablet, Rfl: 1    multivitamin capsule, Take 1 capsule by mouth once daily., Disp: , Rfl:     multivitamin with minerals (HAIR,SKIN AND NAILS ORAL), Take by mouth once daily., Disp: , Rfl:     promethazine-dextromethorphan (PROMETHAZINE-DM) 6.25-15 mg/5 mL Syrp, Take 5 mLs by mouth nightly as needed (Cough)., Disp: 120 mL, Rfl: 0    rosuvastatin (CRESTOR) 10 MG tablet, Take 1 tablet (10 mg total) by mouth once daily., Disp: 90 tablet, Rfl: 3    triamcinolone acetonide 0.1% (KENALOG) 0.1 % ointment, Apply topically 2 (two) times daily as needed., Disp: , Rfl:     TURMERIC ORAL, Take by mouth., Disp: , Rfl:     VUITY 1.25 % Drop,  Place 1 drop into both eyes daily as needed., Disp: , Rfl:

## 2025-07-01 DIAGNOSIS — M05.9 SEROPOSITIVE RHEUMATOID ARTHRITIS: ICD-10-CM

## 2025-07-01 RX ORDER — METHOTREXATE 2.5 MG/1
TABLET ORAL
Qty: 120 TABLET | Refills: 1 | OUTPATIENT
Start: 2025-07-01

## 2025-07-17 ENCOUNTER — OFFICE VISIT (OUTPATIENT)
Dept: INTERNAL MEDICINE | Facility: CLINIC | Age: 60
End: 2025-07-17
Payer: COMMERCIAL

## 2025-07-17 VITALS
TEMPERATURE: 98 F | OXYGEN SATURATION: 98 % | DIASTOLIC BLOOD PRESSURE: 84 MMHG | BODY MASS INDEX: 26.3 KG/M2 | WEIGHT: 157.88 LBS | SYSTOLIC BLOOD PRESSURE: 120 MMHG | HEART RATE: 100 BPM | HEIGHT: 65 IN

## 2025-07-17 DIAGNOSIS — R51.9 ACUTE NONINTRACTABLE HEADACHE, UNSPECIFIED HEADACHE TYPE: ICD-10-CM

## 2025-07-17 DIAGNOSIS — R03.0 ELEVATED BLOOD PRESSURE READING: Primary | ICD-10-CM

## 2025-07-17 DIAGNOSIS — R73.03 PREDIABETES: ICD-10-CM

## 2025-07-17 DIAGNOSIS — E78.2 MIXED HYPERLIPIDEMIA: Chronic | ICD-10-CM

## 2025-07-17 PROCEDURE — 3008F BODY MASS INDEX DOCD: CPT | Mod: CPTII,S$GLB,, | Performed by: FAMILY MEDICINE

## 2025-07-17 PROCEDURE — 99999 PR PBB SHADOW E&M-EST. PATIENT-LVL IV: CPT | Mod: PBBFAC,,, | Performed by: FAMILY MEDICINE

## 2025-07-17 PROCEDURE — 3044F HG A1C LEVEL LT 7.0%: CPT | Mod: CPTII,S$GLB,, | Performed by: FAMILY MEDICINE

## 2025-07-17 PROCEDURE — 3079F DIAST BP 80-89 MM HG: CPT | Mod: CPTII,S$GLB,, | Performed by: FAMILY MEDICINE

## 2025-07-17 PROCEDURE — G2211 COMPLEX E/M VISIT ADD ON: HCPCS | Mod: S$GLB,,, | Performed by: FAMILY MEDICINE

## 2025-07-17 PROCEDURE — 99214 OFFICE O/P EST MOD 30 MIN: CPT | Mod: S$GLB,,, | Performed by: FAMILY MEDICINE

## 2025-07-17 PROCEDURE — 1159F MED LIST DOCD IN RCRD: CPT | Mod: CPTII,S$GLB,, | Performed by: FAMILY MEDICINE

## 2025-07-17 PROCEDURE — 1160F RVW MEDS BY RX/DR IN RCRD: CPT | Mod: CPTII,S$GLB,, | Performed by: FAMILY MEDICINE

## 2025-07-17 PROCEDURE — 3074F SYST BP LT 130 MM HG: CPT | Mod: CPTII,S$GLB,, | Performed by: FAMILY MEDICINE

## 2025-07-17 RX ORDER — AMOXICILLIN 500 MG/1
500 TABLET, FILM COATED ORAL 3 TIMES DAILY
COMMUNITY
Start: 2025-07-09

## 2025-07-17 RX ORDER — OXYCODONE AND ACETAMINOPHEN 5; 325 MG/1; MG/1
1 TABLET ORAL
COMMUNITY
Start: 2025-07-10

## 2025-07-28 NOTE — PROGRESS NOTES
Subjective     Patient ID: Ayesha Arcos is a 60 y.o. female.    Chief Complaint: Headache, Hypertension, and Follow-up (Ongoing issues for 15 years , but had stopped .Pt had oral surgery on 7/10/25 and this is when the symptoms started again .)    History of Present Illness    PRESENTATION:  Ayesha presents for follow-up regarding elevated blood pressure and to discuss recent lab results.    HPI:  Ayesha reports elevated blood pressure over the weekend, with readings as high as 145/98, accompanied by headaches. She had oral surgery on the 10th for tooth extraction in preparation for an implant, which caused pain over the weekend. She believes the pain may have contributed to her elevated blood pressure. Ayesha states she has not had problems with elevated blood pressure for 10-15 years prior to this recent episode. She was evaluated at a follow-up visit this morning for her oral surgery, where the dentist confirmed proper healing. Ayesha mentions having elevated glycerol levels 5 months ago, for which she was prescribed Rosuvastatin. Her total cholesterol has since decreased from 261 to 189. Ayesha also reports sleep disturbances and difficulty with her sleep schedule.    Ayesha denies chest pain and shortness of breath.    MEDICATIONS:  Ayesha is on Rosuvastatin for elevated cholesterol, with a 90-day supply and 3 refills. She is also on Cymbalta, Folic acid, and Methotrexate.    MEDICAL HISTORY:  Ayesha has a history of hypertension diagnosed 10-15 years ago, but she is not currently on medication for it. She has prediabetes with a Hemoglobin A1c of 5.7, which falls in the prediabetic range. She also has hyperlipidemia, with elevated cholesterol noted 5 months ago, for which she is now on medication.    SURGICAL HISTORY:  Ayesha underwent oral surgery on the 10th of the current month, which involved a tooth extraction in preparation for a dental implant.    TEST RESULTS:  Her recent Hemoglobin  "A1c was 5.7%, which is slightly elevated and in the prediabetic range. Ayesha's recent CBC was noted as "good". Her kidney function test, serum sodium, and serum potassium levels were all recently reported as "perfect". Her glucose levels were noted as "stable". Five months ago, her total cholesterol decreased from 261 to 189.    SOCIAL HISTORY:  Occupation: Employed      ROS:  General: -fever, -chills, -fatigue, -weight gain, -weight loss  Eyes: -vision changes, -redness, -discharge  ENT: -ear pain, -nasal congestion, -sore throat  Cardiovascular: -chest pain, -palpitations, -lower extremity edema  Respiratory: -cough, -shortness of breath  Gastrointestinal: -abdominal pain, -nausea, -vomiting, -diarrhea, -constipation, -blood in stool  Genitourinary: -dysuria, -hematuria, -frequency  Musculoskeletal: -joint pain, -muscle pain  Skin: -rash, -lesion  Neurological: +headache, -dizziness, -numbness, -tingling, +sleep disturbances  Psychiatric: -anxiety, -depression, -sleep difficulty  Head: +head pain, +tooth pain            Objective     Physical Exam  Vitals and nursing note reviewed.   Constitutional:       Appearance: Normal appearance. She is normal weight.   HENT:      Head: Normocephalic and atraumatic.   Eyes:      Extraocular Movements: Extraocular movements intact.      Conjunctiva/sclera: Conjunctivae normal.   Cardiovascular:      Rate and Rhythm: Normal rate and regular rhythm.      Pulses: Normal pulses.      Heart sounds: Normal heart sounds.   Pulmonary:      Effort: Pulmonary effort is normal.      Breath sounds: Normal breath sounds.   Musculoskeletal:      Cervical back: Normal range of motion and neck supple.      Right lower leg: No edema.      Left lower leg: No edema.   Skin:     General: Skin is warm and dry.   Neurological:      General: No focal deficit present.      Mental Status: She is alert and oriented to person, place, and time.   Psychiatric:         Mood and Affect: Mood normal.    "      Behavior: Behavior normal.                Assessment and Plan     1. Elevated blood pressure reading    2. Mixed hyperlipidemia    3. Acute nonintractable headache, unspecified headache type    4. Prediabetes        Assessment & Plan    Assessed elevated BP, likely due to pain from recent oral surgery.  Evaluated labs: hemoglobin A1c slightly elevated at 5.7 (prediabetic range).  Noted improvement in cholesterol levels with current statin therapy.  Reviewed up-to-date status on screening tests.    PATIENT EDUCATION:   Explained that pain can cause temporary elevations in BP.   Discussed prediabetic range of hemoglobin A1c and its implications.    ACTION ITEMS/LIFESTYLE:   Ayesha to monitor BP, especially during episodes of pain.    MEDICATIONS:   Continued Rosuvastatin: provided 90-day supply with 3 refills.              Follow up in about 6 months (around 1/17/2026).    This note was generated with the assistance of ambient listening technology. Verbal consent was obtained by the patient and accompanying visitor(s) for the recording of patient appointment to facilitate this note. I attest to having reviewed and edited the generated note for accuracy, though some syntax or spelling errors may persist. Please contact the author of this note for any clarification.

## 2025-07-31 PROCEDURE — 85652 RBC SED RATE AUTOMATED: CPT | Performed by: INTERNAL MEDICINE

## 2025-07-31 PROCEDURE — 87340 HEPATITIS B SURFACE AG IA: CPT | Performed by: INTERNAL MEDICINE

## 2025-07-31 PROCEDURE — 85025 COMPLETE CBC W/AUTO DIFF WBC: CPT | Performed by: INTERNAL MEDICINE

## 2025-07-31 PROCEDURE — 86480 TB TEST CELL IMMUN MEASURE: CPT | Performed by: INTERNAL MEDICINE

## 2025-07-31 PROCEDURE — 86704 HEP B CORE ANTIBODY TOTAL: CPT | Performed by: INTERNAL MEDICINE
